# Patient Record
Sex: MALE | Race: WHITE | URBAN - METROPOLITAN AREA
[De-identification: names, ages, dates, MRNs, and addresses within clinical notes are randomized per-mention and may not be internally consistent; named-entity substitution may affect disease eponyms.]

---

## 2017-01-01 ENCOUNTER — ONCOLOGY VISIT (OUTPATIENT)
Dept: ONCOLOGY | Facility: CLINIC | Age: 27
End: 2017-01-01
Attending: PHYSICIAN ASSISTANT
Payer: COMMERCIAL

## 2017-01-01 ENCOUNTER — ALLIED HEALTH/NURSE VISIT (OUTPATIENT)
Dept: RADIATION ONCOLOGY | Facility: CLINIC | Age: 27
End: 2017-01-01
Attending: RADIOLOGY
Payer: COMMERCIAL

## 2017-01-01 ENCOUNTER — INFUSION THERAPY VISIT (OUTPATIENT)
Dept: ONCOLOGY | Facility: CLINIC | Age: 27
End: 2017-01-01
Attending: INTERNAL MEDICINE
Payer: COMMERCIAL

## 2017-01-01 ENCOUNTER — OFFICE VISIT (OUTPATIENT)
Dept: PALLIATIVE CARE | Facility: CLINIC | Age: 27
End: 2017-01-01
Attending: INTERNAL MEDICINE
Payer: COMMERCIAL

## 2017-01-01 ENCOUNTER — CARE COORDINATION (OUTPATIENT)
Dept: ONCOLOGY | Facility: CLINIC | Age: 27
End: 2017-01-01

## 2017-01-01 ENCOUNTER — TELEPHONE (OUTPATIENT)
Dept: INTERVENTIONAL RADIOLOGY/VASCULAR | Facility: CLINIC | Age: 27
End: 2017-01-01

## 2017-01-01 ENCOUNTER — APPOINTMENT (OUTPATIENT)
Dept: LAB | Facility: CLINIC | Age: 27
End: 2017-01-01
Attending: INTERNAL MEDICINE
Payer: COMMERCIAL

## 2017-01-01 ENCOUNTER — MYC MEDICAL ADVICE (OUTPATIENT)
Dept: ONCOLOGY | Facility: CLINIC | Age: 27
End: 2017-01-01

## 2017-01-01 ENCOUNTER — TELEPHONE (OUTPATIENT)
Dept: ONCOLOGY | Facility: CLINIC | Age: 27
End: 2017-01-01

## 2017-01-01 ENCOUNTER — OFFICE VISIT (OUTPATIENT)
Dept: ORTHOPEDICS | Facility: CLINIC | Age: 27
End: 2017-01-01

## 2017-01-01 ENCOUNTER — HOSPITAL ENCOUNTER (OUTPATIENT)
Dept: NUTRITION | Facility: CLINIC | Age: 27
Discharge: HOME | End: 2017-04-25
Attending: INTERNAL MEDICINE | Admitting: INTERNAL MEDICINE
Payer: COMMERCIAL

## 2017-01-01 ENCOUNTER — ALLIED HEALTH/NURSE VISIT (OUTPATIENT)
Dept: ONCOLOGY | Facility: CLINIC | Age: 27
End: 2017-01-01
Attending: DIETITIAN, REGISTERED
Payer: COMMERCIAL

## 2017-01-01 ENCOUNTER — MYC REFILL (OUTPATIENT)
Dept: ONCOLOGY | Facility: CLINIC | Age: 27
End: 2017-01-01

## 2017-01-01 ENCOUNTER — OFFICE VISIT (OUTPATIENT)
Dept: ONCOLOGY | Facility: CLINIC | Age: 27
End: 2017-01-01
Attending: GENETIC COUNSELOR, MS
Payer: COMMERCIAL

## 2017-01-01 ENCOUNTER — ONCOLOGY VISIT (OUTPATIENT)
Dept: RADIATION ONCOLOGY | Facility: CLINIC | Age: 27
End: 2017-01-01

## 2017-01-01 ENCOUNTER — APPOINTMENT (OUTPATIENT)
Dept: MEDSURG UNIT | Facility: CLINIC | Age: 27
End: 2017-01-01
Attending: RADIOLOGY
Payer: COMMERCIAL

## 2017-01-01 ENCOUNTER — THERAPY VISIT (OUTPATIENT)
Dept: SPEECH THERAPY | Facility: CLINIC | Age: 27
End: 2017-01-01

## 2017-01-01 ENCOUNTER — APPOINTMENT (OUTPATIENT)
Dept: INTERVENTIONAL RADIOLOGY/VASCULAR | Facility: CLINIC | Age: 27
End: 2017-01-01
Attending: PHYSICIAN ASSISTANT
Payer: COMMERCIAL

## 2017-01-01 ENCOUNTER — TELEPHONE (OUTPATIENT)
Dept: ENDOCRINOLOGY | Facility: CLINIC | Age: 27
End: 2017-01-01

## 2017-01-01 ENCOUNTER — HOSPITAL ENCOUNTER (OUTPATIENT)
Facility: CLINIC | Age: 27
End: 2017-01-01
Admitting: INTERNAL MEDICINE
Payer: COMMERCIAL

## 2017-01-01 ENCOUNTER — PRE VISIT (OUTPATIENT)
Dept: ORTHOPEDICS | Facility: CLINIC | Age: 27
End: 2017-01-01

## 2017-01-01 ENCOUNTER — HOSPITAL ENCOUNTER (OUTPATIENT)
Facility: CLINIC | Age: 27
Setting detail: OBSERVATION
Discharge: HOME HEALTH | End: 2017-03-21
Attending: RADIOLOGY | Admitting: INTERNAL MEDICINE
Payer: COMMERCIAL

## 2017-01-01 VITALS
TEMPERATURE: 97.5 F | WEIGHT: 129.9 LBS | HEART RATE: 88 BPM | RESPIRATION RATE: 16 BRPM | OXYGEN SATURATION: 98 % | DIASTOLIC BLOOD PRESSURE: 81 MMHG | BODY MASS INDEX: 16.68 KG/M2 | SYSTOLIC BLOOD PRESSURE: 122 MMHG

## 2017-01-01 VITALS
RESPIRATION RATE: 16 BRPM | HEART RATE: 109 BPM | DIASTOLIC BLOOD PRESSURE: 77 MMHG | TEMPERATURE: 98.5 F | OXYGEN SATURATION: 98 % | WEIGHT: 127.8 LBS | HEIGHT: 74 IN | BODY MASS INDEX: 16.4 KG/M2 | SYSTOLIC BLOOD PRESSURE: 117 MMHG

## 2017-01-01 VITALS
HEART RATE: 106 BPM | TEMPERATURE: 98.3 F | BODY MASS INDEX: 19.3 KG/M2 | DIASTOLIC BLOOD PRESSURE: 67 MMHG | HEIGHT: 74 IN | DIASTOLIC BLOOD PRESSURE: 79 MMHG | SYSTOLIC BLOOD PRESSURE: 127 MMHG | RESPIRATION RATE: 18 BRPM | WEIGHT: 126.9 LBS | SYSTOLIC BLOOD PRESSURE: 118 MMHG | OXYGEN SATURATION: 100 % | BODY MASS INDEX: 16.29 KG/M2 | WEIGHT: 150.4 LBS | OXYGEN SATURATION: 93 % | HEART RATE: 107 BPM | RESPIRATION RATE: 18 BRPM | TEMPERATURE: 98.7 F

## 2017-01-01 VITALS
RESPIRATION RATE: 18 BRPM | SYSTOLIC BLOOD PRESSURE: 120 MMHG | HEART RATE: 75 BPM | OXYGEN SATURATION: 100 % | BODY MASS INDEX: 15.89 KG/M2 | DIASTOLIC BLOOD PRESSURE: 80 MMHG | WEIGHT: 123.8 LBS | TEMPERATURE: 98.1 F | HEIGHT: 74 IN

## 2017-01-01 VITALS
RESPIRATION RATE: 16 BRPM | DIASTOLIC BLOOD PRESSURE: 81 MMHG | OXYGEN SATURATION: 100 % | WEIGHT: 134.7 LBS | TEMPERATURE: 98.3 F | HEART RATE: 93 BPM | BODY MASS INDEX: 17.29 KG/M2 | SYSTOLIC BLOOD PRESSURE: 130 MMHG

## 2017-01-01 VITALS
RESPIRATION RATE: 14 BRPM | SYSTOLIC BLOOD PRESSURE: 133 MMHG | TEMPERATURE: 98.9 F | WEIGHT: 132.3 LBS | DIASTOLIC BLOOD PRESSURE: 93 MMHG | BODY MASS INDEX: 16.98 KG/M2 | HEART RATE: 115 BPM | OXYGEN SATURATION: 100 %

## 2017-01-01 VITALS
OXYGEN SATURATION: 100 % | BODY MASS INDEX: 18.22 KG/M2 | HEART RATE: 82 BPM | DIASTOLIC BLOOD PRESSURE: 71 MMHG | SYSTOLIC BLOOD PRESSURE: 128 MMHG | WEIGHT: 142 LBS

## 2017-01-01 VITALS
SYSTOLIC BLOOD PRESSURE: 127 MMHG | BODY MASS INDEX: 18.19 KG/M2 | TEMPERATURE: 97.8 F | WEIGHT: 141.7 LBS | RESPIRATION RATE: 18 BRPM | HEART RATE: 94 BPM | OXYGEN SATURATION: 100 % | DIASTOLIC BLOOD PRESSURE: 79 MMHG

## 2017-01-01 VITALS
DIASTOLIC BLOOD PRESSURE: 99 MMHG | HEART RATE: 109 BPM | OXYGEN SATURATION: 100 % | SYSTOLIC BLOOD PRESSURE: 130 MMHG | BODY MASS INDEX: 16.81 KG/M2 | WEIGHT: 131 LBS

## 2017-01-01 VITALS
TEMPERATURE: 98 F | HEART RATE: 104 BPM | BODY MASS INDEX: 16.54 KG/M2 | DIASTOLIC BLOOD PRESSURE: 84 MMHG | RESPIRATION RATE: 16 BRPM | SYSTOLIC BLOOD PRESSURE: 118 MMHG | OXYGEN SATURATION: 100 % | WEIGHT: 128.8 LBS

## 2017-01-01 VITALS
SYSTOLIC BLOOD PRESSURE: 111 MMHG | WEIGHT: 125.44 LBS | OXYGEN SATURATION: 100 % | HEART RATE: 116 BPM | TEMPERATURE: 98.1 F | DIASTOLIC BLOOD PRESSURE: 76 MMHG | BODY MASS INDEX: 16.1 KG/M2 | RESPIRATION RATE: 16 BRPM

## 2017-01-01 VITALS
HEIGHT: 74 IN | TEMPERATURE: 98.2 F | WEIGHT: 142 LBS | HEART RATE: 85 BPM | SYSTOLIC BLOOD PRESSURE: 126 MMHG | RESPIRATION RATE: 18 BRPM | OXYGEN SATURATION: 97 % | DIASTOLIC BLOOD PRESSURE: 81 MMHG | BODY MASS INDEX: 18.22 KG/M2

## 2017-01-01 VITALS
SYSTOLIC BLOOD PRESSURE: 115 MMHG | HEART RATE: 120 BPM | DIASTOLIC BLOOD PRESSURE: 67 MMHG | HEIGHT: 74 IN | WEIGHT: 131.7 LBS | TEMPERATURE: 98.7 F | OXYGEN SATURATION: 100 % | BODY MASS INDEX: 16.9 KG/M2 | RESPIRATION RATE: 20 BRPM

## 2017-01-01 VITALS
BODY MASS INDEX: 18.85 KG/M2 | TEMPERATURE: 99.5 F | SYSTOLIC BLOOD PRESSURE: 115 MMHG | DIASTOLIC BLOOD PRESSURE: 72 MMHG | HEART RATE: 97 BPM | WEIGHT: 146.9 LBS | HEIGHT: 74 IN | OXYGEN SATURATION: 100 % | RESPIRATION RATE: 14 BRPM

## 2017-01-01 VITALS
DIASTOLIC BLOOD PRESSURE: 82 MMHG | RESPIRATION RATE: 18 BRPM | BODY MASS INDEX: 15.97 KG/M2 | TEMPERATURE: 98.9 F | WEIGHT: 124.4 LBS | OXYGEN SATURATION: 100 % | SYSTOLIC BLOOD PRESSURE: 113 MMHG | HEIGHT: 74 IN | HEART RATE: 119 BPM

## 2017-01-01 VITALS
TEMPERATURE: 98.2 F | OXYGEN SATURATION: 99 % | DIASTOLIC BLOOD PRESSURE: 73 MMHG | HEART RATE: 113 BPM | BODY MASS INDEX: 16.19 KG/M2 | WEIGHT: 126.11 LBS | RESPIRATION RATE: 16 BRPM | SYSTOLIC BLOOD PRESSURE: 109 MMHG

## 2017-01-01 VITALS
RESPIRATION RATE: 16 BRPM | DIASTOLIC BLOOD PRESSURE: 68 MMHG | TEMPERATURE: 97.4 F | OXYGEN SATURATION: 97 % | SYSTOLIC BLOOD PRESSURE: 112 MMHG | HEART RATE: 101 BPM

## 2017-01-01 VITALS
TEMPERATURE: 98.1 F | OXYGEN SATURATION: 100 % | WEIGHT: 136.9 LBS | RESPIRATION RATE: 18 BRPM | SYSTOLIC BLOOD PRESSURE: 118 MMHG | DIASTOLIC BLOOD PRESSURE: 75 MMHG | HEART RATE: 103 BPM | BODY MASS INDEX: 17.57 KG/M2

## 2017-01-01 VITALS — BODY MASS INDEX: 17.53 KG/M2 | WEIGHT: 136.6 LBS

## 2017-01-01 VITALS — HEIGHT: 74 IN | BODY MASS INDEX: 18.26 KG/M2 | WEIGHT: 142.3 LBS

## 2017-01-01 DIAGNOSIS — D70.2 DRUG-INDUCED NEUTROPENIA (H): ICD-10-CM

## 2017-01-01 DIAGNOSIS — R63.4 WEIGHT LOSS: ICD-10-CM

## 2017-01-01 DIAGNOSIS — C76.0 HEAD AND NECK CANCER (H): Primary | ICD-10-CM

## 2017-01-01 DIAGNOSIS — Z80.8 FAMILY HISTORY OF BRAIN CANCER: ICD-10-CM

## 2017-01-01 DIAGNOSIS — C76.0 HEAD AND NECK CANCER (H): ICD-10-CM

## 2017-01-01 DIAGNOSIS — C79.51 BONE METASTASIS: ICD-10-CM

## 2017-01-01 DIAGNOSIS — R09.89 RESPIRATORY CRACKLES AT LEFT LUNG BASE: ICD-10-CM

## 2017-01-01 DIAGNOSIS — C02.9 MALIGNANT NEOPLASM OF TONGUE (H): Primary | ICD-10-CM

## 2017-01-01 DIAGNOSIS — C02.9 MALIGNANT NEOPLASM OF TONGUE (H): ICD-10-CM

## 2017-01-01 DIAGNOSIS — C06.9 ORAL CANCER (H): ICD-10-CM

## 2017-01-01 DIAGNOSIS — Z80.41 FAMILY HISTORY OF MALIGNANT NEOPLASM OF OVARY: ICD-10-CM

## 2017-01-01 DIAGNOSIS — C79.51 BONE METASTASIS: Primary | ICD-10-CM

## 2017-01-01 DIAGNOSIS — Z80.51 FAMILY HISTORY OF KIDNEY CANCER: ICD-10-CM

## 2017-01-01 DIAGNOSIS — R13.12 OROPHARYNGEAL DYSPHAGIA: Primary | ICD-10-CM

## 2017-01-01 DIAGNOSIS — R53.0 NEOPLASTIC MALIGNANT RELATED FATIGUE: ICD-10-CM

## 2017-01-01 DIAGNOSIS — D70.1 CHEMOTHERAPY-INDUCED NEUTROPENIA (H): ICD-10-CM

## 2017-01-01 DIAGNOSIS — R21 RASH: ICD-10-CM

## 2017-01-01 DIAGNOSIS — C02.9 PRIMARY TONGUE SQUAMOUS CELL CARCINOMA (H): Primary | ICD-10-CM

## 2017-01-01 DIAGNOSIS — K59.03 DRUG-INDUCED CONSTIPATION: ICD-10-CM

## 2017-01-01 DIAGNOSIS — C14.0 THROAT CANCER (H): Primary | ICD-10-CM

## 2017-01-01 DIAGNOSIS — R11.0 NAUSEA: ICD-10-CM

## 2017-01-01 DIAGNOSIS — K11.7 INCREASED OROPHARYNGEAL SECRETIONS: ICD-10-CM

## 2017-01-01 DIAGNOSIS — E03.8 OTHER SPECIFIED HYPOTHYROIDISM: ICD-10-CM

## 2017-01-01 DIAGNOSIS — C02.9 TONGUE CANCER (H): ICD-10-CM

## 2017-01-01 DIAGNOSIS — Z51.5 ENCOUNTER FOR PALLIATIVE CARE: Primary | ICD-10-CM

## 2017-01-01 DIAGNOSIS — D70.2 DRUG-INDUCED NEUTROPENIA (H): Primary | ICD-10-CM

## 2017-01-01 DIAGNOSIS — T45.1X5A CHEMOTHERAPY-INDUCED NEUTROPENIA (H): ICD-10-CM

## 2017-01-01 DIAGNOSIS — K12.31 MUCOSITIS DUE TO CHEMOTHERAPY: ICD-10-CM

## 2017-01-01 LAB
ABO + RH BLD: NORMAL
ABO + RH BLD: NORMAL
ACANTHOCYTES BLD QL SMEAR: SLIGHT
ALBUMIN SERPL-MCNC: 2.7 G/DL (ref 3.4–5)
ALBUMIN SERPL-MCNC: 2.8 G/DL (ref 3.4–5)
ALBUMIN SERPL-MCNC: 2.9 G/DL (ref 3.4–5)
ALBUMIN SERPL-MCNC: 3 G/DL (ref 3.4–5)
ALBUMIN SERPL-MCNC: 3.1 G/DL (ref 3.4–5)
ALBUMIN SERPL-MCNC: 3.1 G/DL (ref 3.4–5)
ALBUMIN SERPL-MCNC: 3.2 G/DL (ref 3.4–5)
ALBUMIN SERPL-MCNC: 3.3 G/DL (ref 3.4–5)
ALBUMIN SERPL-MCNC: 3.4 G/DL (ref 3.4–5)
ALBUMIN SERPL-MCNC: 3.4 G/DL (ref 3.4–5)
ALP SERPL-CCNC: 133 U/L (ref 40–150)
ALP SERPL-CCNC: 177 U/L (ref 40–150)
ALP SERPL-CCNC: 64 U/L (ref 40–150)
ALP SERPL-CCNC: 66 U/L (ref 40–150)
ALP SERPL-CCNC: 68 U/L (ref 40–150)
ALP SERPL-CCNC: 69 U/L (ref 40–150)
ALP SERPL-CCNC: 69 U/L (ref 40–150)
ALP SERPL-CCNC: 71 U/L (ref 40–150)
ALP SERPL-CCNC: 75 U/L (ref 40–150)
ALP SERPL-CCNC: 76 U/L (ref 40–150)
ALP SERPL-CCNC: 84 U/L (ref 40–150)
ALP SERPL-CCNC: 95 U/L (ref 40–150)
ALT SERPL W P-5'-P-CCNC: 18 U/L (ref 0–70)
ALT SERPL W P-5'-P-CCNC: 23 U/L (ref 0–70)
ALT SERPL W P-5'-P-CCNC: 26 U/L (ref 0–70)
ALT SERPL W P-5'-P-CCNC: 27 U/L (ref 0–70)
ALT SERPL W P-5'-P-CCNC: 27 U/L (ref 0–70)
ALT SERPL W P-5'-P-CCNC: 28 U/L (ref 0–70)
ALT SERPL W P-5'-P-CCNC: 34 U/L (ref 0–70)
ALT SERPL W P-5'-P-CCNC: 34 U/L (ref 0–70)
ALT SERPL W P-5'-P-CCNC: 37 U/L (ref 0–70)
ALT SERPL W P-5'-P-CCNC: 43 U/L (ref 0–70)
ALT SERPL W P-5'-P-CCNC: 51 U/L (ref 0–70)
ALT SERPL W P-5'-P-CCNC: 52 U/L (ref 0–70)
ALT SERPL W P-5'-P-CCNC: 52 U/L (ref 0–70)
ALT SERPL W P-5'-P-CCNC: 64 U/L (ref 0–70)
ALT SERPL W P-5'-P-CCNC: 65 U/L (ref 0–70)
ALT SERPL W P-5'-P-CCNC: 71 U/L (ref 0–70)
ANION GAP SERPL CALCULATED.3IONS-SCNC: 5 MMOL/L (ref 3–14)
ANION GAP SERPL CALCULATED.3IONS-SCNC: 6 MMOL/L (ref 3–14)
ANION GAP SERPL CALCULATED.3IONS-SCNC: 7 MMOL/L (ref 3–14)
ANION GAP SERPL CALCULATED.3IONS-SCNC: 8 MMOL/L (ref 3–14)
ANION GAP SERPL CALCULATED.3IONS-SCNC: 9 MMOL/L (ref 3–14)
ANISOCYTOSIS BLD QL SMEAR: ABNORMAL
ANISOCYTOSIS BLD QL SMEAR: ABNORMAL
AST SERPL W P-5'-P-CCNC: 15 U/L (ref 0–45)
AST SERPL W P-5'-P-CCNC: 15 U/L (ref 0–45)
AST SERPL W P-5'-P-CCNC: 18 U/L (ref 0–45)
AST SERPL W P-5'-P-CCNC: 19 U/L (ref 0–45)
AST SERPL W P-5'-P-CCNC: 19 U/L (ref 0–45)
AST SERPL W P-5'-P-CCNC: 20 U/L (ref 0–45)
AST SERPL W P-5'-P-CCNC: 22 U/L (ref 0–45)
AST SERPL W P-5'-P-CCNC: 23 U/L (ref 0–45)
AST SERPL W P-5'-P-CCNC: 24 U/L (ref 0–45)
AST SERPL W P-5'-P-CCNC: 25 U/L (ref 0–45)
AST SERPL W P-5'-P-CCNC: 30 U/L (ref 0–45)
AST SERPL W P-5'-P-CCNC: 30 U/L (ref 0–45)
AST SERPL W P-5'-P-CCNC: 32 U/L (ref 0–45)
AST SERPL W P-5'-P-CCNC: 38 U/L (ref 0–45)
AST SERPL W P-5'-P-CCNC: 39 U/L (ref 0–45)
AST SERPL W P-5'-P-CCNC: 41 U/L (ref 0–45)
BASOPHILS # BLD AUTO: 0 10E9/L (ref 0–0.2)
BASOPHILS # BLD AUTO: 0.1 10E9/L (ref 0–0.2)
BASOPHILS # BLD AUTO: 0.1 10E9/L (ref 0–0.2)
BASOPHILS NFR BLD AUTO: 0 %
BASOPHILS NFR BLD AUTO: 0.3 %
BASOPHILS NFR BLD AUTO: 0.4 %
BASOPHILS NFR BLD AUTO: 0.4 %
BASOPHILS NFR BLD AUTO: 0.5 %
BASOPHILS NFR BLD AUTO: 0.7 %
BASOPHILS NFR BLD AUTO: 0.8 %
BASOPHILS NFR BLD AUTO: 0.9 %
BILIRUB SERPL-MCNC: 0.2 MG/DL (ref 0.2–1.3)
BILIRUB SERPL-MCNC: 0.3 MG/DL (ref 0.2–1.3)
BILIRUB SERPL-MCNC: 0.4 MG/DL (ref 0.2–1.3)
BILIRUB SERPL-MCNC: 0.5 MG/DL (ref 0.2–1.3)
BILIRUB SERPL-MCNC: 0.5 MG/DL (ref 0.2–1.3)
BILIRUB SERPL-MCNC: 0.7 MG/DL (ref 0.2–1.3)
BILIRUB SERPL-MCNC: 0.7 MG/DL (ref 0.2–1.3)
BLD GP AB SCN SERPL QL: NORMAL
BLD PROD TYP BPU: NORMAL
BLD PROD TYP BPU: NORMAL
BLD UNIT ID BPU: 0
BLOOD BANK CMNT PATIENT-IMP: NORMAL
BLOOD PRODUCT CODE: NORMAL
BPU ID: NORMAL
BUN SERPL-MCNC: 10 MG/DL (ref 7–30)
BUN SERPL-MCNC: 11 MG/DL (ref 7–30)
BUN SERPL-MCNC: 11 MG/DL (ref 7–30)
BUN SERPL-MCNC: 12 MG/DL (ref 7–30)
BUN SERPL-MCNC: 14 MG/DL (ref 7–30)
BUN SERPL-MCNC: 16 MG/DL (ref 7–30)
BUN SERPL-MCNC: 17 MG/DL (ref 7–30)
BUN SERPL-MCNC: 17 MG/DL (ref 7–30)
BUN SERPL-MCNC: 20 MG/DL (ref 7–30)
BUN SERPL-MCNC: 8 MG/DL (ref 7–30)
CALCIUM SERPL-MCNC: 10.6 MG/DL (ref 8.5–10.1)
CALCIUM SERPL-MCNC: 10.7 MG/DL (ref 8.5–10.1)
CALCIUM SERPL-MCNC: 7.9 MG/DL (ref 8.5–10.1)
CALCIUM SERPL-MCNC: 8.5 MG/DL (ref 8.5–10.1)
CALCIUM SERPL-MCNC: 8.6 MG/DL (ref 8.5–10.1)
CALCIUM SERPL-MCNC: 8.8 MG/DL (ref 8.5–10.1)
CALCIUM SERPL-MCNC: 9 MG/DL (ref 8.5–10.1)
CALCIUM SERPL-MCNC: 9.1 MG/DL (ref 8.5–10.1)
CALCIUM SERPL-MCNC: 9.2 MG/DL (ref 8.5–10.1)
CALCIUM SERPL-MCNC: 9.3 MG/DL (ref 8.5–10.1)
CHLORIDE SERPL-SCNC: 100 MMOL/L (ref 94–109)
CHLORIDE SERPL-SCNC: 101 MMOL/L (ref 94–109)
CHLORIDE SERPL-SCNC: 102 MMOL/L (ref 94–109)
CHLORIDE SERPL-SCNC: 102 MMOL/L (ref 94–109)
CHLORIDE SERPL-SCNC: 103 MMOL/L (ref 94–109)
CHLORIDE SERPL-SCNC: 103 MMOL/L (ref 94–109)
CHLORIDE SERPL-SCNC: 104 MMOL/L (ref 94–109)
CHLORIDE SERPL-SCNC: 105 MMOL/L (ref 94–109)
CHLORIDE SERPL-SCNC: 99 MMOL/L (ref 94–109)
CO2 SERPL-SCNC: 25 MMOL/L (ref 20–32)
CO2 SERPL-SCNC: 26 MMOL/L (ref 20–32)
CO2 SERPL-SCNC: 27 MMOL/L (ref 20–32)
CO2 SERPL-SCNC: 28 MMOL/L (ref 20–32)
CO2 SERPL-SCNC: 29 MMOL/L (ref 20–32)
CO2 SERPL-SCNC: 32 MMOL/L (ref 20–32)
COPATH REPORT: NORMAL
CREAT SERPL-MCNC: 0.53 MG/DL (ref 0.66–1.25)
CREAT SERPL-MCNC: 0.55 MG/DL (ref 0.66–1.25)
CREAT SERPL-MCNC: 0.55 MG/DL (ref 0.66–1.25)
CREAT SERPL-MCNC: 0.56 MG/DL (ref 0.66–1.25)
CREAT SERPL-MCNC: 0.56 MG/DL (ref 0.66–1.25)
CREAT SERPL-MCNC: 0.58 MG/DL (ref 0.66–1.25)
CREAT SERPL-MCNC: 0.59 MG/DL (ref 0.66–1.25)
CREAT SERPL-MCNC: 0.63 MG/DL (ref 0.66–1.25)
CREAT SERPL-MCNC: 0.65 MG/DL (ref 0.66–1.25)
CREAT SERPL-MCNC: 0.66 MG/DL (ref 0.66–1.25)
CREAT SERPL-MCNC: 0.69 MG/DL (ref 0.66–1.25)
CREAT SERPL-MCNC: 0.71 MG/DL (ref 0.66–1.25)
CREAT SERPL-MCNC: 0.72 MG/DL (ref 0.66–1.25)
CREAT SERPL-MCNC: 0.73 MG/DL (ref 0.66–1.25)
DACRYOCYTES BLD QL SMEAR: SLIGHT
DIFFERENTIAL METHOD BLD: ABNORMAL
EOSINOPHIL # BLD AUTO: 0 10E9/L (ref 0–0.7)
EOSINOPHIL # BLD AUTO: 0.1 10E9/L (ref 0–0.7)
EOSINOPHIL # BLD AUTO: 0.3 10E9/L (ref 0–0.7)
EOSINOPHIL # BLD AUTO: 0.4 10E9/L (ref 0–0.7)
EOSINOPHIL NFR BLD AUTO: 0 %
EOSINOPHIL NFR BLD AUTO: 0.7 %
EOSINOPHIL NFR BLD AUTO: 0.8 %
EOSINOPHIL NFR BLD AUTO: 0.8 %
EOSINOPHIL NFR BLD AUTO: 1 %
EOSINOPHIL NFR BLD AUTO: 1.5 %
EOSINOPHIL NFR BLD AUTO: 1.8 %
EOSINOPHIL NFR BLD AUTO: 2 %
EOSINOPHIL NFR BLD AUTO: 2.9 %
EOSINOPHIL NFR BLD AUTO: 3.2 %
EOSINOPHIL NFR BLD AUTO: 3.5 %
EOSINOPHIL NFR BLD AUTO: 3.7 %
EOSINOPHIL NFR BLD AUTO: 4.4 %
ERYTHROCYTE [DISTWIDTH] IN BLOOD BY AUTOMATED COUNT: 14.1 % (ref 10–15)
ERYTHROCYTE [DISTWIDTH] IN BLOOD BY AUTOMATED COUNT: 14.2 % (ref 10–15)
ERYTHROCYTE [DISTWIDTH] IN BLOOD BY AUTOMATED COUNT: 16 % (ref 10–15)
ERYTHROCYTE [DISTWIDTH] IN BLOOD BY AUTOMATED COUNT: 18.4 % (ref 10–15)
ERYTHROCYTE [DISTWIDTH] IN BLOOD BY AUTOMATED COUNT: 18.6 % (ref 10–15)
ERYTHROCYTE [DISTWIDTH] IN BLOOD BY AUTOMATED COUNT: 20.4 % (ref 10–15)
ERYTHROCYTE [DISTWIDTH] IN BLOOD BY AUTOMATED COUNT: 21.4 % (ref 10–15)
ERYTHROCYTE [DISTWIDTH] IN BLOOD BY AUTOMATED COUNT: 21.9 % (ref 10–15)
ERYTHROCYTE [DISTWIDTH] IN BLOOD BY AUTOMATED COUNT: 22.5 % (ref 10–15)
ERYTHROCYTE [DISTWIDTH] IN BLOOD BY AUTOMATED COUNT: 22.7 % (ref 10–15)
ERYTHROCYTE [DISTWIDTH] IN BLOOD BY AUTOMATED COUNT: 23.8 % (ref 10–15)
ERYTHROCYTE [DISTWIDTH] IN BLOOD BY AUTOMATED COUNT: 24 % (ref 10–15)
ERYTHROCYTE [DISTWIDTH] IN BLOOD BY AUTOMATED COUNT: 24.1 % (ref 10–15)
ERYTHROCYTE [DISTWIDTH] IN BLOOD BY AUTOMATED COUNT: 24.5 % (ref 10–15)
ERYTHROCYTE [DISTWIDTH] IN BLOOD BY AUTOMATED COUNT: 25 % (ref 10–15)
GFR SERPL CREATININE-BSD FRML MDRD: ABNORMAL ML/MIN/1.7M2
GFR SERPL CREATININE-BSD FRML MDRD: NORMAL ML/MIN/1.7M2
GLUCOSE SERPL-MCNC: 104 MG/DL (ref 70–99)
GLUCOSE SERPL-MCNC: 106 MG/DL (ref 70–99)
GLUCOSE SERPL-MCNC: 108 MG/DL (ref 70–99)
GLUCOSE SERPL-MCNC: 109 MG/DL (ref 70–99)
GLUCOSE SERPL-MCNC: 113 MG/DL (ref 70–99)
GLUCOSE SERPL-MCNC: 114 MG/DL (ref 70–99)
GLUCOSE SERPL-MCNC: 114 MG/DL (ref 70–99)
GLUCOSE SERPL-MCNC: 117 MG/DL (ref 70–99)
GLUCOSE SERPL-MCNC: 118 MG/DL (ref 70–99)
GLUCOSE SERPL-MCNC: 118 MG/DL (ref 70–99)
GLUCOSE SERPL-MCNC: 132 MG/DL (ref 70–99)
GLUCOSE SERPL-MCNC: 141 MG/DL (ref 70–99)
GLUCOSE SERPL-MCNC: 148 MG/DL (ref 70–99)
GLUCOSE SERPL-MCNC: 73 MG/DL (ref 70–99)
GLUCOSE SERPL-MCNC: 89 MG/DL (ref 70–99)
GLUCOSE SERPL-MCNC: 98 MG/DL (ref 70–99)
HCT VFR BLD AUTO: 23.7 % (ref 40–53)
HCT VFR BLD AUTO: 25.6 % (ref 40–53)
HCT VFR BLD AUTO: 26 % (ref 40–53)
HCT VFR BLD AUTO: 26.1 % (ref 40–53)
HCT VFR BLD AUTO: 26.1 % (ref 40–53)
HCT VFR BLD AUTO: 29.4 % (ref 40–53)
HCT VFR BLD AUTO: 29.4 % (ref 40–53)
HCT VFR BLD AUTO: 29.7 % (ref 40–53)
HCT VFR BLD AUTO: 30.3 % (ref 40–53)
HCT VFR BLD AUTO: 30.4 % (ref 40–53)
HCT VFR BLD AUTO: 30.4 % (ref 40–53)
HCT VFR BLD AUTO: 30.5 % (ref 40–53)
HCT VFR BLD AUTO: 31 % (ref 40–53)
HCT VFR BLD AUTO: 33 % (ref 40–53)
HCT VFR BLD AUTO: 33.3 % (ref 40–53)
HGB BLD-MCNC: 10 G/DL (ref 13.3–17.7)
HGB BLD-MCNC: 10.2 G/DL (ref 13.3–17.7)
HGB BLD-MCNC: 7.6 G/DL (ref 13.3–17.7)
HGB BLD-MCNC: 8.2 G/DL (ref 13.3–17.7)
HGB BLD-MCNC: 8.3 G/DL (ref 13.3–17.7)
HGB BLD-MCNC: 8.4 G/DL (ref 13.3–17.7)
HGB BLD-MCNC: 8.5 G/DL (ref 13.3–17.7)
HGB BLD-MCNC: 9.1 G/DL (ref 13.3–17.7)
HGB BLD-MCNC: 9.3 G/DL (ref 13.3–17.7)
HGB BLD-MCNC: 9.5 G/DL (ref 13.3–17.7)
HGB BLD-MCNC: 9.5 G/DL (ref 13.3–17.7)
HGB BLD-MCNC: 9.6 G/DL (ref 13.3–17.7)
HGB BLD-MCNC: 9.7 G/DL (ref 13.3–17.7)
HGB BLD-MCNC: 9.7 G/DL (ref 13.3–17.7)
HGB BLD-MCNC: 9.8 G/DL (ref 13.3–17.7)
IMM GRANULOCYTES # BLD: 0 10E9/L (ref 0–0.4)
IMM GRANULOCYTES # BLD: 0.1 10E9/L (ref 0–0.4)
IMM GRANULOCYTES # BLD: 0.1 10E9/L (ref 0–0.4)
IMM GRANULOCYTES NFR BLD: 0.3 %
IMM GRANULOCYTES NFR BLD: 0.4 %
IMM GRANULOCYTES NFR BLD: 0.5 %
IMM GRANULOCYTES NFR BLD: 0.5 %
IMM GRANULOCYTES NFR BLD: 0.6 %
IMM GRANULOCYTES NFR BLD: 0.7 %
IMM GRANULOCYTES NFR BLD: 0.7 %
IMM GRANULOCYTES NFR BLD: 1 %
IMM GRANULOCYTES NFR BLD: 1.3 %
IMM GRANULOCYTES NFR BLD: 1.5 %
IMM GRANULOCYTES NFR BLD: 2 %
INR PPP: 1.12 (ref 0.86–1.14)
INTERPRETATION ECG - MUSE: NORMAL
INTERPRETATION ECG - MUSE: NORMAL
LAB SCANNED RESULT: NORMAL
LAB SCANNED RESULT: NORMAL
LYMPHOCYTES # BLD AUTO: 0.2 10E9/L (ref 0.8–5.3)
LYMPHOCYTES # BLD AUTO: 0.3 10E9/L (ref 0.8–5.3)
LYMPHOCYTES # BLD AUTO: 0.4 10E9/L (ref 0.8–5.3)
LYMPHOCYTES # BLD AUTO: 0.5 10E9/L (ref 0.8–5.3)
LYMPHOCYTES # BLD AUTO: 0.5 10E9/L (ref 0.8–5.3)
LYMPHOCYTES # BLD AUTO: 0.6 10E9/L (ref 0.8–5.3)
LYMPHOCYTES # BLD AUTO: 0.9 10E9/L (ref 0.8–5.3)
LYMPHOCYTES NFR BLD AUTO: 10.3 %
LYMPHOCYTES NFR BLD AUTO: 10.6 %
LYMPHOCYTES NFR BLD AUTO: 12.4 %
LYMPHOCYTES NFR BLD AUTO: 12.4 %
LYMPHOCYTES NFR BLD AUTO: 12.6 %
LYMPHOCYTES NFR BLD AUTO: 14.3 %
LYMPHOCYTES NFR BLD AUTO: 15.7 %
LYMPHOCYTES NFR BLD AUTO: 15.7 %
LYMPHOCYTES NFR BLD AUTO: 16.1 %
LYMPHOCYTES NFR BLD AUTO: 16.8 %
LYMPHOCYTES NFR BLD AUTO: 17.8 %
LYMPHOCYTES NFR BLD AUTO: 19.6 %
LYMPHOCYTES NFR BLD AUTO: 28.6 %
LYMPHOCYTES NFR BLD AUTO: 7.4 %
LYMPHOCYTES NFR BLD AUTO: 8.3 %
MAGNESIUM SERPL-MCNC: 2 MG/DL (ref 1.6–2.3)
MAGNESIUM SERPL-MCNC: 2.1 MG/DL (ref 1.6–2.3)
MAGNESIUM SERPL-MCNC: 2.2 MG/DL (ref 1.6–2.3)
MAGNESIUM SERPL-MCNC: 2.3 MG/DL (ref 1.6–2.3)
MAGNESIUM SERPL-MCNC: 2.3 MG/DL (ref 1.6–2.3)
MAGNESIUM SERPL-MCNC: 2.4 MG/DL (ref 1.6–2.3)
MAGNESIUM SERPL-MCNC: 2.5 MG/DL (ref 1.6–2.3)
MAGNESIUM SERPL-MCNC: 2.7 MG/DL (ref 1.6–2.3)
MCH RBC QN AUTO: 24.2 PG (ref 26.5–33)
MCH RBC QN AUTO: 24.5 PG (ref 26.5–33)
MCH RBC QN AUTO: 24.7 PG (ref 26.5–33)
MCH RBC QN AUTO: 24.8 PG (ref 26.5–33)
MCH RBC QN AUTO: 24.9 PG (ref 26.5–33)
MCH RBC QN AUTO: 25.8 PG (ref 26.5–33)
MCH RBC QN AUTO: 26.1 PG (ref 26.5–33)
MCH RBC QN AUTO: 26.5 PG (ref 26.5–33)
MCH RBC QN AUTO: 26.6 PG (ref 26.5–33)
MCH RBC QN AUTO: 27.2 PG (ref 26.5–33)
MCH RBC QN AUTO: 28.6 PG (ref 26.5–33)
MCH RBC QN AUTO: 28.9 PG (ref 26.5–33)
MCH RBC QN AUTO: 29.2 PG (ref 26.5–33)
MCH RBC QN AUTO: 30.4 PG (ref 26.5–33)
MCH RBC QN AUTO: 30.9 PG (ref 26.5–33)
MCHC RBC AUTO-ENTMCNC: 30.3 G/DL (ref 31.5–36.5)
MCHC RBC AUTO-ENTMCNC: 30.6 G/DL (ref 31.5–36.5)
MCHC RBC AUTO-ENTMCNC: 31 G/DL (ref 31.5–36.5)
MCHC RBC AUTO-ENTMCNC: 31.1 G/DL (ref 31.5–36.5)
MCHC RBC AUTO-ENTMCNC: 31.3 G/DL (ref 31.5–36.5)
MCHC RBC AUTO-ENTMCNC: 31.5 G/DL (ref 31.5–36.5)
MCHC RBC AUTO-ENTMCNC: 31.6 G/DL (ref 31.5–36.5)
MCHC RBC AUTO-ENTMCNC: 31.7 G/DL (ref 31.5–36.5)
MCHC RBC AUTO-ENTMCNC: 31.8 G/DL (ref 31.5–36.5)
MCHC RBC AUTO-ENTMCNC: 31.9 G/DL (ref 31.5–36.5)
MCHC RBC AUTO-ENTMCNC: 31.9 G/DL (ref 31.5–36.5)
MCHC RBC AUTO-ENTMCNC: 32.1 G/DL (ref 31.5–36.5)
MCHC RBC AUTO-ENTMCNC: 32.3 G/DL (ref 31.5–36.5)
MCHC RBC AUTO-ENTMCNC: 32.6 G/DL (ref 31.5–36.5)
MCHC RBC AUTO-ENTMCNC: 32.8 G/DL (ref 31.5–36.5)
MCV RBC AUTO: 78 FL (ref 78–100)
MCV RBC AUTO: 78 FL (ref 78–100)
MCV RBC AUTO: 79 FL (ref 78–100)
MCV RBC AUTO: 80 FL (ref 78–100)
MCV RBC AUTO: 82 FL (ref 78–100)
MCV RBC AUTO: 83 FL (ref 78–100)
MCV RBC AUTO: 84 FL (ref 78–100)
MCV RBC AUTO: 85 FL (ref 78–100)
MCV RBC AUTO: 88 FL (ref 78–100)
MCV RBC AUTO: 88 FL (ref 78–100)
MCV RBC AUTO: 93 FL (ref 78–100)
MCV RBC AUTO: 95 FL (ref 78–100)
MCV RBC AUTO: 97 FL (ref 78–100)
MICROCYTES BLD QL SMEAR: PRESENT
MISCELLANEOUS TEST: NORMAL
MISCELLANEOUS TEST: NORMAL
MONOCYTES # BLD AUTO: 0.1 10E9/L (ref 0–1.3)
MONOCYTES # BLD AUTO: 0.2 10E9/L (ref 0–1.3)
MONOCYTES # BLD AUTO: 0.2 10E9/L (ref 0–1.3)
MONOCYTES # BLD AUTO: 0.3 10E9/L (ref 0–1.3)
MONOCYTES # BLD AUTO: 0.4 10E9/L (ref 0–1.3)
MONOCYTES # BLD AUTO: 0.5 10E9/L (ref 0–1.3)
MONOCYTES # BLD AUTO: 0.5 10E9/L (ref 0–1.3)
MONOCYTES # BLD AUTO: 0.6 10E9/L (ref 0–1.3)
MONOCYTES # BLD AUTO: 0.7 10E9/L (ref 0–1.3)
MONOCYTES # BLD AUTO: 1.7 10E9/L (ref 0–1.3)
MONOCYTES NFR BLD AUTO: 10.2 %
MONOCYTES NFR BLD AUTO: 10.3 %
MONOCYTES NFR BLD AUTO: 13.1 %
MONOCYTES NFR BLD AUTO: 14.2 %
MONOCYTES NFR BLD AUTO: 14.7 %
MONOCYTES NFR BLD AUTO: 18.2 %
MONOCYTES NFR BLD AUTO: 19.4 %
MONOCYTES NFR BLD AUTO: 20.1 %
MONOCYTES NFR BLD AUTO: 23 %
MONOCYTES NFR BLD AUTO: 24.8 %
MONOCYTES NFR BLD AUTO: 25 %
MONOCYTES NFR BLD AUTO: 26.8 %
MONOCYTES NFR BLD AUTO: 28.7 %
MONOCYTES NFR BLD AUTO: 5.3 %
MONOCYTES NFR BLD AUTO: 6.2 %
NEUTROPHILS # BLD AUTO: 0.5 10E9/L (ref 1.6–8.3)
NEUTROPHILS # BLD AUTO: 0.5 10E9/L (ref 1.6–8.3)
NEUTROPHILS # BLD AUTO: 1 10E9/L (ref 1.6–8.3)
NEUTROPHILS # BLD AUTO: 1.2 10E9/L (ref 1.6–8.3)
NEUTROPHILS # BLD AUTO: 1.3 10E9/L (ref 1.6–8.3)
NEUTROPHILS # BLD AUTO: 1.4 10E9/L (ref 1.6–8.3)
NEUTROPHILS # BLD AUTO: 1.4 10E9/L (ref 1.6–8.3)
NEUTROPHILS # BLD AUTO: 1.5 10E9/L (ref 1.6–8.3)
NEUTROPHILS # BLD AUTO: 1.6 10E9/L (ref 1.6–8.3)
NEUTROPHILS # BLD AUTO: 1.6 10E9/L (ref 1.6–8.3)
NEUTROPHILS # BLD AUTO: 1.8 10E9/L (ref 1.6–8.3)
NEUTROPHILS # BLD AUTO: 2 10E9/L (ref 1.6–8.3)
NEUTROPHILS # BLD AUTO: 2.7 10E9/L (ref 1.6–8.3)
NEUTROPHILS # BLD AUTO: 6.1 10E9/L (ref 1.6–8.3)
NEUTROPHILS # BLD AUTO: 8.7 10E9/L (ref 1.6–8.3)
NEUTROPHILS NFR BLD AUTO: 44.6 %
NEUTROPHILS NFR BLD AUTO: 49.5 %
NEUTROPHILS NFR BLD AUTO: 57.7 %
NEUTROPHILS NFR BLD AUTO: 60.2 %
NEUTROPHILS NFR BLD AUTO: 60.5 %
NEUTROPHILS NFR BLD AUTO: 63.1 %
NEUTROPHILS NFR BLD AUTO: 63.2 %
NEUTROPHILS NFR BLD AUTO: 63.8 %
NEUTROPHILS NFR BLD AUTO: 64 %
NEUTROPHILS NFR BLD AUTO: 66 %
NEUTROPHILS NFR BLD AUTO: 70 %
NEUTROPHILS NFR BLD AUTO: 73.2 %
NEUTROPHILS NFR BLD AUTO: 76.9 %
NEUTROPHILS NFR BLD AUTO: 79.9 %
NEUTROPHILS NFR BLD AUTO: 81.3 %
NRBC # BLD AUTO: 0 10*3/UL
NRBC BLD AUTO-RTO: 0 /100
NUM BPU REQUESTED: 1
OVALOCYTES BLD QL SMEAR: SLIGHT
PHOSPHATE SERPL-MCNC: 3.1 MG/DL (ref 2.5–4.5)
PLATELET # BLD AUTO: 105 10E9/L (ref 150–450)
PLATELET # BLD AUTO: 113 10E9/L (ref 150–450)
PLATELET # BLD AUTO: 129 10E9/L (ref 150–450)
PLATELET # BLD AUTO: 151 10E9/L (ref 150–450)
PLATELET # BLD AUTO: 156 10E9/L (ref 150–450)
PLATELET # BLD AUTO: 225 10E9/L (ref 150–450)
PLATELET # BLD AUTO: 232 10E9/L (ref 150–450)
PLATELET # BLD AUTO: 250 10E9/L (ref 150–450)
PLATELET # BLD AUTO: 278 10E9/L (ref 150–450)
PLATELET # BLD AUTO: 290 10E9/L (ref 150–450)
PLATELET # BLD AUTO: 299 10E9/L (ref 150–450)
PLATELET # BLD AUTO: 45 10E9/L (ref 150–450)
PLATELET # BLD AUTO: 511 10E9/L (ref 150–450)
PLATELET # BLD AUTO: 521 10E9/L (ref 150–450)
PLATELET # BLD AUTO: 71 10E9/L (ref 150–450)
PLATELET # BLD AUTO: 99 10E9/L (ref 150–450)
POIKILOCYTOSIS BLD QL SMEAR: ABNORMAL
POIKILOCYTOSIS BLD QL SMEAR: SLIGHT
POTASSIUM SERPL-SCNC: 3.6 MMOL/L (ref 3.4–5.3)
POTASSIUM SERPL-SCNC: 3.7 MMOL/L (ref 3.4–5.3)
POTASSIUM SERPL-SCNC: 3.8 MMOL/L (ref 3.4–5.3)
POTASSIUM SERPL-SCNC: 3.8 MMOL/L (ref 3.4–5.3)
POTASSIUM SERPL-SCNC: 3.9 MMOL/L (ref 3.4–5.3)
POTASSIUM SERPL-SCNC: 4 MMOL/L (ref 3.4–5.3)
POTASSIUM SERPL-SCNC: 4.1 MMOL/L (ref 3.4–5.3)
POTASSIUM SERPL-SCNC: 4.1 MMOL/L (ref 3.4–5.3)
POTASSIUM SERPL-SCNC: 4.2 MMOL/L (ref 3.4–5.3)
POTASSIUM SERPL-SCNC: 4.4 MMOL/L (ref 3.4–5.3)
POTASSIUM SERPL-SCNC: 4.8 MMOL/L (ref 3.4–5.3)
POTASSIUM SERPL-SCNC: 5.1 MMOL/L (ref 3.4–5.3)
PROT SERPL-MCNC: 7 G/DL (ref 6.8–8.8)
PROT SERPL-MCNC: 7.3 G/DL (ref 6.8–8.8)
PROT SERPL-MCNC: 7.4 G/DL (ref 6.8–8.8)
PROT SERPL-MCNC: 7.6 G/DL (ref 6.8–8.8)
PROT SERPL-MCNC: 7.7 G/DL (ref 6.8–8.8)
PROT SERPL-MCNC: 7.7 G/DL (ref 6.8–8.8)
PROT SERPL-MCNC: 7.8 G/DL (ref 6.8–8.8)
PROT SERPL-MCNC: 7.8 G/DL (ref 6.8–8.8)
PROT SERPL-MCNC: 8 G/DL (ref 6.8–8.8)
PROT SERPL-MCNC: 8.5 G/DL (ref 6.8–8.8)
RBC # BLD AUTO: 2.5 10E12/L (ref 4.4–5.9)
RBC # BLD AUTO: 2.69 10E12/L (ref 4.4–5.9)
RBC # BLD AUTO: 2.81 10E12/L (ref 4.4–5.9)
RBC # BLD AUTO: 2.91 10E12/L (ref 4.4–5.9)
RBC # BLD AUTO: 2.97 10E12/L (ref 4.4–5.9)
RBC # BLD AUTO: 3.57 10E12/L (ref 4.4–5.9)
RBC # BLD AUTO: 3.58 10E12/L (ref 4.4–5.9)
RBC # BLD AUTO: 3.6 10E12/L (ref 4.4–5.9)
RBC # BLD AUTO: 3.68 10E12/L (ref 4.4–5.9)
RBC # BLD AUTO: 3.68 10E12/L (ref 4.4–5.9)
RBC # BLD AUTO: 3.76 10E12/L (ref 4.4–5.9)
RBC # BLD AUTO: 3.84 10E12/L (ref 4.4–5.9)
RBC # BLD AUTO: 3.89 10E12/L (ref 4.4–5.9)
RBC # BLD AUTO: 4.04 10E12/L (ref 4.4–5.9)
RBC # BLD AUTO: 4.17 10E12/L (ref 4.4–5.9)
SODIUM SERPL-SCNC: 132 MMOL/L (ref 133–144)
SODIUM SERPL-SCNC: 135 MMOL/L (ref 133–144)
SODIUM SERPL-SCNC: 136 MMOL/L (ref 133–144)
SODIUM SERPL-SCNC: 137 MMOL/L (ref 133–144)
SODIUM SERPL-SCNC: 138 MMOL/L (ref 133–144)
SODIUM SERPL-SCNC: 139 MMOL/L (ref 133–144)
SODIUM SERPL-SCNC: 139 MMOL/L (ref 133–144)
SPECIMEN EXP DATE BLD: NORMAL
T4 FREE SERPL-MCNC: 0.53 NG/DL (ref 0.76–1.46)
T4 FREE SERPL-MCNC: 0.6 NG/DL (ref 0.76–1.46)
TRANSFUSION STATUS PATIENT QL: NORMAL
TRANSFUSION STATUS PATIENT QL: NORMAL
TSH SERPL DL<=0.005 MIU/L-ACNC: 131.83 MU/L (ref 0.4–4)
TSH SERPL DL<=0.005 MIU/L-ACNC: 93.09 MU/L (ref 0.4–4)
WBC # BLD AUTO: 1 10E9/L (ref 4–11)
WBC # BLD AUTO: 1.1 10E9/L (ref 4–11)
WBC # BLD AUTO: 1.4 10E9/L (ref 4–11)
WBC # BLD AUTO: 11.9 10E9/L (ref 4–11)
WBC # BLD AUTO: 2 10E9/L (ref 4–11)
WBC # BLD AUTO: 2.2 10E9/L (ref 4–11)
WBC # BLD AUTO: 2.4 10E9/L (ref 4–11)
WBC # BLD AUTO: 2.4 10E9/L (ref 4–11)
WBC # BLD AUTO: 2.5 10E9/L (ref 4–11)
WBC # BLD AUTO: 3 10E9/L (ref 4–11)
WBC # BLD AUTO: 3 10E9/L (ref 4–11)
WBC # BLD AUTO: 3.4 10E9/L (ref 4–11)
WBC # BLD AUTO: 7.5 10E9/L (ref 4–11)

## 2017-01-01 PROCEDURE — 80053 COMPREHEN METABOLIC PANEL: CPT | Performed by: INTERNAL MEDICINE

## 2017-01-01 PROCEDURE — 96413 CHEMO IV INFUSION 1 HR: CPT

## 2017-01-01 PROCEDURE — 96416 CHEMO PROLONG INFUSE W/PUMP: CPT

## 2017-01-01 PROCEDURE — 25000128 H RX IP 250 OP 636: Mod: ZF | Performed by: INTERNAL MEDICINE

## 2017-01-01 PROCEDURE — 82962 GLUCOSE BLOOD TEST: CPT

## 2017-01-01 PROCEDURE — 25500064 ZZH RX 255 OP 636: Performed by: RADIOLOGY

## 2017-01-01 PROCEDURE — 85049 AUTOMATED PLATELET COUNT: CPT | Performed by: RADIOLOGY

## 2017-01-01 PROCEDURE — 25000128 H RX IP 250 OP 636: Mod: ZF | Performed by: PHYSICIAN ASSISTANT

## 2017-01-01 PROCEDURE — 85025 COMPLETE CBC W/AUTO DIFF WBC: CPT | Performed by: INTERNAL MEDICINE

## 2017-01-01 PROCEDURE — 96361 HYDRATE IV INFUSION ADD-ON: CPT

## 2017-01-01 PROCEDURE — 86901 BLOOD TYPING SEROLOGIC RH(D): CPT | Performed by: INTERNAL MEDICINE

## 2017-01-01 PROCEDURE — 99214 OFFICE O/P EST MOD 30 MIN: CPT | Mod: ZP | Performed by: INTERNAL MEDICINE

## 2017-01-01 PROCEDURE — C1887 CATHETER, GUIDING: HCPCS

## 2017-01-01 PROCEDURE — P9037 PLATE PHERES LEUKOREDU IRRAD: HCPCS | Performed by: RADIOLOGY

## 2017-01-01 PROCEDURE — 96365 THER/PROPH/DIAG IV INF INIT: CPT

## 2017-01-01 PROCEDURE — 99214 OFFICE O/P EST MOD 30 MIN: CPT | Mod: ZP | Performed by: PHYSICIAN ASSISTANT

## 2017-01-01 PROCEDURE — 96375 TX/PRO/DX INJ NEW DRUG ADDON: CPT

## 2017-01-01 PROCEDURE — 96417 CHEMO IV INFUS EACH ADDL SEQ: CPT

## 2017-01-01 PROCEDURE — 84439 ASSAY OF FREE THYROXINE: CPT | Performed by: INTERNAL MEDICINE

## 2017-01-01 PROCEDURE — 83735 ASSAY OF MAGNESIUM: CPT | Performed by: INTERNAL MEDICINE

## 2017-01-01 PROCEDURE — 25000125 ZZHC RX 250: Mod: ZF | Performed by: INTERNAL MEDICINE

## 2017-01-01 PROCEDURE — 36415 COLL VENOUS BLD VENIPUNCTURE: CPT

## 2017-01-01 PROCEDURE — 77336 RADIATION PHYSICS CONSULT: CPT | Performed by: RADIOLOGY

## 2017-01-01 PROCEDURE — 99212 OFFICE O/P EST SF 10 MIN: CPT

## 2017-01-01 PROCEDURE — 84999 UNLISTED CHEMISTRY PROCEDURE: CPT | Performed by: COLON & RECTAL SURGERY

## 2017-01-01 PROCEDURE — 99212 OFFICE O/P EST SF 10 MIN: CPT | Mod: ZF

## 2017-01-01 PROCEDURE — 88230 TISSUE CULTURE LYMPHOCYTE: CPT | Performed by: COLON & RECTAL SURGERY

## 2017-01-01 PROCEDURE — 83735 ASSAY OF MAGNESIUM: CPT | Performed by: PHYSICIAN ASSISTANT

## 2017-01-01 PROCEDURE — 86900 BLOOD TYPING SEROLOGIC ABO: CPT | Performed by: INTERNAL MEDICINE

## 2017-01-01 PROCEDURE — 25000132 ZZH RX MED GY IP 250 OP 250 PS 637: Performed by: PHYSICIAN ASSISTANT

## 2017-01-01 PROCEDURE — 49440 PLACE GASTROSTOMY TUBE PERC: CPT

## 2017-01-01 PROCEDURE — 25000128 H RX IP 250 OP 636: Performed by: RADIOLOGY

## 2017-01-01 PROCEDURE — 96415 CHEMO IV INFUSION ADDL HR: CPT

## 2017-01-01 PROCEDURE — 77280 THER RAD SIMULAJ FIELD SMPL: CPT | Performed by: RADIOLOGY

## 2017-01-01 PROCEDURE — 96367 TX/PROPH/DG ADDL SEQ IV INF: CPT

## 2017-01-01 PROCEDURE — G0378 HOSPITAL OBSERVATION PER HR: HCPCS

## 2017-01-01 PROCEDURE — 93010 ELECTROCARDIOGRAM REPORT: CPT | Performed by: INTERNAL MEDICINE

## 2017-01-01 PROCEDURE — 25000125 ZZHC RX 250: Mod: ZF | Performed by: PHYSICIAN ASSISTANT

## 2017-01-01 PROCEDURE — 25000128 H RX IP 250 OP 636: Performed by: PHYSICIAN ASSISTANT

## 2017-01-01 PROCEDURE — 77290 THER RAD SIMULAJ FIELD CPLX: CPT | Performed by: RADIOLOGY

## 2017-01-01 PROCEDURE — 84443 ASSAY THYROID STIM HORMONE: CPT | Performed by: INTERNAL MEDICINE

## 2017-01-01 PROCEDURE — 99207 ZZC APP CREDIT; MD BILLING SHARED VISIT: CPT | Performed by: PHYSICIAN ASSISTANT

## 2017-01-01 PROCEDURE — 99212 OFFICE O/P EST SF 10 MIN: CPT | Mod: 25

## 2017-01-01 PROCEDURE — 77387 GUIDANCE FOR RADJ TX DLVR: CPT | Performed by: RADIOLOGY

## 2017-01-01 PROCEDURE — 77412 RADIATION TX DELIVERY LVL 3: CPT | Performed by: RADIOLOGY

## 2017-01-01 PROCEDURE — 88249 CHROMOSOME ANALYSIS 100: CPT | Performed by: COLON & RECTAL SURGERY

## 2017-01-01 PROCEDURE — 99153 MOD SED SAME PHYS/QHP EA: CPT

## 2017-01-01 PROCEDURE — 86850 RBC ANTIBODY SCREEN: CPT | Performed by: INTERNAL MEDICINE

## 2017-01-01 PROCEDURE — 99215 OFFICE O/P EST HI 40 MIN: CPT | Mod: ZP | Performed by: INTERNAL MEDICINE

## 2017-01-01 PROCEDURE — 99215 OFFICE O/P EST HI 40 MIN: CPT | Mod: ZP | Performed by: PHYSICIAN ASSISTANT

## 2017-01-01 PROCEDURE — 36593 DECLOT VASCULAR DEVICE: CPT

## 2017-01-01 PROCEDURE — 77334 RADIATION TREATMENT AID(S): CPT | Performed by: RADIOLOGY

## 2017-01-01 PROCEDURE — 99212 OFFICE O/P EST SF 10 MIN: CPT | Mod: 25,ZF

## 2017-01-01 PROCEDURE — 84439 ASSAY OF FREE THYROXINE: CPT | Performed by: PHYSICIAN ASSISTANT

## 2017-01-01 PROCEDURE — 84443 ASSAY THYROID STIM HORMONE: CPT | Performed by: PHYSICIAN ASSISTANT

## 2017-01-01 PROCEDURE — 40000172 ZZH STATISTIC PROCEDURE PREP ONLY

## 2017-01-01 PROCEDURE — 99215 OFFICE O/P EST HI 40 MIN: CPT | Mod: 25 | Performed by: RADIOLOGY

## 2017-01-01 PROCEDURE — 88185 FLOWCYTOMETRY/TC ADD-ON: CPT | Performed by: COLON & RECTAL SURGERY

## 2017-01-01 PROCEDURE — 97802 MEDICAL NUTRITION INDIV IN: CPT | Mod: ZF | Performed by: DIETITIAN, REGISTERED

## 2017-01-01 PROCEDURE — 85025 COMPLETE CBC W/AUTO DIFF WBC: CPT | Performed by: PHYSICIAN ASSISTANT

## 2017-01-01 PROCEDURE — 25000128 H RX IP 250 OP 636: Mod: JW,ZF | Performed by: INTERNAL MEDICINE

## 2017-01-01 PROCEDURE — 25000125 ZZHC RX 250: Performed by: RADIOLOGY

## 2017-01-01 PROCEDURE — 80053 COMPREHEN METABOLIC PANEL: CPT | Performed by: PHYSICIAN ASSISTANT

## 2017-01-01 PROCEDURE — 27210905 ZZH KIT CR7

## 2017-01-01 PROCEDURE — 36430 TRANSFUSION BLD/BLD COMPNT: CPT

## 2017-01-01 PROCEDURE — 85610 PROTHROMBIN TIME: CPT | Performed by: RADIOLOGY

## 2017-01-01 PROCEDURE — 40000072 ZZH STATISTIC GENETIC COUNSELING, < 16 MIN: Mod: ZF | Performed by: GENETIC COUNSELOR, MS

## 2017-01-01 PROCEDURE — 84100 ASSAY OF PHOSPHORUS: CPT | Performed by: PHYSICIAN ASSISTANT

## 2017-01-01 PROCEDURE — 88184 FLOWCYTOMETRY/ TC 1 MARKER: CPT | Performed by: COLON & RECTAL SURGERY

## 2017-01-01 PROCEDURE — 27210995 ZZH RX 272: Performed by: RADIOLOGY

## 2017-01-01 PROCEDURE — 96040 ZZH GENETIC COUNSELING, EACH 30 MINUTES: CPT | Mod: ZF | Performed by: GENETIC COUNSELOR, MS

## 2017-01-01 PROCEDURE — 99152 MOD SED SAME PHYS/QHP 5/>YRS: CPT

## 2017-01-01 PROCEDURE — 99219 ZZC INITIAL OBSERVATION CARE,LEVL II: CPT | Mod: AI | Performed by: INTERNAL MEDICINE

## 2017-01-01 PROCEDURE — 27210814 ZZ H TUBE GASTRO CR12

## 2017-01-01 PROCEDURE — 36592 COLLECT BLOOD FROM PICC: CPT | Performed by: PHYSICIAN ASSISTANT

## 2017-01-01 PROCEDURE — 93005 ELECTROCARDIOGRAM TRACING: CPT

## 2017-01-01 PROCEDURE — 40000114 ZZH STATISTIC NO CHARGE CLINIC VISIT: Mod: ZF

## 2017-01-01 RX ORDER — HEPARIN SODIUM (PORCINE) LOCK FLUSH IV SOLN 100 UNIT/ML 100 UNIT/ML
5 SOLUTION INTRAVENOUS ONCE
Status: COMPLETED | OUTPATIENT
Start: 2017-01-01 | End: 2017-01-01

## 2017-01-01 RX ORDER — LORAZEPAM 0.5 MG/1
0.5 TABLET ORAL EVERY 4 HOURS PRN
Qty: 30 TABLET | Refills: 5 | Status: SHIPPED | OUTPATIENT
Start: 2017-01-01 | End: 2017-01-01

## 2017-01-01 RX ORDER — SODIUM CHLORIDE 9 MG/ML
1000 INJECTION, SOLUTION INTRAVENOUS CONTINUOUS PRN
Status: CANCELLED
Start: 2017-01-01

## 2017-01-01 RX ORDER — OXYCODONE HCL 20 MG/1
20 TABLET, FILM COATED, EXTENDED RELEASE ORAL EVERY 12 HOURS
Qty: 60 TABLET | Refills: 0 | Status: SHIPPED | OUTPATIENT
Start: 2017-01-01 | End: 2017-01-01

## 2017-01-01 RX ORDER — METHYLPREDNISOLONE SODIUM SUCCINATE 125 MG/2ML
125 INJECTION, POWDER, LYOPHILIZED, FOR SOLUTION INTRAMUSCULAR; INTRAVENOUS
Status: CANCELLED
Start: 2017-01-01

## 2017-01-01 RX ORDER — ZOLEDRONIC ACID 0.04 MG/ML
4 INJECTION, SOLUTION INTRAVENOUS ONCE
Status: COMPLETED | OUTPATIENT
Start: 2017-01-01 | End: 2017-01-01

## 2017-01-01 RX ORDER — ZOLEDRONIC ACID 0.04 MG/ML
4 INJECTION, SOLUTION INTRAVENOUS ONCE
Status: CANCELLED | OUTPATIENT
Start: 2017-01-01 | End: 2017-01-01

## 2017-01-01 RX ORDER — LORAZEPAM 2 MG/ML
0.5 INJECTION INTRAMUSCULAR EVERY 4 HOURS PRN
Status: CANCELLED
Start: 2017-01-01

## 2017-01-01 RX ORDER — FLUMAZENIL 0.1 MG/ML
0.2 INJECTION, SOLUTION INTRAVENOUS
Status: DISCONTINUED | OUTPATIENT
Start: 2017-01-01 | End: 2017-01-01 | Stop reason: HOSPADM

## 2017-01-01 RX ORDER — HEPARIN SODIUM (PORCINE) LOCK FLUSH IV SOLN 100 UNIT/ML 100 UNIT/ML
500 SOLUTION INTRAVENOUS ONCE
Status: CANCELLED
Start: 2017-01-01 | End: 2017-01-01

## 2017-01-01 RX ORDER — DIPHENHYDRAMINE HYDROCHLORIDE 50 MG/ML
50 INJECTION INTRAMUSCULAR; INTRAVENOUS
Status: CANCELLED
Start: 2017-01-01

## 2017-01-01 RX ORDER — LEVOTHYROXINE SODIUM 25 UG/1
25 TABLET ORAL DAILY
Status: DISCONTINUED | OUTPATIENT
Start: 2017-01-01 | End: 2017-01-01 | Stop reason: HOSPADM

## 2017-01-01 RX ORDER — ALBUTEROL SULFATE 90 UG/1
1-2 AEROSOL, METERED RESPIRATORY (INHALATION)
Status: CANCELLED
Start: 2017-01-01

## 2017-01-01 RX ORDER — LEVOTHYROXINE SODIUM 100 UG/1
100 TABLET ORAL DAILY
Qty: 30 TABLET | Refills: 1 | Status: SHIPPED | OUTPATIENT
Start: 2017-01-01 | End: 2017-01-01

## 2017-01-01 RX ORDER — OXYCODONE HCL 10 MG/1
10 TABLET, FILM COATED, EXTENDED RELEASE ORAL EVERY 12 HOURS
Qty: 60 TABLET | Refills: 0 | Status: SHIPPED | OUTPATIENT
Start: 2017-01-01 | End: 2017-01-01

## 2017-01-01 RX ORDER — HEPARIN SODIUM (PORCINE) LOCK FLUSH IV SOLN 100 UNIT/ML 100 UNIT/ML
5 SOLUTION INTRAVENOUS
Status: DISCONTINUED | OUTPATIENT
Start: 2017-01-01 | End: 2017-01-01 | Stop reason: HOSPADM

## 2017-01-01 RX ORDER — PROCHLORPERAZINE MALEATE 10 MG
10 TABLET ORAL EVERY 6 HOURS PRN
Qty: 30 TABLET | Refills: 5 | Status: SHIPPED | OUTPATIENT
Start: 2017-01-01 | End: 2017-05-30

## 2017-01-01 RX ORDER — ZOLEDRONIC ACID 0.04 MG/ML
4 INJECTION, SOLUTION INTRAVENOUS ONCE
Status: DISCONTINUED | OUTPATIENT
Start: 2017-01-01 | End: 2017-01-01 | Stop reason: CLARIF

## 2017-01-01 RX ORDER — EPINEPHRINE 0.3 MG/.3ML
0.3 INJECTION SUBCUTANEOUS EVERY 5 MIN PRN
Status: CANCELLED | OUTPATIENT
Start: 2017-01-01

## 2017-01-01 RX ORDER — ALBUTEROL SULFATE 0.83 MG/ML
2.5 SOLUTION RESPIRATORY (INHALATION)
Status: CANCELLED | OUTPATIENT
Start: 2017-01-01

## 2017-01-01 RX ORDER — OXYCODONE HCL 5 MG/5 ML
10 SOLUTION, ORAL ORAL EVERY 4 HOURS PRN
Qty: 1000 ML | Refills: 0 | Status: SHIPPED | OUTPATIENT
Start: 2017-01-01 | End: 2017-01-01

## 2017-01-01 RX ORDER — HEPARIN SODIUM (PORCINE) LOCK FLUSH IV SOLN 100 UNIT/ML 100 UNIT/ML
500 SOLUTION INTRAVENOUS ONCE
Status: COMPLETED | OUTPATIENT
Start: 2017-01-01 | End: 2017-01-01

## 2017-01-01 RX ORDER — LEVOFLOXACIN 500 MG/1
500 TABLET, FILM COATED ORAL DAILY
Qty: 7 TABLET | Refills: 0 | Status: SHIPPED | OUTPATIENT
Start: 2017-01-01 | End: 2017-01-01

## 2017-01-01 RX ORDER — PALONOSETRON 0.05 MG/ML
0.25 INJECTION, SOLUTION INTRAVENOUS ONCE
Status: COMPLETED | OUTPATIENT
Start: 2017-01-01 | End: 2017-01-01

## 2017-01-01 RX ORDER — NALOXONE HYDROCHLORIDE 0.4 MG/ML
.1-.4 INJECTION, SOLUTION INTRAMUSCULAR; INTRAVENOUS; SUBCUTANEOUS
Status: DISCONTINUED | OUTPATIENT
Start: 2017-01-01 | End: 2017-01-01 | Stop reason: HOSPADM

## 2017-01-01 RX ORDER — HEPARIN SODIUM (PORCINE) LOCK FLUSH IV SOLN 100 UNIT/ML 100 UNIT/ML
5 SOLUTION INTRAVENOUS DAILY PRN
Status: DISCONTINUED | OUTPATIENT
Start: 2017-01-01 | End: 2017-01-01 | Stop reason: HOSPADM

## 2017-01-01 RX ORDER — MEPERIDINE HYDROCHLORIDE 25 MG/ML
25 INJECTION INTRAMUSCULAR; INTRAVENOUS; SUBCUTANEOUS EVERY 30 MIN PRN
Status: CANCELLED | OUTPATIENT
Start: 2017-01-01

## 2017-01-01 RX ORDER — SODIUM CHLORIDE 9 MG/ML
INJECTION, SOLUTION INTRAVENOUS CONTINUOUS
Status: DISCONTINUED | OUTPATIENT
Start: 2017-01-01 | End: 2017-01-01 | Stop reason: HOSPADM

## 2017-01-01 RX ORDER — CITALOPRAM HYDROBROMIDE 20 MG/10ML
20 SOLUTION, ORAL ORAL DAILY
Status: DISCONTINUED | OUTPATIENT
Start: 2017-01-01 | End: 2017-01-01 | Stop reason: HOSPADM

## 2017-01-01 RX ORDER — ONDANSETRON 2 MG/ML
4 INJECTION INTRAMUSCULAR; INTRAVENOUS EVERY 6 HOURS PRN
Status: DISCONTINUED | OUTPATIENT
Start: 2017-01-01 | End: 2017-01-01 | Stop reason: HOSPADM

## 2017-01-01 RX ORDER — SODIUM CHLORIDE 9 MG/ML
INJECTION, SOLUTION INTRAVENOUS CONTINUOUS
Status: CANCELLED | OUTPATIENT
Start: 2017-01-01

## 2017-01-01 RX ORDER — PALONOSETRON 0.05 MG/ML
0.25 INJECTION, SOLUTION INTRAVENOUS ONCE
Status: CANCELLED
Start: 2017-01-01 | End: 2017-01-01

## 2017-01-01 RX ORDER — LIDOCAINE 40 MG/G
CREAM TOPICAL
Status: CANCELLED | OUTPATIENT
Start: 2017-01-01

## 2017-01-01 RX ORDER — HEPARIN SODIUM (PORCINE) LOCK FLUSH IV SOLN 100 UNIT/ML 100 UNIT/ML
5 SOLUTION INTRAVENOUS EVERY 8 HOURS
Status: DISCONTINUED | OUTPATIENT
Start: 2017-01-01 | End: 2017-01-01 | Stop reason: HOSPADM

## 2017-01-01 RX ORDER — CLINDAMYCIN PHOSPHATE 10 UG/ML
LOTION TOPICAL 2 TIMES DAILY
Qty: 60 ML | Refills: 3 | Status: SHIPPED | OUTPATIENT
Start: 2017-01-01

## 2017-01-01 RX ORDER — LEVOTHYROXINE SODIUM 50 UG/1
50 TABLET ORAL DAILY
Qty: 30 TABLET | Refills: 1 | Status: SHIPPED | OUTPATIENT
Start: 2017-01-01 | End: 2017-01-01

## 2017-01-01 RX ORDER — OXYCODONE HCL 5 MG/5 ML
10 SOLUTION, ORAL ORAL EVERY 4 HOURS PRN
Status: DISCONTINUED | OUTPATIENT
Start: 2017-01-01 | End: 2017-01-01 | Stop reason: HOSPADM

## 2017-01-01 RX ORDER — METHYLPHENIDATE HYDROCHLORIDE 5 MG/1
5 TABLET ORAL 2 TIMES DAILY
Qty: 60 TABLET | Refills: 0 | Status: SHIPPED | OUTPATIENT
Start: 2017-01-01

## 2017-01-01 RX ORDER — OXYCODONE HCL 5 MG/5 ML
10 SOLUTION, ORAL ORAL EVERY 4 HOURS PRN
Qty: 1000 ML | Refills: 0 | Status: SHIPPED | OUTPATIENT
Start: 2017-01-01

## 2017-01-01 RX ORDER — ONDANSETRON 4 MG/1
4 TABLET, ORALLY DISINTEGRATING ORAL EVERY 6 HOURS PRN
Status: DISCONTINUED | OUTPATIENT
Start: 2017-01-01 | End: 2017-01-01 | Stop reason: HOSPADM

## 2017-01-01 RX ORDER — LEVOTHYROXINE SODIUM 100 UG/1
100 TABLET ORAL DAILY
Qty: 30 TABLET | Refills: 3 | Status: SHIPPED | OUTPATIENT
Start: 2017-01-01

## 2017-01-01 RX ORDER — LIDOCAINE 40 MG/G
CREAM TOPICAL
Status: DISCONTINUED | OUTPATIENT
Start: 2017-01-01 | End: 2017-01-01 | Stop reason: HOSPADM

## 2017-01-01 RX ORDER — IODIXANOL 320 MG/ML
50 INJECTION, SOLUTION INTRAVASCULAR ONCE
Status: COMPLETED | OUTPATIENT
Start: 2017-01-01 | End: 2017-01-01

## 2017-01-01 RX ORDER — OXYCODONE HCL 20 MG/1
20 TABLET, FILM COATED, EXTENDED RELEASE ORAL EVERY 12 HOURS
Qty: 60 TABLET | Refills: 0 | Status: SHIPPED | OUTPATIENT
Start: 2017-01-01

## 2017-01-01 RX ORDER — OXYCODONE HCL 5 MG/5 ML
10 SOLUTION, ORAL ORAL EVERY 4 HOURS PRN
Qty: 500 ML | Refills: 0 | Status: SHIPPED | OUTPATIENT
Start: 2017-01-01 | End: 2017-01-01

## 2017-01-01 RX ORDER — METHYLPHENIDATE HYDROCHLORIDE 5 MG/1
5 TABLET ORAL 2 TIMES DAILY
Qty: 60 TABLET | Refills: 0 | Status: SHIPPED | OUTPATIENT
Start: 2017-01-01 | End: 2017-01-01

## 2017-01-01 RX ORDER — HEPARIN SODIUM (PORCINE) LOCK FLUSH IV SOLN 100 UNIT/ML 100 UNIT/ML
500 SOLUTION INTRAVENOUS ONCE
Status: DISCONTINUED | OUTPATIENT
Start: 2017-01-01 | End: 2017-01-01 | Stop reason: HOSPADM

## 2017-01-01 RX ORDER — OXYCODONE HCL 20 MG/1
20 TABLET, FILM COATED, EXTENDED RELEASE ORAL EVERY 12 HOURS
Status: DISCONTINUED | OUTPATIENT
Start: 2017-01-01 | End: 2017-01-01 | Stop reason: HOSPADM

## 2017-01-01 RX ORDER — HEPARIN SODIUM (PORCINE) LOCK FLUSH IV SOLN 100 UNIT/ML 100 UNIT/ML
500 SOLUTION INTRAVENOUS EVERY 8 HOURS
Status: DISCONTINUED | OUTPATIENT
Start: 2017-01-01 | End: 2017-01-01 | Stop reason: HOSPADM

## 2017-01-01 RX ORDER — LORAZEPAM 0.5 MG/1
0.5 TABLET ORAL EVERY 4 HOURS PRN
Qty: 30 TABLET | Refills: 5 | Status: SHIPPED | OUTPATIENT
Start: 2017-01-01

## 2017-01-01 RX ORDER — LEVOTHYROXINE SODIUM 25 UG/1
50 TABLET ORAL DAILY
Qty: 90 TABLET | Refills: 3 | COMMUNITY
Start: 2017-01-01 | End: 2017-01-01 | Stop reason: DRUGHIGH

## 2017-01-01 RX ORDER — EPINEPHRINE 0.3 MG/.3ML
INJECTION SUBCUTANEOUS
Status: DISCONTINUED
Start: 2017-01-01 | End: 2017-01-01 | Stop reason: WASHOUT

## 2017-01-01 RX ORDER — OLANZAPINE 5 MG/1
5 TABLET ORAL 2 TIMES DAILY PRN
Qty: 60 TABLET | Refills: 3 | Status: SHIPPED | OUTPATIENT
Start: 2017-01-01

## 2017-01-01 RX ORDER — CEFAZOLIN SODIUM 2 G/100ML
2 INJECTION, SOLUTION INTRAVENOUS
Status: COMPLETED | OUTPATIENT
Start: 2017-01-01 | End: 2017-01-01

## 2017-01-01 RX ORDER — FENTANYL CITRATE 50 UG/ML
25-50 INJECTION, SOLUTION INTRAMUSCULAR; INTRAVENOUS EVERY 5 MIN PRN
Status: DISCONTINUED | OUTPATIENT
Start: 2017-01-01 | End: 2017-01-01 | Stop reason: HOSPADM

## 2017-01-01 RX ADMIN — DIPHENHYDRAMINE HYDROCHLORIDE 50 MG: 50 INJECTION, SOLUTION INTRAMUSCULAR; INTRAVENOUS at 13:17

## 2017-01-01 RX ADMIN — CETUXIMAB 480 MG: 2 SOLUTION INTRAVENOUS at 08:50

## 2017-01-01 RX ADMIN — LEVOTHYROXINE SODIUM 25 MCG: 25 TABLET ORAL at 07:58

## 2017-01-01 RX ADMIN — SODIUM CHLORIDE, PRESERVATIVE FREE 5 ML: 5 INJECTION INTRAVENOUS at 08:22

## 2017-01-01 RX ADMIN — CETUXIMAB 433 MG: 2 SOLUTION INTRAVENOUS at 15:42

## 2017-01-01 RX ADMIN — SODIUM CHLORIDE 250 ML: 9 INJECTION, SOLUTION INTRAVENOUS at 12:57

## 2017-01-01 RX ADMIN — SODIUM CHLORIDE 250 ML: 9 INJECTION, SOLUTION INTRAVENOUS at 15:27

## 2017-01-01 RX ADMIN — CETUXIMAB 480 MG: 2 SOLUTION INTRAVENOUS at 16:20

## 2017-01-01 RX ADMIN — PALONOSETRON HYDROCHLORIDE 0.25 MG: 0.25 INJECTION INTRAVENOUS at 14:25

## 2017-01-01 RX ADMIN — SODIUM CHLORIDE, PRESERVATIVE FREE 5 ML: 5 INJECTION INTRAVENOUS at 16:36

## 2017-01-01 RX ADMIN — SODIUM CHLORIDE, PRESERVATIVE FREE 5 ML: 5 INJECTION INTRAVENOUS at 14:48

## 2017-01-01 RX ADMIN — SODIUM CHLORIDE, PRESERVATIVE FREE 5 ML: 5 INJECTION INTRAVENOUS at 11:51

## 2017-01-01 RX ADMIN — SODIUM CHLORIDE 150 MG: 9 INJECTION, SOLUTION INTRAVENOUS at 14:54

## 2017-01-01 RX ADMIN — SODIUM CHLORIDE 250 ML: 9 INJECTION, SOLUTION INTRAVENOUS at 15:42

## 2017-01-01 RX ADMIN — SODIUM CHLORIDE, PRESERVATIVE FREE 500 UNITS: 5 INJECTION INTRAVENOUS at 16:35

## 2017-01-01 RX ADMIN — SODIUM CHLORIDE, PRESERVATIVE FREE 5 ML: 5 INJECTION INTRAVENOUS at 14:01

## 2017-01-01 RX ADMIN — SODIUM CHLORIDE 1000 ML: 9 INJECTION, SOLUTION INTRAVENOUS at 14:06

## 2017-01-01 RX ADMIN — SODIUM CHLORIDE, PRESERVATIVE FREE 500 UNITS: 5 INJECTION INTRAVENOUS at 11:18

## 2017-01-01 RX ADMIN — SODIUM CHLORIDE 250 ML: 9 INJECTION, SOLUTION INTRAVENOUS at 14:36

## 2017-01-01 RX ADMIN — SODIUM CHLORIDE, PRESERVATIVE FREE 5 ML: 5 INJECTION INTRAVENOUS at 13:45

## 2017-01-01 RX ADMIN — SODIUM CHLORIDE, PRESERVATIVE FREE 500 UNITS: 5 INJECTION INTRAVENOUS at 16:07

## 2017-01-01 RX ADMIN — CETUXIMAB 480 MG: 2 SOLUTION INTRAVENOUS at 15:39

## 2017-01-01 RX ADMIN — SODIUM CHLORIDE 250 ML: 9 INJECTION, SOLUTION INTRAVENOUS at 14:24

## 2017-01-01 RX ADMIN — IODIXANOL 20 ML: 320 INJECTION, SOLUTION INTRAVASCULAR at 17:19

## 2017-01-01 RX ADMIN — ZOLEDRONIC ACID 4 MG: 0.04 INJECTION, SOLUTION INTRAVENOUS at 16:16

## 2017-01-01 RX ADMIN — FENTANYL CITRATE 150 MCG: 50 INJECTION, SOLUTION INTRAMUSCULAR; INTRAVENOUS at 16:52

## 2017-01-01 RX ADMIN — SODIUM CHLORIDE, PRESERVATIVE FREE 500 UNITS: 5 INJECTION INTRAVENOUS at 17:19

## 2017-01-01 RX ADMIN — SODIUM CHLORIDE 250 ML: 9 INJECTION, SOLUTION INTRAVENOUS at 10:40

## 2017-01-01 RX ADMIN — SODIUM CHLORIDE, PRESERVATIVE FREE 500 UNITS: 5 INJECTION INTRAVENOUS at 10:08

## 2017-01-01 RX ADMIN — SODIUM CHLORIDE 1000 ML: 9 INJECTION, SOLUTION INTRAVENOUS at 09:23

## 2017-01-01 RX ADMIN — SODIUM CHLORIDE, PRESERVATIVE FREE 5 ML: 5 INJECTION INTRAVENOUS at 13:33

## 2017-01-01 RX ADMIN — CARBOPLATIN 750 MG: 10 INJECTION, SOLUTION INTRAVENOUS at 16:40

## 2017-01-01 RX ADMIN — CETUXIMAB 768 MG: 2 SOLUTION INTRAVENOUS at 13:42

## 2017-01-01 RX ADMIN — OXYCODONE HYDROCHLORIDE 10 MG: 5 SOLUTION ORAL at 02:57

## 2017-01-01 RX ADMIN — GLUCAGON HYDROCHLORIDE 1 MG: 1 INJECTION, POWDER, FOR SOLUTION INTRAMUSCULAR; INTRAVENOUS; SUBCUTANEOUS at 16:38

## 2017-01-01 RX ADMIN — SODIUM CHLORIDE, PRESERVATIVE FREE 5 ML: 5 INJECTION INTRAVENOUS at 09:27

## 2017-01-01 RX ADMIN — SODIUM CHLORIDE 150 MG: 9 INJECTION, SOLUTION INTRAVENOUS at 14:55

## 2017-01-01 RX ADMIN — SODIUM CHLORIDE, PRESERVATIVE FREE 5 ML: 5 INJECTION INTRAVENOUS at 07:33

## 2017-01-01 RX ADMIN — SODIUM CHLORIDE 250 ML: 9 INJECTION, SOLUTION INTRAVENOUS at 12:26

## 2017-01-01 RX ADMIN — ALTEPLASE 2 MG: 2.2 INJECTION, POWDER, LYOPHILIZED, FOR SOLUTION INTRAVENOUS at 09:47

## 2017-01-01 RX ADMIN — SODIUM CHLORIDE 250 ML: 9 INJECTION, SOLUTION INTRAVENOUS at 08:38

## 2017-01-01 RX ADMIN — SODIUM CHLORIDE 250 ML: 9 INJECTION, SOLUTION INTRAVENOUS at 15:01

## 2017-01-01 RX ADMIN — ZOLEDRONIC ACID 4 MG: 0.04 INJECTION, SOLUTION INTRAVENOUS at 15:44

## 2017-01-01 RX ADMIN — SODIUM CHLORIDE, PRESERVATIVE FREE 500 UNITS: 5 INJECTION INTRAVENOUS at 16:51

## 2017-01-01 RX ADMIN — DEXAMETHASONE SODIUM PHOSPHATE: 10 INJECTION, SOLUTION INTRAMUSCULAR; INTRAVENOUS at 12:58

## 2017-01-01 RX ADMIN — DEXAMETHASONE SODIUM PHOSPHATE: 10 INJECTION, SOLUTION INTRAMUSCULAR; INTRAVENOUS at 14:36

## 2017-01-01 RX ADMIN — CARBOPLATIN 750 MG: 10 INJECTION, SOLUTION INTRAVENOUS at 17:14

## 2017-01-01 RX ADMIN — SODIUM CHLORIDE, PRESERVATIVE FREE 5 ML: 5 INJECTION INTRAVENOUS at 13:22

## 2017-01-01 RX ADMIN — CARBOPLATIN 750 MG: 10 INJECTION, SOLUTION INTRAVENOUS at 10:31

## 2017-01-01 RX ADMIN — CEFAZOLIN SODIUM 2 G: 2 INJECTION, SOLUTION INTRAVENOUS at 13:35

## 2017-01-01 RX ADMIN — SODIUM CHLORIDE, PRESERVATIVE FREE 5 ML: 5 INJECTION INTRAVENOUS at 17:52

## 2017-01-01 RX ADMIN — SODIUM CHLORIDE 250 ML: 9 INJECTION, SOLUTION INTRAVENOUS at 15:39

## 2017-01-01 RX ADMIN — LIDOCAINE HYDROCHLORIDE 11 ML: 10 INJECTION, SOLUTION EPIDURAL; INFILTRATION; INTRACAUDAL; PERINEURAL at 16:25

## 2017-01-01 RX ADMIN — SODIUM CHLORIDE: 900 INJECTION, SOLUTION INTRAVENOUS at 13:34

## 2017-01-01 RX ADMIN — SODIUM CHLORIDE, PRESERVATIVE FREE 5 ML: 5 INJECTION INTRAVENOUS at 12:58

## 2017-01-01 RX ADMIN — CETUXIMAB 480 MG: 2 SOLUTION INTRAVENOUS at 15:28

## 2017-01-01 RX ADMIN — DEXAMETHASONE SODIUM PHOSPHATE: 10 INJECTION, SOLUTION INTRAMUSCULAR; INTRAVENOUS at 09:36

## 2017-01-01 RX ADMIN — DEXAMETHASONE SODIUM PHOSPHATE: 10 INJECTION, SOLUTION INTRAMUSCULAR; INTRAVENOUS at 08:38

## 2017-01-01 RX ADMIN — OXYCODONE HYDROCHLORIDE 20 MG: 20 TABLET, FILM COATED, EXTENDED RELEASE ORAL at 21:13

## 2017-01-01 RX ADMIN — CETUXIMAB 480 MG: 2 SOLUTION INTRAVENOUS at 11:22

## 2017-01-01 RX ADMIN — SODIUM CHLORIDE, PRESERVATIVE FREE 5 ML: 5 INJECTION INTRAVENOUS at 13:16

## 2017-01-01 RX ADMIN — SODIUM CHLORIDE 250 ML: 9 INJECTION, SOLUTION INTRAVENOUS at 16:19

## 2017-01-01 RX ADMIN — CETUXIMAB 480 MG: 2 SOLUTION INTRAVENOUS at 15:01

## 2017-01-01 RX ADMIN — MIDAZOLAM 3 MG: 1 INJECTION INTRAMUSCULAR; INTRAVENOUS at 16:52

## 2017-01-01 RX ADMIN — CITALOPRAM HYDROBROMIDE 20 MG: 10 SOLUTION ORAL at 07:58

## 2017-01-01 RX ADMIN — CARBOPLATIN 750 MG: 10 INJECTION, SOLUTION INTRAVENOUS at 12:52

## 2017-01-01 RX ADMIN — SODIUM CHLORIDE, PRESERVATIVE FREE 500 UNITS: 5 INJECTION INTRAVENOUS at 14:32

## 2017-01-01 RX ADMIN — DEXAMETHASONE SODIUM PHOSPHATE: 10 INJECTION, SOLUTION INTRAMUSCULAR; INTRAVENOUS at 10:41

## 2017-01-01 RX ADMIN — ZOLEDRONIC ACID 4 MG: 0.04 INJECTION, SOLUTION INTRAVENOUS at 15:18

## 2017-01-01 RX ADMIN — CETUXIMAB 480 MG: 2 SOLUTION INTRAVENOUS at 14:36

## 2017-01-01 RX ADMIN — SODIUM CHLORIDE 150 MG: 9 INJECTION, SOLUTION INTRAVENOUS at 09:54

## 2017-01-01 RX ADMIN — CETUXIMAB 480 MG: 2 SOLUTION INTRAVENOUS at 12:28

## 2017-01-01 ASSESSMENT — ENCOUNTER SYMPTOMS
SLEEP DISTURBANCES DUE TO BREATHING: 0
INCREASED ENERGY: 1
DYSURIA: 0
SINUS CONGESTION: 0
INCREASED ENERGY: 1
SPUTUM PRODUCTION: 0
SORE THROAT: 1
ALTERED TEMPERATURE REGULATION: 0
NECK MASS: 1
POSTURAL DYSPNEA: 0
NUMBNESS: 0
LOSS OF CONSCIOUSNESS: 0
HALLUCINATIONS: 0
VOMITING: 1
MYALGIAS: 0
SINUS CONGESTION: 1
HOARSE VOICE: 0
SEIZURES: 0
DYSPNEA ON EXERTION: 1
WEIGHT GAIN: 0
JOINT SWELLING: 0
COUGH DISTURBING SLEEP: 0
ABDOMINAL PAIN: 1
SPEECH CHANGE: 0
TREMORS: 0
POOR WOUND HEALING: 0
SHORTNESS OF BREATH: 1
CONSTIPATION: 0
ALTERED TEMPERATURE REGULATION: 0
SINUS CONGESTION: 0
POSTURAL DYSPNEA: 0
POSTURAL DYSPNEA: 0
WHEEZING: 1
HALLUCINATIONS: 0
WEIGHT LOSS: 1
BOWEL INCONTINENCE: 0
DIFFICULTY URINATING: 0
SNORES LOUDLY: 0
PARALYSIS: 0
SINUS CONGESTION: 0
RESPIRATORY PAIN: 0
PARALYSIS: 0
MYALGIAS: 1
DECREASED APPETITE: 0
SNORES LOUDLY: 0
NIGHT SWEATS: 1
DIZZINESS: 1
SINUS CONGESTION: 0
LIGHT-HEADEDNESS: 0
INCREASED ENERGY: 1
DIARRHEA: 0
NAIL CHANGES: 0
TASTE DISTURBANCE: 0
DIARRHEA: 0
SHORTNESS OF BREATH: 0
RECTAL BLEEDING: 0
MEMORY LOSS: 0
NECK MASS: 1
RESPIRATORY PAIN: 0
SEIZURES: 0
RECTAL PAIN: 0
CLAUDICATION: 0
DIARRHEA: 0
LEG PAIN: 0
ORTHOPNEA: 0
MUSCLE CRAMPS: 1
HEADACHES: 0
NIGHT SWEATS: 0
LEG SWELLING: 0
NECK PAIN: 1
POSTURAL DYSPNEA: 0
STIFFNESS: 0
HYPERTENSION: 0
MUSCLE WEAKNESS: 1
DIFFICULTY URINATING: 0
POLYPHAGIA: 0
FATIGUE: 1
COUGH: 1
POOR WOUND HEALING: 0
COUGH DISTURBING SLEEP: 0
BACK PAIN: 1
TASTE DISTURBANCE: 1
HALLUCINATIONS: 0
SHORTNESS OF BREATH: 0
BLOOD IN STOOL: 0
DYSPNEA ON EXERTION: 0
FEVER: 0
TINGLING: 0
SMELL DISTURBANCE: 0
HEMOPTYSIS: 0
FATIGUE: 1
DECREASED APPETITE: 0
POLYPHAGIA: 0
RECTAL PAIN: 0
STIFFNESS: 0
SEIZURES: 0
TINGLING: 0
COUGH DISTURBING SLEEP: 0
FATIGUE: 0
HEMATURIA: 0
TROUBLE SWALLOWING: 1
BLOATING: 0
SLEEP DISTURBANCES DUE TO BREATHING: 0
SHORTNESS OF BREATH: 0
DIZZINESS: 0
POLYPHAGIA: 0
LEG PAIN: 0
SORE THROAT: 0
DIARRHEA: 0
DYSPNEA ON EXERTION: 0
BLOATING: 0
MUSCLE CRAMPS: 0
SHORTNESS OF BREATH: 0
NECK MASS: 0
DISTURBANCES IN COORDINATION: 0
TROUBLE SWALLOWING: 1
SNORES LOUDLY: 0
MUSCLE CRAMPS: 0
ABDOMINAL PAIN: 0
ALTERED TEMPERATURE REGULATION: 0
SPUTUM PRODUCTION: 0
POOR WOUND HEALING: 0
ORTHOPNEA: 0
SMELL DISTURBANCE: 0
CHILLS: 0
MUSCLE CRAMPS: 0
MUSCLE CRAMPS: 0
COUGH DISTURBING SLEEP: 0
SORE THROAT: 1
COUGH: 1
MYALGIAS: 1
BACK PAIN: 1
FEVER: 0
MUSCLE WEAKNESS: 0
TREMORS: 0
NUMBNESS: 0
RESPIRATORY PAIN: 0
RECTAL BLEEDING: 0
RESPIRATORY PAIN: 0
WEIGHT GAIN: 0
SHORTNESS OF BREATH: 0
SINUS PAIN: 0
SORE THROAT: 0
ABDOMINAL PAIN: 1
SKIN CHANGES: 0
SPUTUM PRODUCTION: 1
DYSURIA: 0
INCREASED ENERGY: 1
MUSCLE WEAKNESS: 0
NECK PAIN: 1
SMELL DISTURBANCE: 0
NAIL CHANGES: 0
DECREASED APPETITE: 1
COUGH DISTURBING SLEEP: 0
FLANK PAIN: 0
DIFFICULTY URINATING: 0
MEMORY LOSS: 0
NECK MASS: 1
SNORES LOUDLY: 0
HYPERTENSION: 0
SPUTUM PRODUCTION: 1
STIFFNESS: 0
SINUS CONGESTION: 0
SKIN CHANGES: 0
ARTHRALGIAS: 0
HEADACHES: 0
RESPIRATORY PAIN: 0
HEARTBURN: 0
SPEECH CHANGE: 1
WHEEZING: 1
WEAKNESS: 0
COUGH: 0
COUGH DISTURBING SLEEP: 0
JOINT SWELLING: 0
HEMATURIA: 0
CHILLS: 0
WEAKNESS: 1
POLYPHAGIA: 0
DYSURIA: 0
POSTURAL DYSPNEA: 0
TROUBLE SWALLOWING: 1
TROUBLE SWALLOWING: 1
SNORES LOUDLY: 0
MUSCLE WEAKNESS: 0
JOINT SWELLING: 0
RESPIRATORY PAIN: 0
SNORES LOUDLY: 0
NECK PAIN: 1
SPUTUM PRODUCTION: 0
ARTHRALGIAS: 0
WEIGHT GAIN: 0
SPUTUM PRODUCTION: 0
NIGHT SWEATS: 0
NUMBNESS: 0
DECREASED APPETITE: 0
RECTAL BLEEDING: 0
NAUSEA: 1
BLOATING: 0
NECK MASS: 1
NIGHT SWEATS: 1
SINUS PAIN: 0
HEADACHES: 0
TASTE DISTURBANCE: 0
LIGHT-HEADEDNESS: 0
STIFFNESS: 0
SYNCOPE: 0
HALLUCINATIONS: 0
DECREASED APPETITE: 0
TINGLING: 1
NECK PAIN: 1
POLYDIPSIA: 0
MUSCLE WEAKNESS: 1
HEMATURIA: 0
SNORES LOUDLY: 0
HOARSE VOICE: 1
ARTHRALGIAS: 0
FEVER: 0
MYALGIAS: 0
SHORTNESS OF BREATH: 0
BOWEL INCONTINENCE: 0
BLOOD IN STOOL: 0
NECK MASS: 0
CHILLS: 0
HEADACHES: 0
DISTURBANCES IN COORDINATION: 0
HALLUCINATIONS: 0
TASTE DISTURBANCE: 0
LOSS OF CONSCIOUSNESS: 0
SINUS PAIN: 0
COUGH: 1
WEIGHT LOSS: 1
BLOOD IN STOOL: 0
ARTHRALGIAS: 0
RESPIRATORY PAIN: 0
PALPITATIONS: 0
SORE THROAT: 1
SINUS PAIN: 0
WEIGHT LOSS: 1
DIFFICULTY URINATING: 0
HEARTBURN: 0
FLANK PAIN: 0
HALLUCINATIONS: 0
CHILLS: 0
MYALGIAS: 0
STIFFNESS: 0
LOSS OF CONSCIOUSNESS: 0
COUGH: 1
SNORES LOUDLY: 0
BLOOD IN STOOL: 0
TROUBLE SWALLOWING: 1
SHORTNESS OF BREATH: 0
FEVER: 0
TACHYCARDIA: 0
BOWEL INCONTINENCE: 0
ARTHRALGIAS: 0
POLYDIPSIA: 0
SMELL DISTURBANCE: 0
LOSS OF CONSCIOUSNESS: 0
SKIN CHANGES: 0
DYSPNEA ON EXERTION: 0
HYPOTENSION: 0
JAUNDICE: 0
MUSCLE WEAKNESS: 0
NIGHT SWEATS: 1
HEMATURIA: 0
PARALYSIS: 0
CHILLS: 0
HEMATURIA: 0
HEMOPTYSIS: 0
DISTURBANCES IN COORDINATION: 0
TACHYCARDIA: 0
WEAKNESS: 1
POLYDIPSIA: 0
FLANK PAIN: 0
SPUTUM PRODUCTION: 0
DYSPNEA ON EXERTION: 0
BACK PAIN: 1
FEVER: 0
WEIGHT LOSS: 1
VOMITING: 1
DYSPNEA ON EXERTION: 1
TINGLING: 1
JOINT SWELLING: 0
WHEEZING: 1
HEARTBURN: 1
EXERCISE INTOLERANCE: 0
DIZZINESS: 0
WHEEZING: 1
CONSTIPATION: 0
SKIN CHANGES: 0
DECREASED APPETITE: 0
MYALGIAS: 0
RECTAL PAIN: 0
WEAKNESS: 1
WEIGHT LOSS: 1
DISTURBANCES IN COORDINATION: 0
ARTHRALGIAS: 0
EXERCISE INTOLERANCE: 0
POLYDIPSIA: 0
DYSPNEA ON EXERTION: 1
HOARSE VOICE: 1
INCREASED ENERGY: 1
FATIGUE: 0
ALTERED TEMPERATURE REGULATION: 0
BOWEL INCONTINENCE: 0
FATIGUE: 0
COUGH: 1
ALTERED TEMPERATURE REGULATION: 0
POOR WOUND HEALING: 0
WEIGHT LOSS: 1
HEMOPTYSIS: 0
MEMORY LOSS: 0
WEIGHT LOSS: 1
SPUTUM PRODUCTION: 1
HEADACHES: 0
PARALYSIS: 0
DIFFICULTY URINATING: 0
FEVER: 0
DYSURIA: 0
FATIGUE: 1
JAUNDICE: 0
VOMITING: 1
BACK PAIN: 1
SPEECH CHANGE: 0
SYNCOPE: 0
NECK PAIN: 1
HEMOPTYSIS: 0
MUSCLE CRAMPS: 0
SORE THROAT: 0
SPEECH CHANGE: 1
WEIGHT GAIN: 0
TASTE DISTURBANCE: 0
JOINT SWELLING: 0
SKIN CHANGES: 0
NIGHT SWEATS: 0
FATIGUE: 0
LOSS OF CONSCIOUSNESS: 0
POLYPHAGIA: 0
HOARSE VOICE: 0
SEIZURES: 0
BACK PAIN: 1
DECREASED APPETITE: 0
POLYDIPSIA: 0
CLAUDICATION: 0
POSTURAL DYSPNEA: 0
FEVER: 0
SPEECH CHANGE: 1
TROUBLE SWALLOWING: 1
TREMORS: 0
WHEEZING: 0
JAUNDICE: 0
BACK PAIN: 1
POLYPHAGIA: 0
HYPOTENSION: 0
DISTURBANCES IN COORDINATION: 0
WHEEZING: 0
VOMITING: 1
NECK PAIN: 1
MEMORY LOSS: 1
JOINT SWELLING: 0
HEARTBURN: 1
NIGHT SWEATS: 0
ALTERED TEMPERATURE REGULATION: 0
ABDOMINAL PAIN: 0
HEMOPTYSIS: 0
MEMORY LOSS: 0
BLOATING: 0
NAIL CHANGES: 0
WHEEZING: 1
STIFFNESS: 0
CONSTIPATION: 0
NAUSEA: 1
SINUS PAIN: 0
RECTAL PAIN: 0
JAUNDICE: 0
HEMOPTYSIS: 0
DYSPNEA ON EXERTION: 1
MUSCLE CRAMPS: 0
POLYDIPSIA: 0
HOARSE VOICE: 0
SINUS CONGESTION: 0
NUMBNESS: 1
DIZZINESS: 0
SINUS PAIN: 0
HALLUCINATIONS: 0
COUGH: 1
SEIZURES: 0
NAUSEA: 1
ARTHRALGIAS: 0
NUMBNESS: 1
SORE THROAT: 1
COUGH DISTURBING SLEEP: 0
WEIGHT GAIN: 0
POLYDIPSIA: 0
STIFFNESS: 0
NAIL CHANGES: 0
FLANK PAIN: 0
FLANK PAIN: 0
CONSTIPATION: 0
DYSURIA: 0
DIZZINESS: 1
WEIGHT GAIN: 0
PARALYSIS: 0
PALPITATIONS: 0
WEAKNESS: 0
POSTURAL DYSPNEA: 0
JOINT SWELLING: 0
RECTAL BLEEDING: 0
CHILLS: 0
TROUBLE SWALLOWING: 1
MYALGIAS: 0
POOR WOUND HEALING: 0
INCREASED ENERGY: 1
CHILLS: 0
BACK PAIN: 0
SMELL DISTURBANCE: 0
POSTURAL DYSPNEA: 0
RESPIRATORY PAIN: 1
SINUS PAIN: 0
INCREASED ENERGY: 1
NAIL CHANGES: 0
COUGH: 1
WHEEZING: 1
TINGLING: 1
ALTERED TEMPERATURE REGULATION: 0
NAUSEA: 1
HEMOPTYSIS: 0
LEG SWELLING: 0
TREMORS: 0
COUGH DISTURBING SLEEP: 0
HEMOPTYSIS: 0
NECK MASS: 0
TREMORS: 0
POLYPHAGIA: 0
MUSCLE WEAKNESS: 1
NECK PAIN: 1

## 2017-01-01 ASSESSMENT — PAIN SCALES - GENERAL
PAINLEVEL: NO PAIN (0)
PAINLEVEL: MODERATE PAIN (5)
PAINLEVEL: NO PAIN (0)
PAINLEVEL: SEVERE PAIN (6)
PAINLEVEL: MODERATE PAIN (5)
PAINLEVEL: NO PAIN (0)

## 2017-01-01 ASSESSMENT — PAIN DESCRIPTION - DESCRIPTORS: DESCRIPTORS: ACHING;BURNING

## 2017-01-03 NOTE — TELEPHONE ENCOUNTER
Called pt and informed him Dr. Hernandez prescribed Oxycontin 10mg BID for him to take in addition to using oxycodone prn for breakthrough pain.  Pt requesting to  medication at [facility] pharmacy.  Rx dropped off at pharmacy for .

## 2017-01-03 NOTE — TELEPHONE ENCOUNTER
----- Message from Keira Hernandez MD sent at 1/3/2017  7:01 AM CST -----  Please add oxycontin 10 mg BID. I can sign the prescription, please arrange for a  by the patient.     Thanks  ----- Message -----     From: Yadira Ferraro RN     Sent: 1/2/2017   1:04 PM       To: Keira Hernandez MD    Hi Dr. Hernandez and welcome back...you were missed!!!      So, Darek's sister called me and said that Darek is having trouble controlling his pain.  The oxy only lasts about 3 hours before it wears off.  He would like to discuss alternatives with you.  If your schedule does not permit, I am happy to call him.  Is there something different that would last longer of in addition to current pain control regimen?    Yadira Acuña

## 2017-01-16 NOTE — NURSING NOTE
Chief Complaint   Patient presents with     Blood Draw     lab collected in right antecubital by RN, not other appts on this floor.

## 2017-01-16 NOTE — TELEPHONE ENCOUNTER
I was called by radiology about the patient's CT scan which shows progression and a thoracic vertebral fracture. I have tried to call the patient but phone was not attended. I have called his sister Heena and talked to her. She will try to reach him, if he has any worsening back pain or any numbness or weakness of legs then he will call us. Otherwise, I plan to see him tomorrow. I will likely switch treatment to chemotherapy for now given persistent rapid progression while on chemotherapy.     Keira Hernandez MD

## 2017-01-17 NOTE — Clinical Note
1/17/2017       RE: Darek Navarro  [id number]  [place] [address]     Dear Colleague,    Thank you for referring your patient, Darek Navarro, to the [facility]. Please see a copy of my visit note below.    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 Pathologic staging: [id number]      TREATMENT:    1- status post an April 28, 2016 transmandibular subtotal oral glossectomy, right base of tongue resection with partial pharyngectomy, bilateral neck dissections, and free flap reconstruction. Concurrent ChemoRt with high dose Cisplatin 6/6/16. day 22 cisplatin on 6/27/16. He completed day 43 on 7/19/16. Radiation completed, 6600cGy on 7/21/16.   2- Palliative intent treatment with Pembrolizumab 12/6/2016  3- Due to rapid progression of disease the treatment was switched to Carboplatin, 5-FU and Cetuximab on 1/18/17     SUBJECTIVE:  The patient was seen as a followup today. ***    REVIEW OF SYSTEMS:  A complete review of systems was performed and found to be negative other than pertinent positives mentioned in history of present illness.     Past medical, social histories reviewed.    Meds- Reviewed.     PHYSICAL EXAMINATION:   VITAL SIGNS: ***  GENERAL: Sitting comfortably.   HEENT: Pupils are equal. Oropharynx is clear.   NECK: No cervical or supraclavicular lymphadenopathy.   LUNGS: Clear bilaterally.   HEART: S1, S2, regular.   ABDOMEN: Soft, nontender, nondistended, no hepatosplenomegaly.   EXTREMITIES: Warm, well perfused.   NEUROLOGIC: Alert, awake.   SKIN: No rash.   LYMPHATICS: No edema.      DATA:  Recent Labs   Lab Test  01/16/17   1454  12/27/16   1359   05/05/16   0750  05/04/16   0739   NA  135  135   < >  140  138   POTASSIUM  4.4  4.0   < >  4.9  4.1    CHLORIDE  100  101   < >  105  105   CO2  28  26   < >  28  29   ANIONGAP  6  8   < >  6  4   BUN  17  13   < >  16  14   CR  0.72  0.73   < >  0.64*  0.64*   GLC  104*  111*   < >  106*  102*   COLE  10.7*  9.2   < >  9.6  9.3   MAG  2.3  2.4*   < >  2.3  2.2   PHOS   --    --    --   5.3*  4.0    < > = values in this interval not displayed.     Recent Labs   Lab Test  01/16/17   1454  12/27/16   1359  12/21/16   1619   WBC  11.9*  10.9  15.3*   HGB  10.2*  10.4*  10.8*   PLT  511*  576*  557*   MCV  80  82  81   NEUTROPHIL  73.2  78.7  83.1     Recent Labs   Lab Test  01/16/17   1454  12/27/16   1359  12/21/16   1619   BILITOTAL  0.3  0.4  0.4   ALKPHOS  177*  422*  168*   ALT  43  69  51   AST  23  47*  37   ALBUMIN  3.0*  2.6*  2.8*         Results for orders placed or performed in visit on 01/16/17   CT Soft Tissue Neck w Contrast    Narrative    CT SOFT TISSUE NECK W CONTRAST 1/16/2017 3:30 PM    History:  Resection oral cavity squama cell carcinoma April 2017. At  chemoirradiation June 2016 2 July 2016. Now on chemotherapy for  metastatic disease.      Comparison:  CT neck 12/6/2016 and PET/CT dated 10/20/2016     Technique: Following intravenous administration of nonionic iodinated  contrast medium, thin section helical CT images were obtained from the  skull base down to the level of the aortic arch.  Axial, coronal and  sagittal reformations were performed with 2-3 mm slice thickness  reconstruction. Images were reviewed in soft tissue, lung and bone  windows.    Contrast: Isovue 370 95cc    Findings:   Postsurgical changes from bilateral neck dissection and mastectomy.  There is been resection of the right jugular vein. Vascular structures  of the neck are otherwise unremarkable.    The necrotic level 2 mass on the right which is hot on prior PET/CT  measures 2.1 x 3.6 cm, previously measured 1.8 x 2.9 cm.    There is a lytic lesion in the anterior aspect of T1 with compression  fracture and  approximately 10% loss of height anteriorly.    Previously PET avid mass in the base of the tongue measures 2.3 cm x  2.9 cm, previously measured 2.5 x 2.6 cm.    There is a left level 3 node at about the level of the cricoid closely  related to the anterior edge of the left sternocleidomastoid which is  increased in size from 0.9 to 1.5 cm.    Centrally necrotic mass in the left lung apex again noted increased in  size. Left-sided central venous line extends both inferior margin of  the study.      Impression    Impression:    1. Multiple findings consistent with progression of disease.  2. There is a lytic lesion in T1 causing a pathologic compression  fracture with approximately 10% loss of height anteriorly. No  retropulsion. Called to Keira Hernandez at 4:55 pm 1/16/17.  3. Previously PET avid mass base of the tongue has increased in size.  4. Necrotic mass at level 2 on the right has increased in size. There  is a necrotic level 3 node on the left which is also increased in  size.  5. Centrally necrotic chronic lung mass seen in the left lung apex has  increased in size.    I have personally reviewed the examination and initial interpretation  and I agree with the findings.    FLORENCIO JEAN MD       ECOG PS: 1     ASSESSMENT:  A 26-year-old gentleman  [id number] squamous cell carcinoma of the right oral tongue.Status post April 28, 2016 transmandibular subtotal oral glossectomy, right base of tongue resection with partial pharyngectomy, bilateral neck dissections, and free flap. Initiated treatment by 6/6/16.   He completed his concurrent chemoRT 7/21/16.  Follow up PET CT scan 10/28/16 was reviewed at multidisciplinary head and neck tumor board. It was consistent with local and distant recurrence. L- His case was subsequently reviewed at Thoracic oncology conference and we will proceed with biopsy by IR.glendy biopsy results were reviewed with the patient and family members which confirmed metastatic disease.   He was  seen at [facility] and no first line trials were available. He was switched to Pembrolizumab with a plan to do short term scans to assess the pace of disease progression.   - CT scan 1/16/17 results and images were reviewed. It unfortunately has shown rapid disease progression with thoracic vertebral fracture.   - I think we should switch to chemotherapy at this point for better disease control. May consider restarting immunotherapy in future.   - Discussed about combination chemotherapy with carboplatin/5-FU and Cetuximab, discussed risks and benefits and potential side effects. He agrees to proceed.     PLAN:  1- Discontinue Pembrolizumab.  2- Chemo education- carboplatin, 5-FU and cetuximab.  3- Start chemotherapy tomorrow.   4- Spine surgery consult for vertebral fracture.   5- RTC MD 3 weeks   ***  KEIRA HERNANDEZ MD    1/17/2017    cc: Andrea Pagan MD     Again, thank you for allowing me to participate in the care of your patient.      Sincerely,    Keira Hernandez MD

## 2017-01-17 NOTE — PROGRESS NOTES
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 Pathologic staging: [id number]      TREATMENT:    1- status post an April 28, 2016 transmandibular subtotal oral glossectomy, right base of tongue resection with partial pharyngectomy, bilateral neck dissections, and free flap reconstruction. Concurrent ChemoRt with high dose Cisplatin 6/6/16. day 22 cisplatin on 6/27/16. He completed day 43 on 7/19/16. Radiation completed, 6600cGy on 7/21/16.   2- Palliative intent treatment with Pembrolizumab 12/6/2016  3- Due to rapid progression of disease the treatment was switched to Carboplatin, 5-FU and Cetuximab on 1/18/17     SUBJECTIVE:  The patient was seen as a followup today. He states that he has upper back pain which is controlled by current pain meds. No weakness or numbness in the legs. He is loosing weight.     REVIEW OF SYSTEMS:  A complete review of systems was performed and found to be negative other than pertinent positives mentioned in history of present illness.     Past medical, social histories reviewed.    Meds- Reviewed.     PHYSICAL EXAMINATION:   VITAL SIGNS: ./79 mmHg  Pulse 107  Temp(Src) 98.3  F (36.8  C) (Oral)  Resp 18  Ht 1.88 m (6' 2.02\")  Wt 68.221 kg (150 lb 6.4 oz)  BMI 19.30 kg/m2  SpO2 93%  GENERAL: Sitting comfortably.   HEENT: Pupils are equal. Oropharynx is clear.   NECK: No cervical or supraclavicular lymphadenopathy.   LUNGS: Clear bilaterally.   HEART: S1, S2, regular.   ABDOMEN: Soft, nontender, nondistended, no hepatosplenomegaly.   EXTREMITIES: Warm, well perfused.   NEUROLOGIC: Alert, awake.   SKIN: No rash.   LYMPHATICS: No edema.      DATA:  Recent Labs   Lab Test  01/16/17   1454  12/27/16   1359   05/05/16   0750  05/04/16   0739   NA  135  135   < >  140  " 138   POTASSIUM  4.4  4.0   < >  4.9  4.1   CHLORIDE  100  101   < >  105  105   CO2  28  26   < >  28  29   ANIONGAP  6  8   < >  6  4   BUN  17  13   < >  16  14   CR  0.72  0.73   < >  0.64*  0.64*   GLC  104*  111*   < >  106*  102*   COLE  10.7*  9.2   < >  9.6  9.3   MAG  2.3  2.4*   < >  2.3  2.2   PHOS   --    --    --   5.3*  4.0    < > = values in this interval not displayed.     Recent Labs   Lab Test  01/16/17   1454  12/27/16   1359  12/21/16   1619   WBC  11.9*  10.9  15.3*   HGB  10.2*  10.4*  10.8*   PLT  511*  576*  557*   MCV  80  82  81   NEUTROPHIL  73.2  78.7  83.1     Recent Labs   Lab Test  01/16/17   1454  12/27/16   1359  12/21/16   1619   BILITOTAL  0.3  0.4  0.4   ALKPHOS  177*  422*  168*   ALT  43  69  51   AST  23  47*  37   ALBUMIN  3.0*  2.6*  2.8*         Results for orders placed or performed in visit on 01/16/17   CT Soft Tissue Neck w Contrast    Narrative    CT SOFT TISSUE NECK W CONTRAST 1/16/2017 3:30 PM    History:  Resection oral cavity squama cell carcinoma April 2017. At  chemoirradiation June 2016 2 July 2016. Now on chemotherapy for  metastatic disease.      Comparison:  CT neck 12/6/2016 and PET/CT dated 10/20/2016     Technique: Following intravenous administration of nonionic iodinated  contrast medium, thin section helical CT images were obtained from the  skull base down to the level of the aortic arch.  Axial, coronal and  sagittal reformations were performed with 2-3 mm slice thickness  reconstruction. Images were reviewed in soft tissue, lung and bone  windows.    Contrast: Isovue 370 95cc    Findings:   Postsurgical changes from bilateral neck dissection and mastectomy.  There is been resection of the right jugular vein. Vascular structures  of the neck are otherwise unremarkable.    The necrotic level 2 mass on the right which is hot on prior PET/CT  measures 2.1 x 3.6 cm, previously measured 1.8 x 2.9 cm.    There is a lytic lesion in the anterior aspect of T1  with compression  fracture and approximately 10% loss of height anteriorly.    Previously PET avid mass in the base of the tongue measures 2.3 cm x  2.9 cm, previously measured 2.5 x 2.6 cm.    There is a left level 3 node at about the level of the cricoid closely  related to the anterior edge of the left sternocleidomastoid which is  increased in size from 0.9 to 1.5 cm.    Centrally necrotic mass in the left lung apex again noted increased in  size. Left-sided central venous line extends both inferior margin of  the study.      Impression    Impression:    1. Multiple findings consistent with progression of disease.  2. There is a lytic lesion in T1 causing a pathologic compression  fracture with approximately 10% loss of height anteriorly. No  retropulsion. Called to Keira Hernandez at 4:55 pm 1/16/17.  3. Previously PET avid mass base of the tongue has increased in size.  4. Necrotic mass at level 2 on the right has increased in size. There  is a necrotic level 3 node on the left which is also increased in  size.  5. Centrally necrotic chronic lung mass seen in the left lung apex has  increased in size.    I have personally reviewed the examination and initial interpretation  and I agree with the findings.    FLORENCIO JEAN MD       ECOG PS: 1     ASSESSMENT:  A 26-year-old gentleman  [id number] squamous cell carcinoma of the right oral tongue.Status post April 28, 2016 transmandibular subtotal oral glossectomy, right base of tongue resection with partial pharyngectomy, bilateral neck dissections, and free flap. Initiated treatment by 6/6/16.   He completed his concurrent chemoRT 7/21/16.  Follow up PET CT scan 10/28/16 was reviewed at multidisciplinary head and neck tumor board. It was consistent with local and distant recurrence. L- His case was subsequently reviewed at Thoracic oncology conference and we will proceed with biopsy by IR.glendy biopsy results were reviewed with the patient and family members which  confirmed metastatic disease.   He was seen at [facility] and no first line trials were available. He was switched to Pembrolizumab with a plan to do short term scans to assess the pace of disease progression.   - CT scan 1/16/17 results and images were reviewed. It unfortunately has shown rapid disease progression with thoracic vertebral fracture.   - I think we should switch to chemotherapy at this point for better disease control. May consider restarting immunotherapy in future.   - Discussed about combination chemotherapy with carboplatin/5-FU and Cetuximab, discussed risks and benefits and potential side effects. He agrees to proceed.   - He may need palliative radiation to bone mets.     PLAN:  1- Discontinue Pembrolizumab.  2- Chemo education- carboplatin, 5-FU and cetuximab.  3- Start chemotherapy tomorrow.   4- Spine surgery consult for vertebral fracture.   5- RTC MD 3 weeks   6- Schedule an appointment with the nutritionist for weight loss    CLAUS CAT MD    1/17/2017    cc: Andrea Pagan MD

## 2017-01-17 NOTE — Clinical Note
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 Pathologic staging: [id number]      TREATMENT:    1- status post an April 28, 2016 transmandibular subtotal oral glossectomy, right base of tongue resection with partial pharyngectomy, bilateral neck dissections, and free flap reconstruction. Concurrent ChemoRt with high dose Cisplatin 6/6/16. day 22 cisplatin on 6/27/16. He completed day 43 on 7/19/16. Radiation completed, 6600cGy on 7/21/16.   2- Palliative intent treatment with Pembrolizumab 12/6/2016  3- Due to rapid progression of disease the treatment was switched to Carboplatin, 5-FU and Cetuximab on 1/18/17     SUBJECTIVE:  The patient was seen as a followup today. He states that he has upper back pain which is controlled by current pain meds. No weakness or numbness in the legs. He is loosing weight.     REVIEW OF SYSTEMS:  A complete review of systems was performed and found to be negative other than pertinent positives mentioned in history of present illness.     Past medical, social histories reviewed.    Meds- Reviewed.     PHYSICAL EXAMINATION:   VITAL SIGNS: ./79 mmHg  Pulse 107  Temp(Src) 98.3  F (36.8  C) (Oral)  Resp 18  Ht 1.88 m (6' 2.02\")  Wt 68.221 kg (150 lb 6.4 oz)  BMI 19.30 kg/m2  SpO2 93%  GENERAL: Sitting comfortably.   HEENT: Pupils are equal. Oropharynx is clear.   NECK: No cervical or supraclavicular lymphadenopathy.   LUNGS: Clear bilaterally.   HEART: S1, S2, regular.   ABDOMEN: Soft, nontender, nondistended, no hepatosplenomegaly.   EXTREMITIES: Warm, well perfused.   NEUROLOGIC: Alert, awake.   SKIN: No rash.   LYMPHATICS: No edema.      DATA:  Recent Labs   Lab Test  01/16/17   " 1454  12/27/16   1359   05/05/16   0750  05/04/16   0739   NA  135  135   < >  140  138   POTASSIUM  4.4  4.0   < >  4.9  4.1   CHLORIDE  100  101   < >  105  105   CO2  28  26   < >  28  29   ANIONGAP  6  8   < >  6  4   BUN  17  13   < >  16  14   CR  0.72  0.73   < >  0.64*  0.64*   GLC  104*  111*   < >  106*  102*   COLE  10.7*  9.2   < >  9.6  9.3   MAG  2.3  2.4*   < >  2.3  2.2   PHOS   --    --    --   5.3*  4.0    < > = values in this interval not displayed.     Recent Labs   Lab Test  01/16/17   1454  12/27/16   1359  12/21/16   1619   WBC  11.9*  10.9  15.3*   HGB  10.2*  10.4*  10.8*   PLT  511*  576*  557*   MCV  80  82  81   NEUTROPHIL  73.2  78.7  83.1     Recent Labs   Lab Test  01/16/17   1454  12/27/16   1359  12/21/16   1619   BILITOTAL  0.3  0.4  0.4   ALKPHOS  177*  422*  168*   ALT  43  69  51   AST  23  47*  37   ALBUMIN  3.0*  2.6*  2.8*         Results for orders placed or performed in visit on 01/16/17   CT Soft Tissue Neck w Contrast    Narrative    CT SOFT TISSUE NECK W CONTRAST 1/16/2017 3:30 PM    History:  Resection oral cavity squama cell carcinoma April 2017. At  chemoirradiation June 2016 2 July 2016. Now on chemotherapy for  metastatic disease.      Comparison:  CT neck 12/6/2016 and PET/CT dated 10/20/2016     Technique: Following intravenous administration of nonionic iodinated  contrast medium, thin section helical CT images were obtained from the  skull base down to the level of the aortic arch.  Axial, coronal and  sagittal reformations were performed with 2-3 mm slice thickness  reconstruction. Images were reviewed in soft tissue, lung and bone  windows.    Contrast: Isovue 370 95cc    Findings:   Postsurgical changes from bilateral neck dissection and mastectomy.  There is been resection of the right jugular vein. Vascular structures  of the neck are otherwise unremarkable.    The necrotic level 2 mass on the right which is hot on prior PET/CT  measures 2.1 x 3.6 cm,  previously measured 1.8 x 2.9 cm.    There is a lytic lesion in the anterior aspect of T1 with compression  fracture and approximately 10% loss of height anteriorly.    Previously PET avid mass in the base of the tongue measures 2.3 cm x  2.9 cm, previously measured 2.5 x 2.6 cm.    There is a left level 3 node at about the level of the cricoid closely  related to the anterior edge of the left sternocleidomastoid which is  increased in size from 0.9 to 1.5 cm.    Centrally necrotic mass in the left lung apex again noted increased in  size. Left-sided central venous line extends both inferior margin of  the study.      Impression    Impression:    1. Multiple findings consistent with progression of disease.  2. There is a lytic lesion in T1 causing a pathologic compression  fracture with approximately 10% loss of height anteriorly. No  retropulsion. Called to Keira Hernandez at 4:55 pm 1/16/17.  3. Previously PET avid mass base of the tongue has increased in size.  4. Necrotic mass at level 2 on the right has increased in size. There  is a necrotic level 3 node on the left which is also increased in  size.  5. Centrally necrotic chronic lung mass seen in the left lung apex has  increased in size.    I have personally reviewed the examination and initial interpretation  and I agree with the findings.    FLORENCIO JEAN MD       ECOG PS: 1     ASSESSMENT:  A 26-year-old gentleman  [id number] squamous cell carcinoma of the right oral tongue.Status post April 28, 2016 transmandibular subtotal oral glossectomy, right base of tongue resection with partial pharyngectomy, bilateral neck dissections, and free flap. Initiated treatment by 6/6/16.   He completed his concurrent chemoRT 7/21/16.  Follow up PET CT scan 10/28/16 was reviewed at multidisciplinary head and neck tumor board. It was consistent with local and distant recurrence. L- His case was subsequently reviewed at Thoracic oncology conference and we will proceed with biopsy  by Mikal biopsy results were reviewed with the patient and family members which confirmed metastatic disease.   He was seen at [facility] and no first line trials were available. He was switched to Pembrolizumab with a plan to do short term scans to assess the pace of disease progression.   - CT scan 1/16/17 results and images were reviewed. It unfortunately has shown rapid disease progression with thoracic vertebral fracture.   - I think we should switch to chemotherapy at this point for better disease control. May consider restarting immunotherapy in future.   - Discussed about combination chemotherapy with carboplatin/5-FU and Cetuximab, discussed risks and benefits and potential side effects. He agrees to proceed.   - He may need palliative radiation to bone mets.     PLAN:  1- Discontinue Pembrolizumab.  2- Chemo education- carboplatin, 5-FU and cetuximab.  3- Start chemotherapy tomorrow.   4- Spine surgery consult for vertebral fracture.   5- RTC MD 3 weeks   6- Schedule an appointment with the nutritionist for weight loss    CLAUS HERNANDEZ MD    1/17/2017    cc: Andrea Hernandez MD

## 2017-01-17 NOTE — NURSING NOTE
"Darek Navarro is a 26 year old male who presents for:  Chief Complaint   Patient presents with     Oncology Clinic Visit     Throat Ca, 3 wk F/U        Initial Vitals:  /79 mmHg  Pulse 107  Temp(Src) 98.3  F (36.8  C) (Oral)  Resp 18  Ht 1.88 m (6' 2.02\")  Wt 68.221 kg (150 lb 6.4 oz)  BMI 19.30 kg/m2  SpO2 93% Estimated body mass index is 19.3 kg/(m^2) as calculated from the following:    Height as of this encounter: 1.88 m (6' 2.02\").    Weight as of this encounter: 68.221 kg (150 lb 6.4 oz).. Body surface area is 1.89 meters squared. BP completed using cuff size: regular  Severe Pain (6) No LMP for male patient. Allergies and medications reviewed.     Medications: Medication refills not needed today.  Pharmacy name entered into [facility]:    [facility] [address] [place], [place] - [address]  [facility] [id number] [place] [facility] [address]    Comments:     7 minutes for nursing intake (face to face time)   Gertrude Mcgill LPN          "

## 2017-01-18 NOTE — PROGRESS NOTES
Infusion Nursing Note:  Darek Navarro presents today for C1 D1 Erbitux, Carboplatin, Zometa and Fluorouracil pump.    Patient seen by provider today: No   present during visit today: Not Applicable.    Note: Pt is receiving new chemotherapy regimen. RN printed information on Erbitux, Carboplatin and Flurouracil. Care coordinator RN did the chemotherapy teaching.    Intravenous Access:  Implanted Port.    Treatment Conditions:  HGB     10.2   1/16/2017  WBC     11.9   1/16/2017   ANEU      8.7   1/16/2017  PLT      511   1/16/2017     NA      135   1/16/2017                POTASSIUM      4.4   1/16/2017        MAG      2.3   1/16/2017         CR     0.72   1/16/2017                COLE     10.7   1/16/2017             BILITOTAL      0.3   1/16/2017        ALBUMIN      3.0   1/16/2017                 ALT       43   1/16/2017        AST       23   1/16/2017  Results reviewed, labs MET treatment parameters, ok to proceed with treatment.      Post Infusion Assessment:  Flurouracil  continuous infusion pump connected at 1727.  Positive blood return from port at time of pump hook up.  Pump will infuse over 96 hours at 2.1cc/hour.  Fluorouracil continuous pump will be disconnected on Sunday 1/22  by [facility].  Yadira care Coordinator RN is in the process of arranging pump disconnect through [facility]. She will contact pt with exact time.     Patient tolerated infusion without incident.  Blood return noted pre and post infusion.  Site patent and intact, free from redness, edema or discomfort.  No evidence of extravasations.    Discharge Plan:   Prescription refills given for Ativan and Compazine.  Discharge instructions reviewed with: Patient.  Patient and/or family verbalized understanding of discharge instructions and all questions answered.  Copy of AVS reviewed with patient and/or family.  Patient will return 1/24/17 for next appointment.    Lee Ann Tejada RN

## 2017-01-18 NOTE — PROGRESS NOTES
"CLINICAL NUTRITION SERVICES - ASSESSMENT NOTE    REASON FOR ASSESSMENT  Darek Navarro is a 26 year old male seen by the dietitian for Medical Nutrition Therapy related to throat cancer referred by Dr. Hernandez.  Pt accompanied by his sister, Carmina.      NUTRITION HISTORY  Factors affecting nutrition intake include:feeding difficulties, slow eating pace and swallowing difficulties    Corey has been following a full liquid diet d/t barriers mentioned above.  He reports that he can eat solid foods and does try them frequently, however, he has meal time fatigue as it takes him almost 2 minutes to chew/swallow 1 bite.    He has been drinking 4 regular Ensure or Boost/day for ~1100kcal.    He drinks 2 cups 2% milk for ~250kcal  He drinks 1 meal replacement for ~300kcal.     Total caloric intake = 1650/day (65-80 % of his calorie needs).     He reports that he will take bites of cottage cheese, yogurt and mashed potatoes.   He drinks sports drinks and water.       ANTHROPOMETRICS  Height: 6'2\"  Weight: 150 lbs  BMI: 19.2  Weight Status:  Normal BMI  IBW: 190 lbs  % IBW: 78%  Weight History: noted ~27 lb wt loss x past 4 months since removal of feeding tube.  He reports that he did not like his feeding tube and refuses to have another one placed.   Wt Readings from Last 10 Encounters:   01/17/17 68.221 kg (150 lb 6.4 oz)   12/27/16 70.58 kg (155 lb 9.6 oz)   12/14/16 74.39 kg (164 lb)   12/09/16 74.39 kg (164 lb)   12/06/16 74.3 kg (163 lb 12.8 oz)   11/17/16 76.204 kg (168 lb)   11/09/16 76.658 kg (169 lb)   11/04/16 77.565 kg (171 lb)   11/03/16 77.928 kg (171 lb 12.8 oz)   09/14/16 80.287 kg (177 lb)     LABS  Labs reviewed    MEDICATIONS/VITAMINS/MINERALS  Medications reviewed    PROCEDURES WITH NUTRITIONAL IMPLICATIONS  Chemotherapy - initiation of Carboplatin, 5FU and Cetuximab today 1/18/17  Glossectomy - 6/6/16    ASSESSED NUTRITION NEEDS:  Estimated Energy Needs: 1718-5456 kcals (30-35 Kcal/Kg)  Justification: " increased needs with chemo/repletion  Estimated Protein Needs:  grams protein (1.2-1.5 g pro/Kg)  Justification: Increased needs with chemo/repletion  Estimated Fluid Needs: 7784-9339  mL  Justification: maintenance with chemotherapy    MALNUTRITION:  % Weight Loss:  > 7.5% in 3 months (severe malnutrition)  % Intake:  <75% for >/= 3 months (non-severe malnutrition)  Subcutaneous Fat Loss:  Thoracic region moderate depletion  Muscle Loss:  Anterior thigh region moderate depletion  Fluid Retention:  None noted    Malnutrition Diagnosis: Non-Severe malnutrition  In Context of:  Chronic illness or disease    NUTRITION DIAGNOSIS:  Inadequate oral intake related to dysphagia, chewing difficulties as evidenced by 15% wt loss x past 4 months.     INTERVENTIONS  Recommendations / Nutrition Prescription  1. 2400kcal, 80-100g protein/day via small frequent meals  2. 4-6 ONS - Switch to higher calorie dense ONS - Boost Plus/Ensure Plus, Ensure Enlive or Boost Very High Calorie to obtain >300 additional calories if drinking at least 4/day.     Implementation/Nutrition education provided:  -Discussed ways to maximize kcal and protein intake. Discussed calorie and protein needs for maintenance of weight and nutrition status.  Advised pt to aim for 2400kcal and 80-100g protein via 5-6 small frequent meals.   -Discussed that as chemotherapy progresses, eating may become more difficult and discussed ways to cope with this.      Provided pt with corresponding education materials -  High Calorie, High Protein Recipe booklet, Tips to Increase the Calories in Your Diet, Tips to Increase the Protein in Your Diet and Protein Sources.    Suggested patient switch ONS to Ensure Enlive or Boost VHC.  Suggested ways to incorporate these supplements to avoid flavor fatigue. Provided pt with [employer] recipe bookelet.  Encouraged pt to have at least 4-6/day in addition to meal replacement he is currently drinking or home made  shakes/smoothies.     Pt verbalize understanding of materials provided during consult.   Patient Understanding: Excellent  Expected Compliance: Excellent      Goals  1.  Aim for 5-6 small frequent meals   2.  Aim for 2400kcal and 80-100g protein/day  3. Weight repletion towards 170 lbs    Follow-Up Plans: Pt has RD contact information for questions.    Pt encouraged to follow up with RD in 2 for weight check.     MONITORING AND EVALUATION:  -Food intake, adequacy   -Liquid meal replacement or supplement (?switch to higher calorie ONS)  -Weight trends    Time spent with patient: 30 minutes.  Renea Borges RD, LD

## 2017-01-24 NOTE — Clinical Note
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 Pathologic staging: [id number]      TREATMENT:  1- status post an April 28, 2016 transmandibular subtotal oral glossectomy, right base of tongue resection with partial pharyngectomy, bilateral neck dissections, and free flap reconstruction. Concurrent ChemoRt with high dose Cisplatin 6/6/16. day 22 cisplatin on 6/27/16. He completed day 43 on 7/19/16. Radiation completed, 6600cGy on 7/21/16.    2- Palliative intent treatment with Pembrolizumab 12/6/2016  3- Due to rapid progression of disease the treatment was switched to Carboplatin, 5-FU and Cetuximab on 1/18/17    INTERVAL HISTORY:   Darek presents for follow-up after starting new chemotherapy regimen last week.    He reports it went well for him with the exception of mouth sores, which are improving, but still there today. He had some leftover viscous lidocaine and felt this has been very helpful. He has been drinking 6 Boost daily, just switched to the higher calorie Plus version. He had no fevers, chills, cough, SOB, chest pain, nausea, vomiting, bowel changes, bleeding, swelling, or rashes. Ritalin is helping his fatigue and he reports no pain today. He offers no further concerns today.        Current Outpatient Prescriptions   Medication Sig Dispense Refill     LORazepam (ATIVAN) 0.5 MG tablet Take 1 tablet (0.5 mg) by mouth every 4 hours as needed (Anxiety, Nausea/Vomiting or Sleep) 30 tablet 5     prochlorperazine (COMPAZINE) 10 MG tablet Take 1 tablet (10 mg) by mouth every 6 hours as needed (Nausea/Vomiting) 30 tablet 5     oxyCODONE (ROXICODONE) 5 MG/5ML solution 10 mLs (10 mg) by Oral or PEG tube route every 4 hours as  "needed for moderate to severe pain 1000 mL 0     oxyCODONE (OXYCONTIN) 10 MG 12 hr tablet Take 1 tablet (10 mg) by mouth every 12 hours maximum 2 tablet(s) per day 60 tablet 0     pantoprazole (PROTONIX) 20 MG EC tablet Take 1 tablet (20 mg) by mouth daily (Patient taking differently: Take 20 mg by mouth daily as needed ) 30 tablet 3     methylphenidate (RITALIN) 5 MG tablet Take 1 tablet (5 mg) by mouth 2 times daily 60 tablet 0     levothyroxine (SYNTHROID/LEVOTHROID) 25 MCG tablet Take 1 tablet (25 mcg) by mouth daily 90 tablet 3     ibuprofen (ADVIL/MOTRIN) 200 MG tablet Take 200 mg by mouth daily as needed        ondansetron (ZOFRAN) 4 MG tablet Take 4 mg by mouth daily as needed        LORazepam (ATIVAN) 2 MG/ML (HIGH CONC) solution Take 0.25-0.5 mLs (0.5-1 mg) by mouth every 4 hours as needed for anxiety, sleep, nausea or vomiting 30 mL 3     citalopram (CELEXA) 10 MG/5ML solution Take 10 mLs (20 mg) by mouth daily 300 mL 3     lidocaine-prilocaine (EMLA) cream Apply to PORT site 45-60 minutes prior to procedure.  Cover with non-absorbent dressing. 30 g 5     multivitamins with minerals (CERTAVITE) LIQD 15 mLs by Per Feeding Tube route daily 1 Bottle 1     mineral oil-hydrophilic petrolatum (AQUAPHOR) ointment Apply topically every 8 hours (Patient taking differently: Apply topically every 8 hours as needed ) 50 g 0        No Known Allergies    PHYSICAL EXAMINATION  /72 mmHg  Pulse 97  Temp(Src) 99.5  F (37.5  C) (Oral)  Resp 14  Ht 1.88 m (6' 2.02\")  Wt 66.633 kg (146 lb 14.4 oz)  BMI 18.85 kg/m2  SpO2 100%   Wt Readings from Last 10 Encounters:   01/24/17 66.633 kg (146 lb 14.4 oz)   01/17/17 68.221 kg (150 lb 6.4 oz)   12/27/16 70.58 kg (155 lb 9.6 oz)   12/14/16 74.39 kg (164 lb)   12/09/16 74.39 kg (164 lb)   12/06/16 74.3 kg (163 lb 12.8 oz)   11/17/16 76.204 kg (168 lb)   11/09/16 76.658 kg (169 lb)   11/04/16 77.565 kg (171 lb)   11/03/16 77.928 kg (171 lb 12.8 oz)     Constitutional: " Alert, oriented male in no visible distress.  Eyes: PERRL. Anicteric sclerae.  ENT/Mouth: OM moist and pink, moderate mucositis on palate and buccal mucosa. Moderate trismus. Neck with surgical resection changes and limited ROM.  CV: RRR, no murmurs appreciated.  Resp: CTAB throughout  Abdomen: Soft, non-tender, non-distended. Bowel sounds present. No masses appreciated. No organomegaly noted.  Extremities: No lower extremity edema appreciated.  Skin: Warm, dry. No bruising or petechiae noted.  Lymph: No cervical or supraclavicular lymphadenopathy appreciated.   Neuro: CN II-XII grossly intact.    LABS:  Results for DAREK NAVARRO (MRN [id number]) as of 1/24/2017 10:46   Ref. Range 1/16/2017 14:54 1/24/2017 10:15   WBC Latest Ref Range: 4.0-11.0 10e9/L 11.9 (H) 7.5   Hemoglobin Latest Ref Range: 13.3-17.7 g/dL 10.2 (L) 9.1 (L)   Hematocrit Latest Ref Range: 40.0-53.0 % 33.3 (L) 29.4 (L)   Platelet Count Latest Ref Range: 150-450 10e9/L 511 (H) 521 (H)       IMPRESSION/PLAN:  Darek Navarro is a 27-year-old male with [id number] squamous cell carcinoma of the right oral tongue initially treated with resection and concurrent chemoradiation, completed in July 2016, with recurrent disease identified in October 2016. He had rapid progression identified on repeat imaging 1/16/2017 while on Keytruda.    1. Metastatic SCC: Started Carbo/5-FU/cetuximab on 1/18/2017. Here today for day 8 cetuximab. He is tolerating this regimen well thus far with the exception of mucositis. He will return next week for day 15 cetuximab, and will see Dr. Hernandez with cycle 2 day 1.    2. Bone mets: Vertebral fracture identifed on restaging. Sine surgery consult  -on monthly Zometa, last given 1/18    3. Cancer-related back pain: Reports stable. Continue Oxycodone 10 mg every 4 hours PRN    4. FEN: Progressive weight loss over the last month. He has met with nutritionist on 1/17 and recommended to strive for 2400 kCal daily and switch to 4-6  high calorie supplements, like Boost/Ensure Plus or Ensure Enlive. He is doing 6 supplements daily and just started doing the higher calorie versions. Continue.  - Hypercalcemia resolved today with Zometa 1/18 (calcium corrects to 8.8)    5. Fatigue: Continue the Ritalin 5 mg BID PRN.    6. Mucositis: Improving, but still present today. Continue the viscous lidocaine and salt/soda swishes.    Wilma Hill PA-C  Hematology, Oncology, and Transplant  [facility]  [address] [place] [id number] [phone]

## 2017-01-24 NOTE — PATIENT INSTRUCTIONS
Contact Numbers    [facility] Main Line: [phone]    Triage Nurse Line: [phone]      Please call the [facility] Nurse Triage line if you experience a temperature greater than or equal to 100.5, shaking chills, have uncontrolled nausea, vomiting and/or diarrhea, dizziness, shortness of breath, bleeding not relieved with pressure, or if you have any other questions or concerns.     If it is after hours, weekends, or holidays, please call the main hospital  at  [phone] and ask to speak to the adult Oncology doctor on call.     If you are having any concerning symptoms or wish to speak to a provider before your next infusion visit, please call your care coordinator or triage them so we can adequately serve you.      If you need to refill your narcotic prescription or other medication, please call triage before your infusion appointment.        January 2017 Sunday Monday Tuesday Wednesday Thursday Friday Saturday   1     2     3     4     5     6     7       8     9     10     11     12     13     14       15     16     [facility]ON[facility] LAB DRAW    1:45 PM   (15 min.)    MASONIC LAB DRAW   [facility] Lab Draw     CT CHEST/ABDOMEN/PELVIS W    2:20 PM   (20 min.)   CT2   [facility] CT     CT SOFT TISSUE NECK W    2:40 PM   (20 min.)   [address] 17     [facility] RETURN    1:15 PM   (30 min.)   Keira Hernandez MD   [facility] 18     [facility] ONC INFUSION 240   12:00 PM   (240 min.)   [facility] ONCOLOGY INFUSION   [facility] NUTRITION VISIT    3:00 PM   (30 min.)   Renea Avila, BIJAL   [facility] 19     20     21       22     23  Happy Birthday!     24     P [facility]ONIC LAB DRAW    9:45 AM   (15 min.)    [facility] LAB DRAW   [facility] Lab Draw     [facility] RETURN   10:10 AM   (50 min.)   Wilma Hlil PA-C   [facility] ONC INFUSION 120   11:30 AM    (120 min.)   [facility] ONCOLOGY INFUSION   [facility] 25     26     27     28       29     30     31     [facility] LAB DRAW    2:00 PM   (15 min.)   [facility] LAB DRAW   [facility] Lab Draw     [facility] RETURN    2:30 PM   (50 min.)   Wilma Hill PA-C   [facility]     [facility] ONC INFUSION 120    3:30 PM   (120 min.)   [facility] ONCOLOGY INFUSION   [facility]                                February 2017 Sunday Monday Tuesday Wednesday Thursday Friday Saturday                  1     2     3     4       5     6     7     [facility] LAB DRAW    9:00 AM   (15 min.)    [facility] LAB DRAW   [facility] Lab Draw     [facility] RETURN    9:30 AM   (30 min.)   Keira Hernandez MD   [facility]     [facility] ONC INFUSION 240   10:00 AM   (240 min.)    ONCOLOGY INFUSION   [facility] 8     9     10     11       12     13     14     [facility] RETURN WITH ROOM    3:30 PM   (60 min.)   Yanique Wiggins GC   [facility] 15     16     17     18       19     20     21     22     23     24     25       26     27     28                                     Recent Results (from the past 24 hour(s))   CBC with platelets differential    Collection Time: 01/24/17 10:15 AM   Result Value Ref Range    WBC 7.5 4.0 - 11.0 10e9/L    RBC Count 3.76 (L) 4.4 - 5.9 10e12/L    Hemoglobin 9.1 (L) 13.3 - 17.7 g/dL    Hematocrit 29.4 (L) 40.0 - 53.0 %    MCV 78 78 - 100 fl    MCH 24.2 (L) 26.5 - 33.0 pg    MCHC 31.0 (L) 31.5 - 36.5 g/dL    RDW 14.2 10.0 - 15.0 %    Platelet Count 521 (H) 150 - 450 10e9/L    Diff Method Automated Method     % Neutrophils 81.3 %    % Lymphocytes 8.3 %    % Monocytes 5.3 %    % Eosinophils 3.7 %    % Basophils 0.7 %    % Immature Granulocytes 0.7 %    Nucleated RBCs 0 0 /100    Absolute Neutrophil 6.1 1.6 - 8.3 10e9/L    Absolute Lymphocytes 0.6 (L) 0.8 - 5.3 10e9/L    Absolute Monocytes 0.4 0.0 - 1.3 10e9/L    Absolute  Eosinophils 0.3 0.0 - 0.7 10e9/L    Absolute Basophils 0.1 0.0 - 0.2 10e9/L    Abs Immature Granulocytes 0.1 0 - 0.4 10e9/L    Absolute Nucleated RBC 0.0    Comprehensive metabolic panel    Collection Time: 01/24/17 10:15 AM   Result Value Ref Range    Sodium 132 (L) 133 - 144 mmol/L    Potassium 4.1 3.4 - 5.3 mmol/L    Chloride 99 94 - 109 mmol/L    Carbon Dioxide 26 20 - 32 mmol/L    Anion Gap 7 3 - 14 mmol/L    Glucose 98 70 - 99 mg/dL    Urea Nitrogen 10 7 - 30 mg/dL    Creatinine 0.65 (L) 0.66 - 1.25 mg/dL    GFR Estimate >90  Non  GFR Calc   >60 mL/min/1.7m2    GFR Estimate If Black >90   GFR Calc   >60 mL/min/1.7m2    Calcium 7.9 (L) 8.5 - 10.1 mg/dL    Bilirubin Total 0.7 0.2 - 1.3 mg/dL    Albumin 2.8 (L) 3.4 - 5.0 g/dL    Protein Total 7.4 6.8 - 8.8 g/dL    Alkaline Phosphatase 133 40 - 150 U/L    ALT 52 0 - 70 U/L    AST 24 0 - 45 U/L   Magnesium    Collection Time: 01/24/17 10:15 AM   Result Value Ref Range    Magnesium 2.5 (H) 1.6 - 2.3 mg/dL       Contact Numbers    [facility] Main Line: [phone]    Triage Nurse Line: [phone]      Please call the [facility] Nurse Triage line if you experience a temperature greater than or equal to 100.5, shaking chills, have uncontrolled nausea, vomiting and/or diarrhea, dizziness, shortness of breath, bleeding not relieved with pressure, or if you have any other questions or concerns.     If it is after hours, weekends, or holidays, please call the main hospital  at  [phone] and ask to speak to the adult Oncology doctor on call.     If you are having any concerning symptoms or wish to speak to a provider before your next infusion visit, please call your care coordinator or triage them so we can adequately serve you.      If you need to refill your narcotic prescription or other medication, please call triage before your infusion appointment.        January 2017 Sunday Monday Tuesday Wednesday  Thursday Friday Saturday   1     2     3     4     5     6     7       8     9     10     11     12     13     14       15     16     [facility] LAB DRAW    1:45 PM   (15 min.)   [facility] DRAW   [facility] Lab Draw     CT CHEST/ABDOMEN/PELVIS W    2:20 PM   (20 min.)   CT2   [facility] CT     CT SOFT TISSUE NECK W    2:40 PM   (20 min.)   CT2   [facility] CT 17     [facility] RETURN    1:15 PM   (30 min.)   Keira Hernandez MD   [facility] 18     [facility] ONC INFUSION 240   12:00 PM   (240 min.)   [facility] ONCOLOGY INFUSION   [facility]     [facility] NUTRITION VISIT    3:00 PM   (30 min.)   Renea Avila RD   [facility] 19     20     21       22     23  Happy Birthday!     24     [facility] LAB DRAW    9:45 AM   (15 min.)   [facility] LAB DRAW   [facility] Lab Draw     [facility] RETURN   10:10 AM   (50 min.)   Wilma Hill PA-C   [facility]     [facility] ONC INFUSION 120   11:30 AM   (120 min.)   [facility] ONCOLOGY INFUSION   [facility] 25     26     27     28       29     30     31     [facility] LAB DRAW    2:00 PM   (15 min.)   [facility] LAB DRAW   [facility] Lab Draw     [facility] RETURN    2:30 PM   (50 min.)   Wilma Hill PA-C   [facility]     [facility] ONC INFUSION 120    3:30 PM   (120 min.)   [facility] ONCOLOGY INFUSION   [facility]                                February 2017 Sunday Monday Tuesday Wednesday Thursday Friday Saturday                  1     2     3     4       5     6     7     [facility] LAB DRAW    9:00 AM   (15 min.)   [facility]     [facility] RETURN    9:30 AM   (30 min.)   Keira Hernandez MD   [facility]     [facility] ONC INFUSION 240   10:00 AM   (240 min.)   [facility] ONCOLOGY INFUSION   [facility] 8     9     10     11       12     13     14     [facility] RETURN  WITH ROOM    3:30 PM   (60 min.)   Yanique Wiggins, [facility] 15     16     17     18       19     20     21     22     23     24     25       26     27     28                                     Recent Results (from the past 24 hour(s))   CBC with platelets differential    Collection Time: 01/24/17 10:15 AM   Result Value Ref Range    WBC 7.5 4.0 - 11.0 10e9/L    RBC Count 3.76 (L) 4.4 - 5.9 10e12/L    Hemoglobin 9.1 (L) 13.3 - 17.7 g/dL    Hematocrit 29.4 (L) 40.0 - 53.0 %    MCV 78 78 - 100 fl    MCH 24.2 (L) 26.5 - 33.0 pg    MCHC 31.0 (L) 31.5 - 36.5 g/dL    RDW 14.2 10.0 - 15.0 %    Platelet Count 521 (H) 150 - 450 10e9/L    Diff Method Automated Method     % Neutrophils 81.3 %    % Lymphocytes 8.3 %    % Monocytes 5.3 %    % Eosinophils 3.7 %    % Basophils 0.7 %    % Immature Granulocytes 0.7 %    Nucleated RBCs 0 0 /100    Absolute Neutrophil 6.1 1.6 - 8.3 10e9/L    Absolute Lymphocytes 0.6 (L) 0.8 - 5.3 10e9/L    Absolute Monocytes 0.4 0.0 - 1.3 10e9/L    Absolute Eosinophils 0.3 0.0 - 0.7 10e9/L    Absolute Basophils 0.1 0.0 - 0.2 10e9/L    Abs Immature Granulocytes 0.1 0 - 0.4 10e9/L    Absolute Nucleated RBC 0.0    Comprehensive metabolic panel    Collection Time: 01/24/17 10:15 AM   Result Value Ref Range    Sodium 132 (L) 133 - 144 mmol/L    Potassium 4.1 3.4 - 5.3 mmol/L    Chloride 99 94 - 109 mmol/L    Carbon Dioxide 26 20 - 32 mmol/L    Anion Gap 7 3 - 14 mmol/L    Glucose 98 70 - 99 mg/dL    Urea Nitrogen 10 7 - 30 mg/dL    Creatinine 0.65 (L) 0.66 - 1.25 mg/dL    GFR Estimate >90  Non  GFR Calc   >60 mL/min/1.7m2    GFR Estimate If Black >90   GFR Calc   >60 mL/min/1.7m2    Calcium 7.9 (L) 8.5 - 10.1 mg/dL    Bilirubin Total 0.7 0.2 - 1.3 mg/dL    Albumin 2.8 (L) 3.4 - 5.0 g/dL    Protein Total 7.4 6.8 - 8.8 g/dL    Alkaline Phosphatase 133 40 - 150 U/L    ALT 52 0 - 70 U/L    AST 24 0 - 45 U/L   Magnesium    Collection Time: 01/24/17  10:15 AM   Result Value Ref Range    Magnesium 2.5 (H) 1.6 - 2.3 mg/dL

## 2017-01-24 NOTE — NURSING NOTE
Chief Complaint   Patient presents with     Port Draw     Labs Drawn      Port accessed. Labs drawn. Flushed with heparin and NS.    Lauren Schoen, RN

## 2017-01-24 NOTE — PROGRESS NOTES
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 Pathologic staging: [id number]      TREATMENT:  1- status post an April 28, 2016 transmandibular subtotal oral glossectomy, right base of tongue resection with partial pharyngectomy, bilateral neck dissections, and free flap reconstruction. Concurrent ChemoRt with high dose Cisplatin 6/6/16. day 22 cisplatin on 6/27/16. He completed day 43 on 7/19/16. Radiation completed, 6600cGy on 7/21/16.    2- Palliative intent treatment with Pembrolizumab 12/6/2016  3- Due to rapid progression of disease the treatment was switched to Carboplatin, 5-FU and Cetuximab on 1/18/17    INTERVAL HISTORY:   Darek presents for follow-up after starting new chemotherapy regimen last week.    He reports it went well for him with the exception of mouth sores, which are improving, but still there today. He had some leftover viscous lidocaine and felt this has been very helpful. He has been drinking 6 Boost daily, just switched to the higher calorie Plus version. He had no fevers, chills, cough, SOB, chest pain, nausea, vomiting, bowel changes, bleeding, swelling, or rashes. Ritalin is helping his fatigue and he reports no pain today. He offers no further concerns today.        Current Outpatient Prescriptions   Medication Sig Dispense Refill     LORazepam (ATIVAN) 0.5 MG tablet Take 1 tablet (0.5 mg) by mouth every 4 hours as needed (Anxiety, Nausea/Vomiting or Sleep) 30 tablet 5     prochlorperazine (COMPAZINE) 10 MG tablet Take 1 tablet (10 mg) by mouth every 6 hours as needed (Nausea/Vomiting) 30 tablet 5     oxyCODONE (ROXICODONE) 5 MG/5ML solution 10 mLs (10 mg) by Oral or PEG tube route every 4 hours as needed for moderate to severe pain 1000 mL 0     oxyCODONE (OXYCONTIN) 10 MG 12 hr  "tablet Take 1 tablet (10 mg) by mouth every 12 hours maximum 2 tablet(s) per day 60 tablet 0     pantoprazole (PROTONIX) 20 MG EC tablet Take 1 tablet (20 mg) by mouth daily (Patient taking differently: Take 20 mg by mouth daily as needed ) 30 tablet 3     methylphenidate (RITALIN) 5 MG tablet Take 1 tablet (5 mg) by mouth 2 times daily 60 tablet 0     levothyroxine (SYNTHROID/LEVOTHROID) 25 MCG tablet Take 1 tablet (25 mcg) by mouth daily 90 tablet 3     ibuprofen (ADVIL/MOTRIN) 200 MG tablet Take 200 mg by mouth daily as needed        ondansetron (ZOFRAN) 4 MG tablet Take 4 mg by mouth daily as needed        LORazepam (ATIVAN) 2 MG/ML (HIGH CONC) solution Take 0.25-0.5 mLs (0.5-1 mg) by mouth every 4 hours as needed for anxiety, sleep, nausea or vomiting 30 mL 3     citalopram (CELEXA) 10 MG/5ML solution Take 10 mLs (20 mg) by mouth daily 300 mL 3     lidocaine-prilocaine (EMLA) cream Apply to PORT site 45-60 minutes prior to procedure.  Cover with non-absorbent dressing. 30 g 5     multivitamins with minerals (CERTAVITE) LIQD 15 mLs by Per Feeding Tube route daily 1 Bottle 1     mineral oil-hydrophilic petrolatum (AQUAPHOR) ointment Apply topically every 8 hours (Patient taking differently: Apply topically every 8 hours as needed ) 50 g 0        No Known Allergies    PHYSICAL EXAMINATION  /72 mmHg  Pulse 97  Temp(Src) 99.5  F (37.5  C) (Oral)  Resp 14  Ht 1.88 m (6' 2.02\")  Wt 66.633 kg (146 lb 14.4 oz)  BMI 18.85 kg/m2  SpO2 100%   Wt Readings from Last 10 Encounters:   01/24/17 66.633 kg (146 lb 14.4 oz)   01/17/17 68.221 kg (150 lb 6.4 oz)   12/27/16 70.58 kg (155 lb 9.6 oz)   12/14/16 74.39 kg (164 lb)   12/09/16 74.39 kg (164 lb)   12/06/16 74.3 kg (163 lb 12.8 oz)   11/17/16 76.204 kg (168 lb)   11/09/16 76.658 kg (169 lb)   11/04/16 77.565 kg (171 lb)   11/03/16 77.928 kg (171 lb 12.8 oz)     Constitutional: Alert, oriented male in no visible distress.  Eyes: PERRL. Anicteric " sclerae.  ENT/Mouth: OM moist and pink, moderate mucositis on palate and buccal mucosa. Moderate trismus. Neck with surgical resection changes and limited ROM.  CV: RRR, no murmurs appreciated.  Resp: CTAB throughout  Abdomen: Soft, non-tender, non-distended. Bowel sounds present. No masses appreciated. No organomegaly noted.  Extremities: No lower extremity edema appreciated.  Skin: Warm, dry. No bruising or petechiae noted.  Lymph: No cervical or supraclavicular lymphadenopathy appreciated.   Neuro: CN II-XII grossly intact.    LABS:  Results for DAREK NAVARRO (MRN [id number]) as of 1/24/2017 10:46   Ref. Range 1/16/2017 14:54 1/24/2017 10:15   WBC Latest Ref Range: 4.0-11.0 10e9/L 11.9 (H) 7.5   Hemoglobin Latest Ref Range: 13.3-17.7 g/dL 10.2 (L) 9.1 (L)   Hematocrit Latest Ref Range: 40.0-53.0 % 33.3 (L) 29.4 (L)   Platelet Count Latest Ref Range: 150-450 10e9/L 511 (H) 521 (H)       IMPRESSION/PLAN:  Darek Navarro is a 27-year-old male with [id number] squamous cell carcinoma of the right oral tongue initially treated with resection and concurrent chemoradiation, completed in July 2016, with recurrent disease identified in October 2016. He had rapid progression identified on repeat imaging 1/16/2017 while on Keytruda.    1. Metastatic SCC: Started Carbo/5-FU/cetuximab on 1/18/2017. Here today for day 8 cetuximab. He is tolerating this regimen well thus far with the exception of mucositis. He will return next week for day 15 cetuximab, and will see Dr. Hernandez with cycle 2 day 1.    2. Bone mets: Vertebral fracture identifed on restaging. Sine surgery consult  -on monthly Zometa, last given 1/18    3. Cancer-related back pain: Reports stable. Continue Oxycodone 10 mg every 4 hours PRN    4. FEN: Progressive weight loss over the last month. He has met with nutritionist on 1/17 and recommended to strive for 2400 kCal daily and switch to 4-6 high calorie supplements, like Boost/Ensure Plus or Ensure Enlive. He  is doing 6 supplements daily and just started doing the higher calorie versions. Continue.  - Hypercalcemia resolved today with Zometa 1/18 (calcium corrects to 8.8)    5. Fatigue: Continue the Ritalin 5 mg BID PRN.    6. Mucositis: Improving, but still present today. Continue the viscous lidocaine and salt/soda swishes.    Wilma Hill PA-C  Hematology, Oncology, and Transplant  [facility]  [address] [place] [id number] [phone]

## 2017-01-24 NOTE — NURSING NOTE
"Darek Navarro is a 27 year old male who presents for:  Chief Complaint   Patient presents with     Port Draw     Labs Drawn      Oncology Clinic Visit     Throat CA        Initial Vitals:  /72 mmHg  Pulse 97  Temp(Src) 99.5  F (37.5  C) (Oral)  Resp 14  Ht 1.88 m (6' 2.02\")  Wt 66.633 kg (146 lb 14.4 oz)  BMI 18.85 kg/m2  SpO2 100% Estimated body mass index is 18.85 kg/(m^2) as calculated from the following:    Height as of this encounter: 1.88 m (6' 2.02\").    Weight as of this encounter: 66.633 kg (146 lb 14.4 oz).. Body surface area is 1.86 meters squared. BP completed using cuff size: NA (Not Taken)  Data Unavailable No LMP for male patient. Allergies and medications reviewed.     Medications: MEDICATION REFILLS NEEDED TODAY.  Pharmacy name entered into OMGPOP:    [facility] [id number] [place], [place] [facility] [address] [facility]  [facility] [address] [place], [place] - [address]    Comments: Pt would like a refill on the Methylphenidate HCL    7 minutes for nursing intake (face to face time)   Sara Gay MA          "

## 2017-01-24 NOTE — Clinical Note
1/24/2017       RE: Darek Navarro  [address]  [place] [address]     Dear Colleague,    Thank you for referring your patient, Darek Navarro, to the [facility]. Please see a copy of my visit note below.    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 Pathologic staging: [id number]      TREATMENT:  1- status post an April 28, 2016 transmandibular subtotal oral glossectomy, right base of tongue resection with partial pharyngectomy, bilateral neck dissections, and free flap reconstruction. Concurrent ChemoRt with high dose Cisplatin 6/6/16. day 22 cisplatin on 6/27/16. He completed day 43 on 7/19/16. Radiation completed, 6600cGy on 7/21/16.    2- Palliative intent treatment with Pembrolizumab 12/6/2016  3- Due to rapid progression of disease the treatment was switched to Carboplatin, 5-FU and Cetuximab on 1/18/17    INTERVAL HISTORY:   ***       Otherwise, ROS negative for: fever, chills, sweats, weight loss, weight gain, appetite change, rashes, change in vision or hearing, shortness of breath, cough, chest pain, abdominal pain, nausea, vomiting, diarrhea, weakness, easy bleeding or bruising, lymphadenopathy, or bone pain.      Current Outpatient Prescriptions   Medication Sig Dispense Refill     LORazepam (ATIVAN) 0.5 MG tablet Take 1 tablet (0.5 mg) by mouth every 4 hours as needed (Anxiety, Nausea/Vomiting or Sleep) 30 tablet 5     prochlorperazine (COMPAZINE) 10 MG tablet Take 1 tablet (10 mg) by mouth every 6 hours as needed (Nausea/Vomiting) 30 tablet 5     oxyCODONE (ROXICODONE) 5 MG/5ML solution 10 mLs (10 mg) by Oral or PEG tube route every 4 hours as needed for moderate to severe pain 1000 mL 0     oxyCODONE (OXYCONTIN) 10 MG 12 hr tablet Take 1 tablet (10 mg) by mouth  "every 12 hours maximum 2 tablet(s) per day 60 tablet 0     pantoprazole (PROTONIX) 20 MG EC tablet Take 1 tablet (20 mg) by mouth daily (Patient taking differently: Take 20 mg by mouth daily as needed ) 30 tablet 3     methylphenidate (RITALIN) 5 MG tablet Take 1 tablet (5 mg) by mouth 2 times daily 60 tablet 0     levothyroxine (SYNTHROID/LEVOTHROID) 25 MCG tablet Take 1 tablet (25 mcg) by mouth daily 90 tablet 3     ibuprofen (ADVIL/MOTRIN) 200 MG tablet Take 200 mg by mouth daily as needed        ondansetron (ZOFRAN) 4 MG tablet Take 4 mg by mouth daily as needed        LORazepam (ATIVAN) 2 MG/ML (HIGH CONC) solution Take 0.25-0.5 mLs (0.5-1 mg) by mouth every 4 hours as needed for anxiety, sleep, nausea or vomiting 30 mL 3     citalopram (CELEXA) 10 MG/5ML solution Take 10 mLs (20 mg) by mouth daily 300 mL 3     lidocaine-prilocaine (EMLA) cream Apply to PORT site 45-60 minutes prior to procedure.  Cover with non-absorbent dressing. 30 g 5     multivitamins with minerals (CERTAVITE) LIQD 15 mLs by Per Feeding Tube route daily 1 Bottle 1     mineral oil-hydrophilic petrolatum (AQUAPHOR) ointment Apply topically every 8 hours (Patient taking differently: Apply topically every 8 hours as needed ) 50 g 0        No Known Allergies    PHYSICAL EXAMINATION  /72 mmHg  Pulse 97  Temp(Src) 99.5  F (37.5  C) (Oral)  Resp 14  Ht 1.88 m (6' 2.02\")  Wt 66.633 kg (146 lb 14.4 oz)  BMI 18.85 kg/m2  SpO2 100%   Wt Readings from Last 10 Encounters:   01/24/17 66.633 kg (146 lb 14.4 oz)   01/17/17 68.221 kg (150 lb 6.4 oz)   12/27/16 70.58 kg (155 lb 9.6 oz)   12/14/16 74.39 kg (164 lb)   12/09/16 74.39 kg (164 lb)   12/06/16 74.3 kg (163 lb 12.8 oz)   11/17/16 76.204 kg (168 lb)   11/09/16 76.658 kg (169 lb)   11/04/16 77.565 kg (171 lb)   11/03/16 77.928 kg (171 lb 12.8 oz)     Constitutional: Alert, oriented male in no visible distress.  Eyes: PERRL. Anicteric sclerae.  ENT/Mouth: OM moist and pink without lesions " or thrush.  CV: RRR, no murmurs appreciated.  Resp: CTAB throughout  Abdomen: Soft, non-tender, non-distended. Bowel sounds present. No masses appreciated. No organomegaly noted.  Extremities: No lower extremity edema appreciated.  Skin: Warm, dry. No bruising or petechiae noted.  Lymph: No cervical or supraclavicular lymphadenopathy appreciated.   Neuro: CN II-XII grossly intact.    LABS:  No results found for this or any previous visit (from the past 24 hour(s)).      IMPRESSION/PLAN:  Darek Navarro is a 27-year-old male with [id number] squamous cell carcinoma of the right oral tongue initially treated with resection and concurrent chemoradiation, completed in July 2016, with recurrent disease identified in October 2016. He had rapid progression identified on repeat imaging 1/16/2017 while on Keytruda.    1. Metastatic SCC: Started Carbo/5-FU/cetuximab on 1/18/2017. Here today for day 8 cetuximab.  2. Bone mets: Vertebral fracture identifed on restaging. Sine surgery consult  -on monthly Zometa, last given 1/18  3. Cancer-related pain: Oxycodone 10 mg every 4 hours  4. FEN: Progressive weight loss over the last month. He has met with nutritionist on 1/17 and recommended to strive for 2400 kCal daily and switch to 4-6 high calorie supplements, like Boost/Ensure Plus or Ensure Enlive.    Wilma Hill PA-C  Hematology, Oncology, and Transplant  [facility]  [address] [place] [id number] [phone]      Again, thank you for allowing me to participate in the care of your patient.      Sincerely,    Wilma Hill PA-C

## 2017-01-24 NOTE — PROGRESS NOTES
Infusion Nursing Note:  Darek Navarro presents today for C1D8 Erbitux.    Patient seen by provider today: Yes: Wilma TOURE    Note: Patient tolerated today's infusion without difficulity.    Intravenous Access:  Implanted Port.      Treatment Conditions:  HGB      9.1   1/24/2017  WBC      7.5   1/24/2017   ANEU      6.1   1/24/2017  PLT      521   1/24/2017     NA      132   1/24/2017                POTASSIUM      4.1   1/24/2017        MAG      2.5   1/24/2017         CR     0.65   1/24/2017                COLE      7.9   1/24/2017             BILITOTAL      0.7   1/24/2017        ALBUMIN      2.8   1/24/2017                 ALT       52   1/24/2017        AST       24   1/24/2017  Results reviewed, labs MET treatment parameters, ok to proceed with treatment.      Post Infusion Assessment:  Patient tolerated infusion without incident.  Patient observed for 30 minutes post Erbitux per protocol.  Site patent and intact, free from redness, edema or discomfort.  No evidence of extravasations.  Access discontinued per protocol.    Discharge Plan:   Prescription refills given for Ritalin, lidocaine mouth rinse.  Discharge instructions reviewed with: Patient.  Patient and/or family verbalized understanding of discharge instructions and all questions answered.  AVS to patient via aWhereT.  Patient will return 1/31/17 for next appointment.   Patient discharged in stable condition accompanied by: friend.  Departure Mode: Ambulatory.    Kate Hebert RN

## 2017-01-24 NOTE — PROGRESS NOTES
SPIRITUAL HEALTH SERVICES  SPIRITUAL ASSESSMENT Progress Note  [facility]      ILLNESS CIRCUMSTANCES:   Reviewed documentation. I chatted briefly with Corey about his illness, his treatment, and I provided a quick orientation to the Chaplaincy Internship program.     PLAN: Either I or another member of the chaplaincy team will follow-up with Corey as he receives ongoing care at the [facility].    Driss Veliz   Intern  Pager [phone]

## 2017-01-31 NOTE — PROGRESS NOTES
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 Pathologic staging: [id number]      TREATMENT:  1- status post an April 28, 2016 transmandibular subtotal oral glossectomy, right base of tongue resection with partial pharyngectomy, bilateral neck dissections, and free flap reconstruction. Concurrent ChemoRt with high dose Cisplatin 6/6/16. day 22 cisplatin on 6/27/16. He completed day 43 on 7/19/16. Radiation completed, 6600cGy on 7/21/16.    2- Palliative intent treatment with Pembrolizumab 12/6/2016  3- Due to rapid progression of disease the treatment was switched to Carboplatin, 5-FU and Cetuximab on 1/18/17    INTERVAL HISTORY:   Darek presents for follow-up after starting new chemotherapy regimen last week, accompanied by sister.    He has no new concerns today. His back pain is stable on current regimen and he has no new pains. He does admit he has been lax about the protein shakes, but knows he can do better. The mouth sores have resolved. He has some mild acneiform rash on his face. He had no fevers, chills, cough, SOB, chest pain, nausea, vomiting, bowel changes, bleeding, swelling.He offers no further concerns today.        Current Outpatient Prescriptions   Medication Sig Dispense Refill     oxyCODONE (ROXICODONE) 5 MG/5ML solution 10 mLs (10 mg) by Oral or PEG tube route every 4 hours as needed for moderate to severe pain 1000 mL 0     oxyCODONE (OXYCONTIN) 10 MG 12 hr tablet Take 1 tablet (10 mg) by mouth every 12 hours maximum 2 tablet(s) per day 60 tablet 0     LORazepam (ATIVAN) 0.5 MG tablet Take 1 tablet (0.5 mg) by mouth every 4 hours as needed (Anxiety, Nausea/Vomiting or Sleep) 30 tablet 5     methylphenidate (RITALIN) 5 MG tablet Take 1 tablet (5 mg) by mouth 2 times daily 60 tablet 0      "pantoprazole (PROTONIX) 20 MG EC tablet Take 1 tablet (20 mg) by mouth daily (Patient taking differently: Take 20 mg by mouth daily as needed ) 30 tablet 3     levothyroxine (SYNTHROID/LEVOTHROID) 25 MCG tablet Take 1 tablet (25 mcg) by mouth daily 90 tablet 3     ibuprofen (ADVIL/MOTRIN) 200 MG tablet Take 200 mg by mouth daily as needed        citalopram (CELEXA) 10 MG/5ML solution Take 10 mLs (20 mg) by mouth daily 300 mL 3     multivitamins with minerals (CERTAVITE) LIQD 15 mLs by Per Feeding Tube route daily 1 Bottle 1     lidocaine (XYLOCAINE) 2 % solution 15 mL swish and spit q3h PRN, max 8 doses in 24 hours 100 mL 3     prochlorperazine (COMPAZINE) 10 MG tablet Take 1 tablet (10 mg) by mouth every 6 hours as needed (Nausea/Vomiting) 30 tablet 5     ondansetron (ZOFRAN) 4 MG tablet Take 4 mg by mouth daily as needed        LORazepam (ATIVAN) 2 MG/ML (HIGH CONC) solution Take 0.25-0.5 mLs (0.5-1 mg) by mouth every 4 hours as needed for anxiety, sleep, nausea or vomiting 30 mL 3     lidocaine-prilocaine (EMLA) cream Apply to PORT site 45-60 minutes prior to procedure.  Cover with non-absorbent dressing. 30 g 5     mineral oil-hydrophilic petrolatum (AQUAPHOR) ointment Apply topically every 8 hours (Patient taking differently: Apply topically every 8 hours as needed ) 50 g 0        No Known Allergies    PHYSICAL EXAMINATION  /81 mmHg  Pulse 85  Temp(Src) 98.2  F (36.8  C) (Oral)  Resp 18  Ht 1.88 m (6' 2.02\")  Wt 64.411 kg (142 lb)  BMI 18.22 kg/m2  SpO2 97%   Wt Readings from Last 10 Encounters:   01/31/17 64.411 kg (142 lb)   01/24/17 66.633 kg (146 lb 14.4 oz)   01/17/17 68.221 kg (150 lb 6.4 oz)   12/27/16 70.58 kg (155 lb 9.6 oz)   12/14/16 74.39 kg (164 lb)   12/09/16 74.39 kg (164 lb)   12/06/16 74.3 kg (163 lb 12.8 oz)   11/17/16 76.204 kg (168 lb)   11/09/16 76.658 kg (169 lb)   11/04/16 77.565 kg (171 lb)     Constitutional: Alert, oriented male in no visible distress.  Eyes: PERRL. " Anicteric sclerae.  ENT/Mouth: OM moist and pink. Neck with surgical resection changes and limited ROM.  CV: RRR, no murmurs appreciated.  Resp: CTAB throughout  Abdomen: Soft, non-tender, non-distended. Bowel sounds present. No masses appreciated. No organomegaly noted.  Extremities: No lower extremity edema appreciated.  Skin: Warm, dry. No bruising or petechiae noted. Mild acneiform rash on nose  Lymph: No cervical or supraclavicular lymphadenopathy appreciated.   Neuro: CN II-XII grossly intact.    LABS:  Results for DAREK NAVARRO (MRN [id number]) as of 1/24/2017 10:46   Ref. Range 1/16/2017 14:54 1/24/2017 10:15   WBC Latest Ref Range: 4.0-11.0 10e9/L 11.9 (H) 7.5   Hemoglobin Latest Ref Range: 13.3-17.7 g/dL 10.2 (L) 9.1 (L)   Hematocrit Latest Ref Range: 40.0-53.0 % 33.3 (L) 29.4 (L)   Platelet Count Latest Ref Range: 150-450 10e9/L 511 (H) 521 (H)       IMPRESSION/PLAN:  Darek Navarro is a 27-year-old male with [id number] squamous cell carcinoma of the right oral tongue initially treated with resection and concurrent chemoradiation, completed in July 2016, with recurrent disease identified in October 2016. He had rapid progression identified on repeat imaging 1/16/2017 while on Keytruda.    1. Metastatic SCC: Started Carbo/5-FU/cetuximab on 1/18/2017. Here today for day 8 cetuximab. He is tolerating this regimen well thus far with the exception of mucositis after day 1 and mild rash. We will continue with day 15 today, and will see Dr. Hernandez with cycle 2 day 1.    2. Bone mets: Vertebral fracture identifed on restaging. Spine surgery consult - asked again for scheduling of this  -on monthly Zometa, last given 1/18    3. Cancer-related back pain: Reports stable. Continue OxyContin 10 mg BID with Oxycodone 10 mg every 4 hours PRN    4. FEN: Progressive weight loss over the last month. He has met with nutritionist on 1/17 and recommended to strive for 2400 kCal daily and switch to 4-6 high calorie  supplements, like Boost/Ensure Plus or Ensure Enlive. He has been lax about this. We had a elvis discussion about getting the calories in and he will try to do better. Goal to have stable weight at next visit.    5. Fatigue: Continue the Ritalin 5 mg BID PRN.    6. Mucositis: Resolved today. When symptomatic, has viscous lidocaine and salt/soda swishes.    7. Mild acneiform rash and xeroderma: likely 2/2 Erbitux. Continue emollients. Discussed that we can use an Rx cream if he would like or if rash worsens, he would like to continue the emollients alone for now.    Wilma Hill PA-C  Hematology, Oncology, and Transplant  [facility]  [address] [place] [address] [id number] [phone]      I spent >15 minutes with the patient, with over 50% of the time spent counseling or coordinating their care as described above.

## 2017-01-31 NOTE — NURSING NOTE
Chief Complaint   Patient presents with     Port Draw     port accessed by RN, labs collected and sent, line flushed with NS and heparin, pt tolerated well-multiple flushes done to promote blood return, vitals taken and pt checked in for next appt.     Opal Avendano

## 2017-01-31 NOTE — Clinical Note
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 Pathologic staging: [id number]      TREATMENT:  1- status post an April 28, 2016 transmandibular subtotal oral glossectomy, right base of tongue resection with partial pharyngectomy, bilateral neck dissections, and free flap reconstruction. Concurrent ChemoRt with high dose Cisplatin 6/6/16. day 22 cisplatin on 6/27/16. He completed day 43 on 7/19/16. Radiation completed, 6600cGy on 7/21/16.    2- Palliative intent treatment with Pembrolizumab 12/6/2016  3- Due to rapid progression of disease the treatment was switched to Carboplatin, 5-FU and Cetuximab on 1/18/17    INTERVAL HISTORY:   Darek presents for follow-up after starting new chemotherapy regimen last week, accompanied by sister.    He has no new concerns today. His back pain is stable on current regimen and he has no new pains. He does admit he has been lax about the protein shakes, but knows he can do better. The mouth sores have resolved. He has some mild acneiform rash on his face. He had no fevers, chills, cough, SOB, chest pain, nausea, vomiting, bowel changes, bleeding, swelling.He offers no further concerns today.        Current Outpatient Prescriptions   Medication Sig Dispense Refill     oxyCODONE (ROXICODONE) 5 MG/5ML solution 10 mLs (10 mg) by Oral or PEG tube route every 4 hours as needed for moderate to severe pain 1000 mL 0     oxyCODONE (OXYCONTIN) 10 MG 12 hr tablet Take 1 tablet (10 mg) by mouth every 12 hours maximum 2 tablet(s) per day 60 tablet 0     LORazepam (ATIVAN) 0.5 MG tablet Take 1 tablet (0.5 mg) by mouth every 4 hours as needed (Anxiety, Nausea/Vomiting or Sleep) 30 tablet 5     methylphenidate  "(RITALIN) 5 MG tablet Take 1 tablet (5 mg) by mouth 2 times daily 60 tablet 0     pantoprazole (PROTONIX) 20 MG EC tablet Take 1 tablet (20 mg) by mouth daily (Patient taking differently: Take 20 mg by mouth daily as needed ) 30 tablet 3     levothyroxine (SYNTHROID/LEVOTHROID) 25 MCG tablet Take 1 tablet (25 mcg) by mouth daily 90 tablet 3     ibuprofen (ADVIL/MOTRIN) 200 MG tablet Take 200 mg by mouth daily as needed        citalopram (CELEXA) 10 MG/5ML solution Take 10 mLs (20 mg) by mouth daily 300 mL 3     multivitamins with minerals (CERTAVITE) LIQD 15 mLs by Per Feeding Tube route daily 1 Bottle 1     lidocaine (XYLOCAINE) 2 % solution 15 mL swish and spit q3h PRN, max 8 doses in 24 hours 100 mL 3     prochlorperazine (COMPAZINE) 10 MG tablet Take 1 tablet (10 mg) by mouth every 6 hours as needed (Nausea/Vomiting) 30 tablet 5     ondansetron (ZOFRAN) 4 MG tablet Take 4 mg by mouth daily as needed        LORazepam (ATIVAN) 2 MG/ML (HIGH CONC) solution Take 0.25-0.5 mLs (0.5-1 mg) by mouth every 4 hours as needed for anxiety, sleep, nausea or vomiting 30 mL 3     lidocaine-prilocaine (EMLA) cream Apply to PORT site 45-60 minutes prior to procedure.  Cover with non-absorbent dressing. 30 g 5     mineral oil-hydrophilic petrolatum (AQUAPHOR) ointment Apply topically every 8 hours (Patient taking differently: Apply topically every 8 hours as needed ) 50 g 0        No Known Allergies    PHYSICAL EXAMINATION  /81 mmHg  Pulse 85  Temp(Src) 98.2  F (36.8  C) (Oral)  Resp 18  Ht 1.88 m (6' 2.02\")  Wt 64.411 kg (142 lb)  BMI 18.22 kg/m2  SpO2 97%   Wt Readings from Last 10 Encounters:   01/31/17 64.411 kg (142 lb)   01/24/17 66.633 kg (146 lb 14.4 oz)   01/17/17 68.221 kg (150 lb 6.4 oz)   12/27/16 70.58 kg (155 lb 9.6 oz)   12/14/16 74.39 kg (164 lb)   12/09/16 74.39 kg (164 lb)   12/06/16 74.3 kg (163 lb 12.8 oz)   11/17/16 76.204 kg (168 lb)   11/09/16 76.658 kg (169 lb)   11/04/16 77.565 kg (171 lb) "     Constitutional: Alert, oriented male in no visible distress.  Eyes: PERRL. Anicteric sclerae.  ENT/Mouth: OM moist and pink. Neck with surgical resection changes and limited ROM.  CV: RRR, no murmurs appreciated.  Resp: CTAB throughout  Abdomen: Soft, non-tender, non-distended. Bowel sounds present. No masses appreciated. No organomegaly noted.  Extremities: No lower extremity edema appreciated.  Skin: Warm, dry. No bruising or petechiae noted. Mild acneiform rash on nose  Lymph: No cervical or supraclavicular lymphadenopathy appreciated.   Neuro: CN II-XII grossly intact.    LABS:  Results for DAREK NAVARRO (MRN [id number]) as of 1/24/2017 10:46   Ref. Range 1/16/2017 14:54 1/24/2017 10:15   WBC Latest Ref Range: 4.0-11.0 10e9/L 11.9 (H) 7.5   Hemoglobin Latest Ref Range: 13.3-17.7 g/dL 10.2 (L) 9.1 (L)   Hematocrit Latest Ref Range: 40.0-53.0 % 33.3 (L) 29.4 (L)   Platelet Count Latest Ref Range: 150-450 10e9/L 511 (H) 521 (H)       IMPRESSION/PLAN:  Darek Navarro is a 27-year-old male with [id number] squamous cell carcinoma of the right oral tongue initially treated with resection and concurrent chemoradiation, completed in July 2016, with recurrent disease identified in October 2016. He had rapid progression identified on repeat imaging 1/16/2017 while on Keytruda.    1. Metastatic SCC: Started Carbo/5-FU/cetuximab on 1/18/2017. Here today for day 8 cetuximab. He is tolerating this regimen well thus far with the exception of mucositis after day 1 and mild rash. We will continue with day 15 today, and will see Dr. Hernandez with cycle 2 day 1.    2. Bone mets: Vertebral fracture identifed on restaging. Spine surgery consult - asked again for scheduling of this  -on monthly Zometa, last given 1/18    3. Cancer-related back pain: Reports stable. Continue OxyContin 10 mg BID with Oxycodone 10 mg every 4 hours PRN    4. FEN: Progressive weight loss over the last month. He has met with nutritionist on 1/17 and  recommended to strive for 2400 kCal daily and switch to 4-6 high calorie supplements, like Boost/Ensure Plus or Ensure Enlive. He has been lax about this. We had a elvis discussion about getting the calories in and he will try to do better. Goal to have stable weight at next visit.    5. Fatigue: Continue the Ritalin 5 mg BID PRN.    6. Mucositis: Resolved today. When symptomatic, has viscous lidocaine and salt/soda swishes.    7. Mild acneiform rash and xeroderma: likely 2/2 Erbitux. Continue emollients. Discussed that we can use an Rx cream if he would like or if rash worsens, he would like to continue the emollients alone for now.    Wilma Hill PA-C  Hematology, Oncology, and Transplant  [facility]  [address] [place] [id number] [phone]      I spent >15 minutes with the patient, with over 50% of the time spent counseling or coordinating their care as described above.

## 2017-01-31 NOTE — PROGRESS NOTES
Infusion Nursing Note:  Darek Navarro presents today for Cycle 1 Day 15 Erbitux.    Patient seen by provider today: Yes: CLARY Pavon    Intravenous Access:  Implanted Port.    Treatment Conditions:  HGB      9.5   1/31/2017  WBC      2.5   1/31/2017   ANEU      1.6   1/31/2017  PLT      290   1/31/2017     NA      138   1/31/2017                POTASSIUM      4.2   1/31/2017        MAG      2.2   1/31/2017         CR     0.73   1/31/2017                COLE      8.8   1/31/2017             BILITOTAL      0.4   1/31/2017        ALBUMIN      3.1   1/31/2017                 ALT       27   1/31/2017        AST       20   1/31/2017  Results reviewed, labs MET treatment parameters, ok to proceed with treatment.    Post Infusion Assessment:  Patient tolerated infusion without incident.  Patient observed for 30 minutes post Erbitux per protocol.  Blood return noted pre and post infusion.  Site patent and intact, free from redness, edema or discomfort.  No evidence of extravasations.  Access discontinued per protocol.    Discharge Plan:   Prescription refills given for Oxycodone, Oxycontin and Ativan.  Copy of AVS reviewed with patient and/or family.  Patient will return 2/7 for next appointment.  Patient discharged in stable condition accompanied by: siblings.  Departure Mode: Ambulatory.    Ariadna Bucio RN

## 2017-01-31 NOTE — NURSING NOTE
"Darek Navarro is a 27 year old male who presents for:  Chief Complaint   Patient presents with     Port Draw     port accessed by RN, labs collected and sent, line flushed with NS and heparin, pt tolerated well-multiple flushes done to promote blood return, vitals taken and pt checked in for next appt.     Oncology Clinic Visit     return patient for follow up visit related to throat ca         Initial Vitals:  /81 mmHg  Pulse 85  Temp(Src) 98.2  F (36.8  C) (Oral)  Resp 18  Ht 1.88 m (6' 2.02\")  Wt 64.411 kg (142 lb)  BMI 18.22 kg/m2  SpO2 97% Estimated body mass index is 18.22 kg/(m^2) as calculated from the following:    Height as of this encounter: 1.88 m (6' 2.02\").    Weight as of this encounter: 64.411 kg (142 lb).. Body surface area is 1.83 meters squared. BP completed using cuff size: NA (Not Taken)  No Pain (0) No LMP for male patient. Allergies and medications reviewed.     Medications: MEDICATION REFILLS NEEDED TODAY.  Pharmacy name entered into HOSTEX:    [facility] [id number] [place], [place] - [address]  [facility] [id number] [place] [facility] [address]    Comments: patient mentioned severe back pain. Patient took oxycontin to help relieve pain.     5 minutes for nursing intake (face to face time)   Hesham Arreola CMA          "

## 2017-01-31 NOTE — Clinical Note
1/31/2017       RE: Darek Navarro  [address]  [place] [address]     Dear Colleague,    Thank you for referring your patient, Darek Navarro, to the [facility]. Please see a copy of my visit note below.    No notes on file    Again, thank you for allowing me to participate in the care of your patient.      Sincerely,    Wilma Hill PA-C

## 2017-02-01 NOTE — TELEPHONE ENCOUNTER
1.  Date/reason for appt: 2/1/17 - New Pathologic Fracture T1 and T10 Vertebral Bodies  2.  Referring provider: Dr. Hernandez  3.  Call to patient (Yes / No - short description): No, internal referral  4.  Previous care at / records requested from: [facility] -- Records and imaging in Epic/pacs

## 2017-02-01 NOTE — PROGRESS NOTES
This 27-year-old man has a history  Of squamous cell carcinoma of the tongue. He has bone metastasis to the spine. He reports that he has received some  Medical treatment for the bone lesions. Presumably this is Zometa or something similar. He is also receiving chemotherapy. He has had resection of the primary tumor. He has minimal spine symptoms with no major pain.He has not noticed any lower extremity weakness or numbness. He presents today because of new lesions that were recently noticed on his scans including a fracture of T10. I have reviewed his patient surveys in the EMR.     I reviewed this patient's imaging including PET scans and CT scans.  He has  Lesions at T1 and  T10 and T11. There is no canal compromise but there is disruption of the anterior cortex at T1 and T10.     This patient is alert and oriented and in no acute distress. His eyes are reactive and nonicteric. His speech is understandable butaltered secondary to his surgery presumably. He has well-healed scars along his jaw. He is able to ambulate without a limp. His respirations are regular and unlabored.    This patient has some significant spine metastases without significant kyphosis. There is some loss of height of the anterior portion of T10. I have recommended that he undergo radiation treatment to the  Known lesions.  I have recommended that he see Dr. Collado or spine surgeon for a consultation but I doubt that he is a candidate for a vertebroplasty given his lack of symptoms. He should continue on a bisphosphonate or similar medication. He will return to see me as needed.

## 2017-02-01 NOTE — NURSING NOTE
"Reason For Visit:   Chief Complaint   Patient presents with     Consult     Pt. states that he is here today for Vertebral Fracture. He mention that he is not having pain and no recent injury or fall.        Pain Assessment  Patient Currently in Pain: No               HEIGHT: 6' 2.02\", WEIGHT: 142 lbs 4.8 oz, BMI: Body mass index is 18.26 kg/(m^2).      Current Outpatient Prescriptions   Medication Sig Dispense Refill     oxyCODONE (ROXICODONE) 5 MG/5ML solution 10 mLs (10 mg) by Oral or PEG tube route every 4 hours as needed for moderate to severe pain 1000 mL 0     oxyCODONE (OXYCONTIN) 10 MG 12 hr tablet Take 1 tablet (10 mg) by mouth every 12 hours maximum 2 tablet(s) per day 60 tablet 0     LORazepam (ATIVAN) 0.5 MG tablet Take 1 tablet (0.5 mg) by mouth every 4 hours as needed (Anxiety, Nausea/Vomiting or Sleep) 30 tablet 5     lidocaine (XYLOCAINE) 2 % solution 15 mL swish and spit q3h PRN, max 8 doses in 24 hours 100 mL 3     methylphenidate (RITALIN) 5 MG tablet Take 1 tablet (5 mg) by mouth 2 times daily 60 tablet 0     prochlorperazine (COMPAZINE) 10 MG tablet Take 1 tablet (10 mg) by mouth every 6 hours as needed (Nausea/Vomiting) 30 tablet 5     pantoprazole (PROTONIX) 20 MG EC tablet Take 1 tablet (20 mg) by mouth daily (Patient taking differently: Take 20 mg by mouth daily as needed ) 30 tablet 3     levothyroxine (SYNTHROID/LEVOTHROID) 25 MCG tablet Take 1 tablet (25 mcg) by mouth daily 90 tablet 3     ibuprofen (ADVIL/MOTRIN) 200 MG tablet Take 200 mg by mouth daily as needed        ondansetron (ZOFRAN) 4 MG tablet Take 4 mg by mouth daily as needed        LORazepam (ATIVAN) 2 MG/ML (HIGH CONC) solution Take 0.25-0.5 mLs (0.5-1 mg) by mouth every 4 hours as needed for anxiety, sleep, nausea or vomiting 30 mL 3     citalopram (CELEXA) 10 MG/5ML solution Take 10 mLs (20 mg) by mouth daily 300 mL 3     lidocaine-prilocaine (EMLA) cream Apply to PORT site 45-60 minutes prior to procedure.  Cover with " non-absorbent dressing. 30 g 5     multivitamins with minerals (CERTAVITE) LIQD 15 mLs by Per Feeding Tube route daily 1 Bottle 1     mineral oil-hydrophilic petrolatum (AQUAPHOR) ointment Apply topically every 8 hours (Patient taking differently: Apply topically every 8 hours as needed ) 50 g 0        No Known Allergies

## 2017-02-01 NOTE — Clinical Note
2/1/2017       RE: Darek Navarro  [address]  [place] [address]     Dear Colleague,    Thank you for referring your patient, Darek Navarro, to the [facility] at [facility]. Please see a copy of my visit note below.    This 27-year-old man has a history  Of squamous cell carcinoma of the tongue. He has bone metastasis to the spine. He reports that he has received some  Medical treatment for the bone lesions. Presumably this is Zometa or something similar. He is also receiving chemotherapy. He has had resection of the primary tumor. He has minimal spine symptoms with no major pain.He has not noticed any lower extremity weakness or numbness. He presents today because of new lesions that were recently noticed on his scans including a fracture of T10. I have reviewed his patient surveys in the EMR.     I reviewed this patient's imaging including PET scans and CT scans.  He has  Lesions at T1 and  T10 and T11. There is no canal compromise but there is disruption of the anterior cortex at T1 and T10.     This patient is alert and oriented and in no acute distress. His eyes are reactive and nonicteric. His speech is understandable butaltered secondary to his surgery presumably. He has well-healed scars along his jaw. He is able to ambulate without a limp. His respirations are regular and unlabored.    This patient has some significant spine metastases without significant kyphosis. There is some loss of height of the anterior portion of T10. I have recommended that he undergo radiation treatment to the  Known lesions.  I have recommended that he see Dr. Collado or spine surgeon for a consultation but I doubt that he is a candidate for a vertebroplasty given his lack of symptoms. He should continue on a bisphosphonate or similar medication. He will return to see me as needed.    Again, thank you for allowing me to participate in the care of your patient.       Sincerely,    Dayday Parker MD

## 2017-02-07 NOTE — MR AVS SNAPSHOT
After Visit Summary   2/7/2017    Darek Navarro    MRN: [id number]           Patient Information     Date Of Birth          1990        Visit Information        Provider Department      2/7/2017 10:00 AM  24 ATC;  ONCOLOGY INFUSION [facility]        Today's Diagnoses     Head and neck cancer (H)    -  1     Drug-induced neutropenia (H)         Bone metastasis (H)         Malignant neoplasm of tongue (H)           Care Instructions    Contact Numbers    [facility] Main Line: [phone]  [facility] Triage:  [phone]    Call triage with chills and/or temperature greater than or equal to 100.5, uncontrolled nausea/vomiting, diarrhea, constipation, dizziness, shortness of breath, chest pain, bleeding, unexplained bruising, or any new/concerning symptoms, questions/concerns.     If you are having any concerning symptoms or wish to speak to a provider before your next infusion visit, please call your care coordinator or triage to notify them so we can adequately serve you.       After Hours: [phone]    If after hours, weekends, or holidays, call main hospital  and ask for Oncology doctor on call.           February 2017 Sunday Monday Tuesday Wednesday Thursday Friday Saturday                  1     P NEW TUMOR    8:15 AM   (15 min.)   Dayday Parker MD   [facility] Orthopa[facility] 2     3     4       5     6     7     [facility] LAB DRAW    9:00 AM   (15 min.)    [facility] LAB DRAW   [facility] Lab Draw     [facility] RETURN    9:30 AM   (30 min.)   Keira Hernandez MD   [facility] ONC INFUSION 240   10:00 AM   (240 min.)    ONCOLOGY INFUSION   [facility] 8     [facility] CONSULT    2:30 PM   (90 min.)   Jodee Pagan MD   Radiation Oncology Clinic     [facility] TCT/SIM SUITE    4:00 PM   (60 min.)   Jodee Pagan MD   Radiation Oncology Clinic 9     10     11       12     13     14     [facility]    8:00 AM   (15 min.)    [facility]   [facility]     [facility] RETURN    8:40 AM   (50 min.)   Opal Cannon PA-C   [facility]     [facility] ONC INFUSION 120   10:00 AM   (120 min.)   [facility] ONCOLOGY INFUSION   [facility]     [facility] RETURN WITH ROOM    3:30 PM   (60 min.)   Yanique Wiggins GC   [facility] 15     16     17     18       19     20     21     [facility] LAB DRAW   11:30 AM   (15 min.)    [facility] LAB DRAW   [facility] Lab Draw     CT CHEST/ABDOMEN/PELVIS W   12:00 PM   (20 min.)   UCCT2   [facility] CT     [facility] RETURN    1:40 PM   (50 min.)   Opal Cannon PA-C   [facility]     [facility] ONC INFUSION 120    2:30 PM   (120 min.)    ONCOLOGY INFUSION   [facility] 22     23     24     25       26     27     28     P [facility] LAB DRAW    7:00 AM   (15 min.)    [facility] LAB DRAW   [facility] Lab Draw     [facility] RETURN    7:30 AM   (30 min.)   Keira Hernandez MD   [facility]     [facility] ONC INFUSION 240    8:00 AM   (240 min.)    ONCOLOGY INFUSION   [facility]                                March 2017 Sunday Monday Tuesday Wednesday Thursday Friday Saturday                  1     2     3     4       5     6     7     8     9     10     11       12     13     14     15     16     17     18       19     20     21     22     23     24     25       26     27     28     29     30     31                      Recent Results (from the past 24 hour(s))   CBC with platelets differential    Collection Time: 02/07/17  9:37 AM   Result Value Ref Range    WBC 3.0 (L) 4.0 - 11.0 10e9/L    RBC Count 4.04 (L) 4.4 - 5.9 10e12/L    Hemoglobin 10.0 (L) 13.3 - 17.7 g/dL    Hematocrit 33.0 (L) 40.0 - 53.0 %    MCV 82 78 - 100 fl    MCH 24.8 (L) 26.5 - 33.0 pg    MCHC 30.3 (L) 31.5 - 36.5 g/dL    RDW 18.6 (H) 10.0 - 15.0 %    Platelet Count 225 150 -  450 10e9/L    Diff Method Automated Method     % Neutrophils 66.0 %    % Lymphocytes 16.8 %    % Monocytes 14.2 %    % Eosinophils 2.0 %    % Basophils 0.7 %    % Immature Granulocytes 0.3 %    Nucleated RBCs 0 0 /100    Absolute Neutrophil 2.0 1.6 - 8.3 10e9/L    Absolute Lymphocytes 0.5 (L) 0.8 - 5.3 10e9/L    Absolute Monocytes 0.4 0.0 - 1.3 10e9/L    Absolute Eosinophils 0.1 0.0 - 0.7 10e9/L    Absolute Basophils 0.0 0.0 - 0.2 10e9/L    Abs Immature Granulocytes 0.0 0 - 0.4 10e9/L    Absolute Nucleated RBC 0.0    Comprehensive metabolic panel    Collection Time: 02/07/17  9:37 AM   Result Value Ref Range    Sodium 137 133 - 144 mmol/L    Potassium 4.8 3.4 - 5.3 mmol/L    Chloride 102 94 - 109 mmol/L    Carbon Dioxide 28 20 - 32 mmol/L    Anion Gap 7 3 - 14 mmol/L    Glucose 141 (H) 70 - 99 mg/dL    Urea Nitrogen 10 7 - 30 mg/dL    Creatinine 0.71 0.66 - 1.25 mg/dL    GFR Estimate >90  Non  GFR Calc   >60 mL/min/1.7m2    GFR Estimate If Black >90   GFR Calc   >60 mL/min/1.7m2    Calcium 9.3 8.5 - 10.1 mg/dL    Bilirubin Total 0.4 0.2 - 1.3 mg/dL    Albumin 3.4 3.4 - 5.0 g/dL    Protein Total 8.0 6.8 - 8.8 g/dL    Alkaline Phosphatase 84 40 - 150 U/L    ALT 28 0 - 70 U/L    AST 19 0 - 45 U/L   Magnesium    Collection Time: 02/07/17  9:37 AM   Result Value Ref Range    Magnesium 2.1 1.6 - 2.3 mg/dL                 Follow-ups after your visit        Your next 10 appointments already scheduled     Feb 08, 2017  2:30 PM   (Arrive by 2:15 PM)   CONSULT with Jodee Pagan MD   Radiation Oncology Clinic ([facility])    [facility]  1st Floor  [address] [address] [phone]            Feb 08, 2017  4:00 PM   TCT/SIM Suite Visit with Jodee Pagan MD   Radiation Oncology Clinic ([facility])    [facility]  1st Floor  [address] [address] [phone]             Feb 14, 2017  8:00 AM   [facility] Lab Draw with  MASONIC LAB DRAW   [facility] Lab Draw ([facility])    [address] [place] [address]   [phone]            Feb 14, 2017  8:40 AM   (Arrive by 8:25 AM)   Return Visit with Opal Cannon PA-C   [facility] ([facility])    [address] [place] [address]   [phone]            Feb 14, 2017 10:00 AM   Infusion 120 with  ONCOLOGY INFUSION, UC 28 ATC   [facility] ([facility])    [address] [place] [address]   [phone]            Feb 14, 2017  3:30 PM   (Arrive by 3:15 PM)   RETURN WITH ROOM with Yanique Wiggins GC,  2 114 CONSULT RM   [facility] ([facility])    [address] [place] [address]   [phone]            Feb 21, 2017 11:30 AM   [facility] Lab Draw with  [facility] LAB DRAW   [facility]    [address] [place] [address]   [phone]            Feb 21, 2017 12:00 PM   (Arrive by 11:45 AM)   CT CHEST/ABDOMEN/PELVIS W CONTRAST with UCCT2   [facility] [place] [facility] ([facility])    [address] [place] [address]  1st [facility] [address]   [phone]           Please bring any scans or X-rays taken at other hospitals, if similar tests were done. Also bring a list of your medicines, including vitamins, minerals and over-the-counter drugs. It is safest to leave personal items at home.  Be sure to tell your doctor:   If you have any allergies.   If there s any chance you are pregnant.   If you are breastfeeding.   If you have any special needs.  You may have contrast for this exam. To prepare:   Do not eat or drink for 2 hours before your exam. If you need to take  medicine, you may take it with small sips of water. (We may ask you to take liquid medicine as well.)   The day before your exam, drink extra fluids at least six 8-ounce glasses (unless your doctor tells you to restrict your fluids).  Patients over 70 or patients with diabetes or kidney problems:   If you haven t had a blood test (creatinine test) within the last 30 days, go to your clinic or Diagnostic Imaging Department for this test.  If you have diabetes:   If your kidney function is normal, continue taking your metformin (Avandamet, Glucophage, Glucovance, Metaglip) on the day of your exam.   If your kidney function is abnormal, wait 48 hours before restarting this medicine.  You will have oral contrast for this exam:   You will drink the contrast at home. Get this from your clinic or Diagnostic Imaging Department. Please follow the directions given.  Please wear loose clothing, such as a sweat suit or jogging clothes. Avoid snaps, zippers and other metal. We may ask you to undress and put on a hospital gown.  If you have any questions, please call the Imaging Department where you will have your exam.              Future tests that were ordered for you today     Open Future Orders        Priority Expected Expires Ordered    CT Chest/Abdomen/Pelvis w Contrast Routine  2/7/2018 2/7/2017            Who to contact     If you have questions or need follow up information about today's clinic visit or your schedule please contact [facility] directly at [phone].  Normal or non-critical lab and imaging results will be communicated to you by MyChart, letter or phone within 4 business days after the clinic has received the results. If you do not hear from us within 7 days, please contact the clinic through MyChart or phone. If you have a critical or abnormal lab result, we will notify you by phone as soon as possible.  Submit refill requests through [employer] or call your pharmacy and they will  forward the refill request to us. Please allow 3 business days for your refill to be completed.          Additional Information About Your Visit        [employer] Information     [employer] gives you secure access to your electronic health record. If you see a primary care provider, you can also send messages to your care team and make appointments. If you have questions, please call your primary care clinic.  If you do not have a primary care provider, please call [phone] and they will assist you.        Care EveryWhere ID     This is your Care EveryWhere ID. This could be used by other organizations to access your [facility] medical records  [id number]         Blood Pressure from Last 3 Encounters:   02/07/17 127/79   01/31/17 126/81   01/24/17 115/72    Weight from Last 3 Encounters:   02/07/17 64.275 kg (141 lb 11.2 oz)   02/01/17 64.547 kg (142 lb 4.8 oz)   01/31/17 64.411 kg (142 lb)              We Performed the Following     CBC with platelets differential     Comprehensive metabolic panel     Magnesium     Nursing observation     Treatment Conditions     Treatment Conditions          Today's Medication Changes          These changes are accurate as of: 2/7/17  1:34 PM.  If you have any questions, ask your nurse or doctor.               Start taking these medicines.        Dose/Directions    FIRST-FERNANDEZ MOUTHWASH Susp   Used for:  Head and neck cancer (H)   Started by:  Keira Hernandez MD        Dose:  5-10 mL   Swish and swallow 5-10 mLs in mouth every 6 hours as needed   Quantity:  237 mL   Refills:  11         These medicines have changed or have updated prescriptions.        Dose/Directions    mineral oil-hydrophilic petrolatum   This may have changed:    - when to take this  - reasons to take this   Used for:  Malignant neoplasm of tongue (H)        Apply topically every 8 hours   Quantity:  50 g   Refills:  0       pantoprazole 20 MG EC tablet   Commonly known as:  PROTONIX   This may have changed:    -  when to take this  - reasons to take this   Used for:  Gastroesophageal reflux disease without esophagitis        Dose:  20 mg   Take 1 tablet (20 mg) by mouth daily   Quantity:  30 tablet   Refills:  3            Where to get your medicines      These medications were sent to [facility] [place] [place] - [address]  [address], [facility] [address]    Hours:  TRANSPLANT PHONE NUMBER [phone] Phone:  [phone]    - FIRST-FERNANDEZ MOUTHWASH Susp             Primary Care Provider Office Phone # Fax #    [facility] [phone] [address]  [facility] [address]        Thank you!     Thank you for choosing [facility] CANC[facility]  for your care. Our goal is always to provide you with excellent care. Hearing back from our patients is one way we can continue to improve our services. Please take a few minutes to complete the written survey that you may receive in the mail after your visit with us. Thank you!             Your Updated Medication List - Protect others around you: Learn how to safely use, store and throw away your medicines at www.disposemymeds.org.          This list is accurate as of: 2/7/17  1:34 PM.  Always use your most recent med list.                   Brand Name Dispense Instructions for use    citalopram 10 MG/5ML solution    celeXA    300 mL    Take 10 mLs (20 mg) by mouth daily       FIRST-FERNANDEZ MOUTHWASH Susp     237 mL    Swish and swallow 5-10 mLs in mouth every 6 hours as needed       ibuprofen 200 MG tablet    ADVIL/MOTRIN     Take 200 mg by mouth daily as needed       levothyroxine 25 MCG tablet    SYNTHROID/LEVOTHROID    90 tablet    Take 1 tablet (25 mcg) by mouth daily       lidocaine 2 % solution    XYLOCAINE    100 mL    15 mL swish and spit q3h PRN, max 8 doses in 24 hours       lidocaine-prilocaine cream    EMLA    30 g    Apply to PORT site 45-60  minutes prior to procedure.  Cover with non-absorbent dressing.       * LORazepam 2 MG/ML (HIGH CONC) solution    ATIVAN    30 mL    Take 0.25-0.5 mLs (0.5-1 mg) by mouth every 4 hours as needed for anxiety, sleep, nausea or vomiting       * LORazepam 0.5 MG tablet    ATIVAN    30 tablet    Take 1 tablet (0.5 mg) by mouth every 4 hours as needed (Anxiety, Nausea/Vomiting or Sleep)       methylphenidate 5 MG tablet    RITALIN    60 tablet    Take 1 tablet (5 mg) by mouth 2 times daily       mineral oil-hydrophilic petrolatum     50 g    Apply topically every 8 hours       multivitamins with minerals Liqd liquid     1 Bottle    15 mLs by Per Feeding Tube route daily       ondansetron 4 MG tablet    ZOFRAN     Take 4 mg by mouth daily as needed       * oxyCODONE 5 MG/5ML solution    ROXICODONE    1000 mL    10 mLs (10 mg) by Oral or PEG tube route every 4 hours as needed for moderate to severe pain       * oxyCODONE 10 MG 12 hr tablet    OxyCONTIN    60 tablet    Take 1 tablet (10 mg) by mouth every 12 hours maximum 2 tablet(s) per day       pantoprazole 20 MG EC tablet    PROTONIX    30 tablet    Take 1 tablet (20 mg) by mouth daily       prochlorperazine 10 MG tablet    COMPAZINE    30 tablet    Take 1 tablet (10 mg) by mouth every 6 hours as needed (Nausea/Vomiting)       * Notice:  This list has 4 medication(s) that are the same as other medications prescribed for you. Read the directions carefully, and ask your doctor or other care provider to review them with you.

## 2017-02-07 NOTE — NURSING NOTE
Chief Complaint   Patient presents with     Blood Draw     Pt with hx of head and neck ca labs collected via venipuncture. Port accessed, no consistant blood flow. Alteplace administered. Will need to reassess port flow prior to infusion.

## 2017-02-07 NOTE — Clinical Note
2/7/2017       RE: Darek Navarro  [address]  [place] [address]     Dear Colleague,    Thank you for referring your patient, Darek Navarro, to the [facility]. Please see a copy of my visit note below.    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 Pathologic staging: [id number]      TREATMENT:    1- status post an April 28, 2016 transmandibular subtotal oral glossectomy, right base of tongue resection with partial pharyngectomy, bilateral neck dissections, and free flap reconstruction. Concurrent ChemoRt with high dose Cisplatin 6/6/16. day 22 cisplatin on 6/27/16. He completed day 43 on 7/19/16. Radiation completed, 6600cGy on 7/21/16.   2- Palliative intent treatment with Pembrolizumab 12/6/2016  3- Due to rapid progression of disease the treatment was switched to Carboplatin, 5-FU and Cetuximab on 1/18/17     SUBJECTIVE:  The patient was seen as a followup today. ***    REVIEW OF SYSTEMS:  A complete review of systems was performed and found to be negative other than pertinent positives mentioned in history of present illness.     Past medical, social histories reviewed.    Meds- Reviewed.     PHYSICAL EXAMINATION:   VITAL SIGNS: ./79 mmHg  Pulse 94  Temp(Src) 97.8  F (36.6  C) (Oral)  Resp 18  Wt 64.275 kg (141 lb 11.2 oz)  SpO2 100%  GENERAL: Sitting comfortably.   HEENT: Pupils are equal. Oropharynx is clear.   NECK: No cervical or supraclavicular lymphadenopathy.   LUNGS: Clear bilaterally.   HEART: S1, S2, regular.   ABDOMEN: Soft, nontender, nondistended, no hepatosplenomegaly.   EXTREMITIES: Warm, well perfused.   NEUROLOGIC: Alert, awake.   SKIN: No rash.   LYMPHATICS: No edema.      DATA:  Recent Labs   Lab Test  01/31/17   1447  01/24/17    1015   05/05/16   0750  05/04/16   0739   NA  138  132*   < >  140  138   POTASSIUM  4.2  4.1   < >  4.9  4.1   CHLORIDE  103  99   < >  105  105   CO2  26  26   < >  28  29   ANIONGAP  8  7   < >  6  4   BUN  11  10   < >  16  14   CR  0.73  0.65*   < >  0.64*  0.64*   GLC  148*  98   < >  106*  102*   COLE  8.8  7.9*   < >  9.6  9.3   MAG  2.2  2.5*   < >  2.3  2.2   PHOS   --    --    --   5.3*  4.0    < > = values in this interval not displayed.     Recent Labs   Lab Test  01/31/17   1447  01/24/17   1015  01/16/17   1454   WBC  2.5*  7.5  11.9*   HGB  9.5*  9.1*  10.2*   PLT  290  521*  511*   MCV  79  78  80   NEUTROPHIL  63.2  81.3  73.2     Recent Labs   Lab Test  01/31/17   1447  01/24/17   1015  01/16/17   1454   BILITOTAL  0.4  0.7  0.3   ALKPHOS  95  133  177*   ALT  27  52  43   AST  20  24  23   ALBUMIN  3.1*  2.8*  3.0*     ECOG PS: 1     ASSESSMENT:  A 26-year-old gentleman  [id number] squamous cell carcinoma of the right oral tongue.Status post April 28, 2016 transmandibular subtotal oral glossectomy, right base of tongue resection with partial pharyngectomy, bilateral neck dissections, and free flap. Initiated treatment by 6/6/16.   He completed his concurrent chemoRT 7/21/16.  Follow up PET CT scan 10/28/16 was reviewed at multidisciplinary head and neck tumor board. It was consistent with local and distant recurrence. L- His case was subsequently reviewed at Thoracic oncology conference and we will proceed with biopsy by IR.glendy biopsy results were reviewed with the patient and family members which confirmed metastatic disease.   He was seen at [facility] and no first line trials were available. He was switched to Pembrolizumab with a plan to do short term scans to assess the pace of disease progression.   CT scan 1/16/17 showed rapid disease progression with thoracic vertebral fracture. Multiple lytic/destructive spine lesions were reviewed by spine surgery and were not felt to be unstable.   He is  on chemotherapy with carboplatin/5-FU and Cetuximab.  He is on Zometa  - He may need palliative radiation to bone mets.     PLAN:  1- Continue carboplatin, 5-FU and cetuximab. Cycle 2 starts today.  2-     KEIRA HERNANDEZ MD    2/7/2017  ***  cc: Andrea Pagan MD     Again, thank you for allowing me to participate in the care of your patient.      Sincerely,    Keira Hernandez MD

## 2017-02-07 NOTE — LETTER
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 Pathologic staging: [id number]      TREATMENT:    1- status post an April 28, 2016 transmandibular subtotal oral glossectomy, right base of tongue resection with partial pharyngectomy, bilateral neck dissections, and free flap reconstruction. Concurrent ChemoRt with high dose Cisplatin 6/6/16. day 22 cisplatin on 6/27/16. He completed day 43 on 7/19/16. Radiation completed, 6600cGy on 7/21/16.   2- Palliative intent treatment with Pembrolizumab 12/6/2016  3- Due to rapid progression of disease the treatment was switched to Carboplatin, 5-FU and Cetuximab on 1/18/17     SUBJECTIVE:  The patient was seen as a followup today. He is tolerating treatment well except for skin rash. Appetite is better.     REVIEW OF SYSTEMS:  A complete review of systems was performed and found to be negative other than pertinent positives mentioned in history of present illness.     Past medical, social histories reviewed.    Meds- Reviewed.     PHYSICAL EXAMINATION:   VITAL SIGNS: ./79 mmHg  Pulse 94  Temp(Src) 97.8  F (36.6  C) (Oral)  Resp 18  Wt 64.275 kg (141 lb 11.2 oz)  SpO2 100%  GENERAL: Sitting comfortably.   HEENT: Pupils are equal. Oropharynx is clear.   NECK: No cervical or supraclavicular lymphadenopathy.   ABDOMEN: Soft, nontender, nondistended, no hepatosplenomegaly.   EXTREMITIES: Warm, well perfused.   NEUROLOGIC: Alert, awake.   SKIN: Rash on skin and torso due to cetuximab.    LYMPHATICS: No edema.      DATA:  Recent Labs   Lab Test  01/31/17   1447  01/24/17   1015   05/05/16   0750  05/04/16   0739   NA  138  132*   < >  140  138   POTASSIUM  4.2  4.1   < >  4.9  4.1    CHLORIDE  103  99   < >  105  105   CO2  26  26   < >  28  29   ANIONGAP  8  7   < >  6  4   BUN  11  10   < >  16  14   CR  0.73  0.65*   < >  0.64*  0.64*   GLC  148*  98   < >  106*  102*   COLE  8.8  7.9*   < >  9.6  9.3   MAG  2.2  2.5*   < >  2.3  2.2   PHOS   --    --    --   5.3*  4.0    < > = values in this interval not displayed.     Recent Labs   Lab Test  01/31/17   1447  01/24/17   1015  01/16/17   1454   WBC  2.5*  7.5  11.9*   HGB  9.5*  9.1*  10.2*   PLT  290  521*  511*   MCV  79  78  80   NEUTROPHIL  63.2  81.3  73.2     Recent Labs   Lab Test  01/31/17   1447  01/24/17   1015  01/16/17   1454   BILITOTAL  0.4  0.7  0.3   ALKPHOS  95  133  177*   ALT  27  52  43   AST  20  24  23   ALBUMIN  3.1*  2.8*  3.0*     ECOG PS: 1     ASSESSMENT:  A 26-year-old gentleman  [id number] squamous cell carcinoma of the right oral tongue.Status post April 28, 2016 transmandibular subtotal oral glossectomy, right base of tongue resection with partial pharyngectomy, bilateral neck dissections, and free flap. Initiated treatment by 6/6/16.   He completed his concurrent chemoRT 7/21/16.  Follow up PET CT scan 10/28/16 was reviewed at multidisciplinary head and neck tumor board. It was consistent with local and distant recurrence. L- His case was subsequently reviewed at Thoracic oncology conference and we will proceed with biopsy by IR.glendy biopsy results were reviewed with the patient and family members which confirmed metastatic disease.   He was seen at [facility] and no first line trials were available. He was switched to Pembrolizumab with a plan to do short term scans to assess the pace of disease progression.   CT scan 1/16/17 showed rapid disease progression with thoracic vertebral fracture. Multiple lytic/destructive spine lesions were reviewed by spine surgery and were not felt to be unstable.   He is on chemotherapy with carboplatin/5-FU and Cetuximab.  He is on Zometa  He needs palliative radiation to bone  mets.   - He is using emollient for rash related to cetuximab.   - I will repeat a scan in next few weeks.     PLAN:  1- Continue carboplatin, 5-FU and cetuximab. Cycle 2 starts today.  2- Seeing genetics next week  3- CT scan chest abdomen and pelvis with contrast in 2 weeks  4- RTC MD 3 weeks  5- Refer to Rad Onc for palliative radiation    CLAUS CAT MD    2/7/2017    cc: Andrea Pagan MD

## 2017-02-07 NOTE — PROGRESS NOTES
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 Pathologic staging: [id number]      TREATMENT:    1- status post an April 28, 2016 transmandibular subtotal oral glossectomy, right base of tongue resection with partial pharyngectomy, bilateral neck dissections, and free flap reconstruction. Concurrent ChemoRt with high dose Cisplatin 6/6/16. day 22 cisplatin on 6/27/16. He completed day 43 on 7/19/16. Radiation completed, 6600cGy on 7/21/16.   2- Palliative intent treatment with Pembrolizumab 12/6/2016  3- Due to rapid progression of disease the treatment was switched to Carboplatin, 5-FU and Cetuximab on 1/18/17     SUBJECTIVE:  The patient was seen as a followup today. He is tolerating treatment well except for skin rash. Appetite is better.     REVIEW OF SYSTEMS:  A complete review of systems was performed and found to be negative other than pertinent positives mentioned in history of present illness.     Past medical, social histories reviewed.    Meds- Reviewed.     PHYSICAL EXAMINATION:   VITAL SIGNS: ./79 mmHg  Pulse 94  Temp(Src) 97.8  F (36.6  C) (Oral)  Resp 18  Wt 64.275 kg (141 lb 11.2 oz)  SpO2 100%  GENERAL: Sitting comfortably.   HEENT: Pupils are equal. Oropharynx is clear.   NECK: No cervical or supraclavicular lymphadenopathy.   ABDOMEN: Soft, nontender, nondistended, no hepatosplenomegaly.   EXTREMITIES: Warm, well perfused.   NEUROLOGIC: Alert, awake.   SKIN: Rash on skin and torso due to cetuximab.    LYMPHATICS: No edema.      DATA:  Recent Labs   Lab Test  01/31/17   1447  01/24/17   1015   05/05/16   0750  05/04/16   0739   NA  138  132*   < >  140  138   POTASSIUM  4.2  4.1   < >  4.9  4.1   CHLORIDE  103  99   < >  105  105   CO2  26  26   < >  28  29   ANIONGAP  8  7    < >  6  4   BUN  11  10   < >  16  14   CR  0.73  0.65*   < >  0.64*  0.64*   GLC  148*  98   < >  106*  102*   COLE  8.8  7.9*   < >  9.6  9.3   MAG  2.2  2.5*   < >  2.3  2.2   PHOS   --    --    --   5.3*  4.0    < > = values in this interval not displayed.     Recent Labs   Lab Test  01/31/17   1447  01/24/17   1015  01/16/17   1454   WBC  2.5*  7.5  11.9*   HGB  9.5*  9.1*  10.2*   PLT  290  521*  511*   MCV  79  78  80   NEUTROPHIL  63.2  81.3  73.2     Recent Labs   Lab Test  01/31/17   1447  01/24/17   1015  01/16/17   1454   BILITOTAL  0.4  0.7  0.3   ALKPHOS  95  133  177*   ALT  27  52  43   AST  20  24  23   ALBUMIN  3.1*  2.8*  3.0*     ECOG PS: 1     ASSESSMENT:  A 26-year-old gentleman  [id number] squamous cell carcinoma of the right oral tongue.Status post April 28, 2016 transmandibular subtotal oral glossectomy, right base of tongue resection with partial pharyngectomy, bilateral neck dissections, and free flap. Initiated treatment by 6/6/16.   He completed his concurrent chemoRT 7/21/16.  Follow up PET CT scan 10/28/16 was reviewed at multidisciplinary head and neck tumor board. It was consistent with local and distant recurrence. L- His case was subsequently reviewed at Thoracic oncology conference and we will proceed with biopsy by IR.glendy biopsy results were reviewed with the patient and family members which confirmed metastatic disease.   He was seen at [facility] and no first line trials were available. He was switched to Pembrolizumab with a plan to do short term scans to assess the pace of disease progression.   CT scan 1/16/17 showed rapid disease progression with thoracic vertebral fracture. Multiple lytic/destructive spine lesions were reviewed by spine surgery and were not felt to be unstable.   He is on chemotherapy with carboplatin/5-FU and Cetuximab.  He is on Zometa  He needs palliative radiation to bone mets.   - He is using emollient for rash related to cetuximab.   - I will repeat a  scan in next few weeks.     PLAN:  1- Continue carboplatin, 5-FU and cetuximab. Cycle 2 starts today.  2- Seeing genetics next week  3- CT scan chest abdomen and pelvis with contrast in 2 weeks  4- RTC MD 3 weeks  5- Refer to Rad Onc for palliative radiation    CLAUS CAT MD    2/7/2017    cc: Andrea Pagan MD

## 2017-02-07 NOTE — MR AVS SNAPSHOT
After Visit Summary   2/7/2017    Darek Navarro    MRN: [id number]           Patient Information     Date Of Birth          1990        Visit Information        Provider Department      2/7/2017 9:30 AM Keira Hernandez MD [facility]        Today's Diagnoses     Head and neck cancer (H)    -  1       Follow-ups after your visit        Your next 10 appointments already scheduled     Feb 15, 2017  1:30 PM CST   EXTERNAL RADIATION TREATMENT with [facility] RAD ONC VARIAN   Radiation Oncology Clinic ([facility])    [facility]  1st Floor  [address] [address]   [phone]            Feb 15, 2017  1:45 PM CST   ON TREATMENT VISIT with Jodee Pagan MD   Radiation Oncology Clinic ([facility])    [facility]  1st Floor  [address] [address]   [phone]            Feb 16, 2017  1:30 PM CST   EXTERNAL RADIATION TREATMENT with [facility] RAD ONC VARIAN   Radiation Oncology Clinic ([facility])    [facility]  1st Floor  [address] [address]   [phone]            Feb 16, 2017  2:30 PM CST   Treatment with QUINTIN Fortune   [facility] ([facility] [place])    [address]  4th Floor  [facility] [address]   [phone]            Feb 17, 2017  1:30 PM CST   EXTERNAL RADIATION TREATMENT with [facility] RAD ONC VARIAN   Radiation Oncology Clinic ([facility])    [facility]  1st Floor  [address] [address]   [phone]            Feb 21, 2017 11:30 AM CST   [facility] Draw with  [facility] DRAW   [facility] Draw ([facility])    [address]  2nd Floor  [facility] [address]   [phone]            Feb 21, 2017 12:00 PM CST   (Arrive by 11:45 AM)   CT CHEST/ABDOMEN/PELVIS W CONTRAST  with UCCT2   [facility] CT ([facility])    [address]  1st [facility] [address] [phone]           Please bring any scans or X-rays taken at other hospitals, if similar tests were done. Also bring a list of your medicines, including vitamins, minerals and over-the-counter drugs. It is safest to leave personal items at home.  Be sure to tell your doctor:   If you have any allergies.   If there s any chance you are pregnant.   If you are breastfeeding.   If you have any special needs.  You may have contrast for this exam. To prepare:   Do not eat or drink for 2 hours before your exam. If you need to take medicine, you may take it with small sips of water. (We may ask you to take liquid medicine as well.)   The day before your exam, drink extra fluids at least six 8-ounce glasses (unless your doctor tells you to restrict your fluids).  Patients over 70 or patients with diabetes or kidney problems:   If you haven t had a blood test (creatinine test) within the last 30 days, go to your clinic or Diagnostic Imaging Department for this test.  If you have diabetes:   If your kidney function is normal, continue taking your metformin (Avandamet, Glucophage, Glucovance, Metaglip) on the day of your exam.   If your kidney function is abnormal, wait 48 hours before restarting this medicine.  You will have oral contrast for this exam:   You will drink the contrast at home. Get this from your clinic or Diagnostic Imaging Department. Please follow the directions given.  Please wear loose clothing, such as a sweat suit or jogging clothes. Avoid snaps, zippers and other metal. We may ask you to undress and put on a hospital gown.  If you have any questions, please call the Imaging Department where you will have your exam.            Feb 21, 2017  1:40 PM CST   (Arrive by 1:25 PM)   Return Visit with Opal Cannon PA-C   [facility] ([facility]  [facility] [place])    [address]  [address]nd [facility] [address] [phone]            Feb 21, 2017  2:30 PM CST   Infusion 120 with UC ONCOLOGY INFUSION, UC 20 ATC   [facility] ([facility] [place])    [address] [place] [address]  [address]nd [facility] [address] [phone]              Who to contact     If you have questions or need follow up information about today's clinic visit or your schedule please contact [facility] directly at [phone].  Normal or non-critical lab and imaging results will be communicated to you by [employer], letter or phone within 4 business days after the clinic has received the results. If you do not hear from us within 7 days, please contact the clinic through [employer] or phone. If you have a critical or abnormal lab result, we will notify you by phone as soon as possible.  Submit refill requests through [employer] or call your pharmacy and they will forward the refill request to us. Please allow 3 business days for your refill to be completed.          Additional Information About Your Visit        MyChart Information     [employer] gives you secure access to your electronic health record. If you see a primary care provider, you can also send messages to your care team and make appointments. If you have questions, please call your primary care clinic.  If you do not have a primary care provider, please call [phone] and they will assist you.        Care EveryWhere ID     This is your Care EveryWhere ID. This could be used by other organizations to access your [place] medical records  [id number]        Your Vitals Were     Pulse Temperature Respirations Pulse Oximetry BMI (Body Mass Index)       94 97.8  F (36.6  C) (Oral) 18 100% 18.18 kg/m2        Blood Pressure from Last 3 Encounters:   No data found for BP    Weight from Last 3 Encounters:   No data found for Wt                 Today's  Medication Changes          These changes are accurate as of: 2/7/17 11:59 PM.  If you have any questions, ask your nurse or doctor.               Start taking these medicines.        Dose/Directions    FIRST-FERNANDEZ MOUTHWASH Susp   Used for:  Head and neck cancer (H)   Started by:  Keira Hernandez MD        Dose:  5-10 mL   Swish and swallow 5-10 mLs in mouth every 6 hours as needed   Quantity:  237 mL   Refills:  11         These medicines have changed or have updated prescriptions.        Dose/Directions    mineral oil-hydrophilic petrolatum   This may have changed:    - when to take this  - reasons to take this   Used for:  Malignant neoplasm of tongue (H)        Apply topically every 8 hours   Quantity:  50 g   Refills:  0       pantoprazole 20 MG EC tablet   Commonly known as:  PROTONIX   This may have changed:    - when to take this  - reasons to take this   Used for:  Gastroesophageal reflux disease without esophagitis        Dose:  20 mg   Take 1 tablet (20 mg) by mouth daily   Quantity:  30 tablet   Refills:  3            Where to get your medicines      These medications were sent to [address] [address]    Hours:  TRANSPLANT PHONE NUMBER [phone] Phone:  [phone]     FIRST-FERNANDEZ MOUTHWASH Susp                Primary Care Provider Office Phone # Fax #    [facility] [phone] [address]  [facility] [address]        Thank you!     Thank you for choosing [facility]  for your care. Our goal is always to provide you with excellent care. Hearing back from our patients is one way we can continue to improve our services. Please take a few minutes to complete the written survey that you may receive in the mail after your visit with us. Thank you!             Your Updated Medication List - Protect others around  you: Learn how to safely use, store and throw away your medicines at www.disposemymeds.org.          This list is accurate as of: 2/7/17 11:59 PM.  Always use your most recent med list.                   Brand Name Dispense Instructions for use    citalopram 10 MG/5ML solution    celeXA    300 mL    Take 10 mLs (20 mg) by mouth daily       FIRST-FERNANDEZ MOUTHWASH Susp     237 mL    Swish and swallow 5-10 mLs in mouth every 6 hours as needed       ibuprofen 200 MG tablet    ADVIL/MOTRIN     Take 200 mg by mouth daily as needed       levothyroxine 25 MCG tablet    SYNTHROID/LEVOTHROID    90 tablet    Take 1 tablet (25 mcg) by mouth daily       lidocaine-prilocaine cream    EMLA    30 g    Apply to PORT site 45-60 minutes prior to procedure.  Cover with non-absorbent dressing.       * LORazepam 2 MG/ML (HIGH CONC) solution    ATIVAN    30 mL    Take 0.25-0.5 mLs (0.5-1 mg) by mouth every 4 hours as needed for anxiety, sleep, nausea or vomiting       * LORazepam 0.5 MG tablet    ATIVAN    30 tablet    Take 1 tablet (0.5 mg) by mouth every 4 hours as needed (Anxiety, Nausea/Vomiting or Sleep)       methylphenidate 5 MG tablet    RITALIN    60 tablet    Take 1 tablet (5 mg) by mouth 2 times daily       mineral oil-hydrophilic petrolatum     50 g    Apply topically every 8 hours       multivitamins with minerals Liqd liquid     1 Bottle    15 mLs by Per Feeding Tube route daily       ondansetron 4 MG tablet    ZOFRAN     Take 4 mg by mouth daily as needed       pantoprazole 20 MG EC tablet    PROTONIX    30 tablet    Take 1 tablet (20 mg) by mouth daily       prochlorperazine 10 MG tablet    COMPAZINE    30 tablet    Take 1 tablet (10 mg) by mouth every 6 hours as needed (Nausea/Vomiting)       * Notice:  This list has 2 medication(s) that are the same as other medications prescribed for you. Read the directions carefully, and ask your doctor or other care provider to review them with you.

## 2017-02-07 NOTE — NURSING NOTE
"Darek Navarro is a 27 year old male who presents for:  Chief Complaint   Patient presents with     Blood Draw     Pt with hx of head and neck ca labs collected via venipuncture. Port accessed, no consistant blood flow. Alteplace administered. Will need to reassess port flow prior to infusion.      Oncology Clinic Visit     3 week follow up throat cancer        Initial Vitals:  /79 mmHg  Pulse 94  Temp(Src) 97.8  F (36.6  C) (Oral)  Resp 18  Wt 64.275 kg (141 lb 11.2 oz)  SpO2 100% Estimated body mass index is 18.19 kg/(m^2) as calculated from the following:    Height as of 2/1/17: 1.88 m (6' 2.02\").    Weight as of this encounter: 64.275 kg (141 lb 11.2 oz).. There is no height on file to calculate BSA. BP completed using cuff size: NA (Not Taken)  Moderate Pain (5) No LMP for male patient. Allergies and medications reviewed.     Medications: MEDICATION REFILLS NEEDED TODAY.  Pharmacy name entered into [employer]:    [id number] [place], [place] - [address] [address] [place] [facility] [address]    Comments: needs refill of lidocaine mouthwash. meds correct per patient.    7 minutes for nursing intake (face to face time)   Alessia Pereira CMA          "

## 2017-02-07 NOTE — PATIENT INSTRUCTIONS
Contact Numbers    [facility] Main Line: [phone]  [facility] Triage:  [phone]    Call triage with chills and/or temperature greater than or equal to 100.5, uncontrolled nausea/vomiting, diarrhea, constipation, dizziness, shortness of breath, chest pain, bleeding, unexplained bruising, or any new/concerning symptoms, questions/concerns.     If you are having any concerning symptoms or wish to speak to a provider before your next infusion visit, please call your care coordinator or triage to notify them so we can adequately serve you.       After Hours: [phone]    If after hours, weekends, or holidays, call main hospital  and ask for Oncology doctor on call.           February 2017 Sunday Monday Tuesday Wednesday Thursday Friday Saturday                  1     P NEW TUMOR    8:15 AM   (15 min.)   Dayday Parker MD   [facility] 2     3     4       5     6     7     [facility] DRAW    9:00 AM   (15 min.)    [facility] DRAW   [facility] Lab Draw     [facility] RETURN    9:30 AM   (30 min.)   Keira Hernandez MD   [facility] ONC INFUSION 240   10:00 AM   (240 min.)    ONCOLOGY INFUSION   [facility] 8     [facility] CONSULT    2:30 PM   (90 min.)   Jodee Pagan MD   Radiation Oncology Clinic     [facility] TCT/SIM SUITE    4:00 PM   (60 min.)   Jodee Pagan MD   Radiation Oncology Clinic 9     10     11       12     13     14     [facility] DRAW    8:00 AM   (15 min.)    [facility] DRAW   [facility] Lab Draw     P RETURN    8:40 AM   (50 min.)   Opal Cannon PA-C   [facility] ONC INFUSION 120   10:00 AM   (120 min.)   [facility] ONCOLOGY INFUSION   [facility] RETURN WITH ROOM    3:30 PM   (60 min.)   Yanique Wiggins GC   [facility] 15     16     17     18       19     20     21     [facility] DRAW   11:30 AM   (15 min.)     [facility] LAB DRAW   [facility] Lab Draw     CT CHEST/ABDOMEN/PELVIS W   12:00 PM   (20 min.)   UCCT2   [facility] CT     [facility] RETURN    1:40 PM   (50 min.)   Opal Cannon PA-C   [facility] ONC INFUSION 120    2:30 PM   (120 min.)   [facility] ONCOLOGY INFUSION   [facility] 22     23     24     25       26     27     28     [facility]    7:00 AM   (15 min.)   [facility] DRAW   [facility] Lab Draw     P RETURN    7:30 AM   (30 min.)   Keira Hernandez MD   [facility] ONC INFUSION 240    8:00 AM   (240 min.)    ONCOLOGY INFUSION   [facility]                                March 2017 Sunday Monday Tuesday Wednesday Thursday Friday Saturday                  1     2     3     4       5     6     7     8     9     10     11       12     13     14     15     16     17     18       19     20     21     22     23     24     25       26     27     28     29     30     31                      Recent Results (from the past 24 hour(s))   CBC with platelets differential    Collection Time: 02/07/17  9:37 AM   Result Value Ref Range    WBC 3.0 (L) 4.0 - 11.0 10e9/L    RBC Count 4.04 (L) 4.4 - 5.9 10e12/L    Hemoglobin 10.0 (L) 13.3 - 17.7 g/dL    Hematocrit 33.0 (L) 40.0 - 53.0 %    MCV 82 78 - 100 fl    MCH 24.8 (L) 26.5 - 33.0 pg    MCHC 30.3 (L) 31.5 - 36.5 g/dL    RDW 18.6 (H) 10.0 - 15.0 %    Platelet Count 225 150 - 450 10e9/L    Diff Method Automated Method     % Neutrophils 66.0 %    % Lymphocytes 16.8 %    % Monocytes 14.2 %    % Eosinophils 2.0 %    % Basophils 0.7 %    % Immature Granulocytes 0.3 %    Nucleated RBCs 0 0 /100    Absolute Neutrophil 2.0 1.6 - 8.3 10e9/L    Absolute Lymphocytes 0.5 (L) 0.8 - 5.3 10e9/L    Absolute Monocytes 0.4 0.0 - 1.3 10e9/L    Absolute Eosinophils 0.1 0.0 - 0.7 10e9/L    Absolute Basophils 0.0 0.0 - 0.2 10e9/L    Abs Immature Granulocytes 0.0 0 - 0.4  10e9/L    Absolute Nucleated RBC 0.0    Comprehensive metabolic panel    Collection Time: 02/07/17  9:37 AM   Result Value Ref Range    Sodium 137 133 - 144 mmol/L    Potassium 4.8 3.4 - 5.3 mmol/L    Chloride 102 94 - 109 mmol/L    Carbon Dioxide 28 20 - 32 mmol/L    Anion Gap 7 3 - 14 mmol/L    Glucose 141 (H) 70 - 99 mg/dL    Urea Nitrogen 10 7 - 30 mg/dL    Creatinine 0.71 0.66 - 1.25 mg/dL    GFR Estimate >90  Non  GFR Calc   >60 mL/min/1.7m2    GFR Estimate If Black >90   GFR Calc   >60 mL/min/1.7m2    Calcium 9.3 8.5 - 10.1 mg/dL    Bilirubin Total 0.4 0.2 - 1.3 mg/dL    Albumin 3.4 3.4 - 5.0 g/dL    Protein Total 8.0 6.8 - 8.8 g/dL    Alkaline Phosphatase 84 40 - 150 U/L    ALT 28 0 - 70 U/L    AST 19 0 - 45 U/L   Magnesium    Collection Time: 02/07/17  9:37 AM   Result Value Ref Range    Magnesium 2.1 1.6 - 2.3 mg/dL

## 2017-02-08 NOTE — LETTER
2017       RE: Darek Navarro  [address]  [place] [address]     Dear Colleague,    Thank you for referring your patient, Darek Navarro, to the RADIATION ONCOLOGY CLINIC. Please see a copy of my visit note below.    FOLLOW-UP VISIT    Patient Name: Darek Navarro      : 1990     Age: 27 year old        ______________________________________________________________________________     Chief Complaint   Patient presents with     Cancer     Treatment Planning       Pain  Current history of pain associated with this visit:   Intensity: 5/10  Current: aching  Location: Back  Treatment: Oxycontin and Oxycodone    Labs  Other Labs: No    Imaging  CT: 17    Other Appointments:     MD Name: Opal Cannon NP Appointment Date: 17   MD Name: Appointment Date:   MD Name: Appointment Date:   Other Appointment Notes:     Residual Radiation side effect: Dysphagia     Additional Instructions: Here today to discuss other treatment options for back pain                      DISEASE TREATED:  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      RADIATION THERAPY DELIVERED:  Concurrent chemoradiation therapy with high-dose cisplatin to a dose of 66 Gy in 33 fractions.        INTERVAL SINCE COMPLETION OF RADIATION THERAPY:  Approximately 6 months (completed 2016).      HISTORY OF PRESENT ILLNESS:  Mr. 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  Unfortunately, his 3 month PET-CT was positive for local and distant recurrence with diffuse metastatic disease in bilateral lungs and local recurrence in the base of tongue and cervical lymph nodes.   The left lung mass was biopsied and consistent with metastatic squamous cell carcinoma ([id number]).  He was then started on palliative intent pembrolizumab on 12/06/2016, but his disease continued to progress, and he was switched to carboplatin, 5-FU and cetuximab on 01/18/2017, which he is receiving weekly.      He was referred back for followup regarding pain in his midback.  His CT scan from 12/06/2016, which prompted the switch in chemotherapy regimens, did show new lytic lesions in the body of T1, T10-T11 as well as the left iliac.  This was repeated on 01/16/2017, which showed new pathological fractures in T1 and T10.  He was seen by Dr. Parker on 02/01/2017, who recommended radiation rather than surgical intervention or a vertebroplasty.      He was therefore seen today to discuss palliative radiation therapy.  He elaborates that his pain was going on for 2-3 months and with no analgesics was 8-9/10, decreased to 4-5/10 with OxyContin and oxycodone plus ibuprofen.  The pain is in the low posterior neck and midback.  The neck pain is somewhat worse than the lower back pain, although the lower back pain does occasionally radiate in a belt-like distribution around his ribs.  He denies any neurologic symptoms, such as upper or lower extremity weakness, numbness or change in sensation.  He has normal ambulation and no urinary or bowel retention or incontinence.      PHYSICAL EXAMINATION:   VITAL SIGNS:  Blood pressure 128/71, pulse 82, weight 142 pounds, oxygen saturation 100% on room air.  (Weight was previously 168 pounds 3 months ago.)   GENERAL:  He is alert, in no acute distress.   HEENT:  He has postoperative and post radiation changes to his face and neck with some persistent speech changes related to his tongue reconstruction, but he is very intelligible.   NEUROLOGIC:  Negative for any cranial nerve deficits.  He has 5/5 strength and intact light touch sensation in his extremities as well as 1+ deep  tendon reflexes in bilateral patellar and biceps tendons.  Normal ambulation.   PSYCHIATRIC:  Appropriate mood and affect.      IMPRESSION/RECOMMENDATION:  Mr. Navarro is a 27-year-old man with local and distant squamous cell carcinoma recurrence rapidly progressing after a short interval from definitive treatment attempts.  Unfortunately, he has developed painful metastases at T1 and T10 with slightly more pain at T1.  He was seen by Orthopedic Surgery, and surgical intervention was not recommended.  We therefore recommended palliative radiation therapy to both of these thoracic spine metastases.  We will plan on a shortened course to 20 Gy in 5 fractions each.  The upper spine lesion is within an area that previously received a partial dose of radiation with the cord receiving an EQD2 of approximately 37 Gy with expected cord regeneration and healing of 25% equating to an accumulative equivalent dose of 28 Gy.  With an additional 20 Gy at 4 Gy per fraction, this would provide an additional EQD2 of 23 Gy for a total of approximately 50 Gy to the cord in this area, which is at the upper limit of acceptable cord dose.  Given his rapid progression and poor prognosis, we would preferably like to complete his treatment in a shorter period of time and feel that the cost-benefit ratio justifies proceeding with a dose of 20 Gy in 5 fractions.  The specifics of the procedure as well as the risks and benefits were discussed, and he signed consent prior to proceeding with simulation.  He would prefer to start on Monday so that his treatment is completed in 1 week.      The patient was seen and discussed with staff, Dr. Pagan.      Giovanni Ervin MD  Resident  Department of Radiation Oncology   [facility]  [phone]     Mr. Navarro was seen and examined by me. Note above by Dr. Ervin was reviewed and edited by me and reflects our mutual findings and plan of care.  Jodee Pagan,  MD  Department of Radiation Oncology  [facility]    cc:   Keira Hernandez MD   [facility]    [address] [place] [address]      Dayday Parker MD   [facility]   [facility] [place] [address]

## 2017-02-08 NOTE — PROGRESS NOTES
FOLLOW-UP VISIT    Patient Name: Darek Navarro      : 1990     Age: 27 year old        ______________________________________________________________________________     Chief Complaint   Patient presents with     Cancer     Treatment Planning       Pain  Current history of pain associated with this visit:   Intensity: 5/10  Current: aching  Location: Back  Treatment: Oxycontin and Oxycodone    Labs  Other Labs: No    Imaging  CT: 17    Other Appointments:     MD Name: Opal Cannon NP Appointment Date: 17   MD Name: Appointment Date:   MD Name: Appointment Date:   Other Appointment Notes:     Residual Radiation side effect: Dysphagia     Additional Instructions: Here today to discuss other treatment options for back pain

## 2017-02-08 NOTE — PROGRESS NOTES
A radiation therapy treatment planning simulation was performed.  Please see the [employer] record for documentation.    Jodee Pagan MD  Radiation Oncology

## 2017-02-08 NOTE — MR AVS SNAPSHOT
After Visit Summary   2/8/2017    Darek Navarro    MRN: [id number]           Patient Information     Date Of Birth          1990        Visit Information        Provider Department      2/8/2017 9:30 AM Jodee Pagan MD Radiation Oncology Clinic        Today's Diagnoses     Malignant neoplasm of tongue (H)    -  1    Bone metastasis (H)           Follow-ups after your visit        Your next 10 appointments already scheduled     Feb 15, 2017  1:45 PM CST   ON TREATMENT VISIT with Jodee Pagan MD   Radiation Oncology Clinic ([facility])    [facility]  1st Floor  [address] [facility] [address]   [phone]            Feb 16, 2017  1:30 PM CST   EXTERNAL RADIATION TREATMENT with [facility] RAD ONC VARIAN   Radiation Oncology Clinic ([facility])    [facility]  1st Floor  [address] [address]   [phone]            Feb 16, 2017  2:30 PM CST   Treatment with QUINTIN Fortune   [facility] ([facility])    [address] [facility]  4th Floor  [facility] [address] [phone]            Feb 17, 2017  1:30 PM CST   EXTERNAL RADIATION TREATMENT with [facility] RAD ONC VARIAN   Radiation Oncology Clinic ([facility])    [facility]  1st Floor  [address] [facility] [address] [address]   [phone]            Feb 21, 2017 11:30 AM CST   [facility] Draw with  [facility] DRAW   [facility] ([facility])    [address] [facility] [address]  2nd Floor  [facility] [address] [phone]            Feb 21, 2017 12:00 PM CST   (Arrive by 11:45 AM)   CT CHEST/ABDOMEN/PELVIS W CONTRAST with UCCT2   [facility] ([facility])    [address] [facility] [address]  1st Floor  [facility] [address] [phone]           Please bring any scans or X-rays taken at other  [facility] [place], if similar tests were done. Also bring a list of your medicines, including vitamins, minerals and over-the-counter drugs. It is safest to leave personal items at home.  Be sure to tell your doctor:   If you have any allergies.   If there s any chance you are pregnant.   If you are breastfeeding.   If you have any special needs.  You may have contrast for this exam. To prepare:   Do not eat or drink for 2 hours before your exam. If you need to take medicine, you may take it with small sips of water. (We may ask you to take liquid medicine as well.)   The day before your exam, drink extra fluids at least six 8-ounce glasses (unless your doctor tells you to restrict your fluids).  Patients over 70 or patients with diabetes or kidney problems:   If you haven t had a blood test (creatinine test) within the last 30 days, go to your clinic or Diagnostic Imaging Department for this test.  If you have diabetes:   If your kidney function is normal, continue taking your metformin (Avandamet, Glucophage, Glucovance, Metaglip) on the day of your exam.   If your kidney function is abnormal, wait 48 hours before restarting this medicine.  You will have oral contrast for this exam:   You will drink the contrast at home. Get this from your clinic or Diagnostic Imaging Department. Please follow the directions given.  Please wear loose clothing, such as a sweat suit or jogging clothes. Avoid snaps, zippers and other metal. We may ask you to undress and put on a hospital gown.  If you have any questions, please call the Imaging Department where you will have your exam.            Feb 21, 2017  1:40 PM CST   (Arrive by 1:25 PM)   Return Visit with Opal Cannon PA-C   [facility] ([facility])    [address] [facility]  2nd Floor  [facility] [address] [phone]            Feb 21, 2017  2:30 PM CST   Infusion 120 with  ONCOLOGY INFUSION,  [address] [facility]  Cancer Clinic ([facility])    [address]  2nd [facility] [address] [phone]            Feb 24, 2017  1:15 PM CST   (Arrive by 1:00 PM)   RETURN WITH ROOM with Yanique Wiggins GC   [facility] ([facility])    [address] [id number] [phone]              Who to contact     Please call your clinic at [phone] to:    Ask questions about your health    Make or cancel appointments    Discuss your medicines    Learn about your test results    Speak to your doctor   If you have compliments or concerns about an experience at your clinic, or if you wish to file a complaint, please contact [facility] Patient Relations at [phone] or email us at [email or web address]         Additional Information About Your Visit        [employer] Information     [employer] gives you secure access to your electronic health record. If you see a primary care provider, you can also send messages to your care team and make appointments. If you have questions, please call your primary care clinic.  If you do not have a primary care provider, please call [phone] and they will assist you.      [employer] is an electronic gateway that provides easy, online access to your medical records. With [employer], you can request a clinic appointment, read your test results, renew a prescription or communicate with your care team.     To access your existing account, please contact your [facility] Clinic or call [phone] for assistance.        Care EveryWhere ID     This is your Care EveryWhere ID. This could be used by other organizations to access your [place] medical records  [id number]         Blood Pressure from Last 3 Encounters:   02/14/17 130/81   02/08/17 128/71   02/07/17 127/79    Weight from Last 3 Encounters:   02/14/17 61.1 kg (134 lb 11.2 oz)    02/08/17 64.4 kg (142 lb)   02/07/17 64.3 kg (141 lb 11.2 oz)              Today, you had the following     No orders found for display         Today's Medication Changes          These changes are accurate as of: 2/8/17 11:59 PM.  If you have any questions, ask your nurse or doctor.               These medicines have changed or have updated prescriptions.        Dose/Directions    mineral oil-hydrophilic petrolatum   This may have changed:    - when to take this  - reasons to take this   Used for:  Malignant neoplasm of tongue (H)        Apply topically every 8 hours   Quantity:  50 g   Refills:  0       pantoprazole 20 MG EC tablet   Commonly known as:  PROTONIX   This may have changed:    - when to take this  - reasons to take this   Used for:  Gastroesophageal reflux disease without esophagitis        Dose:  20 mg   Take 1 tablet (20 mg) by mouth daily   Quantity:  30 tablet   Refills:  3                Primary Care Provider Office Phone # Fax #    [facility] [phone] [address] [facility] [facility] [address]  [place] [address]        Thank you!     Thank you for choosing [facility]COL[facility]Y [facility]INIC  for your care. Our goal is always to provide you with excellent care. Hearing back from our patients is one way we can continue to improve our services. Please take a few minutes to complete the written survey that you may receive in the mail after your visit with us. Thank you!             Your Updated Medication List - Protect others around you: Learn how to safely use, store and throw away your medicines at www.disposemymeds.org.          This list is accurate as of: 2/8/17 11:59 PM.  Always use your most recent med list.                   Brand Name Dispense Instructions for use    citalopram 10 MG/5ML solution    celeXA    300 mL    Take 10 mLs (20 mg) by mouth daily       FIRST-FERNANDEZ MOUTHWASH Susp     237 mL    Swish and swallow 5-10 mLs in mouth every 6 hours as  needed       ibuprofen 200 MG tablet    ADVIL/MOTRIN     Take 200 mg by mouth daily as needed       levothyroxine 25 MCG tablet    SYNTHROID/LEVOTHROID    90 tablet    Take 1 tablet (25 mcg) by mouth daily       lidocaine-prilocaine cream    EMLA    30 g    Apply to PORT site 45-60 minutes prior to procedure.  Cover with non-absorbent dressing.       * LORazepam 2 MG/ML (HIGH CONC) solution    ATIVAN    30 mL    Take 0.25-0.5 mLs (0.5-1 mg) by mouth every 4 hours as needed for anxiety, sleep, nausea or vomiting       * LORazepam 0.5 MG tablet    ATIVAN    30 tablet    Take 1 tablet (0.5 mg) by mouth every 4 hours as needed (Anxiety, Nausea/Vomiting or Sleep)       methylphenidate 5 MG tablet    RITALIN    60 tablet    Take 1 tablet (5 mg) by mouth 2 times daily       mineral oil-hydrophilic petrolatum     50 g    Apply topically every 8 hours       multivitamins with minerals Liqd liquid     1 Bottle    15 mLs by Per Feeding Tube route daily       ondansetron 4 MG tablet    ZOFRAN     Take 4 mg by mouth daily as needed       pantoprazole 20 MG EC tablet    PROTONIX    30 tablet    Take 1 tablet (20 mg) by mouth daily       prochlorperazine 10 MG tablet    COMPAZINE    30 tablet    Take 1 tablet (10 mg) by mouth every 6 hours as needed (Nausea/Vomiting)       * Notice:  This list has 2 medication(s) that are the same as other medications prescribed for you. Read the directions carefully, and ask your doctor or other care provider to review them with you.

## 2017-02-14 NOTE — NURSING NOTE
"Darek Navarro is a 27 year old male who presents for:  Chief Complaint   Patient presents with     Port Draw     Labs drawn from Left Chest Port-a-Cath and line flushed with Saline and Heparin.      Oncology Clinic Visit     Return patient visit-         Initial Vitals:  /81  Pulse 93  Temp 98.3  F (36.8  C) (Oral)  Resp 16  Wt 61.1 kg (134 lb 11.2 oz)  SpO2 100%  BMI 17.29 kg/m2 Estimated body mass index is 17.29 kg/(m^2) as calculated from the following:    Height as of 2/1/17: 1.88 m (6' 2.02\").    Weight as of this encounter: 61.1 kg (134 lb 11.2 oz).. Body surface area is 1.79 meters squared. BP completed using cuff size: NA (Not Taken)  No Pain (0) No LMP for male patient. Allergies and medications reviewed.     Medications: MEDICATION REFILLS NEEDED TODAY.  Pharmacy name entered into [email or web address]:    [facility] [id number] [place] [facility] [address] [address] [place] [facility] [address]    Comments: vitals done in lab    5 minutes for nursing intake (face to face time)   Arcadio Gonsalves CMA        "

## 2017-02-14 NOTE — NURSING NOTE
Chief Complaint   Patient presents with     Port Draw     Labs drawn from Left Chest Port-a-Cath and line flushed with Saline and Heparin.      Kerri Rivera RN

## 2017-02-14 NOTE — PROGRESS NOTES
Infusion Nursing Note:  Darek Ramon presents for C2D8 Erbitux  Met with CLARY Cardenas before infusion.    Note: N/A.    Treatment Conditions:  Lab Results   Component Value Date    HGB 9.7 02/14/2017     Lab Results   Component Value Date    WBC 3.4 02/14/2017      Lab Results   Component Value Date    ANEU 2.7 02/14/2017     Lab Results   Component Value Date     02/14/2017      Lab Results   Component Value Date     02/14/2017                   Lab Results   Component Value Date    POTASSIUM 3.6 02/14/2017           Lab Results   Component Value Date    MAG 2.0 02/14/2017            Lab Results   Component Value Date    CR 0.55 02/14/2017                   Lab Results   Component Value Date    COLE 8.8 02/14/2017                Lab Results   Component Value Date    BILITOTAL 0.7 02/14/2017           Lab Results   Component Value Date    ALBUMIN 3.3 02/14/2017                    Lab Results   Component Value Date    ALT 27 02/14/2017           Lab Results   Component Value Date    AST 18 02/14/2017     Results reviewed, labs MET treatment parameters, ok to proceed with treatment.    Intravenous Access:  Implanted Port.    Post Infusion Assessment:  Patient tolerated infusion without incident.  Patient observed for 30 minutes post Erbitux per protocol.  Blood return noted pre and post infusion.  No evidence of extravasations.  Access discontinued per protocol.    Discharge Plan:   Prescription refills given for ativan, oxycodone, oxycontin, and emla cream.  Discharge instructions reviewed with: Patient.  Patient and/or family verbalized understanding of discharge instructions and all questions answered.  AVS to patient via [employer].  Patient will return 2/21 for next appointment.   Patient discharged in stable condition accompanied by: self, brother and sister.    Eileen Langford RN    zometa stopped at 11:50

## 2017-02-14 NOTE — MR AVS SNAPSHOT
After Visit Summary   2/14/2017    Darek Navarro    MRN: [id number]           Patient Information     Date Of Birth          1990        Visit Information        Provider Department      2/14/2017 8:40 AM Opal Cannon PA-C [facility]        Today's Diagnoses     Malignant neoplasm of tongue (H)    -  1    Oral cancer (H)        Head and neck cancer (H)        Bone metastasis (H)        Rash           Follow-ups after your visit        Your next 10 appointments already scheduled     Feb 14, 2017 10:00 AM CST   Infusion 120 with  ONCOLOGY INFUSION, UC 28 ATC   [facility] ([facility])    [address]  2nd Floor  [place] [address] [phone]            Feb 14, 2017  1:30 PM CST   EXTERNAL RADIATION TREATMENT with [facility] RAD ONC VARIAN   Radiation Oncology Clinic ([facility])    [facility]  1st Floor  [address] [address] [phone]            Feb 14, 2017  3:30 PM CST   (Arrive by 3:15 PM)   RETURN WITH ROOM with Yanique Wiggins GC,  2 114 CONSULT [facility] ([facility])    [address]  2nd Floor  [place] [id number] [phone]            Feb 14, 2017  4:30 PM CST   [facility] Draw with [facility] DRAW   [facility] ([facility])    [address]  2nd Floor  [place] [address] [phone]            Feb 15, 2017  1:30 PM CST   EXTERNAL RADIATION TREATMENT with [facility] RAD ONC VARIAN   Radiation Oncology Clinic ([facility])    [facility]  1st Floor  [address] [address]   [phone]            Feb 15, 2017  1:45 PM CST   ON TREATMENT VISIT with Jodee Pagan MD   Radiation Oncology Clinic ([facility])    [facility]  1st  Floor  [address] [address]   [phone]            Feb 16, 2017  1:30 PM CST   EXTERNAL RADIATION TREATMENT with [facility] RAD ONC VARIAN   Radiation Oncology Clinic ([facility])    [facility]  1st Floor  [address] [address]   [phone]            Feb 17, 2017  1:30 PM CST   EXTERNAL RADIATION TREATMENT with [facility] RAD ONC VARIAN   Radiation Oncology Clinic ([facility])    [facility]  1st Floor  [address] [facility] [address]   [phone]              Who to contact     If you have questions or need follow up information about today's clinic visit or your schedule please contact [facility] directly at [phone].  Normal or non-critical lab and imaging results will be communicated to you by MyChart, letter or phone within 4 business days after the clinic has received the results. If you do not hear from us within 7 days, please contact the clinic through MyChart or phone. If you have a critical or abnormal lab result, we will notify you by phone as soon as possible.  Submit refill requests through [employer] or call your pharmacy and they will forward the refill request to us. Please allow 3 business days for your refill to be completed.          Additional Information About Your Visit        [employer] Information     [employer] gives you secure access to your electronic health record. If you see a primary care provider, you can also send messages to your care team and make appointments. If you have questions, please call your primary care clinic.  If you do not have a primary care provider, please call [phone] and they will assist you.        Care EveryWhere ID     This is your Care EveryWhere ID. This could be used by other organizations to access your [place] medical records  [id number]        Your Vitals Were     Pulse Temperature Respirations Pulse Oximetry  BMI (Body Mass Index)       93 98.3  F (36.8  C) (Oral) 16 100% 17.29 kg/m2        Blood Pressure from Last 3 Encounters:   02/14/17 130/81   02/08/17 128/71   02/07/17 127/79    Weight from Last 3 Encounters:   02/14/17 61.1 kg (134 lb 11.2 oz)   02/08/17 64.4 kg (142 lb)   02/07/17 64.3 kg (141 lb 11.2 oz)              Today, you had the following     No orders found for display         Today's Medication Changes          These changes are accurate as of: 2/14/17  9:46 AM.  If you have any questions, ask your nurse or doctor.               Start taking these medicines.        Dose/Directions    clindamycin 1 % lotion   Commonly known as:  CLEOCIN T   Used for:  Rash   Started by:  Opal Cannon PA-C        Apply topically 2 times daily   Quantity:  60 mL   Refills:  3         These medicines have changed or have updated prescriptions.        Dose/Directions    mineral oil-hydrophilic petrolatum   This may have changed:    - when to take this  - reasons to take this   Used for:  Malignant neoplasm of tongue (H)        Apply topically every 8 hours   Quantity:  50 g   Refills:  0       * oxyCODONE 5 MG/5ML solution   Commonly known as:  ROXICODONE   This may have changed:  Another medication with the same name was changed. Make sure you understand how and when to take each.   Used for:  Malignant neoplasm of tongue (H), Head and neck cancer (H), Oral cancer (H)   Changed by:  Opal Cannon PA-C        Dose:  10 mg   10 mLs (10 mg) by Oral or PEG tube route every 4 hours as needed for moderate to severe pain   Quantity:  1000 mL   Refills:  0       * oxyCODONE 20 MG 12 hr tablet   Commonly known as:  OxyCONTIN   This may have changed:    - medication strength  - how much to take   Used for:  Bone metastasis (H)   Changed by:  Opal Cannon PA-C        Dose:  20 mg   Take 1 tablet (20 mg) by mouth every 12 hours maximum 2 tablet(s) per day   Quantity:  60 tablet   Refills:  0       pantoprazole 20  MG EC tablet   Commonly known as:  PROTONIX   This may have changed:    - when to take this  - reasons to take this   Used for:  Gastroesophageal reflux disease without esophagitis        Dose:  20 mg   Take 1 tablet (20 mg) by mouth daily   Quantity:  30 tablet   Refills:  3       * Notice:  This list has 2 medication(s) that are the same as other medications prescribed for you. Read the directions carefully, and ask your doctor or other care provider to review them with you.      Stop taking these medicines if you haven't already. Please contact your care team if you have questions.     [facility] MOUTHWASH Susp   Stopped by:  Opal Cannon PA-C                Where to get your medicines      These medications were sent to [facility] [address] [facility] [address]  [address] [place] [address] [address] [place] [id number]    Hours:  TRANSPLANT PHONE NUMBER [phone] Phone:  [phone]     clindamycin 1 % lotion    lidocaine 2 % solution         Some of these will need a paper prescription and others can be bought over the counter.  Ask your nurse if you have questions.     Bring a paper prescription for each of these medications     oxyCODONE 20 MG 12 hr tablet    oxyCODONE 5 MG/5ML solution                Primary Care Provider Office Phone # Fax #    Mary Jane Nicollet [facility] [phone] [address]  [facility] [address]        Thank you!     Thank you for choosing [facility]  for your care. Our goal is always to provide you with excellent care. Hearing back from our patients is one way we can continue to improve our services. Please take a few minutes to complete the written survey that you may receive in the mail after your visit with us. Thank you!             Your Updated Medication List - Protect others around you: Learn how to safely use, store and throw away your medicines at  www.disposemymeds.org.          This list is accurate as of: 2/14/17  9:46 AM.  Always use your most recent med list.                   Brand Name Dispense Instructions for use    citalopram 10 MG/5ML solution    celeXA    300 mL    Take 10 mLs (20 mg) by mouth daily       clindamycin 1 % lotion    CLEOCIN T    60 mL    Apply topically 2 times daily       ibuprofen 200 MG tablet    ADVIL/MOTRIN     Take 200 mg by mouth daily as needed       levothyroxine 25 MCG tablet    SYNTHROID/LEVOTHROID    90 tablet    Take 1 tablet (25 mcg) by mouth daily       lidocaine 2 % solution    XYLOCAINE    1000 mL    15 mL swish and spit q3h PRN, max 8 doses in 24 hours       lidocaine-prilocaine cream    EMLA    30 g    Apply to PORT site 45-60 minutes prior to procedure.  Cover with non-absorbent dressing.       * LORazepam 2 MG/ML (HIGH CONC) solution    ATIVAN    30 mL    Take 0.25-0.5 mLs (0.5-1 mg) by mouth every 4 hours as needed for anxiety, sleep, nausea or vomiting       * LORazepam 0.5 MG tablet    ATIVAN    30 tablet    Take 1 tablet (0.5 mg) by mouth every 4 hours as needed (Anxiety, Nausea/Vomiting or Sleep)       methylphenidate 5 MG tablet    RITALIN    60 tablet    Take 1 tablet (5 mg) by mouth 2 times daily       mineral oil-hydrophilic petrolatum     50 g    Apply topically every 8 hours       multivitamins with minerals Liqd liquid     1 Bottle    15 mLs by Per Feeding Tube route daily       ondansetron 4 MG tablet    ZOFRAN     Take 4 mg by mouth daily as needed       * oxyCODONE 5 MG/5ML solution    ROXICODONE    1000 mL    10 mLs (10 mg) by Oral or PEG tube route every 4 hours as needed for moderate to severe pain       * oxyCODONE 20 MG 12 hr tablet    OxyCONTIN    60 tablet    Take 1 tablet (20 mg) by mouth every 12 hours maximum 2 tablet(s) per day       pantoprazole 20 MG EC tablet    PROTONIX    30 tablet    Take 1 tablet (20 mg) by mouth daily       prochlorperazine 10 MG tablet    COMPAZINE    30 tablet     Take 1 tablet (10 mg) by mouth every 6 hours as needed (Nausea/Vomiting)       * Notice:  This list has 4 medication(s) that are the same as other medications prescribed for you. Read the directions carefully, and ask your doctor or other care provider to review them with you.

## 2017-02-14 NOTE — MR AVS SNAPSHOT
After Visit Summary   2/14/2017    Darek Navarro    MRN: [id number]           Patient Information     Date Of Birth          1990        Visit Information        Provider Department      2/14/2017 10:00 AM  28 ATC;  ONCOLOGY INFUSION [facility]        Today's Diagnoses     Head and neck cancer (H)    -  1    Drug-induced neutropenia (H)        Bone metastasis (H)        Malignant neoplasm of tongue (H)          Care Instructions    Increase OxyContin to 20 mg twice/day.  Start clindamycin twice/day to face.   Increase salt/soda swishes to 4 times per day.   Get back in to see Lana for swallowing.     Contact numbers:  Triage Main Line: [phone]  After hours: [phone]    Call with chills and/or temperature greater than or equal to 100.5 and questions or concerns.    If after hours, weekends, or holidays, call main hospital  at  [phone] and ask for Oncology doctor on call.           February 2017 Sunday Monday Tuesday Wednesday Thursday Friday Saturday                  1     [facility] NEW TUMOR    8:00 AM   (15 min.)   Dayday Parker MD   [facility] Orthopaedic [facility] 2     3     4       5     6     7     [facility]    9[facility]00 AM   (15 min.)   [facility] LAB DRAW   [facility] Lab Draw     P RETURN    9:15 AM   (30 min.)   Keira Hernandez MD   [facility] ONC INFUSION 240   10:00 AM   (240 min.)    ONCOLOGY INFUSION   [facility] Cancer [facility] 8     P CONSULT    9:30 AM   (90 min.)   Jodee Pagan MD   Radiation Oncology Clinic     [facility] TCT/SIM SUITE   10:00 AM   (60 min.)   Jodee Pagan MD   [facility] Oncology Clinic 9     [facility] TREATMENT PLAN VISIT    7:00 AM   (15 min.)   Jodee Pagan MD   [facility] Oncology Clinic 10     11       12     13     [facility] EXTERNAL RADIATION TREATMT   11:30 AM   (15 min.)   [facility] RAD ONC VARIAN   Radiation Oncology Clinic 14     [facility] MASONIC LAB  DRAW    8:00 AM   (15 min.)   [facility] DRAW   [facility] Lab Draw     [facility] RETURN    8:25 AM   (50 min.)   Opal Cannon PA-C   [facility]     [facility] ONC INFUSION 120   10:00 AM   (120 min.)   [facility] ONCOLOGY INFUSION   [facility]     [facility] EXTERNAL RADIATION TREATMT    1:30 PM   (15 min.)   [facility] RAD ONC [facility]   Radiation Oncology Clinic     [facility] RETURN WITH ROOM    3:15 PM   (60 min.)   Yanique Wiggins GC   [facility]     [facility] LAB DRAW    4:30 PM   (15 min.)   [facility] LAB DRAW   [facility] Lab Draw 15     [facility] EXTERNAL RADIATION TREATMT    1:30 PM   (15 min.)   [facility] RAD ONC [facility]   Radiation Oncology Clinic     [facility] ON TREATMENT VISIT    1:45 PM   (15 min.)   Jodee Pagan MD   [facility] 16     TREATMENT    8:30 AM   (30 min.)   Lana Calvillo SLP   [facility] Rehab     [facility] EXTERNAL RADIATION TREATMT    1:30 PM   (15 min.)   [facility] RAD ONC [facility]   Radiation Oncology Clinic 17     [facility] EXTERNAL RADIATION TREATMT    1:30 PM   (15 min.)   [facility] RAD ONC [facility]   Radiation Oncology Clinic 18       19     20     21     [facility] LAB DRAW   11:30 AM   (15 min.)   [facility] LAB DRAW   [facility] Lab Draw     CT CHEST/ABDOMEN/PELVIS W   11:45 AM   (20 min.)   UCCT2   [facility] Imaging Center CT     [facility] RETURN    1:25 PM   (50 min.)   Opal Cannon PA-C   [facility]     [facility] ONC INFUSION 120    2:30 PM   (120 min.)   [facility] ONCOLOGY INFUSION   [facility] 22     23     24     25       26     27     28     [facility] LAB DRAW    7:00 AM   (15 min.)   [facility] LAB DRAW   [facility] Lab Draw     [facility] RETURN    7:15 AM   (30 min.)   Keira Hernandez MD   [facility]     [facility] ONC INFUSION 240    8:00 AM   (240 min.)   [facility] ONCOLOGY INFUSION   [facility]                                March 2017 Sunday Monday Tuesday Wednesday Thursday Friday  Saturday                  1     2     3     4       5     6     7     8     9     10     11       12     13     14     15     16     17     18       19     20     21     22     23     24     25       26     27     28     29     30     31                       Lab Results:  Recent Results (from the past 12 hour(s))   CBC with platelets differential    Collection Time: 02/14/17  8:17 AM   Result Value Ref Range    WBC 3.4 (L) 4.0 - 11.0 10e9/L    RBC Count 3.89 (L) 4.4 - 5.9 10e12/L    Hemoglobin 9.7 (L) 13.3 - 17.7 g/dL    Hematocrit 30.4 (L) 40.0 - 53.0 %    MCV 78 78 - 100 fl    MCH 24.9 (L) 26.5 - 33.0 pg    MCHC 31.9 31.5 - 36.5 g/dL    RDW 18.4 (H) 10.0 - 15.0 %    Platelet Count 299 150 - 450 10e9/L    Diff Method Automated Method     % Neutrophils 79.9 %    % Lymphocytes 10.6 %    % Monocytes 6.2 %    % Eosinophils 1.8 %    % Basophils 0.9 %    % Immature Granulocytes 0.6 %    Nucleated RBCs 0 0 /100    Absolute Neutrophil 2.7 1.6 - 8.3 10e9/L    Absolute Lymphocytes 0.4 (L) 0.8 - 5.3 10e9/L    Absolute Monocytes 0.2 0.0 - 1.3 10e9/L    Absolute Eosinophils 0.1 0.0 - 0.7 10e9/L    Absolute Basophils 0.0 0.0 - 0.2 10e9/L    Abs Immature Granulocytes 0.0 0 - 0.4 10e9/L    Absolute Nucleated RBC 0.0    Comprehensive metabolic panel    Collection Time: 02/14/17  8:17 AM   Result Value Ref Range    Sodium 135 133 - 144 mmol/L    Potassium 3.6 3.4 - 5.3 mmol/L    Chloride 99 94 - 109 mmol/L    Carbon Dioxide 27 20 - 32 mmol/L    Anion Gap 8 3 - 14 mmol/L    Glucose 114 (H) 70 - 99 mg/dL    Urea Nitrogen 12 7 - 30 mg/dL    Creatinine 0.55 (L) 0.66 - 1.25 mg/dL    GFR Estimate >90  Non  GFR Calc   >60 mL/min/1.7m2    GFR Estimate If Black >90   GFR Calc   >60 mL/min/1.7m2    Calcium 8.8 8.5 - 10.1 mg/dL    Bilirubin Total 0.7 0.2 - 1.3 mg/dL    Albumin 3.3 (L) 3.4 - 5.0 g/dL    Protein Total 7.7 6.8 - 8.8 g/dL    Alkaline Phosphatase 76 40 - 150 U/L    ALT 27 0 - 70 U/L    AST 18 0 - 45  U/L   Magnesium    Collection Time: 02/14/17  8:17 AM   Result Value Ref Range    Magnesium 2.0 1.6 - 2.3 mg/dL             Follow-ups after your visit        Your next 10 appointments already scheduled     Feb 14, 2017  1:30 PM CST   EXTERNAL RADIATION TREATMENT with [facility] RAD ONC VARIAN   Radiation Oncology Clinic ([facility])    [facility]  1st Floor  [address] [address]   [phone]            Feb 14, 2017  3:30 PM CST   (Arrive by 3:15 PM)   RETURN WITH ROOM with Yanique Wiggins GC,  2 114 [facility]    [address]  2nd Floor  [place] [address]   [phone]            Feb 14, 2017  4:30 PM CST   [facility] Lab Draw with [facility] LAB DRAW   [facility]    [address]  2nd Floor  [place] [facility] [place] [address]   [phone]            Feb 15, 2017  1:30 PM CST   EXTERNAL RADIATION TREATMENT with [facility] RAD ONC VARIAN   Radiation Oncology Clinic ([facility])    [facility]  1st Floor  [address] [address]   [phone]            Feb 15, 2017  1:45 PM CST   ON TREATMENT VISIT with Jodee Pagan MD   Radiation Oncology Clinic ([facility])    [facility]  1st Floor  [address] [address]   [phone]            Feb 16, 2017  8:30 AM CST   Treatment with QUINTIN Fortune   [facility] ([facility])    [address]  4th Floor  [facility] [facility] [address]   [phone]            Feb 16, 2017  1:30 PM CST   EXTERNAL RADIATION TREATMENT with [facility] RAD ONC VARIAN   Radiation Oncology Clinic ([facility])    [facility]  1st Floor  [address] [address]   [phone]            Feb 17, 2017  1:30 PM CST   EXTERNAL  RADIATION TREATMENT with [facility] RAD ONC VARIAN   Radiation Oncology Clinic ([facility])    [facility]  1st Floor  [address]  [place] [id number] [phone]            Feb 21, 2017 11:30 AM [facility]   [facility] Lab Draw with  [facility] LAB DRAW   [facility] Lab Draw ([facility])    [address]  2nd Floor  [place] [id number] [phone]              Who to contact     If you have questions or need follow up information about today's clinic visit or your schedule please contact [facility] CANCER CLINIC directly at [phone].  Normal or non-critical lab and imaging results will be communicated to you by PureSignCohart, letter or phone within 4 business days after the clinic has received the results. If you do not hear from us within 7 days, please contact the clinic through Capptaint or phone. If you have a critical or abnormal lab result, we will notify you by phone as soon as possible.  Submit refill requests through voxapp or call your pharmacy and they will forward the refill request to us. Please allow 3 business days for your refill to be completed.          Additional Information About Your Visit        PureSignCoharIroFit Information     voxapp gives you secure access to your electronic health record. If you see a primary care provider, you can also send messages to your care team and make appointments. If you have questions, please call your primary care clinic.  If you do not have a primary care provider, please call [phone] and they will assist you.        Care EveryWhere ID     This is your Care EveryWhere ID. This could be used by other organizations to access your [place] medical records  [id number]         Blood Pressure from Last 3 Encounters:   02/14/17 130/81   02/08/17 128/71   02/07/17 127/79    Weight from Last 3 Encounters:   02/14/17 61.1 kg (134 lb 11.2 oz)   02/08/17 64.4 kg (142 lb)   02/07/17 64.3 kg (141  lb 11.2 oz)              We Performed the Following     CBC with platelets differential     Comprehensive metabolic panel     Magnesium          Today's Medication Changes          These changes are accurate as of: 2/14/17 12:30 PM.  If you have any questions, ask your nurse or doctor.               Start taking these medicines.        Dose/Directions    clindamycin 1 % lotion   Commonly known as:  CLEOCIN T   Used for:  Rash   Started by:  Opal Cannon PA-C        Apply topically 2 times daily   Quantity:  60 mL   Refills:  3         These medicines have changed or have updated prescriptions.        Dose/Directions    mineral oil-hydrophilic petrolatum   This may have changed:    - when to take this  - reasons to take this   Used for:  Malignant neoplasm of tongue (H)        Apply topically every 8 hours   Quantity:  50 g   Refills:  0       * oxyCODONE 5 MG/5ML solution   Commonly known as:  ROXICODONE   This may have changed:  Another medication with the same name was changed. Make sure you understand how and when to take each.   Used for:  Malignant neoplasm of tongue (H), Head and neck cancer (H), Oral cancer (H)   Changed by:  Opal Cannon PA-C        Dose:  10 mg   10 mLs (10 mg) by Oral or PEG tube route every 4 hours as needed for moderate to severe pain   Quantity:  1000 mL   Refills:  0       * oxyCODONE 20 MG 12 hr tablet   Commonly known as:  OxyCONTIN   This may have changed:    - medication strength  - how much to take   Used for:  Bone metastasis (H)   Changed by:  Opal Cannon PA-C        Dose:  20 mg   Take 1 tablet (20 mg) by mouth every 12 hours maximum 2 tablet(s) per day   Quantity:  60 tablet   Refills:  0       pantoprazole 20 MG EC tablet   Commonly known as:  PROTONIX   This may have changed:    - when to take this  - reasons to take this   Used for:  Gastroesophageal reflux disease without esophagitis        Dose:  20 mg   Take 1 tablet (20 mg) by mouth daily    Quantity:  30 tablet   Refills:  3       * Notice:  This list has 2 medication(s) that are the same as other medications prescribed for you. Read the directions carefully, and ask your doctor or other care provider to review them with you.      Stop taking these medicines if you haven't already. Please contact your care team if you have questions.     FIRST-FERNANDEZ MOUTHWASH Susp   Stopped by:  Opal Cannon PA-C                Where to get your medicines      These medications were sent to [place], [place] - [address]  [address], [facility] [address]    Hours:  TRANSPLANT PHONE NUMBER [phone] Phone:  [phone]     clindamycin 1 % lotion    lidocaine 2 % solution         Some of these will need a paper prescription and others can be bought over the counter.  Ask your nurse if you have questions.     Bring a paper prescription for each of these medications     oxyCODONE 20 MG 12 hr tablet    oxyCODONE 5 MG/5ML solution                Primary Care Provider Office Phone # Fax #    Mary Jane Nicollet [facility] [phone] [address]  [facility] [address]        Thank you!     Thank you for choosing [facility] CANCER CLINIC  for your care. Our goal is always to provide you with excellent care. Hearing back from our patients is one way we can continue to improve our services. Please take a few minutes to complete the written survey that you may receive in the mail after your visit with us. Thank you!             Your Updated Medication List - Protect others around you: Learn how to safely use, store and throw away your medicines at www.disposemymeds.org.          This list is accurate as of: 2/14/17 12:30 PM.  Always use your most recent med list.                   Brand Name Dispense Instructions for use    citalopram 10 MG/5ML solution    celeXA    300 mL    Take 10 mLs (20 mg) by  mouth daily       clindamycin 1 % lotion    CLEOCIN T    60 mL    Apply topically 2 times daily       ibuprofen 200 MG tablet    ADVIL/MOTRIN     Take 200 mg by mouth daily as needed       levothyroxine 25 MCG tablet    SYNTHROID/LEVOTHROID    90 tablet    Take 1 tablet (25 mcg) by mouth daily       lidocaine 2 % solution    XYLOCAINE    1000 mL    15 mL swish and spit q3h PRN, max 8 doses in 24 hours       lidocaine-prilocaine cream    EMLA    30 g    Apply to PORT site 45-60 minutes prior to procedure.  Cover with non-absorbent dressing.       * LORazepam 2 MG/ML (HIGH CONC) solution    ATIVAN    30 mL    Take 0.25-0.5 mLs (0.5-1 mg) by mouth every 4 hours as needed for anxiety, sleep, nausea or vomiting       * LORazepam 0.5 MG tablet    ATIVAN    30 tablet    Take 1 tablet (0.5 mg) by mouth every 4 hours as needed (Anxiety, Nausea/Vomiting or Sleep)       methylphenidate 5 MG tablet    RITALIN    60 tablet    Take 1 tablet (5 mg) by mouth 2 times daily       mineral oil-hydrophilic petrolatum     50 g    Apply topically every 8 hours       multivitamins with minerals Liqd liquid     1 Bottle    15 mLs by Per Feeding Tube route daily       ondansetron 4 MG tablet    ZOFRAN     Take 4 mg by mouth daily as needed       * oxyCODONE 5 MG/5ML solution    ROXICODONE    1000 mL    10 mLs (10 mg) by Oral or PEG tube route every 4 hours as needed for moderate to severe pain       * oxyCODONE 20 MG 12 hr tablet    OxyCONTIN    60 tablet    Take 1 tablet (20 mg) by mouth every 12 hours maximum 2 tablet(s) per day       pantoprazole 20 MG EC tablet    PROTONIX    30 tablet    Take 1 tablet (20 mg) by mouth daily       prochlorperazine 10 MG tablet    COMPAZINE    30 tablet    Take 1 tablet (10 mg) by mouth every 6 hours as needed (Nausea/Vomiting)       * Notice:  This list has 4 medication(s) that are the same as other medications prescribed for you. Read the directions carefully, and ask your doctor or other care provider  to review them with you.

## 2017-02-14 NOTE — LETTER
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 Pathologic staging: [id number]      TREATMENT:  1- status post an April 28, 2016 transmandibular subtotal oral glossectomy, right base of tongue resection with partial pharyngectomy, bilateral neck dissections, and free flap reconstruction. Concurrent ChemoRt with high dose Cisplatin 6/6/16. day 22 cisplatin on 6/27/16. He completed day 43 on 7/19/16. Radiation completed, 6600cGy on 7/21/16.    2- Palliative intent treatment with Pembrolizumab 12/6/2016  3- Due to rapid progression of disease the treatment was switched to Carboplatin, 5-FU and Cetuximab on 1/18/17    INTERVAL HISTORY:   Darek presents for follow-up prior to cycle 2, accompanied by sister and brother.    Patient started radiation to two spots in his spine yesterday, which is going okay. He reports taking 10 mg oxycodone every 4 hours, including overnight, in addition to the OxyContin at 10 mg bid for his back pain. He has significant nausea and vomiting the first week following the start of each cycle of chemotherapy. He feels that the Zofran and Compazine do not help, but he does get relief from lorazepam 0.5 mg every 6 hours. He is applying lotion to the rash on his face. He denies a rash anywhere else. He has mouth sores and is doing salt/soda swishes bid. He ran out of lidocaine, but finds this helpful for the mouth pain. He has not been eating regular foods since starting on the chemotherapy, primarily due to pain. He has moderate trismus and is occasionally doing exercises for that. He has not seen speech therapy for a while, but is open to meeting with them again. He gets in 4-5 Ensure or Boost/day, but did much  less this past week with the nausea. He feels his energy is okay. He denies any skin changes on his hands or feet. He denies other concerns.     Current Outpatient Prescriptions   Medication Sig Dispense Refill     oxyCODONE (ROXICODONE) 5 MG/5ML solution 10 mLs (10 mg) by Oral or PEG tube route every 4 hours as needed for moderate to severe pain 1000 mL 0     oxyCODONE (OXYCONTIN) 20 MG 12 hr tablet Take 1 tablet (20 mg) by mouth every 12 hours maximum 2 tablet(s) per day 60 tablet 0     clindamycin (CLEOCIN T) 1 % lotion Apply topically 2 times daily 60 mL 3     lidocaine (XYLOCAINE) 2 % solution 15 mL swish and spit q3h PRN, max 8 doses in 24 hours 1000 mL 3     LORazepam (ATIVAN) 0.5 MG tablet Take 1 tablet (0.5 mg) by mouth every 4 hours as needed (Anxiety, Nausea/Vomiting or Sleep) 30 tablet 5     methylphenidate (RITALIN) 5 MG tablet Take 1 tablet (5 mg) by mouth 2 times daily 60 tablet 0     prochlorperazine (COMPAZINE) 10 MG tablet Take 1 tablet (10 mg) by mouth every 6 hours as needed (Nausea/Vomiting) 30 tablet 5     pantoprazole (PROTONIX) 20 MG EC tablet Take 1 tablet (20 mg) by mouth daily (Patient taking differently: Take 20 mg by mouth daily as needed ) 30 tablet 3     levothyroxine (SYNTHROID/LEVOTHROID) 25 MCG tablet Take 1 tablet (25 mcg) by mouth daily 90 tablet 3     ibuprofen (ADVIL/MOTRIN) 200 MG tablet Take 200 mg by mouth daily as needed        ondansetron (ZOFRAN) 4 MG tablet Take 4 mg by mouth daily as needed        LORazepam (ATIVAN) 2 MG/ML (HIGH CONC) solution Take 0.25-0.5 mLs (0.5-1 mg) by mouth every 4 hours as needed for anxiety, sleep, nausea or vomiting 30 mL 3     citalopram (CELEXA) 10 MG/5ML solution Take 10 mLs (20 mg) by mouth daily 300 mL 3     lidocaine-prilocaine (EMLA) cream Apply to PORT site 45-60 minutes prior to procedure.  Cover with non-absorbent dressing. 30 g 5     multivitamins with minerals (CERTAVITE) LIQD 15 mLs by Per Feeding Tube route daily 1 Bottle 1      mineral oil-hydrophilic petrolatum (AQUAPHOR) ointment Apply topically every 8 hours (Patient taking differently: Apply topically every 8 hours as needed ) 50 g 0        No Known Allergies    PHYSICAL EXAMINATION  /81  Pulse 93  Temp 98.3  F (36.8  C) (Oral)  Resp 16  Wt 61.1 kg (134 lb 11.2 oz)  SpO2 100%  BMI 17.29 kg/m2   Wt Readings from Last 10 Encounters:   02/14/17 61.1 kg (134 lb 11.2 oz)   02/08/17 64.4 kg (142 lb)   02/07/17 64.3 kg (141 lb 11.2 oz)   02/01/17 64.5 kg (142 lb 4.8 oz)   01/31/17 64.4 kg (142 lb)   01/24/17 66.6 kg (146 lb 14.4 oz)   01/17/17 68.2 kg (150 lb 6.4 oz)   12/27/16 70.6 kg (155 lb 9.6 oz)   12/14/16 74.4 kg (164 lb)   12/09/16 74.4 kg (164 lb)     Constitutional: Alert, oriented male in no visible distress.  Eyes: PERRL. Anicteric sclerae. Mouth mucosa is moist with moderate mucositis. Moderate trismus noted.   ENT/Mouth: OM moist and pink. Neck with surgical resection changes and limited ROM.  CV: RRR, no murmurs appreciated.  Resp: CTAB throughout  Abdomen: Soft, non-tender, non-distended. Bowel sounds present. No masses appreciated. No organomegaly noted.  Extremities: No lower extremity edema appreciated.  Skin: Warm, dry. No bruising or petechiae noted. Mild acneiform rash on nose  Lymph: No cervical or supraclavicular lymphadenopathy appreciated.   Neuro: CN II-XII grossly intact.    LABS:   2/14/2017 08:17   Sodium 135   Potassium 3.6   Chloride 99   Carbon Dioxide 27   Urea Nitrogen 12   Creatinine 0.55 (L)   GFR Estimate >90...   GFR Estimate If Black >90...   Calcium 8.8   Anion Gap 8   Magnesium 2.0   Albumin 3.3 (L)   Protein Total 7.7   Bilirubin Total 0.7   Alkaline Phosphatase 76   ALT 27   AST 18   Glucose 114 (H)   WBC 3.4 (L)   Hemoglobin 9.7 (L)   Hematocrit 30.4 (L)   Platelet Count 299   RBC Count 3.89 (L)   MCV 78   MCH 24.9 (L)   MCHC 31.9   RDW 18.4 (H)   Diff Method Automated Method   % Neutrophils 79.9   % Lymphocytes 10.6   % Monocytes 6.2   %  Eosinophils 1.8   % Basophils 0.9   % Immature Granulocytes 0.6   Nucleated RBCs 0   Absolute Neutrophil 2.7   Absolute Lymphocytes 0.4 (L)   Absolute Monocytes 0.2   Absolute Eosinophils 0.1   Absolute Basophils 0.0   Abs Immature Granulocytes 0.0   Absolute Nucleated RBC 0.0     IMPRESSION/PLAN:  Darek Navarro is a 27-year-old male with [id number] squamous cell carcinoma of the right oral tongue initially treated with resection and concurrent chemoradiation, completed in July 2016, with recurrent disease identified in October 2016. He had rapid progression identified on repeat imaging 1/16/2017 while on Keytruda.    1. Metastatic SCC: Started Carbo/5-FU/cetuximab on 1/18/2017. Here today for cycle 2 day 8 cetuximab. He has had difficulty with nausea, vomiting, mucositis, and Erbitux induced rash on his face. He will continue with cycle 2 day 8 today. He will have a CT CAP next week and I will see him back prior to cycle 2 day 15. He will see Dr. Hernandez prior to consideration of cycle 3. He will call sooner for concerns.     2. Bone mets: Vertebral fracture identifed on restaging. Spine surgery and radiation oncology consulted. Recommended radiation, which he started on 2/13/17.  -on monthly Zometa, last given 1/18, will give dose today.    3. Cancer-related back pain:   -Will increase OxyContin to 20 mg bid and continue oxycodone prn, currently 10 mg every 4 hours.     4. FEN: Progressive weight loss over the last month.   -Recommend consistently getting in Boost/Ensure Plus. He met with a dietician on 1/18/17.   -Will get him in to see Lana Calvillo, SLP, again to reevaluate swallowing, as he has stopped eating regular foods and has moderate trismus.  -May need to consider starting Marinol if weight continues to decline.     5. Fatigue: Continue the Ritalin 5 mg BID PRN.    6. Mucositis: Recommend viscous lidocaine and salt/soda swishes, but increase to qid.    7. Mild acneiform rash and xeroderma: likely 2/2  Erbitux. Continue emollients. Start clindamycin lotion bid to face.     Opal Cannon PA-C  [facility]  [address], [place] [id number] [phone]

## 2017-02-14 NOTE — PATIENT INSTRUCTIONS
Increase OxyContin to 20 mg twice/day.  Start clindamycin twice/day to face.   Increase salt/soda swishes to 4 times per day.   Get back in to see Lana for swallowing.     Contact numbers:  Triage Main Line: [phone]  After hours: [phone]    Call with chills and/or temperature greater than or equal to 100.5 and questions or concerns.    If after hours, weekends, or holidays, call main hospital  at  [phone] and ask for Oncology doctor on call.           February 2017 Sunday Monday Tuesday Wednesday Thursday Friday Saturday                  1     [facility] NEW TUMOR    8:00 AM   (15 min.)   Dayday Parker MD   [facility] Orthopaedic [facility] 2     3     4       5     6     7     [facility] LAB DRAW    9:00 AM   (15 min.)    [facility] LAB DRAW   [facility] Lab Draw     P RETURN    9:15 AM   (30 min.)   Keira Hernandez MD   [facility] ONC INFUSION 240   10:00 AM   (240 min.)    ONCOLOGY INFUSION   [facility] 8     [facility] CONSULT    9:30 AM   (90 min.)   Jodee Pagan MD   Radiation Oncology Clinic     [facility] TCT/SIM SUITE   10:00 AM   (60 min.)   Jodee Pagan MD   Radiation Oncology Clinic 9     [facility] TREATMENT PLAN VISIT    7:00 AM   (15 min.)   Jodee Pagan MD   Radiation Oncology Clinic 10     11       12     13     [facility] EXTERNAL RADIATION TREATMT   11:30 AM   (15 min.)   [facility] RAD ONC [facility]   Radiation Oncology Clinic 14     [facility] LAB DRAW    8:00 AM   (15 min.)    [facility] LAB DRAW   [facility] Lab Draw     P RETURN    8:25 AM   (50 min.)   Opal Cannon PA-C   [facility] ONC INFUSION 120   10:00 AM   (120 min.)    ONCOLOGY INFUSION   [facility] EXTERNAL RADIATION TREATMT    1:30 PM   (15 min.)   [facility] RAD ONC [facility]   [facility]    3:15 PM   (60 min.)   Yanique Wiggins GC   [facility]  [facility] LAB DRAW    4:30 PM   (15 min.)    [facility] LAB DRAW   [facility] Lab Draw 15     [facility] EXTERNAL RADIATION TREATMT    1:30 PM   (15 min.)   [facility] RAD ONC [facility]   Radiation Oncology Clinic     [facility] ON TREATMENT VISIT    1:45 PM   (15 min.)   Jodee Pagan MD   Radiation Oncology Clinic 16     TREATMENT    8:30 AM   (30 min.)   Lana Calvillo SLP   [facility] Rehab     [facility] EXTERNAL RADIATION TREATMT    1:30 PM   (15 min.)   [facility] RAD ONC [facility]   Radiation Oncology Clinic 17     [facility] EXTERNAL RADIATION TREATMT    1:30 PM   (15 min.)   [facility] RAD ONC [facility]   Radiation Oncology Clinic 18       19     20     21     [facility] LAB DRAW   11:30 AM   (15 min.)    [facility] LAB DRAW   [facility] Lab Draw     CT CHEST/ABDOMEN/PELVIS W   11:45 AM   (20 min.)   UCCT2   [facility] CT     [facility] RETURN    1:25 PM   (50 min.)   Opal Cannon PA-C   [facility]    2:30 PM   (120 min.)   [facility] ONCOLOGY INFUSION   [facility] 22     23     24     25       26     27     28     [facility] LAB DRAW    7:00 AM   (15 min.)    [facility] LAB DRAW   [facility] Lab Draw     [facility] RETURN    7:15 AM   (30 min.)   Keira Hernandez MD   [facility]    8:00 AM   (240 min.)   [facility] ONCOLOGY INFUSION   [facility]                                March 2017 Sunday Monday Tuesday Wednesday Thursday Friday Saturday                  1     2     3     4       5     6     7     8     9     10     11       12     13     14     15     16     17     18       19     20     21     22     23     24     25       26     27     28     29     30     31                       Lab Results:  Recent Results (from the past 12 hour(s))   CBC with platelets differential    Collection Time: 02/14/17  8:17 AM   Result Value Ref Range    WBC 3.4 (L) 4.0 - 11.0 10e9/L    RBC Count 3.89 (L) 4.4 - 5.9  10e12/L    Hemoglobin 9.7 (L) 13.3 - 17.7 g/dL    Hematocrit 30.4 (L) 40.0 - 53.0 %    MCV 78 78 - 100 fl    MCH 24.9 (L) 26.5 - 33.0 pg    MCHC 31.9 31.5 - 36.5 g/dL    RDW 18.4 (H) 10.0 - 15.0 %    Platelet Count 299 150 - 450 10e9/L    Diff Method Automated Method     % Neutrophils 79.9 %    % Lymphocytes 10.6 %    % Monocytes 6.2 %    % Eosinophils 1.8 %    % Basophils 0.9 %    % Immature Granulocytes 0.6 %    Nucleated RBCs 0 0 /100    Absolute Neutrophil 2.7 1.6 - 8.3 10e9/L    Absolute Lymphocytes 0.4 (L) 0.8 - 5.3 10e9/L    Absolute Monocytes 0.2 0.0 - 1.3 10e9/L    Absolute Eosinophils 0.1 0.0 - 0.7 10e9/L    Absolute Basophils 0.0 0.0 - 0.2 10e9/L    Abs Immature Granulocytes 0.0 0 - 0.4 10e9/L    Absolute Nucleated RBC 0.0    Comprehensive metabolic panel    Collection Time: 02/14/17  8:17 AM   Result Value Ref Range    Sodium 135 133 - 144 mmol/L    Potassium 3.6 3.4 - 5.3 mmol/L    Chloride 99 94 - 109 mmol/L    Carbon Dioxide 27 20 - 32 mmol/L    Anion Gap 8 3 - 14 mmol/L    Glucose 114 (H) 70 - 99 mg/dL    Urea Nitrogen 12 7 - 30 mg/dL    Creatinine 0.55 (L) 0.66 - 1.25 mg/dL    GFR Estimate >90  Non  GFR Calc   >60 mL/min/1.7m2    GFR Estimate If Black >90   GFR Calc   >60 mL/min/1.7m2    Calcium 8.8 8.5 - 10.1 mg/dL    Bilirubin Total 0.7 0.2 - 1.3 mg/dL    Albumin 3.3 (L) 3.4 - 5.0 g/dL    Protein Total 7.7 6.8 - 8.8 g/dL    Alkaline Phosphatase 76 40 - 150 U/L    ALT 27 0 - 70 U/L    AST 18 0 - 45 U/L   Magnesium    Collection Time: 02/14/17  8:17 AM   Result Value Ref Range    Magnesium 2.0 1.6 - 2.3 mg/dL

## 2017-02-14 NOTE — PROGRESS NOTES
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 Pathologic staging: [id number]      TREATMENT:  1- status post an April 28, 2016 transmandibular subtotal oral glossectomy, right base of tongue resection with partial pharyngectomy, bilateral neck dissections, and free flap reconstruction. Concurrent ChemoRt with high dose Cisplatin 6/6/16. day 22 cisplatin on 6/27/16. He completed day 43 on 7/19/16. Radiation completed, 6600cGy on 7/21/16.    2- Palliative intent treatment with Pembrolizumab 12/6/2016  3- Due to rapid progression of disease the treatment was switched to Carboplatin, 5-FU and Cetuximab on 1/18/17    INTERVAL HISTORY:   Darek presents for follow-up prior to cycle 2, accompanied by sister and brother.    Patient started radiation to two spots in his spine yesterday, which is going okay. He reports taking 10 mg oxycodone every 4 hours, including overnight, in addition to the OxyContin at 10 mg bid for his back pain. He has significant nausea and vomiting the first week following the start of each cycle of chemotherapy. He feels that the Zofran and Compazine do not help, but he does get relief from lorazepam 0.5 mg every 6 hours. He is applying lotion to the rash on his face. He denies a rash anywhere else. He has mouth sores and is doing salt/soda swishes bid. He ran out of lidocaine, but finds this helpful for the mouth pain. He has not been eating regular foods since starting on the chemotherapy, primarily due to pain. He has moderate trismus and is occasionally doing exercises for that. He has not seen speech therapy for a while, but is open to meeting with them again. He gets in 4-5 Ensure or Boost/day, but did much less this past week with the nausea. He feels his energy is okay. He denies any  skin changes on his hands or feet. He denies other concerns.     Current Outpatient Prescriptions   Medication Sig Dispense Refill     oxyCODONE (ROXICODONE) 5 MG/5ML solution 10 mLs (10 mg) by Oral or PEG tube route every 4 hours as needed for moderate to severe pain 1000 mL 0     oxyCODONE (OXYCONTIN) 20 MG 12 hr tablet Take 1 tablet (20 mg) by mouth every 12 hours maximum 2 tablet(s) per day 60 tablet 0     clindamycin (CLEOCIN T) 1 % lotion Apply topically 2 times daily 60 mL 3     lidocaine (XYLOCAINE) 2 % solution 15 mL swish and spit q3h PRN, max 8 doses in 24 hours 1000 mL 3     LORazepam (ATIVAN) 0.5 MG tablet Take 1 tablet (0.5 mg) by mouth every 4 hours as needed (Anxiety, Nausea/Vomiting or Sleep) 30 tablet 5     methylphenidate (RITALIN) 5 MG tablet Take 1 tablet (5 mg) by mouth 2 times daily 60 tablet 0     prochlorperazine (COMPAZINE) 10 MG tablet Take 1 tablet (10 mg) by mouth every 6 hours as needed (Nausea/Vomiting) 30 tablet 5     pantoprazole (PROTONIX) 20 MG EC tablet Take 1 tablet (20 mg) by mouth daily (Patient taking differently: Take 20 mg by mouth daily as needed ) 30 tablet 3     levothyroxine (SYNTHROID/LEVOTHROID) 25 MCG tablet Take 1 tablet (25 mcg) by mouth daily 90 tablet 3     ibuprofen (ADVIL/MOTRIN) 200 MG tablet Take 200 mg by mouth daily as needed        ondansetron (ZOFRAN) 4 MG tablet Take 4 mg by mouth daily as needed        LORazepam (ATIVAN) 2 MG/ML (HIGH CONC) solution Take 0.25-0.5 mLs (0.5-1 mg) by mouth every 4 hours as needed for anxiety, sleep, nausea or vomiting 30 mL 3     citalopram (CELEXA) 10 MG/5ML solution Take 10 mLs (20 mg) by mouth daily 300 mL 3     lidocaine-prilocaine (EMLA) cream Apply to PORT site 45-60 minutes prior to procedure.  Cover with non-absorbent dressing. 30 g 5     multivitamins with minerals (CERTAVITE) LIQD 15 mLs by Per Feeding Tube route daily 1 Bottle 1     mineral oil-hydrophilic petrolatum (AQUAPHOR) ointment Apply topically every 8  hours (Patient taking differently: Apply topically every 8 hours as needed ) 50 g 0        No Known Allergies    PHYSICAL EXAMINATION  /81  Pulse 93  Temp 98.3  F (36.8  C) (Oral)  Resp 16  Wt 61.1 kg (134 lb 11.2 oz)  SpO2 100%  BMI 17.29 kg/m2   Wt Readings from Last 10 Encounters:   02/14/17 61.1 kg (134 lb 11.2 oz)   02/08/17 64.4 kg (142 lb)   02/07/17 64.3 kg (141 lb 11.2 oz)   02/01/17 64.5 kg (142 lb 4.8 oz)   01/31/17 64.4 kg (142 lb)   01/24/17 66.6 kg (146 lb 14.4 oz)   01/17/17 68.2 kg (150 lb 6.4 oz)   12/27/16 70.6 kg (155 lb 9.6 oz)   12/14/16 74.4 kg (164 lb)   12/09/16 74.4 kg (164 lb)     Constitutional: Alert, oriented male in no visible distress.  Eyes: PERRL. Anicteric sclerae. Mouth mucosa is moist with moderate mucositis. Moderate trismus noted.   ENT/Mouth: OM moist and pink. Neck with surgical resection changes and limited ROM.  CV: RRR, no murmurs appreciated.  Resp: CTAB throughout  Abdomen: Soft, non-tender, non-distended. Bowel sounds present. No masses appreciated. No organomegaly noted.  Extremities: No lower extremity edema appreciated.  Skin: Warm, dry. No bruising or petechiae noted. Mild acneiform rash on nose  Lymph: No cervical or supraclavicular lymphadenopathy appreciated.   Neuro: CN II-XII grossly intact.    LABS:   2/14/2017 08:17   Sodium 135   Potassium 3.6   Chloride 99   Carbon Dioxide 27   Urea Nitrogen 12   Creatinine 0.55 (L)   GFR Estimate >90...   GFR Estimate If Black >90...   Calcium 8.8   Anion Gap 8   Magnesium 2.0   Albumin 3.3 (L)   Protein Total 7.7   Bilirubin Total 0.7   Alkaline Phosphatase 76   ALT 27   AST 18   Glucose 114 (H)   WBC 3.4 (L)   Hemoglobin 9.7 (L)   Hematocrit 30.4 (L)   Platelet Count 299   RBC Count 3.89 (L)   MCV 78   MCH 24.9 (L)   MCHC 31.9   RDW 18.4 (H)   Diff Method Automated Method   % Neutrophils 79.9   % Lymphocytes 10.6   % Monocytes 6.2   % Eosinophils 1.8   % Basophils 0.9   % Immature Granulocytes 0.6   Nucleated RBCs 0    Absolute Neutrophil 2.7   Absolute Lymphocytes 0.4 (L)   Absolute Monocytes 0.2   Absolute Eosinophils 0.1   Absolute Basophils 0.0   Abs Immature Granulocytes 0.0   Absolute Nucleated RBC 0.0     IMPRESSION/PLAN:  Darek Navarro is a 27-year-old male with pP8wW2a squamous cell carcinoma of the right oral tongue initially treated with resection and concurrent chemoradiation, completed in July 2016, with recurrent disease identified in October 2016. He had rapid progression identified on repeat imaging 1/16/2017 while on Keytruda.    1. Metastatic SCC: Started Carbo/5-FU/cetuximab on 1/18/2017. Here today for cycle 2 day 8 cetuximab. He has had difficulty with nausea, vomiting, mucositis, and Erbitux induced rash on his face. He will continue with cycle 2 day 8 today. He will have a CT CAP next week and I will see him back prior to cycle 2 day 15. He will see Dr. Hernandez prior to consideration of cycle 3. He will call sooner for concerns.     2. Bone mets: Vertebral fracture identifed on restaging. Spine surgery and radiation oncology consulted. Recommended radiation, which he started on 2/13/17.  -on monthly Zometa, last given 1/18, will give dose today.    3. Cancer-related back pain:   -Will increase OxyContin to 20 mg bid and continue oxycodone prn, currently 10 mg every 4 hours.     4. FEN: Progressive weight loss over the last month.   -Recommend consistently getting in Boost/Ensure Plus. He met with a dietician on 1/18/17.   -Will get him in to see Lana Calvillo, SLP, again to reevaluate swallowing, as he has stopped eating regular foods and has moderate trismus.  -May need to consider starting Marinol if weight continues to decline.     5. Fatigue: Continue the Ritalin 5 mg BID PRN.    6. Mucositis: Recommend viscous lidocaine and salt/soda swishes, but increase to qid.    7. Mild acneiform rash and xeroderma: likely 2/2 Erbitux. Continue emollients. Start clindamycin lotion bid to face.     Opal Cannon  CATHERINE  [facility]  [address], [place] [address] [phone]

## 2017-02-15 NOTE — PROGRESS NOTES
[facility] PHYSICIANS  SPECIALIZING IN BREAKTHROUGHS  Radiation Oncology    On Treatment Visit Note      Darek Navarro      Date: 2/15/2017   MRN: [id number]   : 1990  Diagnosis: Tongue Cancer    Treatment Summary to Date  Treatment Site: T1 and T10 Current Dose: 1200/2000 cGy Fractions: 3/5      Subjective: Some improvement in mid back pain at T10, although he has not changed his pain medication regimen yet. No esophagitis or heartburn or nausea.     Nursing ROS:   Nutrition Alteration  Diet Type: Patient's Preference  Skin  Skin Reaction: 0 - No changes     ENT and Mouth Exam  Mucositis - Current: 0 - None      Pain Assessment  0-10 Pain Scale: 3    Objective:   Wt 62 kg (136 lb 9.6 oz)  BMI 17.53 kg/m2  Gen: Appears well, NAD    Assessment:    Tolerating radiation therapy well.  All questions and concerns addressed.    Plan:   1. Continue current therapy.    2. Follow-up as needed after radiation.       Mosaiq chart and setup information reviewed  Port images reviewed    Medication Review  Med list reviewed with patient?: Yes    Educational Topic Discussed  Education Instructions: Reviewed education    Pt was seen and discussed with staff, Dr. Pagan.    Giovanni Ervin MD  Resident   Department of Radiation Oncology   [facility]  Phone: [phone]    Mr. Navarro was seen and examined by me. Note above by Dr. Ervin was reviewed and edited by me and reflects our mutual findings and plan of care.  Jodee Pagan MD  Department of Radiation Oncology  [facility]

## 2017-02-15 NOTE — MR AVS SNAPSHOT
After Visit Summary   2/15/2017    Darek Navarro    MRN: [id number]           Patient Information     Date Of Birth          1990        Visit Information        Provider Department      2/15/2017 1:45 PM Jodee Pagan MD Radiation Oncology Clinic         Follow-ups after your visit        Your next 10 appointments already scheduled     Feb 16, 2017  1:30 PM CST   EXTERNAL RADIATION TREATMENT with P RAD ONC VARIAN   Radiation Oncology Clinic ([facility])    [facility]  1st Floor  [address] [address]   [phone]            Feb 16, 2017  2:30 PM CST   Treatment with QUINTIN Fortune   [facility] ([facility])    [address]  4th Floor  [facility] [address]   [phone]            Feb 17, 2017  1:30 PM CST   EXTERNAL RADIATION TREATMENT with UMP RAD ONC VARIAN   Radiation Oncology Clinic ([facility])    [facility]  1st Floor  [address] [address]   [phone]            Feb 21, 2017 11:30 AM CST   Masonic Lab Draw with  MASONIC LAB DRAW   [facility] Masonic Lab Draw ([facility])    [address]  2nd Floor  [facility] [address] [phone]            Feb 21, 2017 12:00 PM CST   (Arrive by 11:45 AM)   CT CHEST/ABDOMEN/PELVIS W CONTRAST with UCCT2   [facility] [place] [facility] ([facility])    [address]  1st Floor  [facility] [address] [phone]           Please bring any scans or X-rays taken at other hospitals, if similar tests were done. Also bring a list of your medicines, including vitamins, minerals and over-the-counter drugs. It is safest to leave personal items at home.  Be sure to tell your doctor:   If you have any allergies.   If there s any chance you are pregnant.   If you are breastfeeding.   If you have any special needs.   You may have contrast for this exam. To prepare:   Do not eat or drink for 2 hours before your exam. If you need to take medicine, you may take it with small sips of water. (We may ask you to take liquid medicine as well.)   The day before your exam, drink extra fluids at least six 8-ounce glasses (unless your doctor tells you to restrict your fluids).  Patients over 70 or patients with diabetes or kidney problems:   If you haven t had a blood test (creatinine test) within the last 30 days, go to your clinic or Diagnostic Imaging Department for this test.  If you have diabetes:   If your kidney function is normal, continue taking your metformin (Avandamet, Glucophage, Glucovance, Metaglip) on the day of your exam.   If your kidney function is abnormal, wait 48 hours before restarting this medicine.  You will have oral contrast for this exam:   You will drink the contrast at home. Get this from your clinic or Diagnostic Imaging Department. Please follow the directions given.  Please wear loose clothing, such as a sweat suit or jogging clothes. Avoid snaps, zippers and other metal. We may ask you to undress and put on a hospital gown.  If you have any questions, please call the Imaging Department where you will have your exam.            Feb 21, 2017  1:40 PM CST   (Arrive by 1:25 PM)   Return Visit with Opal Cannon PA-C   [facility] ([facility])    [address] [place] [address]   [phone]            Feb 21, 2017  2:30 PM CST   Infusion 120 with  ONCOLOGY INFUSION, UC 20 ATC   [facility] ([facility])    [address] [place] [address]   [phone]            Feb 24, 2017  1:15 PM CST   (Arrive by 1:00 PM)   RETURN WITH ROOM with Yanique Wiggins GC,  2 116 CONSULT RM   [facility] ([facility])    [address] [place] [address]  Se  2nd Floor  [facility] [address] [phone]              Who to contact     Please call your clinic at [phone] to:    Ask questions about your health    Make or cancel appointments    Discuss your medicines    Learn about your test results    Speak to your doctor   If you have compliments or concerns about an experience at your clinic, or if you wish to file a complaint, please contact [facility] Patient Relations at [phone] or email us at [email or web address]         Additional Information About Your Visit        Privatexthart Information     Cliq gives you secure access to your electronic health record. If you see a primary care provider, you can also send messages to your care team and make appointments. If you have questions, please call your primary care clinic.  If you do not have a primary care provider, please call [phone] and they will assist you.      Cliq is an electronic gateway that provides easy, online access to your medical records. With Cliq, you can request a clinic appointment, read your test results, renew a prescription or communicate with your care team.     To access your existing account, please contact your [facility] or call [phone] for assistance.        Care EveryWhere ID     This is your Care EveryWhere ID. This could be used by other organizations to access your [facility] medical records  [id number]         Blood Pressure from Last 3 Encounters:   02/14/17 130/81   02/08/17 128/71   02/07/17 127/79    Weight from Last 3 Encounters:   02/14/17 61.1 kg (134 lb 11.2 oz)   02/08/17 64.4 kg (142 lb)   02/07/17 64.3 kg (141 lb 11.2 oz)              Today, you had the following     No orders found for display         Today's Medication Changes          These changes are accurate as of: 2/15/17  1:51 PM.  If you have any questions, ask your nurse or doctor.               These medicines have  changed or have updated prescriptions.        Dose/Directions    mineral oil-hydrophilic petrolatum   This may have changed:    - when to take this  - reasons to take this   Used for:  Malignant neoplasm of tongue (H)        Apply topically every 8 hours   Quantity:  50 g   Refills:  0       pantoprazole 20 MG EC tablet   Commonly known as:  PROTONIX   This may have changed:    - when to take this  - reasons to take this   Used for:  Gastroesophageal reflux disease without esophagitis        Dose:  20 mg   Take 1 tablet (20 mg) by mouth daily   Quantity:  30 tablet   Refills:  3                Primary Care Provider Office Phone # Fax #    [facility] [phone] [address]  [place] [address]        Thank you!     Thank you for choosing [facility] ONCOLOGY CLINIC  for your care. Our goal is always to provide you with excellent care. Hearing back from our patients is one way we can continue to improve our services. Please take a few minutes to complete the written survey that you may receive in the mail after your visit with us. Thank you!             Your Updated Medication List - Protect others around you: Learn how to safely use, store and throw away your medicines at www.disposemymeds.org.          This list is accurate as of: 2/15/17  1:51 PM.  Always use your most recent med list.                   Brand Name Dispense Instructions for use    citalopram 10 MG/5ML solution    celeXA    300 mL    Take 10 mLs (20 mg) by mouth daily       clindamycin 1 % lotion    CLEOCIN T    60 mL    Apply topically 2 times daily       ibuprofen 200 MG tablet    ADVIL/MOTRIN     Take 200 mg by mouth daily as needed       levothyroxine 25 MCG tablet    SYNTHROID/LEVOTHROID    90 tablet    Take 1 tablet (25 mcg) by mouth daily       lidocaine 2 % solution    XYLOCAINE    1000 mL    15 mL swish and spit q3h PRN, max 8 doses in 24 hours       lidocaine-prilocaine cream     EMLA    30 g    Apply to PORT site 45-60 minutes prior to procedure.  Cover with non-absorbent dressing.       * LORazepam 2 MG/ML (HIGH CONC) solution    ATIVAN    30 mL    Take 0.25-0.5 mLs (0.5-1 mg) by mouth every 4 hours as needed for anxiety, sleep, nausea or vomiting       * LORazepam 0.5 MG tablet    ATIVAN    30 tablet    Take 1 tablet (0.5 mg) by mouth every 4 hours as needed (Anxiety, Nausea/Vomiting or Sleep)       methylphenidate 5 MG tablet    RITALIN    60 tablet    Take 1 tablet (5 mg) by mouth 2 times daily       mineral oil-hydrophilic petrolatum     50 g    Apply topically every 8 hours       multivitamins with minerals Liqd liquid     1 Bottle    15 mLs by Per Feeding Tube route daily       ondansetron 4 MG tablet    ZOFRAN     Take 4 mg by mouth daily as needed       * oxyCODONE 5 MG/5ML solution    ROXICODONE    1000 mL    10 mLs (10 mg) by Oral or PEG tube route every 4 hours as needed for moderate to severe pain       * oxyCODONE 20 MG 12 hr tablet    OxyCONTIN    60 tablet    Take 1 tablet (20 mg) by mouth every 12 hours maximum 2 tablet(s) per day       pantoprazole 20 MG EC tablet    PROTONIX    30 tablet    Take 1 tablet (20 mg) by mouth daily       prochlorperazine 10 MG tablet    COMPAZINE    30 tablet    Take 1 tablet (10 mg) by mouth every 6 hours as needed (Nausea/Vomiting)       * Notice:  This list has 4 medication(s) that are the same as other medications prescribed for you. Read the directions carefully, and ask your doctor or other care provider to review them with you.

## 2017-02-15 NOTE — LETTER
2/15/2017       RE: Darek Navarro  [address]  [place] [address]     Dear Colleague,    Thank you for referring your patient, Darek Navarro, to the RADIATION ONCOLOGY CLINIC. Please see a copy of my visit note below.    [facility]  SPECIALIZING IN BREAKTHROUGHS  Radiation Oncology    On Treatment Visit Note      Darek Navarro      Date: 2/15/2017   MRN: [id number]   : 1990  Diagnosis: Tongue Cancer    Treatment Summary to Date  Treatment Site: T1 and T10 Current Dose: 1200/2000 cGy Fractions: 3/5      Subjective: Some improvement in mid back pain at T10, although he has not changed his pain medication regimen yet. No esophagitis or heartburn or nausea.     Nursing ROS:   Nutrition Alteration  Diet Type: Patient's Preference  Skin  Skin Reaction: 0 - No changes     ENT and Mouth Exam  Mucositis - Current: 0 - None      Pain Assessment  0-10 Pain Scale: 3    Objective:   Wt 62 kg (136 lb 9.6 oz)  BMI 17.53 kg/m2  Gen: Appears well, NAD    Assessment:    Tolerating radiation therapy well.  All questions and concerns addressed.    Plan:   1. Continue current therapy.    2. Follow-up as needed after radiation.       Mosaiq chart and setup information reviewed  Port images reviewed    Medication Review  Med list reviewed with patient?: Yes    Educational Topic Discussed  Education Instructions: Reviewed education    Pt was seen and discussed with staff, Dr. Pagan.    Giovanni Ervin MD  Resident   Department of Radiation Oncology   [facility]  Phone: [phone]    Mr. Navarro was seen and examined by me. Note above by Dr. Ervin was reviewed and edited by me and reflects our mutual findings and plan of care.  Jodee Pagan MD  Department of Radiation Oncology  [facility]

## 2017-02-21 NOTE — MR AVS SNAPSHOT
After Visit Summary   2/21/2017    Darek Navarro    MRN: [id number]           Patient Information     Date Of Birth          1990        Visit Information        Provider Department      2/21/2017 1:40 PM Opal Cannon PA-C [facility]        Today's Diagnoses     Malignant neoplasm of tongue (H)    -  1    Neoplastic malignant related fatigue        Respiratory crackles at left lung base           Follow-ups after your visit        Your next 10 appointments already scheduled     Feb 24, 2017  1:15 PM CST   (Arrive by 1:00 PM)   RETURN WITH ROOM with Yanique Wiggins GC,  2 116 CONSULT RM   [facility] ([facility])    [address] [place] [address] [phone]            Feb 27, 2017 12:00 PM CST   NUTRITION VISIT with Renea Borges RD   [facility] ([facility])    [address] [place] [id number] [phone]            Feb 28, 2017  7:00 AM CST   Masonic Lab Draw with  MASONIC LAB DRAW   [facility] Lab Draw ([facility])    [address] [place] [id number] [phone]            Feb 28, 2017  7:30 AM CST   (Arrive by 7:15 AM)   Return Visit with Keira Hernandez MD   [facility] ([facility])    [address] [place] [address]52-[address] [phone]            Feb 28, 2017  8:00 AM CST   Infusion 240 with TROY ONCOLOGY INFUSION, UC 15 ATC   [facility] ([facility])    [address] [place] [address] [phone]              Who to contact     If you have questions or need follow up information about today's clinic visit or your schedule please contact [facility] directly at [phone].  Normal or  non-critical lab and imaging results will be communicated to you by [email or web address].shhart, letter or phone within 4 business days after the clinic has received the results. If you do not hear from us within 7 days, please contact the clinic through Better Weekdays or phone. If you have a critical or abnormal lab result, we will notify you by phone as soon as possible.  Submit refill requests through Better Weekdays or call your pharmacy and they will forward the refill request to us. Please allow 3 business days for your refill to be completed.          Additional Information About Your Visit        Better Weekdays Information     Better Weekdays gives you secure access to your electronic health record. If you see a primary care provider, you can also send messages to your care team and make appointments. If you have questions, please call your primary care clinic.  If you do not have a primary care provider, please call [phone] and they will assist you.        Care EveryWhere ID     This is your Care EveryWhere ID. This could be used by other organizations to access your [facility] [place] medical records  [id number]        Your Vitals Were     Pulse Temperature Respirations Pulse Oximetry BMI (Body Mass Index)       103 98.1  F (36.7  C) (Oral) 18 100% 17.57 kg/m2        Blood Pressure from Last 3 Encounters:   02/21/17 118/75   02/14/17 130/81   02/08/17 128/71    Weight from Last 3 Encounters:   02/21/17 62.1 kg (136 lb 14.4 oz)   02/15/17 62 kg (136 lb 9.6 oz)   02/14/17 61.1 kg (134 lb 11.2 oz)              Today, you had the following     No orders found for display         Today's Medication Changes          These changes are accurate as of: 2/21/17  3:08 PM.  If you have any questions, ask your nurse or doctor.               Start taking these medicines.        Dose/Directions    levofloxacin 500 MG tablet   Commonly known as:  LEVAQUIN   Used for:  Respiratory crackles at left lung base   Started by:  Opal Cannon PA-C        Dose:  500 mg   Take  1 tablet (500 mg) by mouth daily for 7 days   Quantity:  7 tablet   Refills:  0         These medicines have changed or have updated prescriptions.        Dose/Directions    mineral oil-hydrophilic petrolatum   This may have changed:    - when to take this  - reasons to take this   Used for:  Malignant neoplasm of tongue (H)        Apply topically every 8 hours   Quantity:  50 g   Refills:  0       pantoprazole 20 MG EC tablet   Commonly known as:  PROTONIX   This may have changed:    - when to take this  - reasons to take this   Used for:  Gastroesophageal reflux disease without esophagitis        Dose:  20 mg   Take 1 tablet (20 mg) by mouth daily   Quantity:  30 tablet   Refills:  3            Where to get your medicines      These medications were sent to [facility] [address] [facility] [address]  [address] [place] [address] [address] [address]    Hours:  TRANSPLANT PHONE NUMBER [phone] Phone:  [phone]     levofloxacin 500 MG tablet         Some of these will need a paper prescription and others can be bought over the counter.  Ask your nurse if you have questions.     Bring a paper prescription for each of these medications     methylphenidate 5 MG tablet                Primary Care Provider Office Phone # Fax #    [facility] [phone] [address] [facility] [address]  [facility] [address]        Thank you!     Thank you for choosing [facility]  for your care. Our goal is always to provide you with excellent care. Hearing back from our patients is one way we can continue to improve our services. Please take a few minutes to complete the written survey that you may receive in the mail after your visit with us. Thank you!             Your Updated Medication List - Protect others around you: Learn how to safely use, store and throw away your medicines at www.disposemymeds.org.          This list  is accurate as of: 2/21/17  3:08 PM.  Always use your most recent med list.                   Brand Name Dispense Instructions for use    citalopram 10 MG/5ML solution    celeXA    300 mL    Take 10 mLs (20 mg) by mouth daily       clindamycin 1 % lotion    CLEOCIN T    60 mL    Apply topically 2 times daily       ibuprofen 200 MG tablet    ADVIL/MOTRIN     Take 200 mg by mouth daily as needed       levofloxacin 500 MG tablet    LEVAQUIN    7 tablet    Take 1 tablet (500 mg) by mouth daily for 7 days       levothyroxine 25 MCG tablet    SYNTHROID/LEVOTHROID    90 tablet    Take 1 tablet (25 mcg) by mouth daily       lidocaine 2 % solution    XYLOCAINE    1000 mL    15 mL swish and spit q3h PRN, max 8 doses in 24 hours       lidocaine-prilocaine cream    EMLA    30 g    Apply to PORT site 45-60 minutes prior to procedure.  Cover with non-absorbent dressing.       * LORazepam 2 MG/ML (HIGH CONC) solution    ATIVAN    30 mL    Take 0.25-0.5 mLs (0.5-1 mg) by mouth every 4 hours as needed for anxiety, sleep, nausea or vomiting       * LORazepam 0.5 MG tablet    ATIVAN    30 tablet    Take 1 tablet (0.5 mg) by mouth every 4 hours as needed (Anxiety, Nausea/Vomiting or Sleep)       methylphenidate 5 MG tablet    RITALIN    60 tablet    Take 1 tablet (5 mg) by mouth 2 times daily       mineral oil-hydrophilic petrolatum     50 g    Apply topically every 8 hours       multivitamins with minerals Liqd liquid     1 Bottle    15 mLs by Per Feeding Tube route daily       ondansetron 4 MG tablet    ZOFRAN     Take 4 mg by mouth daily as needed       * oxyCODONE 5 MG/5ML solution    ROXICODONE    1000 mL    10 mLs (10 mg) by Oral or PEG tube route every 4 hours as needed for moderate to severe pain       * oxyCODONE 20 MG 12 hr tablet    OxyCONTIN    60 tablet    Take 1 tablet (20 mg) by mouth every 12 hours maximum 2 tablet(s) per day       pantoprazole 20 MG EC tablet    PROTONIX    30 tablet    Take 1 tablet (20 mg) by mouth  daily       prochlorperazine 10 MG tablet    COMPAZINE    30 tablet    Take 1 tablet (10 mg) by mouth every 6 hours as needed (Nausea/Vomiting)       * Notice:  This list has 4 medication(s) that are the same as other medications prescribed for you. Read the directions carefully, and ask your doctor or other care provider to review them with you.

## 2017-02-21 NOTE — NURSING NOTE
Chief Complaint   Patient presents with     Port Draw     powerport 20g .75in used by RN to access port, labs collected and sent, vitals taken and pt sent to appt downstairs understanding he will need to check in with concierges.     Opal Avendano

## 2017-02-21 NOTE — LETTER
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 Pathologic staging: [id number]      TREATMENT:  1- status post an April 28, 2016 transmandibular subtotal oral glossectomy, right base of tongue resection with partial pharyngectomy, bilateral neck dissections, and free flap reconstruction. Concurrent ChemoRt with high dose Cisplatin 6/6/16. day 22 cisplatin on 6/27/16. He completed day 43 on 7/19/16. Radiation completed, 6600cGy on 7/21/16.    2- Palliative intent treatment with Pembrolizumab 12/6/2016  3- Due to rapid progression of disease the treatment was switched to Carboplatin, 5-FU and Cetuximab on 1/18/17    INTERVAL HISTORY:   Darek presents for follow-up prior to cycle 2 day 15, accompanied by sister and brother and niece.    Patient completed radiation. He feels his back pain is getting better. He is now able to sleep through the night. He remains on OxyContin 20 mg bid and oxycodone 10 mg every 4 hours. He feels his energy is better on Ritalin, which he takes bid. He feels the face rash is getting better with clindamycin. He is using the salt/soda swishes and lidocaine and feels his mouth is also doing a bit better. He recently met with Lana from speech therapy and is working on his nutrition and exercises. He reports he has been eating and drinking better lately. He denies bernie fevers, cough, chest pain, or dyspnea. He denies other concerns.     Current Outpatient Prescriptions   Medication Sig Dispense Refill     methylphenidate (RITALIN) 5 MG tablet Take 1 tablet (5 mg) by mouth 2 times daily 60 tablet 0     levofloxacin (LEVAQUIN) 500 MG tablet Take 1 tablet (500 mg) by mouth daily for 7 days 7 tablet 0     oxyCODONE  (ROXICODONE) 5 MG/5ML solution 10 mLs (10 mg) by Oral or PEG tube route every 4 hours as needed for moderate to severe pain 1000 mL 0     oxyCODONE (OXYCONTIN) 20 MG 12 hr tablet Take 1 tablet (20 mg) by mouth every 12 hours maximum 2 tablet(s) per day 60 tablet 0     clindamycin (CLEOCIN T) 1 % lotion Apply topically 2 times daily 60 mL 3     lidocaine (XYLOCAINE) 2 % solution 15 mL swish and spit q3h PRN, max 8 doses in 24 hours 1000 mL 3     LORazepam (ATIVAN) 0.5 MG tablet Take 1 tablet (0.5 mg) by mouth every 4 hours as needed (Anxiety, Nausea/Vomiting or Sleep) 30 tablet 5     prochlorperazine (COMPAZINE) 10 MG tablet Take 1 tablet (10 mg) by mouth every 6 hours as needed (Nausea/Vomiting) 30 tablet 5     pantoprazole (PROTONIX) 20 MG EC tablet Take 1 tablet (20 mg) by mouth daily (Patient taking differently: Take 20 mg by mouth daily as needed ) 30 tablet 3     levothyroxine (SYNTHROID/LEVOTHROID) 25 MCG tablet Take 1 tablet (25 mcg) by mouth daily 90 tablet 3     ibuprofen (ADVIL/MOTRIN) 200 MG tablet Take 200 mg by mouth daily as needed        ondansetron (ZOFRAN) 4 MG tablet Take 4 mg by mouth daily as needed        LORazepam (ATIVAN) 2 MG/ML (HIGH CONC) solution Take 0.25-0.5 mLs (0.5-1 mg) by mouth every 4 hours as needed for anxiety, sleep, nausea or vomiting 30 mL 3     citalopram (CELEXA) 10 MG/5ML solution Take 10 mLs (20 mg) by mouth daily 300 mL 3     lidocaine-prilocaine (EMLA) cream Apply to PORT site 45-60 minutes prior to procedure.  Cover with non-absorbent dressing. 30 g 5     multivitamins with minerals (CERTAVITE) LIQD 15 mLs by Per Feeding Tube route daily 1 Bottle 1     mineral oil-hydrophilic petrolatum (AQUAPHOR) ointment Apply topically every 8 hours (Patient taking differently: Apply topically every 8 hours as needed ) 50 g 0      No Known Allergies    PHYSICAL EXAMINATION  /75  Pulse 103  Temp 98.1  F (36.7  C) (Oral)  Resp 18  Wt 62.1 kg (136 lb 14.4 oz)  SpO2 100%  BMI 17.57  kg/m2   Wt Readings from Last 10 Encounters:   02/21/17 62.1 kg (136 lb 14.4 oz)   02/15/17 62 kg (136 lb 9.6 oz)   02/14/17 61.1 kg (134 lb 11.2 oz)   02/08/17 64.4 kg (142 lb)   02/07/17 64.3 kg (141 lb 11.2 oz)   02/01/17 64.5 kg (142 lb 4.8 oz)   01/31/17 64.4 kg (142 lb)   01/24/17 66.6 kg (146 lb 14.4 oz)   01/17/17 68.2 kg (150 lb 6.4 oz)   12/27/16 70.6 kg (155 lb 9.6 oz)   Constitutional: Alert, oriented male in no visible distress.  Eyes: PERRL. Anicteric sclerae. Mouth mucosa is moist with mild mucositis. Moderate trismus noted.   ENT/Mouth: OM moist and pink. Neck with surgical resection changes and limited ROM.  CV: RRR, no murmurs appreciated.  Resp: Course LLL crackles, remainder clear to auscultation bilaterally.   Abdomen: Soft, non-tender, non-distended. Bowel sounds present. No masses appreciated. No organomegaly noted.  Extremities: No lower extremity edema appreciated.  Skin: Warm, dry. No bruising or petechiae noted. Mild acneiform rash on nose and face.  Lymph: No cervical or supraclavicular lymphadenopathy appreciated.   Neuro: CN II-XII grossly intact.    LABS:   2/21/2017 12:04   Sodium 138   Potassium 4.2   Chloride 103   Carbon Dioxide 27   Urea Nitrogen 11   Creatinine 0.58 (L)   GFR Estimate >90...   GFR Estimate If Black >90...   Calcium 8.8   Anion Gap 8   Magnesium 2.2   Albumin 3.1 (L)   Protein Total 7.3   Bilirubin Total 0.4   Alkaline Phosphatase 71   ALT 18   AST 15   Glucose 117 (H)   WBC 1.0 (L)   Hemoglobin 9.3 (L)   Hematocrit 29.7 (L)   Platelet Count 151   RBC Count 3.60 (L)   MCV 83   MCH 25.8 (L)   MCHC 31.3 (L)   RDW 20.4 (H)   Diff Method Automated Method   % Neutrophils 49.5   % Lymphocytes 17.8   % Monocytes 28.7   % Eosinophils 2.0   % Basophils 0.0   % Immature Granulocytes 2.0   Nucleated RBCs 0   Absolute Neutrophil 0.5 (L)   Absolute Lymphocytes 0.2 (L)   Absolute Monocytes 0.3   Absolute Eosinophils 0.0   Absolute Basophils 0.0   Abs Immature Granulocytes 0.0    Absolute Nucleated RBC 0.0     IMPRESSION/PLAN:  Darek Navarro is a 27 year old male with [id number] squamous cell carcinoma of the right oral tongue initially treated with resection and concurrent chemoradiation, completed in July 2016, with recurrent disease identified in October 2016. He had rapid progression identified on repeat imaging 1/16/2017 while on Keytruda.    1. Metastatic SCC: Started Carbo/5-FU/cetuximab on 1/18/2017. Here today for cycle 2 day 15 cetuximab. He has had difficulty with nausea, vomiting, mucositis, and Erbitux induced rash on his face, but is doing better today. He will continue with cycle 2 day 15 today. Preliminary review of his CT CAP shows improvement in his disease, which was reviewed with the patient. He will follow up with Dr. Hernandez next week. We will tentatively plan for 4-6 cycles of chemotherapy and then may consider switching back to immune therapy.    2. Bone mets: Vertebral fracture identifed on restaging. Spine surgery and radiation oncology consulted. Recommended radiation, which he started on 2/13/17 and has now completed.  -on monthly Zometa, last given 2/14.    3. Cancer-related back pain:   -Some improvement with radiation. Will continue OxyContin at 20 mg bid and continue oxycodone prn, currently 10 mg every 4 hours.     4. FEN: Progressive weight loss, now stabilized as he is eating better again.   -He met with a dietician on 1/18/17.   -Has also seen Lana Calvillo, QUINTIN to help with swallowing and trismus.  -Encouraged increased protein intake and albumin remains mildly low.    5. Fatigue: Much improved. Continue the Ritalin 5 mg BID.    6. Mucositis: Improved. Continue viscous lidocaine and salt/soda swishes.    7. Mild acneiform rash and xeroderma: likely 2/2 Erbitux. Mildly improved from last week. Continue emollients and clindamycin bid.     8. Neutropenia and tree-in-bud opacities. Chest imaging today concerning for possible pneumonia, though patient is  asymptomatic. He does have crackles in the LLL. He will start on Levaquin 500 mg daily x 7 days. For the neutropenia, we reviewed the importance of calling with a fever of 100.4F or higher and avoiding sick contacts. He will check his temperature at least once daily. He was given masks to wear when in public.    Opal Cannon PA-C  [facility]  [address] [place] [id number] [phone]

## 2017-02-21 NOTE — MR AVS SNAPSHOT
After Visit Summary   2/21/2017    Darek Navarro    MRN: [id number]           Patient Information     Date Of Birth          1990        Visit Information        Provider Department      2/21/2017 2:30 PM UC 20 ATC;  ONCOLOGY INFUSION [facility]        Today's Diagnoses     Head and neck cancer (H)    -  1    Drug-induced neutropenia (H)          Care Instructions    Contact Numbers    [facility] Main Line: [phone]  [facility] Triage:  [phone]    Call triage with chills and/or temperature greater than or equal to 100.5, uncontrolled nausea/vomiting, diarrhea, constipation, dizziness, shortness of breath, chest pain, bleeding, unexplained bruising, or any new/concerning symptoms, questions/concerns.     If you are having any concerning symptoms or wish to speak to a provider before your next infusion visit, please call your care coordinator or triage to notify them so we can adequately serve you.       After Hours: [phone]    If after hours, weekends, or holidays, call main hospital  and ask for Oncology doctor on call.         February 2017 Sunday Monday Tuesday Wednesday Thursday Friday Saturday                  1     [facility] NEW TUMOR    8:00 AM   (15 min.)   Dayday Parker MD   [facility] Orthopaedic [facility] 2     3     4       5     6     7     [facility] LAB DRAW    9:00 AM   (15 min.)    [facility] DRAW   [facility] Lab Draw     P RETURN    9:15 AM   (30 min.)   Keira Hernandez MD   [facility] ONC INFUSION 240   10:00 AM   (240 min.)   UC ONCOLOGY INFUSION   [facility] 8     P CONSULT    9:30 AM   (90 min.)   Jodee Pagan MD   Radiation Oncology Clinic     [facility] TCT/SIM SUITE   10:00 AM   (60 min.)   Jodee Pagan MD   [facility] 9     [facility] TREATMENT PLAN VISIT    7:00 AM   (15 min.)   Jodee Pagan MD   [facility] Clinic 10     11       12      13     [facility] EXTERNAL RADIATION TREATMT   11:30 AM   (15 min.)   [facility] RAD ONC [facility]   Radiation Oncology Clinic 14     [facility] LAB DRAW    8:00 AM   (15 min.)   [facility]     [facility] RETURN    8:25 AM   (50 min.)   Opal Cannon PA-C   [facility]     [facility] ONC INFUSION 120   10:00 AM   (120 min.)   [facility] ONCOLOGY INFUSION   [facility]     [facility] EXTERNAL RADIATION TREATMT    1:30 PM   (15 min.)   [facility] RAD ONC [facility]   [facility] 15     [facility] EXTERNAL RADIATION TREATMT    1:30 PM   (15 min.)   [facility] RAD ONC VARIAN   [facility]     [facility] ON TREATMENT VISIT    1:45 PM   (15 min.)   Jodee Pagan MD   [facility] 16     [facility] EXTERNAL RADIATION TREATMT    1:30 PM   (15 min.)   [facility] RAD ONC [facility]   Radiation Oncology Clinic     TREATMENT    2:30 PM   (30 min.)   Lana Calvillo SLP   [facility] 17     [facility] EXTERNAL RADIATION TREATMT    1:30 PM   (15 min.)   [facility] RAD ONC [facility]   Radiation Oncology Clinic 18       19     20     21     [facility] LAB DRAW   11:30 AM   (15 min.)    [facility] LAB DRAW   [facility] Lab Draw     CT CHEST/ABDOMEN/PELVIS W   11:45 AM   (20 min.)   UCCT2   [facility] CT     [facility] RETURN    1:25 PM   (50 min.)   Opal Cannon PA-C   [facility]     [facility] ONC INFUSION 120    2:30 PM   (120 min.)   [facility] ONCOLOGY INFUSION   [facility] 22     23     24     [facility] RETURN WITH ROOM    1:00 PM   (60 min.)   Yanique Wiggins GC   [facility] 25       26     27     [facility] NUTRITION VISIT   12:00 PM   (30 min.)   Renea Avila RD   [facility] 28     [facility] LAB DRAW    7:00 AM   (15 min.)   [facility] LAB DRAW   [facility] Lab Draw     [facility] RETURN    7:15 AM   (30 min.)   Keira Hernandez MD   [facility]     [facility] ONC INFUSION 240    8:00 AM   (240 min.)     ONCOLOGY [facility] Cancer [facility]                                March 2017 Sunday Monday Tuesday Wednesday Thursday Friday Saturday                  1     2     3     4       5     6     7     8     9     10     11       12     13     14     15     16     17     18       19     20     21     22     23     24     25       26     27     28     29     30     31                       Lab Results:  Recent Results (from the past 12 hour(s))   CBC with platelets differential    Collection Time: 02/21/17 12:04 PM   Result Value Ref Range    WBC 1.0 (L) 4.0 - 11.0 10e9/L    RBC Count 3.60 (L) 4.4 - 5.9 10e12/L    Hemoglobin 9.3 (L) 13.3 - 17.7 g/dL    Hematocrit 29.7 (L) 40.0 - 53.0 %    MCV 83 78 - 100 fl    MCH 25.8 (L) 26.5 - 33.0 pg    MCHC 31.3 (L) 31.5 - 36.5 g/dL    RDW 20.4 (H) 10.0 - 15.0 %    Platelet Count 151 150 - 450 10e9/L    Diff Method Automated Method     % Neutrophils 49.5 %    % Lymphocytes 17.8 %    % Monocytes 28.7 %    % Eosinophils 2.0 %    % Basophils 0.0 %    % Immature Granulocytes 2.0 %    Nucleated RBCs 0 0 /100    Absolute Neutrophil 0.5 (L) 1.6 - 8.3 10e9/L    Absolute Lymphocytes 0.2 (L) 0.8 - 5.3 10e9/L    Absolute Monocytes 0.3 0.0 - 1.3 10e9/L    Absolute Eosinophils 0.0 0.0 - 0.7 10e9/L    Absolute Basophils 0.0 0.0 - 0.2 10e9/L    Abs Immature Granulocytes 0.0 0 - 0.4 10e9/L    Absolute Nucleated RBC 0.0    Comprehensive metabolic panel    Collection Time: 02/21/17 12:04 PM   Result Value Ref Range    Sodium 138 133 - 144 mmol/L    Potassium 4.2 3.4 - 5.3 mmol/L    Chloride 103 94 - 109 mmol/L    Carbon Dioxide 27 20 - 32 mmol/L    Anion Gap 8 3 - 14 mmol/L    Glucose 117 (H) 70 - 99 mg/dL    Urea Nitrogen 11 7 - 30 mg/dL    Creatinine 0.58 (L) 0.66 - 1.25 mg/dL    GFR Estimate >90  Non  GFR Calc   >60 mL/min/1.7m2    GFR Estimate If Black >90   GFR Calc   >60 mL/min/1.7m2    Calcium 8.8 8.5 - 10.1 mg/dL    Bilirubin Total 0.4 0.2 - 1.3  mg/dL    Albumin 3.1 (L) 3.4 - 5.0 g/dL    Protein Total 7.3 6.8 - 8.8 g/dL    Alkaline Phosphatase 71 40 - 150 U/L    ALT 18 0 - 70 U/L    AST 15 0 - 45 U/L   Magnesium    Collection Time: 02/21/17 12:04 PM   Result Value Ref Range    Magnesium 2.2 1.6 - 2.3 mg/dL             Follow-ups after your visit        Your next 10 appointments already scheduled     Feb 24, 2017  1:15 PM CST   (Arrive by 1:00 PM)   RETURN WITH ROOM with Yanique Wiggins GC,  2 116 CONSULT RM   [facility]    [address] [place] [address] [id number] [phone]            Feb 27, 2017 12:00 PM CST   NUTRITION VISIT with Renea Borges RD   [facility])    [address] [place] [address] [id number] [phone]            Feb 28, 2017  7:00 AM CST   [facility] Lab Draw with  [facility] LAB DRAW   [facility] Lab Draw ([facility])    [address] [place] [address] [id number] [phone]            Feb 28, 2017  7:30 AM CST   (Arrive by 7:15 AM)   Return Visit with Keira Hernandez MD   [facility]    [address] [place] [address] [id number] [phone]            Feb 28, 2017  8:00 AM CST   Infusion 240 with  ONCOLOGY INFUSION, UC 15 ATC   [facility])    [address] [place] [address] [id number] [phone]              Who to contact     If you have questions or need follow up information about today's clinic visit or your schedule please contact [facility] directly at [phone].  Normal or non-critical lab and imaging results will be communicated to you by MyChart, letter or phone within 4 business days after the clinic has received the results. If you do not hear  from us within 7 days, please contact the clinic through [employer] or phone. If you have a critical or abnormal lab result, we will notify you by phone as soon as possible.  Submit refill requests through [employer] or call your pharmacy and they will forward the refill request to us. Please allow 3 business days for your refill to be completed.          Additional Information About Your Visit        [employer] Information     [employer] gives you secure access to your electronic health record. If you see a primary care provider, you can also send messages to your care team and make appointments. If you have questions, please call your primary care clinic.  If you do not have a primary care provider, please call [phone] and they will assist you.        Care EveryWhere ID     This is your Care EveryWhere ID. This could be used by other organizations to access your [facility] medical records  [id number]         Blood Pressure from Last 3 Encounters:   02/21/17 118/75   02/14/17 130/81   02/08/17 128/71    Weight from Last 3 Encounters:   02/21/17 62.1 kg (136 lb 14.4 oz)   02/15/17 62 kg (136 lb 9.6 oz)   02/14/17 61.1 kg (134 lb 11.2 oz)              We Performed the Following     CBC with platelets differential     Comprehensive metabolic panel     Magnesium          Today's Medication Changes          These changes are accurate as of: 2/21/17  4:44 PM.  If you have any questions, ask your nurse or doctor.               Start taking these medicines.        Dose/Directions    levofloxacin 500 MG tablet   Commonly known as:  LEVAQUIN   Used for:  Respiratory crackles at left lung base   Started by:  Opal Cannon PA-C        Dose:  500 mg   Take 1 tablet (500 mg) by mouth daily for 7 days   Quantity:  7 tablet   Refills:  0         These medicines have changed or have updated prescriptions.        Dose/Directions    mineral oil-hydrophilic petrolatum   This may have changed:    - when to take this  - reasons to take  this   Used for:  Malignant neoplasm of tongue (H)        Apply topically every 8 hours   Quantity:  50 g   Refills:  0       pantoprazole 20 MG EC tablet   Commonly known as:  PROTONIX   This may have changed:    - when to take this  - reasons to take this   Used for:  Gastroesophageal reflux disease without esophagitis        Dose:  20 mg   Take 1 tablet (20 mg) by mouth daily   Quantity:  30 tablet   Refills:  3            Where to get your medicines      These medications were sent to [place], [place] - [address] [facility] [address]  [address] [facility] [address], [facility] [address]    Hours:  TRANSPLANT PHONE NUMBER [phone] Phone:  [phone]     levofloxacin 500 MG tablet         Some of these will need a paper prescription and others can be bought over the counter.  Ask your nurse if you have questions.     Bring a paper prescription for each of these medications     methylphenidate 5 MG tablet                Primary Care Provider Office Phone # Fax #    Mary Jane Nicollet [facility] [phone] [address] [address]        Thank you!     Thank you for choosing [facility]  for your care. Our goal is always to provide you with excellent care. Hearing back from our patients is one way we can continue to improve our services. Please take a few minutes to complete the written survey that you may receive in the mail after your visit with us. Thank you!             Your Updated Medication List - Protect others around you: Learn how to safely use, store and throw away your medicines at www.disposemymeds.org.          This list is accurate as of: 2/21/17  4:44 PM.  Always use your most recent med list.                   Brand Name Dispense Instructions for use    citalopram 10 MG/5ML solution    celeXA    300 mL    Take 10 mLs (20 mg) by mouth daily       clindamycin 1 % lotion    CLEOCIN T    60 mL     Apply topically 2 times daily       ibuprofen 200 MG tablet    ADVIL/MOTRIN     Take 200 mg by mouth daily as needed       levofloxacin 500 MG tablet    LEVAQUIN    7 tablet    Take 1 tablet (500 mg) by mouth daily for 7 days       levothyroxine 25 MCG tablet    SYNTHROID/LEVOTHROID    90 tablet    Take 1 tablet (25 mcg) by mouth daily       lidocaine 2 % solution    XYLOCAINE    1000 mL    15 mL swish and spit q3h PRN, max 8 doses in 24 hours       lidocaine-prilocaine cream    EMLA    30 g    Apply to PORT site 45-60 minutes prior to procedure.  Cover with non-absorbent dressing.       * LORazepam 2 MG/ML (HIGH CONC) solution    ATIVAN    30 mL    Take 0.25-0.5 mLs (0.5-1 mg) by mouth every 4 hours as needed for anxiety, sleep, nausea or vomiting       * LORazepam 0.5 MG tablet    ATIVAN    30 tablet    Take 1 tablet (0.5 mg) by mouth every 4 hours as needed (Anxiety, Nausea/Vomiting or Sleep)       methylphenidate 5 MG tablet    RITALIN    60 tablet    Take 1 tablet (5 mg) by mouth 2 times daily       mineral oil-hydrophilic petrolatum     50 g    Apply topically every 8 hours       multivitamins with minerals Liqd liquid     1 Bottle    15 mLs by Per Feeding Tube route daily       ondansetron 4 MG tablet    ZOFRAN     Take 4 mg by mouth daily as needed       * oxyCODONE 5 MG/5ML solution    ROXICODONE    1000 mL    10 mLs (10 mg) by Oral or PEG tube route every 4 hours as needed for moderate to severe pain       * oxyCODONE 20 MG 12 hr tablet    OxyCONTIN    60 tablet    Take 1 tablet (20 mg) by mouth every 12 hours maximum 2 tablet(s) per day       pantoprazole 20 MG EC tablet    PROTONIX    30 tablet    Take 1 tablet (20 mg) by mouth daily       prochlorperazine 10 MG tablet    COMPAZINE    30 tablet    Take 1 tablet (10 mg) by mouth every 6 hours as needed (Nausea/Vomiting)       * Notice:  This list has 4 medication(s) that are the same as other medications prescribed for you. Read the directions  carefully, and ask your doctor or other care provider to review them with you.

## 2017-02-21 NOTE — PATIENT INSTRUCTIONS
Contact Numbers    [facility] [place]: [phone]  [facility] [place]:  [phone]    Call triage with chills and/or temperature greater than or equal to 100.5, uncontrolled nausea/vomiting, diarrhea, constipation, dizziness, shortness of breath, chest pain, bleeding, unexplained bruising, or any new/concerning symptoms, questions/concerns.     If you are having any concerning symptoms or wish to speak to a provider before your next infusion visit, please call your care coordinator or triage to notify them so we can adequately serve you.       After Hours: [phone]    If after hours, weekends, or holidays, call main hospital  and ask for Oncology doctor on call.         February 2017 Sunday Monday Tuesday Wednesday Thursday Friday Saturday                  1     [facility] NEW TUMOR    8:00 AM   (15 min.)   Dayday Parker MD   [facility] 2     3     4       5     6     7     [facility] LAB DRAW    9:00 AM   (15 min.)    [facility] LAB DRAW   [facility] Lab Draw     P RETURN    9:15 AM   (30 min.)   Keira Hernandez MD   [facility] ONC INFUSION 240   10:00 AM   (240 min.)    ONCOLOGY INFUSION   [facility] 8     P CONSULT    9:30 AM   (90 min.)   Jodee Pagan MD   [facility]     [facility] TCT/SIM SUITE   10:00 AM   (60 min.)   Jodee Pagan MD   [facility] 9     [facility] TREATMENT PLAN VISIT    7:00 AM   (15 min.)   Jodee Pagan MD   [facility] 10     11       12     13     [facility] EXTERNAL RADIATION TREATMT   11:30 AM   (15 min.)   [facility] RAD ONC VARIAN   [facility] 14     [facility] LAB DRAW    8:00 AM   (15 min.)    [facility] LAB DRAW   [facility] Lab Draw     P RETURN    8:25 AM   (50 min.)   Opal Cannon PA-C   [facility] ONC INFUSION 120   10:00 AM   (120 min.)    ONCOLOGY INFUSION   [facility]      [facility] EXTERNAL RADIATION TREATMT    1:30 PM   (15 min.)   [facility] RAD ONC [facility]   [facility] 15     [facility] EXTERNAL RADIATION TREATMT    1:30 PM   (15 min.)   [facility] RAD ONC [facility]   [facility] Oncology Clinic     [facility] ON TREATMENT VISIT    1:45 PM   (15 min.)   Jodee Pagan MD   [facility] 16     [facility] EXTERNAL RADIATION TREATMT    1:30 PM   (15 min.)   [facility] RAD ONC [facility]   Radiation Oncology Clinic     TREATMENT    2:30 PM   (30 min.)   Lana Calvillo SLP   [facility] 17     [facility] EXTERNAL RADIATION TREATMT    1:30 PM   (15 min.)   [facility] RAD ONC [facility]   [facility]cology Clinic 18       19     20     21     [facility] LAB DRAW   11:30 AM   (15 min.)    [facility] LAB DRAW   [facility] Lab Draw     CT CHEST/ABDOMEN/PELVIS W   11:45 AM   (20 min.)   UCCT2   [facility] CT     [facility] RETURN    1:25 PM   (50 min.)   Opal Cannon PA-C   [facility]     [facility] ONC INFUSION 120    2:30 PM   (120 min.)   [facility] ONCOLOGY INFUSION   [facility] 22     23     24     UMP RETURN WITH ROOM    1:00 PM   (60 min.)   Yanique Wiggins GC   [facility] 25       26     27     UMP NUTRITION VISIT   12:00 PM   (30 min.)   Renea Avila RD   [facility] 28     [facility] LAB DRAW    7:00 AM   (15 min.)    [facility] LAB DRAW   [facility] Lab Draw     UMP RETURN    7:15 AM   (30 min.)   Keira Hernandez MD   [facility]     [facility] ONC INFUSION 240    8:00 AM   (240 min.)   [facility] ONCOLOGY INFUSION   [facility]                                March 2017 Sunday Monday Tuesday Wednesday Thursday Friday Saturday                  1     2     3     4       5     6     7     8     9     10     11       12     13     14     15     16     17     18       19     20     21     22     23     24     25       26     27     28     29     30     31                       Lab  Results:  Recent Results (from the past 12 hour(s))   CBC with platelets differential    Collection Time: 02/21/17 12:04 PM   Result Value Ref Range    WBC 1.0 (L) 4.0 - 11.0 10e9/L    RBC Count 3.60 (L) 4.4 - 5.9 10e12/L    Hemoglobin 9.3 (L) 13.3 - 17.7 g/dL    Hematocrit 29.7 (L) 40.0 - 53.0 %    MCV 83 78 - 100 fl    MCH 25.8 (L) 26.5 - 33.0 pg    MCHC 31.3 (L) 31.5 - 36.5 g/dL    RDW 20.4 (H) 10.0 - 15.0 %    Platelet Count 151 150 - 450 10e9/L    Diff Method Automated Method     % Neutrophils 49.5 %    % Lymphocytes 17.8 %    % Monocytes 28.7 %    % Eosinophils 2.0 %    % Basophils 0.0 %    % Immature Granulocytes 2.0 %    Nucleated RBCs 0 0 /100    Absolute Neutrophil 0.5 (L) 1.6 - 8.3 10e9/L    Absolute Lymphocytes 0.2 (L) 0.8 - 5.3 10e9/L    Absolute Monocytes 0.3 0.0 - 1.3 10e9/L    Absolute Eosinophils 0.0 0.0 - 0.7 10e9/L    Absolute Basophils 0.0 0.0 - 0.2 10e9/L    Abs Immature Granulocytes 0.0 0 - 0.4 10e9/L    Absolute Nucleated RBC 0.0    Comprehensive metabolic panel    Collection Time: 02/21/17 12:04 PM   Result Value Ref Range    Sodium 138 133 - 144 mmol/L    Potassium 4.2 3.4 - 5.3 mmol/L    Chloride 103 94 - 109 mmol/L    Carbon Dioxide 27 20 - 32 mmol/L    Anion Gap 8 3 - 14 mmol/L    Glucose 117 (H) 70 - 99 mg/dL    Urea Nitrogen 11 7 - 30 mg/dL    Creatinine 0.58 (L) 0.66 - 1.25 mg/dL    GFR Estimate >90  Non  GFR Calc   >60 mL/min/1.7m2    GFR Estimate If Black >90   GFR Calc   >60 mL/min/1.7m2    Calcium 8.8 8.5 - 10.1 mg/dL    Bilirubin Total 0.4 0.2 - 1.3 mg/dL    Albumin 3.1 (L) 3.4 - 5.0 g/dL    Protein Total 7.3 6.8 - 8.8 g/dL    Alkaline Phosphatase 71 40 - 150 U/L    ALT 18 0 - 70 U/L    AST 15 0 - 45 U/L   Magnesium    Collection Time: 02/21/17 12:04 PM   Result Value Ref Range    Magnesium 2.2 1.6 - 2.3 mg/dL

## 2017-02-21 NOTE — PROGRESS NOTES
Infusion Nursing Note:  Darek Navarro presents today for Cycle 2, day 15 Erbitux.    Patient seen by provider today: Yes: CLARY Cardenas    Note: Patient presents to clinic today feeling well with no questions.  TORB 2/21/2017 14:20: Okay to administer Erbitux with today's neutropenic counts.    Intravenous Access:  Implanted Port.    Treatment Conditions:  Lab Results   Component Value Date    HGB 9.3 02/21/2017     Lab Results   Component Value Date    WBC 1.0 02/21/2017      Lab Results   Component Value Date    ANEU 0.5 02/21/2017     Lab Results   Component Value Date     02/21/2017      Lab Results   Component Value Date     02/21/2017                   Lab Results   Component Value Date    POTASSIUM 4.2 02/21/2017           Lab Results   Component Value Date    MAG 2.2 02/21/2017            Lab Results   Component Value Date    CR 0.58 02/21/2017                   Lab Results   Component Value Date    COLE 8.8 02/21/2017                Lab Results   Component Value Date    BILITOTAL 0.4 02/21/2017           Lab Results   Component Value Date    ALBUMIN 3.1 02/21/2017                    Lab Results   Component Value Date    ALT 18 02/21/2017           Lab Results   Component Value Date    AST 15 02/21/2017     Results reviewed, labs MET treatment parameters, ok to proceed with treatment.    Post Infusion Assessment:  Patient tolerated infusion without incident.  Patient observed for 30 minutes minutes post Erbitux per protocol.  Blood return noted pre and post infusion.  Site patent and intact, free from redness, edema or discomfort.  No evidence of extravasations.  Access discontinued per protocol.    Discharge Plan:   Patient declined prescription refills.  Discharge instructions reviewed with: Patient.  Patient and/or family verbalized understanding of discharge instructions and all questions answered.  Patient declined copy of AVS reviewed with patient and/or family.  Patient will return  2/28/2017 for next appointment.  Departure Mode: Ambulatory.    Clarice Huerta RN

## 2017-02-21 NOTE — PROGRESS NOTES
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 Pathologic staging: [id number]      TREATMENT:  1- status post an April 28, 2016 transmandibular subtotal oral glossectomy, right base of tongue resection with partial pharyngectomy, bilateral neck dissections, and free flap reconstruction. Concurrent ChemoRt with high dose Cisplatin 6/6/16. day 22 cisplatin on 6/27/16. He completed day 43 on 7/19/16. Radiation completed, 6600cGy on 7/21/16.    2- Palliative intent treatment with Pembrolizumab 12/6/2016  3- Due to rapid progression of disease the treatment was switched to Carboplatin, 5-FU and Cetuximab on 1/18/17    INTERVAL HISTORY:   Darek presents for follow-up prior to cycle 2 day 15, accompanied by sister and brother and niece.    Patient completed radiation. He feels his back pain is getting better. He is now able to sleep through the night. He remains on OxyContin 20 mg bid and oxycodone 10 mg every 4 hours. He feels his energy is better on Ritalin, which he takes bid. He feels the face rash is getting better with clindamycin. He is using the salt/soda swishes and lidocaine and feels his mouth is also doing a bit better. He recently met with Lana from speech therapy and is working on his nutrition and exercises. He reports he has been eating and drinking better lately. He denies bernie fevers, cough, chest pain, or dyspnea. He denies other concerns.     Current Outpatient Prescriptions   Medication Sig Dispense Refill     methylphenidate (RITALIN) 5 MG tablet Take 1 tablet (5 mg) by mouth 2 times daily 60 tablet 0     levofloxacin (LEVAQUIN) 500 MG tablet Take 1 tablet (500 mg) by mouth daily for 7 days 7 tablet 0     oxyCODONE (ROXICODONE) 5 MG/5ML solution 10 mLs (10 mg) by Oral or PEG tube route every 4  hours as needed for moderate to severe pain 1000 mL 0     oxyCODONE (OXYCONTIN) 20 MG 12 hr tablet Take 1 tablet (20 mg) by mouth every 12 hours maximum 2 tablet(s) per day 60 tablet 0     clindamycin (CLEOCIN T) 1 % lotion Apply topically 2 times daily 60 mL 3     lidocaine (XYLOCAINE) 2 % solution 15 mL swish and spit q3h PRN, max 8 doses in 24 hours 1000 mL 3     LORazepam (ATIVAN) 0.5 MG tablet Take 1 tablet (0.5 mg) by mouth every 4 hours as needed (Anxiety, Nausea/Vomiting or Sleep) 30 tablet 5     prochlorperazine (COMPAZINE) 10 MG tablet Take 1 tablet (10 mg) by mouth every 6 hours as needed (Nausea/Vomiting) 30 tablet 5     pantoprazole (PROTONIX) 20 MG EC tablet Take 1 tablet (20 mg) by mouth daily (Patient taking differently: Take 20 mg by mouth daily as needed ) 30 tablet 3     levothyroxine (SYNTHROID/LEVOTHROID) 25 MCG tablet Take 1 tablet (25 mcg) by mouth daily 90 tablet 3     ibuprofen (ADVIL/MOTRIN) 200 MG tablet Take 200 mg by mouth daily as needed        ondansetron (ZOFRAN) 4 MG tablet Take 4 mg by mouth daily as needed        LORazepam (ATIVAN) 2 MG/ML (HIGH CONC) solution Take 0.25-0.5 mLs (0.5-1 mg) by mouth every 4 hours as needed for anxiety, sleep, nausea or vomiting 30 mL 3     citalopram (CELEXA) 10 MG/5ML solution Take 10 mLs (20 mg) by mouth daily 300 mL 3     lidocaine-prilocaine (EMLA) cream Apply to PORT site 45-60 minutes prior to procedure.  Cover with non-absorbent dressing. 30 g 5     multivitamins with minerals (CERTAVITE) LIQD 15 mLs by Per Feeding Tube route daily 1 Bottle 1     mineral oil-hydrophilic petrolatum (AQUAPHOR) ointment Apply topically every 8 hours (Patient taking differently: Apply topically every 8 hours as needed ) 50 g 0      No Known Allergies    PHYSICAL EXAMINATION  /75  Pulse 103  Temp 98.1  F (36.7  C) (Oral)  Resp 18  Wt 62.1 kg (136 lb 14.4 oz)  SpO2 100%  BMI 17.57 kg/m2   Wt Readings from Last 10 Encounters:   02/21/17 62.1 kg (136 lb 14.4 oz)    02/15/17 62 kg (136 lb 9.6 oz)   02/14/17 61.1 kg (134 lb 11.2 oz)   02/08/17 64.4 kg (142 lb)   02/07/17 64.3 kg (141 lb 11.2 oz)   02/01/17 64.5 kg (142 lb 4.8 oz)   01/31/17 64.4 kg (142 lb)   01/24/17 66.6 kg (146 lb 14.4 oz)   01/17/17 68.2 kg (150 lb 6.4 oz)   12/27/16 70.6 kg (155 lb 9.6 oz)   Constitutional: Alert, oriented male in no visible distress.  Eyes: PERRL. Anicteric sclerae. Mouth mucosa is moist with mild mucositis. Moderate trismus noted.   ENT/Mouth: OM moist and pink. Neck with surgical resection changes and limited ROM.  CV: RRR, no murmurs appreciated.  Resp: Course LLL crackles, remainder clear to auscultation bilaterally.   Abdomen: Soft, non-tender, non-distended. Bowel sounds present. No masses appreciated. No organomegaly noted.  Extremities: No lower extremity edema appreciated.  Skin: Warm, dry. No bruising or petechiae noted. Mild acneiform rash on nose and face.  Lymph: No cervical or supraclavicular lymphadenopathy appreciated.   Neuro: CN II-XII grossly intact.    LABS:   2/21/2017 12:04   Sodium 138   Potassium 4.2   Chloride 103   Carbon Dioxide 27   Urea Nitrogen 11   Creatinine 0.58 (L)   GFR Estimate >90...   GFR Estimate If Black >90...   Calcium 8.8   Anion Gap 8   Magnesium 2.2   Albumin 3.1 (L)   Protein Total 7.3   Bilirubin Total 0.4   Alkaline Phosphatase 71   ALT 18   AST 15   Glucose 117 (H)   WBC 1.0 (L)   Hemoglobin 9.3 (L)   Hematocrit 29.7 (L)   Platelet Count 151   RBC Count 3.60 (L)   MCV 83   MCH 25.8 (L)   MCHC 31.3 (L)   RDW 20.4 (H)   Diff Method Automated Method   % Neutrophils 49.5   % Lymphocytes 17.8   % Monocytes 28.7   % Eosinophils 2.0   % Basophils 0.0   % Immature Granulocytes 2.0   Nucleated RBCs 0   Absolute Neutrophil 0.5 (L)   Absolute Lymphocytes 0.2 (L)   Absolute Monocytes 0.3   Absolute Eosinophils 0.0   Absolute Basophils 0.0   Abs Immature Granulocytes 0.0   Absolute Nucleated RBC 0.0     IMPRESSION/PLAN:  Darek Navarro is a 27 year  old male with [id number] squamous cell carcinoma of the right oral tongue initially treated with resection and concurrent chemoradiation, completed in July 2016, with recurrent disease identified in October 2016. He had rapid progression identified on repeat imaging 1/16/2017 while on Keytruda.    1. Metastatic SCC: Started Carbo/5-FU/cetuximab on 1/18/2017. Here today for cycle 2 day 15 cetuximab. He has had difficulty with nausea, vomiting, mucositis, and Erbitux induced rash on his face, but is doing better today. He will continue with cycle 2 day 15 today. Preliminary review of his CT CAP shows improvement in his disease, which was reviewed with the patient. He will follow up with Dr. Hernandez next week. We will tentatively plan for 4-6 cycles of chemotherapy and then may consider switching back to immune therapy.    2. Bone mets: Vertebral fracture identifed on restaging. Spine surgery and radiation oncology consulted. Recommended radiation, which he started on 2/13/17 and has now completed.  -on monthly Zometa, last given 2/14.    3. Cancer-related back pain:   -Some improvement with radiation. Will continue OxyContin at 20 mg bid and continue oxycodone prn, currently 10 mg every 4 hours.     4. FEN: Progressive weight loss, now stabilized as he is eating better again.   -He met with a dietician on 1/18/17.   -Has also seen Lana Calvillo, QUINTIN to help with swallowing and trismus.  -Encouraged increased protein intake and albumin remains mildly low.    5. Fatigue: Much improved. Continue the Ritalin 5 mg BID.    6. Mucositis: Improved. Continue viscous lidocaine and salt/soda swishes.    7. Mild acneiform rash and xeroderma: likely 2/2 Erbitux. Mildly improved from last week. Continue emollients and clindamycin bid.     8. Neutropenia and tree-in-bud opacities. Chest imaging today concerning for possible pneumonia, though patient is asymptomatic. He does have crackles in the LLL. He will start on Levaquin 500 mg daily  x 7 days. For the neutropenia, we reviewed the importance of calling with a fever of 100.4F or higher and avoiding sick contacts. He will check his temperature at least once daily. He was given masks to wear when in public.    Opal Cannon PA-C  [facility]  [address] [place] [id number] [phone]

## 2017-02-21 NOTE — NURSING NOTE
"Darek Navarro is a 27 year old male who presents for:  Chief Complaint   Patient presents with     Port Draw     powerport 20g .75in used by RN to access port, labs collected and sent, vitals taken and pt sent to appt downstairs understanding he will need to check in with concierges.     Oncology Clinic Visit     Pt is here for a Fu appt        Initial Vitals:  /75  Pulse 103  Temp 98.1  F (36.7  C) (Oral)  Resp 18  Wt 62.1 kg (136 lb 14.4 oz)  SpO2 100%  BMI 17.57 kg/m2 Estimated body mass index is 17.57 kg/(m^2) as calculated from the following:    Height as of 2/1/17: 1.88 m (6' 2.02\").    Weight as of this encounter: 62.1 kg (136 lb 14.4 oz).. Body surface area is 1.8 meters squared. BP completed using cuff size: NA (Not Taken)  No Pain (0) No LMP for male patient. Allergies and medications reviewed.     Medications: Medication refills not needed today.  Pharmacy name entered into [employer]:    [facility] [id number] [place] [place] [facility] [address] [facility] [facility] [address]  [facility] [address] [place], [place] - [address]    Comments: Pt vitals taken in lab.    6 minutes for nursing intake (face to face time)   Jessica Rodriguez CMA        "

## 2017-02-24 NOTE — PROGRESS NOTES
2/24/2017    Referring Provider: Keira Hernandez MD    Presenting Information:   I met with Darek Navarro again today for genetic counseling at the Cancer Risk Management Program at the [facility] to discuss his personal history of a squamous cell carcinoma of the tongue and family history of ovarian, kidney, prostate, and brain cancer. He is here today to readdress this history, cancer screening recommendations, and available genetic testing options.     We previously met on 5/31/2016, at which time we pursued a pre-authorization for Fanconi anemia and dyskeratosis congenita screening tests. We were unable to connect over the phone after that visit to coordinate a time to meet to sign paperwork and have his blood drawn for testing.     Personal History:  Darek is a 27 year old male and he reports no changes to his personal history since 5/31/2016. Please see the clinic note from that date for more information.    Updated Family History: (Please see scanned pedigree for detailed family history information)    Darek's sister, Karla, had previously signed a release of information form allowing me to access their mother's records, given their mother's history of ovarian cancer. Per her records, she had pursued genetic testing of the BRCA1 and BRCA2 genes alone before she passed away, which was normal/negative.    Darek's maternal grandfather has recently been diagnosed with a cancerous cyst of his other kidney at age 80; treatment has included cryotherapy.    Darek's maternal great grandmother (through his grandmother) was diagnosed with breast cancer in her 60's and passed away at a later age. Treatment included chemotherapy.    Discussion:    Darek's personal history of a squamous cell of the tongue and family history of ovarian, bilateral kidney, and brain cancer is still suggestive of a hereditary cancer syndrome.    We again discussed the natural history and genetics of several syndromes,  including Fanconi anemia, dyskeratosis congenita, and Brasher syndrome. A detailed handout regarding these syndromes and the information we discussed was provided to Darek at the end of our appointment today and can be found in the after visit summary.  Topics included: inheritance pattern, cancer risks, cancer screening recommendations, and also risks, benefits and limitations of testing.    We reviewed again that his personal history of a squamous cell of the tongue could be consistent with the syndromes Fanconi anemia (FA) and dyskeratosis congenita (DC). FA is caused by mutations in multiple genes and is associated with decreased repair of chromosome damage/breakage. Symptoms of FA include small stature, birth defects, birthmarks, intellectual delay, bone marrow failure, and increased risk for different cancers (including squamous cell of the head and neck). DC is caused by mutations in multiple genes and is associated with small telomere size. Symptoms of DC include birthmarks, fragile/absent fingernails, lung fibrosis, liver cirrhosis, premature greying, white oral plaques, bone marrow failure, and increased risk for certain cancers (including squamous cell of the head and neck).     Testing for these conditions typically begins with two screening tests. For FA, this screening test is a mutagen sensitivity test to measure the amount of chromosomal breakage and is offered by the [facility]. If there is significantly increased breakage, genetic testing for FA may be indicated. For DC, this screening test is a telomere length test through [facility]. If the telomeres are shorter than expected, genetic testing for DC may be indicated.    Darek again expressed interest in these two screening tests, but would prefer having a pre-authorization done first to better understand his out of pocket cost. We discussed that the pre-authorization we pursued when we last met showed  that he did not require pre-authorization for this testing. Darek explained he still felt most comfortable pursuing this pre-authorization again. Darek will be invited back to clinic for a blood draw after this pre-authorization is completed.     We then reviewed his mother's genetic testing of the BRCA1 and BRCA2 genes, which are the most common cause of hereditary ovarian cancer. As she was not found to carry a mutation in either of these genes, she could not have passed on a mutation in these genes to Darek.    His family history of ovarian cancer and kidney cancer could be consistent with other hereditary cancer syndromes, though. One example is Brasher syndrome, which is caused by mutations in the genes MLH1, MSH2, MSH6, PMS2, and EPCAM.    Another possible explanation for the family history is Li-Fraumeni syndrome, which is caused by mutations in the TP53 gene. Though Darek's cancer is not typical for this syndrome, his age at diagnosis and family history of brain tumors could be consistent with the condition.    We discussed that there are also additional genes that could cause increased risk for the cancers in Darek's family that may be separate from his own cancer history. As many of these genes present with overlapping features in a family, it would be reasonable for Darek to consider panel genetic testing to analyze multiple genes at once.    We reviewed genetic testing options for hereditary gynecologic cancers: actionable high/moderate risk panel testing (GYNplus, 13 genes) and expanded high and moderate risk panel testing (OvaNext, 24 genes). Darek expressed an interest in learning as much information as possible from the testing. He opted for the OvaNext panel.    Genetic testing is available for 24 genes associated with hereditary gynecologic cancers: OvaNext (LUISA, BARD1, BRCA1, BRCA2, BRIP1, CDH1, CHEK2, EPCAM, MLH1, MRE11A, MSH2, MSH6, MUTYH, NBN, NF1, PALB2, PMS2, PTEN, RAD50, RAD51C, RAD51D,  SMARCA4, STK11, and TP53).    We discussed that some of the genes in the OvaNext panel are associated with specific hereditary cancer syndromes and have published management guidelines: Hereditary Breast and Ovarian Cancer syndrome (BRCA1, BRCA2), Brasher syndrome (MLH1, MSH2, MSH6, PMS2, EPCAM), Hereditary Gastric Cancer syndrome (CDH1), Cowden syndrome (PTEN), Li Fraumeni syndrome (TP53), Peutz-Jeghers syndrome (STK11), MUTYH Associated Polyposis syndrome (MUTYH), and Neurofibromatosis type 1 (NF1).      Risk-reducing salpingo-oophorectomy can be considered in women with mutations in BRIP1, RAD51C, or RAD51D.  Breast cancer risk management guidelines are available for LUISA, CHEK2, PALB2, NF1, and NBN.    The remaining genes (BARD1, MRE11A, RAD50, and SMARCA4) are associated with increased cancer risk and may allow us to make medical recommendations when mutations are identified.      Darek was provided with a detailed brochure from [employer] explaining the OvaNext testing.    Darek explained that as he will likely need blood draws for the FA and DC screening tests, he would prefer to have his blood drawn for the OvaNext gene panel at that time. As such, this test will be readdressed when he returns to clinic for his other blood draws.    Formal cancer screening recommendations for Darek and his relatives will be made after his genetic testing/screening is completed.    Plan:  1) Today Darek elected to proceed with a pre-authorization to better understand his out of pocket cost for the DC and FA screening tests.  2) Darek will be invited back to clinic after we obtain a pre-authorization to have his blood drawn for the DC and FA screening tests, as well as the OvaNext gene panel through [employer].  3) Darek and his sister were again provided my contact information and encouraged to contact me with any questions. Darek shared that the best way to contact him will be through his sister Karla and he  provided me with her contact information.    Face to face time: 45 minutes    Yanique Wiggins MS, [facility] [place]  Certified Genetic Counselor  Office: [phone]  Pager: [phone]

## 2017-02-24 NOTE — LETTER
2/24/2017       RE: Darek Navarro  [address]  [place] [address]     Dear Colleague,    Thank you for referring your patient, Darek Navarro, to the [facility]. Please see a copy of my visit note below.    2/24/2017    Referring Provider: Keira Hernandez MD    Presenting Information:   I met with Darek Navarro again today for genetic counseling at the Cancer Risk Management Program at the [facility] to discuss his personal history of a squamous cell carcinoma of the tongue and family history of ovarian, kidney, prostate, and brain cancer. He is here today to readdress this history, cancer screening recommendations, and available genetic testing options.     We previously met on 5/31/2016, at which time we pursued a pre-authorization for Fanconi anemia and dyskeratosis congenita screening tests. We were unable to connect over the phone after that visit to coordinate a time to meet to sign paperwork and have his blood drawn for testing.     Personal History:  Darek is a 27 year old male and he reports no changes to his personal history since 5/31/2016. Please see the clinic note from that date for more information.    Updated Family History: (Please see scanned pedigree for detailed family history information)    Darek's sister, Karla, had previously signed a release of information form allowing me to access their mother's records, given their mother's history of ovarian cancer. Per her records, she had pursued genetic testing of the BRCA1 and BRCA2 genes alone before she passed away, which was normal/negative.    Darek's maternal grandfather has recently been diagnosed with a cancerous cyst of his other kidney at age 80; treatment has included cryotherapy.    Darek's maternal great grandmother (through his grandmother) was diagnosed with breast cancer in her 60's and passed away at a later age. Treatment included chemotherapy.    Discussion:    Darek's personal  history of a squamous cell of the tongue and family history of ovarian, bilateral kidney, and brain cancer is still suggestive of a hereditary cancer syndrome.    We again discussed the natural history and genetics of several syndromes, including Fanconi anemia, dyskeratosis congenita, and Brasher syndrome. A detailed handout regarding these syndromes and the information we discussed was provided to Darek at the end of our appointment today and can be found in the after visit summary.  Topics included: inheritance pattern, cancer risks, cancer screening recommendations, and also risks, benefits and limitations of testing.    We reviewed again that his personal history of a squamous cell of the tongue could be consistent with the syndromes Fanconi anemia (FA) and dyskeratosis congenita (DC). FA is caused by mutations in multiple genes and is associated with decreased repair of chromosome damage/breakage. Symptoms of FA include small stature, birth defects, birthmarks, intellectual delay, bone marrow failure, and increased risk for different cancers (including squamous cell of the head and neck). DC is caused by mutations in multiple genes and is associated with small telomere size. Symptoms of DC include birthmarks, fragile/absent fingernails, lung fibrosis, liver cirrhosis, premature greying, white oral plaques, bone marrow failure, and increased risk for certain cancers (including squamous cell of the head and neck).     Testing for these conditions typically begins with two screening tests. For FA, this screening test is a mutagen sensitivity test to measure the amount of chromosomal breakage and is offered by the [facility]. If there is significantly increased breakage, genetic testing for FA may be indicated. For DC, this screening test is a telomere length test through [facility]. If the telomeres are shorter than expected, genetic testing for DC may be  indicated.    Darek again expressed interest in these two screening tests, but would prefer having a pre-authorization done first to better understand his out of pocket cost. We discussed that the pre-authorization we pursued when we last met showed that he did not require pre-authorization for this testing. Darek explained he still felt most comfortable pursuing this pre-authorization again. Darek will be invited back to clinic for a blood draw after this pre-authorization is completed.     We then reviewed his mother's genetic testing of the BRCA1 and BRCA2 genes, which are the most common cause of hereditary ovarian cancer. As she was not found to carry a mutation in either of these genes, she could not have passed on a mutation in these genes to Darek.    His family history of ovarian cancer and kidney cancer could be consistent with other hereditary cancer syndromes, though. One example is Brasher syndrome, which is caused by mutations in the genes MLH1, MSH2, MSH6, PMS2, and EPCAM.    Another possible explanation for the family history is Li-Fraumeni syndrome, which is caused by mutations in the TP53 gene. Though Darek's cancer is not typical for this syndrome, his age at diagnosis and family history of brain tumors could be consistent with the condition.    We discussed that there are also additional genes that could cause increased risk for the cancers in Darek's family that may be separate from his own cancer history. As many of these genes present with overlapping features in a family, it would be reasonable for Darek to consider panel genetic testing to analyze multiple genes at once.    We reviewed genetic testing options for hereditary gynecologic cancers: actionable high/moderate risk panel testing (GYNplus, 13 genes) and expanded high and moderate risk panel testing (OvaNext, 24 genes). Darek expressed an interest in learning as much information as possible from the testing. He opted for the  OvaNext panel.    Genetic testing is available for 24 genes associated with hereditary gynecologic cancers: OvaNext (LUISA, BARD1, BRCA1, BRCA2, BRIP1, CDH1, CHEK2, EPCAM, MLH1, MRE11A, MSH2, MSH6, MUTYH, NBN, NF1, PALB2, PMS2, PTEN, RAD50, RAD51C, RAD51D, SMARCA4, STK11, and TP53).    We discussed that some of the genes in the OvaNext panel are associated with specific hereditary cancer syndromes and have published management guidelines: Hereditary Breast and Ovarian Cancer syndrome (BRCA1, BRCA2), Brasher syndrome (MLH1, MSH2, MSH6, PMS2, EPCAM), Hereditary Gastric Cancer syndrome (CDH1), Cowden syndrome (PTEN), Li Fraumeni syndrome (TP53), Peutz-Jeghers syndrome (STK11), MUTYH Associated Polyposis syndrome (MUTYH), and Neurofibromatosis type 1 (NF1).      Risk-reducing salpingo-oophorectomy can be considered in women with mutations in BRIP1, RAD51C, or RAD51D.  Breast cancer risk management guidelines are available for LUISA, CHEK2, PALB2, NF1, and NBN.    The remaining genes (BARD1, MRE11A, RAD50, and SMARCA4) are associated with increased cancer risk and may allow us to make medical recommendations when mutations are identified.      Darek was provided with a detailed brochure from [email or web address] explaining the OvaNext testing.    Darek explained that as he will likely need blood draws for the FA and DC screening tests, he would prefer to have his blood drawn for the OvaNext gene panel at that time. As such, this test will be readdressed when he returns to clinic for his other blood draws.    Formal cancer screening recommendations for Darek and his relatives will be made after his genetic testing/screening is completed.    Plan:  1) Today Darek elected to proceed with a pre-authorization to better understand his out of pocket cost for the DC and FA screening tests.  2) Darek will be invited back to clinic after we obtain a pre-authorization to have his blood drawn for the DC and FA screening tests, as well as  the OvaNext gene panel through [employer].  3) Darek and his sister were again provided my contact information and encouraged to contact me with any questions. Darek shared that the best way to contact him will be through his sister Karla and he provided me with her contact information.    Face to face time: 45 minutes    Yanique Wiggins MS, [facility]  Certified Genetic Counselor  Office: [phone]  Pager: [phone]

## 2017-02-24 NOTE — PATIENT INSTRUCTIONS
Assessing Cancer Risk  Only about 5-10% of cancers are thought to be due to an inherited cancer susceptibility gene.    These families often have:    Several people with the same or related types of cancer    Cancers diagnosed at a young age (before age 50)    Individuals with more than one primary cancer    Multiple generations of the family affected with cancer    Some people may be candidates for genetic testing of more than one gene.  For these families, genetic testing using a cancer panel may be offered.  These panels can test many genes at once known to increase the risk for gynecologic (and other) cancers:  BRCA1, BRCA2, BRIP1 MLH1, MSH2, MSH6, PMS2, EPCAM, PTEN, PALB2, RAD51C, RAD51D, and TP53. The purpose of this handout is to serve as a brief summary of the gynecologic cancer risk genes that have published clinical management guidelines for individuals who are found to carry a mutation.    _____________________________________________________________________________________    Hereditary Breast and Ovarian Cancer Syndrome   (BRCA1 and BRCA2)  A single mutation in one of the copies of BRCA1 or BRCA2 increases the risk for breast and ovarian cancer, among others.  The risk for pancreatic cancer and melanoma may also be slightly increased in some families.  The chart below shows the chance that someone with a BRCA mutation would develop cancer in his or her lifetime1,2,3,4.        A person s ethnic background is also important to consider, as individuals of Ashkenazi Restorationism ancestry have a higher chance of having a BRCA gene mutation.  There are three BRCA mutations that occur more frequently in this population.    Brasher Syndrome   (MLH1, MSH2, MSH6, PMS2, and EPCAM)  Currently five genes are known to cause Brasher Syndrome: MLH1, MSH2, MSH6, PMS2, and EPCAM.  A single mutation in one of the Brasher Syndrome genes increases the risk for colon, endometrial, ovarian, and stomach cancers.  Other cancers that  occur less commonly in Brasher Syndrome include urinary tract, skin, and brain cancers.  The chart below shows the chance that a person with Brasher syndrome would develop cancer in his or her lifetime7.      *Cancer risk varies depending on Brasher syndrome gene found    Cowden Syndrome   (PTEN)  Cowden syndrome is a hereditary condition that increases the risk for breast, thyroid, endometrial, and kidney cancer.  Cowden syndrome is caused by a mutation in the PTEN gene.  A single mutation in one of the copies of PTEN causes Cowden syndrome and increases cancer risk.  The chart below shows the chance that someone with a PTEN mutation would develop cancer in their lifetime5,6.  Other benign features seen in some individuals with Cowden syndrome include benign skin lesions (facial papules, keratoses, lipomas), learning disability, autism, thyroid nodules, colon polyps, and larger head size.      *One recent study found breast cancer risk to be increased to 85%  Li-Fraumeni Syndrome   (TP53)  Li-Fraumeni Syndrome (LFS) is a cancer predisposition syndrome caused by a mutation in the TP53 gene. A single mutation in one of the copies of TP53 increases the risk for multiple cancers. Individuals with LFS are at an increased risk for developing cancer at a young age. The general lifetime risk for development of cancer is 50% by age 30 and 90% by age 60.     Core Cancers: Sarcomas, Breast, Brain, Lung, Leukemias/Lymphomas, Adrenocortical carcinomas  Other Cancers: Gastrointestinal, Thyroid, Skin, Genitourinary    Additional Genes  PALB2  Mutations in PALB2 have been shown to increase the risk of breast cancer up to 33-58% in some families; where individuals fall within this risk range is dependent upon family history. PALB2 mutations have also been associated with increased risk for pancreatic cancer, although this risk has not been quantified yet.  Individuals who inherit two PALB2 mutations--one from their mother and one from  their father--have a condition called Fanconi Anemia.  This rare autosomal recessive condition is associated with short stature, developmental delay, bone marrow failure, and increased risk for childhood cancers.    BRIP1, RAD51C and RAD51D  Mutations in BRIP1, RAD51C, and RAD51D have been shown to increase the risk of ovarian cancer as well as female breast cancer.    _____________________________________________________________________________________    Inheritance  All of the cancer syndromes reviewed above are inherited in an autosomal dominant pattern.  This means that if a parent has a mutation, each of his or her children will have a 50% chance of inheriting that same mutation.  Therefore, each child--male or female--would have a 50% chance of being at increased risk for developing cancer.      Image obtained from [employer], 2013     Mutations in some genes can occur de maurice, which means that a person s mutation occurred for the first time in them and was not inherited from a parent.  Now that they have the mutation, however, it can be passed on to future generations.    Genetic Testing  Genetic testing involves a blood test and will look for any harmful mutations that are associated with increased cancer risk.  If possible, it is recommended that the person(s) who has had cancer be tested before other family members.  That person will give us the most useful information about whether or not a specific gene is associated with the cancer in the family.    Results  There are three possible results of genetic testing:    Positive--a harmful mutation was identified in one or more of the genes    Negative--no mutation was identified in any of the 9 genes on this panel    Variant of unknown significance--a variation in one of the genes was identified, but it is unclear how this impacts cancer risk in the family    Advantages and Disadvantages   There are advantages and disadvantages to genetic  testing.  Advantages    May clarify your cancer risk    Can help you make medical decisions    May explain the cancers in your family    May give useful information to your family members (if you share your results)    Disadvantages    Possible negative emotional impact of learning about inherited cancer risk    Uncertainty in interpreting a negative test result in some situations    Possible genetic discrimination concerns (see below)    Genetic Information Nondiscrimination Act (BOB)  BOB is a federal law that protects individuals from health insurance or employment discrimination based on a genetic test result alone.  Although rare, there are currently no legal discrimination protections in terms of life insurance, long term care, or disability insurances.  Visit the [employer] [place] website to learn more.    Reducing Cancer Risk  Each of the genes listed within this handout have nationally recognized cancer screening guidelines that would be recommended for individuals who test positive.  In addition to increased cancer screening, surgeries may be offered or recommended to reduce cancer risk.  Recommendations are based upon an individual s genetic test result as well as their personal and family history of cancer.    Questions to Think About Regarding Genetic Testing:    What effect will the test result have on me and my relationship with my family members if I have an inherited gene mutation?  If I don t have a gene mutation?    Should I share my test results, and how will my family react to this news, which may also affect them?    Are my children ready to learn new information that may one day affect their own health?        Hereditary Cancer Resources    FORCE: Facing Our Risk of Cancer Empowered facingourrisk.org   Bright Pink bebrightpink.org   Li-Fraumeni Syndrome Association lfsassociation.org   PTEN World PTENworld.com   Collaborative Group of the Americas on Inherited  Colorectal Cancer (CGA) [email or web address]/   Cancer Care cancercare.org   American Cancer Society (ACS) cancer.org   National Cancer [place] (NCI) cancer.gov       Cancer Risk Management Program [phone]-CANCER ([phone])  ? Leandra Bañuelos, [place], [facility]  [phone]  ? Opal Saba, [place], [facility]  [phone]  ? Yanique Wiggins, [place], [facility]  [phone]  ? Geetha Ames, MS   [phone]    References  1. Alfred HALL, Rene PDP, Jose E S, Kyra DE LA CRUZ, Laura JE, Viridiana JL, Yesenia N, Zuleika H, Scot O, Estefanía A, Pacheco B, Ana P, Jamarcus S, Tenisha DM, Silvio N, Ramses E, Tammi H, Lewis E, Jesus J, Kirit J, April B, Geovanna H, Thorlacius S, Eerola H, Clementina H, Bria K, Sampson OP. Average risks of breast and ovarian cancer associated with BRCA1 or BRCA2 mutations detected in case series unselected for family history: a combined analysis of 222 studies. Am J Hum Danica. 2003;72:1117-30.  2. Yadira N, Yolanda M, Meagan G.  BRCA1 and BRCA2 Hereditary Breast and Ovarian Cancer. Gene Reviews online. 2013.  3. Salvador YC, Desirae S, Monika G, Davidson S. Breast cancer risk among male BRCA1 and BRCA2 mutation carriers. J Natl Cancer Inst. 2007;99:1811-4.  4. Richy DG, Jaylene I, Flaquito J, Milana E, Daxa ER, Austyn F. Risk of breast cancer in male BRCA2 carriers. J Med Danica. 2010;47:710-1.  5. Greg MH, Danika J, Corine J, Rosanna ROMAN, Jarad MS, Roz C. Lifetime cancer risks in individuals with germline PTEN mutations. Clin Cancer Res. 2012;18:400-7.  6. Vickie MEDINA. Cowden Syndrome: A Critical Review of the Clinical Literature. J Danica . 2009:18:13-27.  7. National Comprehensive Cancer Network. Clinical practice guidelines in oncology, colorectal cancer screening. Available online (registration required). 2015.  8. Alfred GAMBLE et al. Breast-Cancer Risk in Families with Mutations in PALB2. NEJM. 2014; 371(6):497-506.

## 2017-02-24 NOTE — LETTER
Cancer Risk Management  Program Locations    [facility]  [facility]  Mailing Address  Cancer Risk Management Program  [facility]  [address]  [place] [address]    New patient appointments  [phone]  February 27, 2017    Darek Navarro  [address]  [place] [address]      Dear Darek,    It was a pleasure meeting with you again at the [facility] on February 24, 2017. Here is a copy of the progress note from your recent genetic counseling visit to the Cancer Risk Management Program. If you have any additional questions, please feel free to call.    2/24/2017    Referring Provider: Keira Hernandez MD    Presenting Information:   I met with Darek Webberwilnermaricarmen again today for genetic counseling at the Cancer Risk Management Program at the [facility] to discuss his personal history of a squamous cell carcinoma of the tongue and family history of ovarian, kidney, prostate, and brain cancer. He is here today to readdress this history, cancer screening recommendations, and available genetic testing options.     We previously met on 5/31/2016, at which time we pursued a pre-authorization for Fanconi anemia and dyskeratosis congenita screening tests. We were unable to connect over the phone after that visit to coordinate a time to meet to sign paperwork and have his blood drawn for testing.     Personal History:  Darek is a 27 year old male and he reports no changes to his personal history since 5/31/2016. Please see the clinic note from that date for more information.    Updated Family History: (Please see scanned pedigree for detailed family history information)    Darek's sister, Karla, had previously signed a release of information form allowing me to access their mother's records, given  their mother's history of ovarian cancer. Per her records, she had pursued genetic testing of the BRCA1 and BRCA2 genes alone before she passed away, which was normal/negative.    Darek's maternal grandfather has recently been diagnosed with a cancerous cyst of his other kidney at age 80; treatment has included cryotherapy.    Darek's maternal great grandmother (through his grandmother) was diagnosed with breast cancer in her 60's and passed away at a later age. Treatment included chemotherapy.    Discussion:    Darek's personal history of a squamous cell of the tongue and family history of ovarian, bilateral kidney, and brain cancer is still suggestive of a hereditary cancer syndrome.    We again discussed the natural history and genetics of several syndromes, including Fanconi anemia, dyskeratosis congenita, and Brasher syndrome. A detailed handout regarding these syndromes and the information we discussed was provided to Darek at the end of our appointment today and can be found in the after visit summary.  Topics included: inheritance pattern, cancer risks, cancer screening recommendations, and also risks, benefits and limitations of testing.    We reviewed again that his personal history of a squamous cell of the tongue could be consistent with the syndromes Fanconi anemia (FA) and dyskeratosis congenita (DC). FA is caused by mutations in multiple genes and is associated with decreased repair of chromosome damage/breakage. Symptoms of FA include small stature, birth defects, birthmarks, intellectual delay, bone marrow failure, and increased risk for different cancers (including squamous cell of the head and neck). DC is caused by mutations in multiple genes and is associated with small telomere size. Symptoms of DC include birthmarks, fragile/absent fingernails, lung fibrosis, liver cirrhosis, premature greying, white oral plaques, bone marrow failure, and increased risk for certain cancers (including  squamous cell of the head and neck).     Testing for these conditions typically begins with two screening tests. For FA, this screening test is a mutagen sensitivity test to measure the amount of chromosomal breakage and is offered by the [facility]. If there is significantly increased breakage, genetic testing for FA may be indicated. For DC, this screening test is a telomere length test through [facility]. If the telomeres are shorter than expected, genetic testing for DC may be indicated.    Darek again expressed interest in these two screening tests, but would prefer having a pre-authorization done first to better understand his out of pocket cost. We discussed that the pre-authorization we pursued when we last met showed that he did not require pre-authorization for this testing. Darek explained he still felt most comfortable pursuing this pre-authorization again. Darek will be invited back to clinic for a blood draw after this pre-authorization is completed.     We then reviewed his mother's genetic testing of the BRCA1 and BRCA2 genes, which are the most common cause of hereditary ovarian cancer. As she was not found to carry a mutation in either of these genes, she could not have passed on a mutation in these genes to Darek.    His family history of ovarian cancer and kidney cancer could be consistent with other hereditary cancer syndromes, though. One example is Brasher syndrome, which is caused by mutations in the genes MLH1, MSH2, MSH6, PMS2, and EPCAM.    Another possible explanation for the family history is Li-Fraumeni syndrome, which is caused by mutations in the TP53 gene. Though Darek's cancer is not typical for this syndrome, his age at diagnosis and family history of brain tumors could be consistent with the condition.    We discussed that there are also additional genes that could cause increased risk for the cancers in Darek's family that may be  separate from his own cancer history. As many of these genes present with overlapping features in a family, it would be reasonable for Darek to consider panel genetic testing to analyze multiple genes at once.    We reviewed genetic testing options for hereditary gynecologic cancers: actionable high/moderate risk panel testing (GYNplus, 13 genes) and expanded high and moderate risk panel testing (OvaNext, 24 genes). Darek expressed an interest in learning as much information as possible from the testing. He opted for the OvaNext panel.    Genetic testing is available for 24 genes associated with hereditary gynecologic cancers: OvaNext (LUISA, BARD1, BRCA1, BRCA2, BRIP1, CDH1, CHEK2, EPCAM, MLH1, MRE11A, MSH2, MSH6, MUTYH, NBN, NF1, PALB2, PMS2, PTEN, RAD50, RAD51C, RAD51D, SMARCA4, STK11, and TP53).    We discussed that some of the genes in the OvaNext panel are associated with specific hereditary cancer syndromes and have published management guidelines: Hereditary Breast and Ovarian Cancer syndrome (BRCA1, BRCA2), Brasher syndrome (MLH1, MSH2, MSH6, PMS2, EPCAM), Hereditary Gastric Cancer syndrome (CDH1), Cowden syndrome (PTEN), Li Fraumeni syndrome (TP53), Peutz-Jeghers syndrome (STK11), MUTYH Associated Polyposis syndrome (MUTYH), and Neurofibromatosis type 1 (NF1).      Risk-reducing salpingo-oophorectomy can be considered in women with mutations in BRIP1, RAD51C, or RAD51D.  Breast cancer risk management guidelines are available for LUISA, CHEK2, PALB2, NF1, and NBN.    The remaining genes (BARD1, MRE11A, RAD50, and SMARCA4) are associated with increased cancer risk and may allow us to make medical recommendations when mutations are identified.      Darek was provided with a detailed brochure from [employer] explaining the OvaNext testing.    Darek explained that as he will likely need blood draws for the FA and DC screening tests, he would prefer to have his blood drawn for the OvaNext gene panel at that  time. As such, this test will be readdressed when he returns to clinic for his other blood draws.    Formal cancer screening recommendations for Darek and his relatives will be made after his genetic testing/screening is completed.    Plan:  1) Today Darek elected to proceed with a pre-authorization to better understand his out of pocket cost for the DC and FA screening tests.  2) Darek will be invited back to clinic after we obtain a pre-authorization to have his blood drawn for the DC and FA screening tests, as well as the OvaNext gene panel through [facility].  3) Darek and his sister were again provided my contact information and encouraged to contact me with any questions. Darek shared that the best way to contact him will be through his sister Karla and he provided me with her contact information.    Face to face time: 45 minutes    Yanique Wiggins MS, [facility]  Certified Genetic Counselor  Office: [phone]  Pager: [phone]

## 2017-02-24 NOTE — MR AVS SNAPSHOT
After Visit Summary   2/24/2017    Darek Navarro    MRN: [id number]           Patient Information     Date Of Birth          1990        Visit Information        Provider Department      2/24/2017 1:15 PM Yanique Wiggins GC;  2 116 CONSULT [facility]        Care Instructions          Assessing Cancer Risk  Only about 5-10% of cancers are thought to be due to an inherited cancer susceptibility gene.    These families often have:    Several people with the same or related types of cancer    Cancers diagnosed at a young age (before age 50)    Individuals with more than one primary cancer    Multiple generations of the family affected with cancer    Some people may be candidates for genetic testing of more than one gene.  For these families, genetic testing using a cancer panel may be offered.  These panels can test many genes at once known to increase the risk for gynecologic (and other) cancers:  BRCA1, BRCA2, BRIP1 MLH1, MSH2, MSH6, PMS2, EPCAM, PTEN, PALB2, RAD51C, RAD51D, and TP53. The purpose of this handout is to serve as a brief summary of the gynecologic cancer risk genes that have published clinical management guidelines for individuals who are found to carry a mutation.    _____________________________________________________________________________________    Hereditary Breast and Ovarian Cancer Syndrome   (BRCA1 and BRCA2)  A single mutation in one of the copies of BRCA1 or BRCA2 increases the risk for breast and ovarian cancer, among others.  The risk for pancreatic cancer and melanoma may also be slightly increased in some families.  The chart below shows the chance that someone with a BRCA mutation would develop cancer in his or her lifetime1,2,3,4.        A person s ethnic background is also important to consider, as individuals of Ashkenazi Pentec[employer]al ancestry have a higher chance of having a BRCA gene mutation.  There are three BRCA mutations that occur  more frequently in this population.    Brasher Syndrome   (MLH1, MSH2, MSH6, PMS2, and EPCAM)  Currently five genes are known to cause Brasher Syndrome: MLH1, MSH2, MSH6, PMS2, and EPCAM.  A single mutation in one of the Brasher Syndrome genes increases the risk for colon, endometrial, ovarian, and stomach cancers.  Other cancers that occur less commonly in Brasher Syndrome include urinary tract, skin, and brain cancers.  The chart below shows the chance that a person with Brasher syndrome would develop cancer in his or her lifetime7.      *Cancer risk varies depending on Brasher syndrome gene found    Cowden Syndrome   (PTEN)  Cowden syndrome is a hereditary condition that increases the risk for breast, thyroid, endometrial, and kidney cancer.  Cowden syndrome is caused by a mutation in the PTEN gene.  A single mutation in one of the copies of PTEN causes Cowden syndrome and increases cancer risk.  The chart below shows the chance that someone with a PTEN mutation would develop cancer in their lifetime5,6.  Other benign features seen in some individuals with Cowden syndrome include benign skin lesions (facial papules, keratoses, lipomas), learning disability, autism, thyroid nodules, colon polyps, and larger head size.      *One recent study found breast cancer risk to be increased to 85%  Li-Fraumeni Syndrome   (TP53)  Li-Fraumeni Syndrome (LFS) is a cancer predisposition syndrome caused by a mutation in the TP53 gene. A single mutation in one of the copies of TP53 increases the risk for multiple cancers. Individuals with LFS are at an increased risk for developing cancer at a young age. The general lifetime risk for development of cancer is 50% by age 30 and 90% by age 60.     Core Cancers: Sarcomas, Breast, Brain, Lung, Leukemias/Lymphomas, Adrenocortical carcinomas  Other Cancers: Gastrointestinal, Thyroid, Skin, Genitourinary    Additional Genes  PALB2  Mutations in PALB2 have been shown to increase the risk of breast  cancer up to 33-58% in some families; where individuals fall within this risk range is dependent upon family history. PALB2 mutations have also been associated with increased risk for pancreatic cancer, although this risk has not been quantified yet.  Individuals who inherit two PALB2 mutations--one from their mother and one from their father--have a condition called Fanconi Anemia.  This rare autosomal recessive condition is associated with short stature, developmental delay, bone marrow failure, and increased risk for childhood cancers.    BRIP1, RAD51C and RAD51D  Mutations in BRIP1, RAD51C, and RAD51D have been shown to increase the risk of ovarian cancer as well as female breast cancer.    _____________________________________________________________________________________    Inheritance  All of the cancer syndromes reviewed above are inherited in an autosomal dominant pattern.  This means that if a parent has a mutation, each of his or her children will have a 50% chance of inheriting that same mutation.  Therefore, each child--male or female--would have a 50% chance of being at increased risk for developing cancer.      Image obtained from Genetics Home Reference, 2013     Mutations in some genes can occur de maurice, which means that a person s mutation occurred for the first time in them and was not inherited from a parent.  Now that they have the mutation, however, it can be passed on to future generations.    Genetic Testing  Genetic testing involves a blood test and will look for any harmful mutations that are associated with increased cancer risk.  If possible, it is recommended that the person(s) who has had cancer be tested before other family members.  That person will give us the most useful information about whether or not a specific gene is associated with the cancer in the family.    Results  There are three possible results of genetic testing:    Positive--a harmful mutation was identified in one  or more of the genes    Negative--no mutation was identified in any of the 9 genes on this panel    Variant of unknown significance--a variation in one of the genes was identified, but it is unclear how this impacts cancer risk in the family    Advantages and Disadvantages   There are advantages and disadvantages to genetic testing.  Advantages    May clarify your cancer risk    Can help you make medical decisions    May explain the cancers in your family    May give useful information to your family members (if you share your results)    Disadvantages    Possible negative emotional impact of learning about inherited cancer risk    Uncertainty in interpreting a negative test result in some situations    Possible genetic discrimination concerns (see below)    Genetic Information Nondiscrimination Act (BOB)  BOB is a federal law that protects individuals from health insurance or employment discrimination based on a genetic test result alone.  Although rare, there are currently no legal discrimination protections in terms of life insurance, long term care, or disability insurances.  Visit the National [employer] Emmalena website to learn more.    Reducing Cancer Risk  Each of the genes listed within this handout have nationally recognized cancer screening guidelines that would be recommended for individuals who test positive.  In addition to increased cancer screening, surgeries may be offered or recommended to reduce cancer risk.  Recommendations are based upon an individual s genetic test result as well as their personal and family history of cancer.    Questions to Think About Regarding Genetic Testing:    What effect will the test result have on me and my relationship with my family members if I have an inherited gene mutation?  If I don t have a gene mutation?    Should I share my test results, and how will my family react to this news, which may also affect them?    Are my children ready to learn  new information that may one day affect their own health?        Hereditary Cancer Resources    FORCE: Facing Our Risk of Cancer Empowered facingourrisk.org   Bright Pink bebrightpink.org   Li-Fraumeni Syndrome Association lfsassociation.org   PTEN World PTENworld.com   Collaborative Group of the Americas on Inherited Colorectal Cancer (CGA) cgaicc.GlobeTrotr.com http://www.facingourrisk.org/   Cancer Care cancercare.org   American Cancer Society (ACS) cancer.org   National Cancer [place] (NCI) cancer.gov       Cancer Risk Management Program [phone] ([phone])  ? Leandra Bañuelos, MS, [facility]  [phone]  ? Opal Wnady, MS, [facility]  [phone]  ? Yanique Wiggins, MS, [facility]  [phone]  ? Geetha Enmabud, MS   [phone]    References  1. Alfred HALL, Rene PDP, Jose E S, Kyra DE LA CRUZ, Laura JE, Viridiana JL, Yesenia N, Zuleika H, Scot O, Estefanía A, Pacheco B, Ana P, Manjody S, Tenisha DM, Anguiano N, Ramses E, Tammi H, Lewis E, Jesus J, Gronwald J, April B, Geovanna H, Thorlacius S, Eerola H, Clementina H, Bria K, Sampson OP. Average risks of breast and ovarian cancer associated with BRCA1 or BRCA2 mutations detected in case series unselected for family history: a combined analysis of 222 studies. Am J Hum Danica. 2003;72:1117-30.  2. Yadira N, Yolanda M, Meagan G.  BRCA1 and BRCA2 Hereditary Breast and Ovarian Cancer. Gene Reviews online. 2013.  3. Salvador YC, Desirae S, Monika G, Davidson S. Breast cancer risk among male BRCA1 and BRCA2 mutation carriers. J Natl Cancer Inst. 2007;99:1811-4.  4. Richy STAUFFER, Jaylene I, Flaquito J, Milana E, Daxa ER, Austyn F. Risk of breast cancer in male BRCA2 carriers. J Med Danica. 2010;47:710-1.  5. Greg CHONG, Danika J, Corine J, Rosanna LA, Jarad MS, Eng C. Lifetime cancer risks in individuals with germline PTEN mutations. Clin Cancer Res. 2012;18:400-7.  6. Vickie MEDINA. Cowden Syndrome: A Critical Review of the Clinical Literature. J Danica . 2009:18:13-27.  7. National  Comprehensive Cancer Network. Clinical practice guidelines in oncology, colorectal cancer screening. Available online (registration required). 2015.  8. Alfred GAMBLE et al. Breast-Cancer Risk in Families with Mutations in PALB2. NEJM. 2014; 371(6):497-506.        Follow-ups after your visit        Your next 10 appointments already scheduled     Feb 24, 2017  2:15 PM CST   Masonic Lab Draw with  MASONIC LAB DRAW   [facility] ([facility])    [address] [place] [address]   [phone]            Feb 28, 2017  7:00 AM CST   Masonic Lab Draw with  MASONIC LAB DRAW   [facility] ([facility])    [address] [place] [address]   [phone]            Feb 28, 2017  7:30 AM CST   (Arrive by 7:15 AM)   Return Visit with Keira Hernandez MD   [facility] ([facility])    [address] [place] [id number] [phone]            Feb 28, 2017  8:00 AM CST   Infusion 240 with  ONCOLOGY INFUSION, UC 15 ATC   [facility] ([facility])    [address] [place] [id number] [phone]              Who to contact     If you have questions or need follow up information about today's clinic visit or your schedule please contact [facility] directly at [phone].  Normal or non-critical lab and imaging results will be communicated to you by MyChart, letter or phone within 4 business days after the clinic has received the results. If you do not hear from us within 7 days, please contact the clinic through MyChart or phone. If you have a critical or abnormal lab result, we will notify you by phone as soon as possible.  Submit refill requests through Mytonomy or call your pharmacy and they will forward the refill request to us. Please allow 3  business days for your refill to be completed.          Additional Information About Your Visit        MyChart Information     kooabahart gives you secure access to your electronic health record. If you see a primary care provider, you can also send messages to your care team and make appointments. If you have questions, please call your primary care clinic.  If you do not have a primary care provider, please call [phone] and they will assist you.        Care EveryWhere ID     This is your Care EveryWhere ID. This could be used by other organizations to access your [place] medical records  [id number]         Blood Pressure from Last 3 Encounters:   02/21/17 118/75   02/14/17 130/81   02/08/17 128/71    Weight from Last 3 Encounters:   02/21/17 62.1 kg (136 lb 14.4 oz)   02/15/17 62 kg (136 lb 9.6 oz)   02/14/17 61.1 kg (134 lb 11.2 oz)              Today, you had the following     No orders found for display         Today's Medication Changes          These changes are accurate as of: 2/24/17  2:09 PM.  If you have any questions, ask your nurse or doctor.               These medicines have changed or have updated prescriptions.        Dose/Directions    mineral oil-hydrophilic petrolatum   This may have changed:    - when to take this  - reasons to take this   Used for:  Malignant neoplasm of tongue (H)        Apply topically every 8 hours   Quantity:  50 g   Refills:  0       pantoprazole 20 MG EC tablet   Commonly known as:  PROTONIX   This may have changed:    - when to take this  - reasons to take this   Used for:  Gastroesophageal reflux disease without esophagitis        Dose:  20 mg   Take 1 tablet (20 mg) by mouth daily   Quantity:  30 tablet   Refills:  3                Primary Care Provider Office Phone # Fax #    [facility] [phone] [address] [facility] [address]  [facility] [address]        Thank you!     Thank you for choosing [facility]  for your care. Our goal is always to provide you with excellent care. Hearing back from our patients is one way we can continue to improve our services. Please take a few minutes to complete the written survey that you may receive in the mail after your visit with us. Thank you!             Your Updated Medication List - Protect others around you: Learn how to safely use, store and throw away your medicines at www.disposemymeds.org.          This list is accurate as of: 2/24/17  2:09 PM.  Always use your most recent med list.                   Brand Name Dispense Instructions for use    citalopram 10 MG/5ML solution    celeXA    300 mL    Take 10 mLs (20 mg) by mouth daily       clindamycin 1 % lotion    CLEOCIN T    60 mL    Apply topically 2 times daily       ibuprofen 200 MG tablet    ADVIL/MOTRIN     Take 200 mg by mouth daily as needed       levofloxacin 500 MG tablet    LEVAQUIN    7 tablet    Take 1 tablet (500 mg) by mouth daily for 7 days       levothyroxine 25 MCG tablet    SYNTHROID/LEVOTHROID    90 tablet    Take 1 tablet (25 mcg) by mouth daily       lidocaine 2 % solution    XYLOCAINE    1000 mL    15 mL swish and spit q3h PRN, max 8 doses in 24 hours       lidocaine-prilocaine cream    EMLA    30 g    Apply to PORT site 45-60 minutes prior to procedure.  Cover with non-absorbent dressing.       * LORazepam 2 MG/ML (HIGH CONC) solution    ATIVAN    30 mL    Take 0.25-0.5 mLs (0.5-1 mg) by mouth every 4 hours as needed for anxiety, sleep, nausea or vomiting       * LORazepam 0.5 MG tablet    ATIVAN    30 tablet    Take 1 tablet (0.5 mg) by mouth every 4 hours as needed (Anxiety, Nausea/Vomiting or Sleep)       methylphenidate 5 MG tablet    RITALIN    60 tablet    Take 1 tablet (5 mg) by mouth 2 times daily       mineral oil-hydrophilic petrolatum     50 g    Apply topically every 8 hours       multivitamins with minerals Liqd liquid     1 Bottle    15 mLs by Per Feeding Tube route daily        ondansetron 4 MG tablet    ZOFRAN     Take 4 mg by mouth daily as needed       * oxyCODONE 5 MG/5ML solution    ROXICODONE    1000 mL    10 mLs (10 mg) by Oral or PEG tube route every 4 hours as needed for moderate to severe pain       * oxyCODONE 20 MG 12 hr tablet    OxyCONTIN    60 tablet    Take 1 tablet (20 mg) by mouth every 12 hours maximum 2 tablet(s) per day       pantoprazole 20 MG EC tablet    PROTONIX    30 tablet    Take 1 tablet (20 mg) by mouth daily       prochlorperazine 10 MG tablet    COMPAZINE    30 tablet    Take 1 tablet (10 mg) by mouth every 6 hours as needed (Nausea/Vomiting)       * Notice:  This list has 4 medication(s) that are the same as other medications prescribed for you. Read the directions carefully, and ask your doctor or other care provider to review them with you.

## 2017-02-26 NOTE — MR AVS SNAPSHOT
After Visit Summary   2/26/2017    Darek Navarro    MRN: [id number]           Patient Information     Date Of Birth          1990        Visit Information        Provider Department      2/26/2017 10:00 PM Jodee Pagan MD [facility]         Follow-ups after your visit        Your next 10 appointments already scheduled     Mar 07, 2017  2:00 PM CST   Masonic Lab Draw with UC MASONIC LAB DRAW   [facility] Masonic Lab Draw ([facility])    [address] [place] [address]  2nd [facility] [address]   [phone]            Mar 07, 2017  2:30 PM CST   Infusion 120 with UC ONCOLOGY INFUSION, UC 19 ATC   [facility] ([facility])    [address] [place] [address]  2nd [facility] [address]   [phone]            Mar 14, 2017  1:00 PM CDT   Masonic Lab Draw with UC MASONIC LAB DRAW   [facility] Masonic Lab Draw ([facility])    [address] [place] [address]   [phone]            Mar 14, 2017  1:30 PM CDT   Infusion 120 with UC ONCOLOGY INFUSION, UC 27 ATC   [facility] ([facility])    [address] [place] [address]   [phone]            Mar 21, 2017 10:00 AM CDT   Masonic Lab Draw with UC MASONIC LAB DRAW   [facility] Masonic Lab Draw ([facility])    [address] [place] [address]   [phone]            Mar 21, 2017 10:30 AM CDT   (Arrive by 10:15 AM)   Return Visit with Keira Hernandez MD   [facility] ([facility])    [address] [place] [id number]   [phone]            Mar 21, 2017 11:00 AM CDT   Infusion 240 with UC ONCOLOGY INFUSION, UC 32 ATC   [facility] ([facility])    [address] [place] [address]  [facility] [address] [phone]              Who to contact     Please call your clinic at [phone] to:    Ask questions about your health    Make or cancel appointments    Discuss your medicines    Learn about your test results    Speak to your doctor   If you have compliments or concerns about an experience at your clinic, or if you wish to file a complaint, please contact [facility] Patient Relations at [phone] or email us at [email or web address]         Additional Information About Your Visit        One on One [employer]et[employer]ric Information     Maven gives you secure access to your electronic health record. If you see a primary care provider, you can also send messages to your care team and make appointments. If you have questions, please call your primary care clinic.  If you do not have a primary care provider, please call [phone] and they will assist you.      Maven is an electronic gateway that provides easy, online access to your medical records. With Maven, you can request a clinic appointment, read your test results, renew a prescription or communicate with your care team.     To access your existing account, please contact your [facility] or call [phone] for assistance.        Care EveryWhere ID     This is your Care EveryWhere ID. This could be used by other organizations to access your [place] medical records  [id number]         Blood Pressure from Last 3 Encounters:   No data found for BP    Weight from Last 3 Encounters:   No data found for Wt              Today, you had the following     No orders found for display         Today's Medication Changes          These changes are accurate as of: 2/26/17 11:59 PM.  If you have any questions, ask your nurse or doctor.               These medicines have changed or have updated prescriptions.        Dose/Directions    mineral oil-hydrophilic petrolatum   This may have  changed:    - when to take this  - reasons to take this   Used for:  Malignant neoplasm of tongue (H)        Apply topically every 8 hours   Quantity:  50 g   Refills:  0       pantoprazole 20 MG EC tablet   Commonly known as:  PROTONIX   This may have changed:    - when to take this  - reasons to take this   Used for:  Gastroesophageal reflux disease without esophagitis        Dose:  20 mg   Take 1 tablet (20 mg) by mouth daily   Quantity:  30 tablet   Refills:  3                Primary Care Provider Office Phone # Fax #    [facility] [phone] [address]  [facility] [address]        Thank you!     Thank you for choosing [facility]  for your care. Our goal is always to provide you with excellent care. Hearing back from our patients is one way we can continue to improve our services. Please take a few minutes to complete the written survey that you may receive in the mail after your visit with us. Thank you!             Your Updated Medication List - Protect others around you: Learn how to safely use, store and throw away your medicines at www.disposemymeds.org.          This list is accurate as of: 2/26/17 11:59 PM.  Always use your most recent med list.                   Brand Name Dispense Instructions for use    citalopram 10 MG/5ML solution    celeXA    300 mL    Take 10 mLs (20 mg) by mouth daily       clindamycin 1 % lotion    CLEOCIN T    60 mL    Apply topically 2 times daily       ibuprofen 200 MG tablet    ADVIL/MOTRIN     Take 200 mg by mouth daily as needed       levofloxacin 500 MG tablet    LEVAQUIN    7 tablet    Take 1 tablet (500 mg) by mouth daily for 7 days       levothyroxine 25 MCG tablet    SYNTHROID/LEVOTHROID    90 tablet    Take 1 tablet (25 mcg) by mouth daily       lidocaine 2 % solution    XYLOCAINE    1000 mL    15 mL swish and spit q3h PRN, max 8 doses in 24 hours       * LORazepam 2 MG/ML (HIGH CONC)  solution    ATIVAN    30 mL    Take 0.25-0.5 mLs (0.5-1 mg) by mouth every 4 hours as needed for anxiety, sleep, nausea or vomiting       * LORazepam 0.5 MG tablet    ATIVAN    30 tablet    Take 1 tablet (0.5 mg) by mouth every 4 hours as needed (Anxiety, Nausea/Vomiting or Sleep)       methylphenidate 5 MG tablet    RITALIN    60 tablet    Take 1 tablet (5 mg) by mouth 2 times daily       mineral oil-hydrophilic petrolatum     50 g    Apply topically every 8 hours       multivitamins with minerals Liqd liquid     1 Bottle    15 mLs by Per Feeding Tube route daily       ondansetron 4 MG tablet    ZOFRAN     Take 4 mg by mouth daily as needed       oxyCODONE 20 MG 12 hr tablet    OxyCONTIN    60 tablet    Take 1 tablet (20 mg) by mouth every 12 hours maximum 2 tablet(s) per day       pantoprazole 20 MG EC tablet    PROTONIX    30 tablet    Take 1 tablet (20 mg) by mouth daily       prochlorperazine 10 MG tablet    COMPAZINE    30 tablet    Take 1 tablet (10 mg) by mouth every 6 hours as needed (Nausea/Vomiting)       * Notice:  This list has 2 medication(s) that are the same as other medications prescribed for you. Read the directions carefully, and ask your doctor or other care provider to review them with you.

## 2017-02-28 NOTE — NURSING NOTE
Chief Complaint   Patient presents with     Oncology Clinic Visit     Throat Ca, 3 Wk F/U     Port Draw     Pt with hx of throat ca labs collected via portacath.

## 2017-02-28 NOTE — MR AVS SNAPSHOT
After Visit Summary   2/28/2017    Darek Navarro    MRN: [id number]           Patient Information     Date Of Birth          1990        Visit Information        Provider Department      2/28/2017 8:00 AM  15 ATC;  ONCOLOGY INFUSION [facility]        Today's Diagnoses     Head and neck cancer (H)    -  1    Drug-induced neutropenia (H)          Care Instructions    Contact Numbers    [facility] Main Line: [phone]  [facility] Triage:  [phone]    Call triage with chills and/or temperature greater than or equal to 100.5, uncontrolled nausea/vomiting, diarrhea, constipation, dizziness, shortness of breath, chest pain, bleeding, unexplained bruising, or any new/concerning symptoms, questions/concerns.     If you are having any concerning symptoms or wish to speak to a provider before your next infusion visit, please call your care coordinator or triage to notify them so we can adequately serve you.       After Hours: [phone]    If after hours, weekends, or holidays, call main hospital  and ask for Oncology doctor on call.         February 2017 Sunday Monday Tuesday Wednesday Thursday Friday Saturday                  1     [facility] NEW TUMOR    8:00 AM   (15 min.)   Dayday Parker MD   [facility] 2     3     4       5     6     7     [facility] MASONIC LAB DRAW    9:00 AM   (15 min.)    MASONIC LAB DRAW   [facility] Lab Draw     P RETURN    9:15 AM   (30 min.)   Keira Hernandez MD   [facility] ONC INFUSION 240   10:00 AM   (240 min.)    ONCOLOGY INFUSION   [facility] 8     P CONSULT    9:30 AM   (90 min.)   Jodee Pagan MD   [facility]cology [facility]     [facility] TCT/SIM SUITE   10:00 AM   (60 min.)   Jodee Pagan MD   [facility] 9     [facility] TREATMENT PLAN VISIT    7:00 AM   (15 min.)   Jodee Pagan MD   [facility] 10     11       12      13     [facility] EXTERNAL RADIATION TREATMT   11:30 AM   (15 min.)   [facility] RAD ONC [facility]   [facility] Oncology [facility] 14     [facility] LAB DRAW    8:00 AM   (15 min.)   [facility] DRAW   [facility] Mason[facility] Lab Draw     [facility] RETURN    8:25 AM   (50 min.)   Opal Cannon PA-C   [facility]     [facility] ONC INFUSION 120   10:00 AM   (120 min.)   [facility] ONCOLOGY INFUSION   [facility]     [facility] EXTERNAL RADIATION TREATMT    1:30 PM   (15 min.)   [facility] RAD ONC [facility]   [facility] 15     [facility] EXTERNAL RADIATION TREATMT    1:30 PM   (15 min.)   [facility] RAD ONC [facility]   Radiation Oncology Clinic     [facility] ON TREATMENT VISIT    1:45 PM   (15 min.)   Jodee Pagan MD   [facility] 16     [facility] EXTERNAL RADIATION TREATMT    1:30 PM   (15 min.)   [facility] RAD ONC [facility]   Radiation Oncology Clinic     TREATMENT    2:30 PM   (30 min.)   Lana Calvillo SLP   [facility] 17     [facility] EXTERNAL RADIATION TREATMT    1:30 PM   (15 min.)   [facility] RAD ONC [facility]   [facility] 18       19     20     21     [facility] LAB DRAW   11:30 AM   (15 min.)   [facility] LAB DRAW   [facility] Lab Draw     CT CHEST/ABDOMEN/PELVIS W   11:45 AM   (20 min.)   UCCT2   [facility] CT     [facility] RETURN    1:25 PM   (50 min.)   Opal Cannon PA-C   [facility]     [facility] ONC INFUSION 120    2:30 PM   (120 min.)   [facility] ONCOLOGY INFUSION   [facility] 22     23     24     [facility] RETURN WITH ROOM    1:00 PM   (60 min.)   Yanique Wiggins GC   [facility] 25       26     27     28     [facility] LAB DRAW    7:00 AM   (15 min.)   [facility] DRAW   [facility]     [facility] RETURN    7:15 AM   (30 min.)   Keira Hernandez MD   [facility]     [facility] ONC INFUSION 240    8:00 AM   (240 min.)   [facility] ONCOLOGY INFUSION   [facility]                                March 2017 Sunday Monday  Tuesday Wednesday Thursday Friday Saturday                  1     2     3     4       5     6     7     [facility] LAB DRAW    2:00 PM   (15 min.)    MASONIC LAB DRAW   [facility] Mason[facility] Lab Draw     [facility] ONC INFUSION 120    2:30 PM   (120 min.)    ONCOLOGY INFUSION   [facility] 8     9     10     11       12     13     14     [facility] LAB DRAW    1:00 PM   (15 min.)    [facility]     [facility] ONC INFUSION 120    1:30 PM   (120 min.)    ONCOLOGY INFUSION   [facility] 15     16     17     18       19     20     21     [facility] LAB DRAW   10:00 AM   (15 min.)    [facility] LAB DRAW   [facility] Lab Draw     [facility] RETURN   10:15 AM   (30 min.)   Keira Hernandez MD   [facility]     [facility] ONC INFUSION 240   11:00 AM   (240 min.)    ONCOLOGY INFUSION   [facility] 22     23     24     25       26     27     28     [facility] LAB DRAW    1:30 PM   (15 min.)    [facility] LAB DRAW   [facility]     [facility] ONC INFUSION 120    2:00 PM   (120 min.)    ONCOLOGY INFUSION   [facility] 29     30     31                      Recent Results (from the past 24 hour(s))   CBC with platelets differential    Collection Time: 02/28/17  7:36 AM   Result Value Ref Range    WBC 2.2 (L) 4.0 - 11.0 10e9/L    RBC Count 3.68 (L) 4.4 - 5.9 10e12/L    Hemoglobin 9.6 (L) 13.3 - 17.7 g/dL    Hematocrit 30.3 (L) 40.0 - 53.0 %    MCV 82 78 - 100 fl    MCH 26.1 (L) 26.5 - 33.0 pg    MCHC 31.7 31.5 - 36.5 g/dL    RDW 22.5 (H) 10.0 - 15.0 %    Platelet Count 129 (L) 150 - 450 10e9/L    Diff Method Automated Method     % Neutrophils 63.8 %    % Lymphocytes 12.6 %    % Monocytes 19.4 %    % Eosinophils 3.2 %    % Basophils 0.5 %    % Immature Granulocytes 0.5 %    Nucleated RBCs 0 0 /100    Absolute Neutrophil 1.4 (L) 1.6 - 8.3 10e9/L    Absolute Lymphocytes 0.3 (L) 0.8 - 5.3 10e9/L    Absolute Monocytes  0.4 0.0 - 1.3 10e9/L    Absolute Eosinophils 0.1 0.0 - 0.7 10e9/L    Absolute Basophils 0.0 0.0 - 0.2 10e9/L    Abs Immature Granulocytes 0.0 0 - 0.4 10e9/L    Absolute Nucleated RBC 0.0    Comprehensive metabolic panel    Collection Time: 02/28/17  7:36 AM   Result Value Ref Range    Sodium 137 133 - 144 mmol/L    Potassium 3.8 3.4 - 5.3 mmol/L    Chloride 100 94 - 109 mmol/L    Carbon Dioxide 28 20 - 32 mmol/L    Anion Gap 9 3 - 14 mmol/L    Glucose 108 (H) 70 - 99 mg/dL    Urea Nitrogen 10 7 - 30 mg/dL    Creatinine 0.69 0.66 - 1.25 mg/dL    GFR Estimate >90  Non  GFR Calc   >60 mL/min/1.7m2    GFR Estimate If Black >90   GFR Calc   >60 mL/min/1.7m2    Calcium 9.0 8.5 - 10.1 mg/dL    Bilirubin Total 0.4 0.2 - 1.3 mg/dL    Albumin 3.2 (L) 3.4 - 5.0 g/dL    Protein Total 7.6 6.8 - 8.8 g/dL    Alkaline Phosphatase 71 40 - 150 U/L    ALT 26 0 - 70 U/L    AST 22 0 - 45 U/L               Follow-ups after your visit        Your next 10 appointments already scheduled     Mar 07, 2017  2:00 PM CST   [facility] Lab Draw with [facility] LAB DRAW   [facility] ([facility])    [address]  2nd [facility] [address] [phone]            Mar 07, 2017  2:30 PM CST   Infusion 120 with UC ONCOLOGY INFUSION, UC 19 ATC   [facility] ([facility])    [address]  2nd [facility] [id number] [phone]            Mar 14, 2017  1:00 PM CDT   [facility] Lab Draw with [facility] LAB DRAW   [facility] ([facility])    [address]  2nd [facility] [id number] [phone]            Mar 14, 2017  1:30 PM CDT   Infusion 120 with UC ONCOLOGY INFUSION, UC 27 ATC   [facility] ([facility])    [address]  2nd Floor  [facility] [id number] [phone]            Mar 21, 2017  10:00 AM CDT   Masonic Lab Draw with  MASONIC LAB DRAW   [facility] Lab Draw ([facility])    [address]  2nd Floor  [facility] [id number] [phone]            Mar 21, 2017 10:30 AM CDT   (Arrive by 10:15 AM)   Return Visit with Keira Hernandez MD   [facility] ([facility])    [address]  2nd Floor  [facility] [id number] [phone]            Mar 21, 2017 11:00 AM CDT   Infusion 240 with UC ONCOLOGY INFUSION, UC 32 ATC   [facility] ([facility])    [address]  2nd Floor  [facility] [id number] [phone]              Who to contact     If you have questions or need follow up information about today's clinic visit or your schedule please contact [facility] directly at [phone].  Normal or non-critical lab and imaging results will be communicated to you by [employer], letter or phone within 4 business days after the clinic has received the results. If you do not hear from us within 7 days, please contact the clinic through [employer] or phone. If you have a critical or abnormal lab result, we will notify you by phone as soon as possible.  Submit refill requests through [employer] or call your pharmacy and they will forward the refill request to us. Please allow 3 business days for your refill to be completed.          Additional Information About Your Visit        [employer] Information     [employer] gives you secure access to your electronic health record. If you see a primary care provider, you can also send messages to your care team and make appointments. If you have questions, please call your primary care clinic.  If you do not have a primary care provider, please call [phone] and they will assist you.        Care EveryWhere ID     This is your Care EveryWhere ID. This could be used by other organizations to access your [place]  medical records  [id number]         Blood Pressure from Last 3 Encounters:   02/28/17 115/67   02/21/17 118/75   02/14/17 130/81    Weight from Last 3 Encounters:   02/28/17 59.7 kg (131 lb 11.2 oz)   02/21/17 62.1 kg (136 lb 14.4 oz)   02/15/17 62 kg (136 lb 9.6 oz)              We Performed the Following     CBC with platelets differential     Comprehensive metabolic panel     Nursing observation     Road Map Alert     Treatment Conditions          Today's Medication Changes          These changes are accurate as of: 2/28/17 10:00 AM.  If you have any questions, ask your nurse or doctor.               These medicines have changed or have updated prescriptions.        Dose/Directions    mineral oil-hydrophilic petrolatum   This may have changed:    - when to take this  - reasons to take this   Used for:  Malignant neoplasm of tongue (H)        Apply topically every 8 hours   Quantity:  50 g   Refills:  0       * oxyCODONE 20 MG 12 hr tablet   Commonly known as:  OxyCONTIN   This may have changed:  Another medication with the same name was changed. Make sure you understand how and when to take each.   Used for:  Bone metastasis (H)   Changed by:  Opal Cannon PA-C        Dose:  20 mg   Take 1 tablet (20 mg) by mouth every 12 hours maximum 2 tablet(s) per day   Quantity:  60 tablet   Refills:  0       * oxyCODONE 5 MG/5ML solution   Commonly known as:  ROXICODONE   This may have changed:    - when to take this  - reasons to take this   Used for:  Malignant neoplasm of tongue (H), Head and neck cancer (H), Oral cancer (H)   Changed by:  Keira Hernandez MD        Dose:  10 mg   10 mLs (10 mg) by Oral or PEG tube route every 4 hours as needed for moderate to severe pain   Quantity:  1000 mL   Refills:  0       pantoprazole 20 MG EC tablet   Commonly known as:  PROTONIX   This may have changed:    - when to take this  - reasons to take this   Used for:  Gastroesophageal reflux disease without esophagitis         Dose:  20 mg   Take 1 tablet (20 mg) by mouth daily   Quantity:  30 tablet   Refills:  3       * Notice:  This list has 2 medication(s) that are the same as other medications prescribed for you. Read the directions carefully, and ask your doctor or other care provider to review them with you.      Stop taking these medicines if you haven't already. Please contact your care team if you have questions.     lidocaine-prilocaine cream   Commonly known as:  EMLA   Stopped by:  Keira Hernandez MD                Where to get your medicines      Some of these will need a paper prescription and others can be bought over the counter.  Ask your nurse if you have questions.     Bring a paper prescription for each of these medications     oxyCODONE 5 MG/5ML solution                Primary Care Provider Office Phone # Fax #    [facility] [phone] [address]  [facility] [address]        Thank you!     Thank you for choosing [facility]  for your care. Our goal is always to provide you with excellent care. Hearing back from our patients is one way we can continue to improve our services. Please take a few minutes to complete the written survey that you may receive in the mail after your visit with us. Thank you!             Your Updated Medication List - Protect others around you: Learn how to safely use, store and throw away your medicines at www.disposemymeds.org.          This list is accurate as of: 2/28/17 10:00 AM.  Always use your most recent med list.                   Brand Name Dispense Instructions for use    citalopram 10 MG/5ML solution    celeXA    300 mL    Take 10 mLs (20 mg) by mouth daily       clindamycin 1 % lotion    CLEOCIN T    60 mL    Apply topically 2 times daily       ibuprofen 200 MG tablet    ADVIL/MOTRIN     Take 200 mg by mouth daily as needed       levofloxacin 500 MG tablet    LEVAQUIN    7 tablet    Take 1 tablet  (500 mg) by mouth daily for 7 days       levothyroxine 25 MCG tablet    SYNTHROID/LEVOTHROID    90 tablet    Take 1 tablet (25 mcg) by mouth daily       lidocaine 2 % solution    XYLOCAINE    1000 mL    15 mL swish and spit q3h PRN, max 8 doses in 24 hours       * LORazepam 2 MG/ML (HIGH CONC) solution    ATIVAN    30 mL    Take 0.25-0.5 mLs (0.5-1 mg) by mouth every 4 hours as needed for anxiety, sleep, nausea or vomiting       * LORazepam 0.5 MG tablet    ATIVAN    30 tablet    Take 1 tablet (0.5 mg) by mouth every 4 hours as needed (Anxiety, Nausea/Vomiting or Sleep)       methylphenidate 5 MG tablet    RITALIN    60 tablet    Take 1 tablet (5 mg) by mouth 2 times daily       mineral oil-hydrophilic petrolatum     50 g    Apply topically every 8 hours       multivitamins with minerals Liqd liquid     1 Bottle    15 mLs by Per Feeding Tube route daily       ondansetron 4 MG tablet    ZOFRAN     Take 4 mg by mouth daily as needed       * oxyCODONE 20 MG 12 hr tablet    OxyCONTIN    60 tablet    Take 1 tablet (20 mg) by mouth every 12 hours maximum 2 tablet(s) per day       * oxyCODONE 5 MG/5ML solution    ROXICODONE    1000 mL    10 mLs (10 mg) by Oral or PEG tube route every 4 hours as needed for moderate to severe pain       pantoprazole 20 MG EC tablet    PROTONIX    30 tablet    Take 1 tablet (20 mg) by mouth daily       prochlorperazine 10 MG tablet    COMPAZINE    30 tablet    Take 1 tablet (10 mg) by mouth every 6 hours as needed (Nausea/Vomiting)       * Notice:  This list has 4 medication(s) that are the same as other medications prescribed for you. Read the directions carefully, and ask your doctor or other care provider to review them with you.

## 2017-02-28 NOTE — PROGRESS NOTES
Infusion Nursing Note:  Darek Navarro presents today for Cycle 3 Day 1 Erbitux, Carboplatin, Fluorouracil pump hook-up.    Patient seen by provider today: Yes: Dr. Hernandez    Note: C-series pump and thermoregulator intact upon patient's discharge. Connections double checked by Partha Pulido, RN. [facility] aware of pump hook-up time and will disconnect patient's pump on 3/4/17 at 1130.    Intravenous Access:  Implanted Port.    Treatment Conditions:  Lab Results   Component Value Date    HGB 9.6 02/28/2017     Lab Results   Component Value Date    WBC 2.2 02/28/2017      Lab Results   Component Value Date    ANEU 1.4 02/28/2017     Lab Results   Component Value Date     02/28/2017      Lab Results   Component Value Date     02/28/2017                   Lab Results   Component Value Date    POTASSIUM 3.8 02/28/2017           Lab Results   Component Value Date    MAG 2.2 02/21/2017            Lab Results   Component Value Date    CR 0.69 02/28/2017                   Lab Results   Component Value Date    COLE 9.0 02/28/2017                Lab Results   Component Value Date    BILITOTAL 0.4 02/28/2017           Lab Results   Component Value Date    ALBUMIN 3.2 02/28/2017                    Lab Results   Component Value Date    ALT 26 02/28/2017           Lab Results   Component Value Date    AST 22 02/28/2017     Results reviewed, labs MET treatment parameters, ok to proceed with treatment.      Post Infusion Assessment:  Patient tolerated infusion without incident.  Blood return noted pre and post infusion.  Patient observed for 30 minutes after the completion of Erbitutx, per protocol.   Site patent and intact, free from redness, edema or discomfort.  No evidence of extravasations.    Discharge Plan:   Prescription refills given for Oxycodone.  Copy of AVS reviewed with patient and/or family. Patient will return 3/21/17 for next appointment.  Patient discharged in stable condition accompanied by: brother and  sister.  Departure Mode: Ambulatory.    Keisha Carter RN

## 2017-02-28 NOTE — NURSING NOTE
"Darek Navarro is a 27 year old male who presents for:  Chief Complaint   Patient presents with     Oncology Clinic Visit     Throat Ca, 3 Wk F/U     Port Draw     Pt with hx of throat ca labs collected via portacath.         Initial Vitals:  /67  Pulse 120  Temp 98.7  F (37.1  C) (Oral)  Resp 20  Ht 1.88 m (6' 2.02\")  Wt 59.7 kg (131 lb 11.2 oz)  SpO2 100%  BMI 16.9 kg/m2 Estimated body mass index is 16.9 kg/(m^2) as calculated from the following:    Height as of this encounter: 1.88 m (6' 2.02\").    Weight as of this encounter: 59.7 kg (131 lb 11.2 oz).. Body surface area is 1.77 meters squared. BP completed using cuff size: regular  No Pain (0) No LMP for male patient. Allergies and medications reviewed.     Medications: MEDICATION REFILLS NEEDED TODAY.  Pharmacy name entered into [facility]:    [id number] [place], [place] - [address] [address] [place], [place] - [address]    Comments: Roxicodone    7 minutes for nursing intake (face to face time)   Gertrude Mcgill LPN        "

## 2017-02-28 NOTE — PROGRESS NOTES
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 Pathologic staging: [id number]. Recurrence with distant metastasis after initial chemoRT.       TREATMENT:    1- April 28, 2016 transmandibular subtotal oral glossectomy, right base of tongue resection with partial pharyngectomy, bilateral neck dissections, and free flap reconstruction. Concurrent ChemoRt with high dose Cisplatin 6/6/16. day 22 cisplatin on 6/27/16. He completed day 43 on 7/19/16. Radiation completed, 6600cGy on 7/21/16.   2- Palliative intent treatment with Pembrolizumab 12/6/2016  3- Due to rapid progression of disease the treatment was switched to Carboplatin, 5-FU and Cetuximab on 1/18/17     SUBJECTIVE:  The patient was seen as a followup today.His weight has stabilized. He has seen a genetic counselor and insurance approval for testing is in progress.     REVIEW OF SYSTEMS:  A complete review of systems was performed and found to be negative other than pertinent positives mentioned in history of present illness.     Past medical, social histories reviewed.    Meds- Reviewed.     PHYSICAL EXAMINATION:   VITAL SIGNS: ./67  Pulse 120  Temp 98.7  F (37.1  C) (Oral)  Resp 20  Wt 59.7 kg (131 lb 11.2 oz)  SpO2 100%  BMI 16.9 kg/m2  GENERAL: Sitting comfortably.   HEENT: Pupils are equal. Oropharynx is clear.   NECK: No cervical or supraclavicular lymphadenopathy.   LUNGS: Clear bilaterally.   HEART: S1, S2, regular.   ABDOMEN: Soft, nontender, nondistended, no hepatosplenomegaly.   EXTREMITIES: Warm, well perfused.   NEUROLOGIC: Alert, awake.   SKIN: No rash.   LYMPHATICS: No edema.     DATA:  Recent Labs   Lab Test  02/21/17   1204  02/14/17   0817   05/05/16   0750  05/04/16   0739   NA  138  135   < >  140  138   POTASSIUM  4.2  3.6    < >  4.9  4.1   CHLORIDE  103  99   < >  105  105   CO2  27  27   < >  28  29   ANIONGAP  8  8   < >  6  4   BUN  11  12   < >  16  14   CR  0.58*  0.55*   < >  0.64*  0.64*   GLC  117*  114*   < >  106*  102*   COLE  8.8  8.8   < >  9.6  9.3   MAG  2.2  2.0   < >  2.3  2.2   PHOS   --    --    --   5.3*  4.0    < > = values in this interval not displayed.     Recent Labs   Lab Test  02/21/17   1204  02/14/17   0817  02/07/17   0937   WBC  1.0*  3.4*  3.0*   HGB  9.3*  9.7*  10.0*   PLT  151  299  225   MCV  83  78  82   NEUTROPHIL  49.5  79.9  66.0     Recent Labs   Lab Test  02/21/17   1204  02/14/17   0817  02/07/17   0937   BILITOTAL  0.4  0.7  0.4   ALKPHOS  71  76  84   ALT  18  27  28   AST  15  18  19   ALBUMIN  3.1*  3.3*  3.4         Results for orders placed or performed in visit on 02/21/17   CT Chest/Abdomen/Pelvis w Contrast    Narrative    EXAMINATION: CT CHEST/ABDOMEN/PELVIS W CONTRAST, 2/21/2017 12:30 PM    TECHNIQUE:  Helical CT images from the thoracic inlet through the  symphysis pubis were obtained  with IV contrast.    COMPARISON: CT CAP 1/16/2017 and 12/6/2016.    HISTORY: follow up head and neck cancer, Malignant neoplasm of head,  face and neck    DLP: 275 mGy*cm    FINDINGS:    Chest:    Redemonstrated cavitary mass with air-fluid level seen in the left  upper lobe, on series 3 image 47, measures 4.1 x 5.0 cm, and decreased  in size, having previously measured 5.1 x 6.7 cm. Additional mass  component seen posterolateral to this cavitating mass, has similarly  decreased in size. Exact measurements are difficult to obtain given  irregular shape, however this measures 4.2 x 2.5 cm on series 3 image  71, having previously measured 5.8 x 3.7 cm when measured in a similar  fashion. Adjacent left upper lobe lesion seen anterolaterally, on  series 3 image 83, is completely fluid-filled, previously having  demonstrated cavitation, is decreased, now measures 4.1 x 3.3 cm,  previously 4.9 x 3.7  cm.    There has been significant decrease in left hilar lymphadenopathy, for  example a 1.1 cm left hilar lymph node on series 3 image 101,  previously measured 2.0 cm. Previously identified prominent  prevascular lymph nodes to the left of the descending aorta, on series  3 image 96, which measures 0.7 cm, and 0.9 cm in short axis dimension,  previously 1.3 cm, and 1.2 cm, respectively. There has been interval  significant decrease in right hilar adenopathy as well.    Pleural-based mass along the superior right lower lobe, on series 6  image 663 has significantly decreased, and measures 1.2 cm in short  axis dimension, previously 2.1 cm, and occupying a smaller width of  the pleural space.    There is been marked interval decrease in nodular mass density along  the superior major fissure, seen on series 6 image 82, now measuring  1.2 x 4.3 cm, previously 3.3 x 8.7 cm.    Cavitating nodule seen in the lateral right upper lobe, on series 6  image 52, have significantly decreased in size. Left lower lobe  cavitating lesion on series 6 image 101, measures 2.3 cm in diameter,  previously 3.4 cm.    Other scattered nodules throughout the lungs are decreased in size.  There is, however new scattered tree-in-bud nodularity seen in the  left base.    Left chest wall Port-A-Cath seen with tip at the superior atriocaval  junction.    No pleural effusion. No pneumothorax. The heart size is within normal  limits. Thoracic aorta is normal in caliber. No large central  pulmonary embolus. No pericardial effusion. Treated bronchial tree is  patent. Thyroid gland is unremarkable.    Abdomen and pelvis:   Redemonstrated numerous hypoenhancing renal masses bilaterally,  overall appear decrease in size. For example: On series 3 image 246,  is a right superior interpolar region mass measuring 3.2 x 2.3 cm,  previously 3.1 x 3.5 cm.    Interval decrease in size of a segment 5 hypodense hepatic lesion,  seen on series 3 image 369,  measures 0.7 cm, previously 1.3 cm.    The spleen is unremarkable. No identified lesion. Normal adrenal  glands. Normal pancreas. Gallbladder is normal. Normal caliber small  bowel. Large stool burden throughout the colon, and into the rectum.  Urinary bladder is unremarkable.    Involving the iliacus muscle, along the medial aspect of the right  iliac bone, as a new well-circumscribed hypodense rounded lesion  measuring 2.5 x 1.5 cm. There appears to be no effect on the adjacent  iliac bone, without periosteal reaction.    There is mild free fluid in the lower abdomen, not significantly  changed.    No pathologic-appearing lymphadenopathy in the abdomen or pelvis.    Bones and soft tissues:   Lytic lesions seen throughout the skeleton, at T1, T10, and T11. The  T1 vertebral body is not fully visualized within the field-of-view.  Anterior wedge compression deformity again seen at T10, slightly  worsened, with anterior vertebral body height of 1.2 cm, previously  1.6 cm. Left iliac lesion, not significantly changed. Small lytic  lesions seen throughout the right iliac bone also identified. There is  significant decrease in paravertebral soft tissue mass density. The  spinal canal appears patent.      Impression    IMPRESSION:   In this patient with history of squamous cell carcinoma of the tongue  1. Overall marked improvement in disease, characterized by decreased  size of lesions in the lungs, john, kidneys, liver, and paravertebral  soft tissue.  2. New nodular and tree-in-bud opacities in the left lower lobe,  concerning for infection versus metastatic disease.  3. New fluid density lesion within the right iliacus muscle. Given  improvement in metastatic disease elsewhere, this is favored to  represent in a nonneoplastic process such as an organizing hematoma or  potentially infection.   4. Interval worsening of anterior compression deformity fracture at  T10.    I have personally reviewed the examination and  initial interpretation  and I agree with the findings.    REBECCA GANNON MD     ECOG PS: 1     ASSESSMENT:  A 26-year-old gentleman  [id number] squamous cell carcinoma of the right oral tongue.Status post April 28, 2016 transmandibular subtotal oral glossectomy, right base of tongue resection with partial pharyngectomy, bilateral neck dissections, and free flap. Initiated treatment by 6/6/16.   He completed his concurrent chemoRT 7/21/16.  Follow up PET CT scan 10/28/16 was reviewed at multidisciplinary head and neck tumor board. It was consistent with local and distant recurrence. L- His case was subsequently reviewed at Thoracic oncology conference and we will proceed with biopsy by IR.glendy biopsy results were reviewed with the patient and family members which confirmed metastatic disease.   He was seen at [facility] and no first line trials were available. He was switched to Pembrolizumab with a plan to do short term scans to assess the pace of disease progression.   CT scan 1/16/17 showed rapid disease progression with thoracic vertebral fracture. Multiple lytic/destructive spine lesions were reviewed by spine surgery and were not felt to be unstable. He completed palliative radiation to bones.   - He is on chemotherapy with carboplatin/5-FU and Cetuximab and is starting 3rd cycle today.   - He is on Zometa  - CT scan 2/21/17 showed an excellent response to treatment. There was a focus in right iliacus muscle, I think we can observe it for now- likely related to a small hematoma.     PLAN:  1- Continue carboplatin, 5-FU and cetuximab. Cycle 3 starts today.  2- RTC MD 3 weeks      CLAUS CAT MD    2/28/2017    cc: Andrea Pagan MD

## 2017-02-28 NOTE — PATIENT INSTRUCTIONS
Contact Numbers    [facility] Main Line: [phone]  [facility] Triage:  [phone]    Call triage with chills and/or temperature greater than or equal to 100.5, uncontrolled nausea/vomiting, diarrhea, constipation, dizziness, shortness of breath, chest pain, bleeding, unexplained bruising, or any new/concerning symptoms, questions/concerns.     If you are having any concerning symptoms or wish to speak to a provider before your next infusion visit, please call your care coordinator or triage to notify them so we can adequately serve you.       After Hours: [phone]    If after hours, weekends, or holidays, call main hospital  and ask for Oncology doctor on call.         February 2017 Sunday Monday Tuesday Wednesday Thursday Friday Saturday                  1     [facility] NEW TUMOR    8:00 AM   (15 min.)   Dayday Parker MD   [facility] 2     3     4       5     6     7     [facility] DRAW    9:00 AM   (15 min.)    [facility] DRAW   [facility] Lab Draw     P RETURN    9:15 AM   (30 min.)   Keira Hernandez MD   [facility] ONC INFUSION 240   10:00 AM   (240 min.)    ONCOLOGY INFUSION   [facility] 8     P CONSULT    9:30 AM   (90 min.)   Jodee Pagan MD   [facility]     [facility] TCT/SIM SUITE   10:00 AM   (60 min.)   Jodee Pagan MD   [facility] 9     [facility] TREATMENT PLAN VISIT    7:00 AM   (15 min.)   Jodee Pagan MD   [facility] 10     11       12     13     [facility] EXTERNAL RADIATION TREATMT   11:30 AM   (15 min.)   [facility] RAD ONC VARIAN   [facility] 14     [facility] DRAW    8:00 AM   (15 min.)    [facility] DRAW   [facility] Lab Draw     P RETURN    8:25 AM   (50 min.)   Opal Cannon PA-C   [facility] ONC INFUSION 120   10:00 AM   (120 min.)    ONCOLOGY INFUSION   [facility]      [facility] EXTERNAL RADIATION TREATMT    1:30 PM   (15 min.)   [facility] RAD ONC [facility]   [facility] 15     [facility] EXTERNAL RADIATION TREATMT    1:30 PM   (15 min.)   [facility] RAD ONC [facility]   [facility]cology Clinic     [facility] ON TREATMENT VISIT    1:45 PM   (15 min.)   Jodee Pagan MD   [facility] 16     [facility] EXTERNAL RADIATION TREATMT    1:30 PM   (15 min.)   [facility] RAD ONC [facility]   Radiation Oncology Clinic     TREATMENT    2:30 PM   (30 min.)   Lana Calvillo SLP   [facility] 17     [facility] EXTERNAL RADIATION TREATMT    1:30 PM   (15 min.)   [facility] RAD ONC [facility]   [facility] 18       19     20     21     [facility] DRAW   11:30 AM   (15 min.)   [facility]     CT CHEST/ABDOMEN/PELVIS W   11:45 AM   (20 min.)   UCCT2   [facility] CT     [facility] RETURN    1:25 PM   (50 min.)   Opal Cannon PA-C   [facility]     [facility] ONC INFUSION 120    2:30 PM   (120 min.)   [facility] ONCOLOGY INFUSION   [facility] 22     23     24     [facility] RETURN WITH ROOM    1:00 PM   (60 min.)   Yanique Wiggins GC   [facility] 25       26     27     28     [facility] DRAW    7:00 AM   (15 min.)   [facility] DRAW   [facility] Draw     [facility] RETURN    7:15 AM   (30 min.)   Keira Hernandez MD   [facility]     [facility] ONC INFUSION 240    8:00 AM   (240 min.)   [facility] ONCOLOGY INFUSION   [facility]                                March 2017 Sunday Monday Tuesday Wednesday Thursday Friday Saturday                  1     2     3     4       5     6     7     [facility] DRAW    2:00 PM   (15 min.)   [facility] DRAW   [facility] Draw     [facility] ONC INFUSION 120    2:30 PM   (120 min.)   [facility] ONCOLOGY INFUSION   [facility] 8     9     10     11       12     13     14     [facility] DRAW    1:00 PM   (15 min.)   [facility] DRAW     [facility] Draw     [facility] ONC INFUSION 120    1:30 PM   (120 min.)    ONCOLOGY INFUSION   [facility] 15     16     17     18       19     20     21     [facility] DRAW   10:00 AM   (15 min.)    [facility]     [facility] RETURN   10:15 AM   (30 min.)   Keira Hernandez MD   [facility]     [facility] ONC INFUSION 240   11:00 AM   (240 min.)    ONCOLOGY INFUSION   [facility] 22     23     24     25       26     27     28     [facility] DRAW    1:30 PM   (15 min.)    [facility] DRAW   [facility] Lab Draw     P ONC INFUSION 120    2:00 PM   (120 min.)    ONCOLOGY INFUSION   [facility] 29     30     31                      Recent Results (from the past 24 hour(s))   CBC with platelets differential    Collection Time: 02/28/17  7:36 AM   Result Value Ref Range    WBC 2.2 (L) 4.0 - 11.0 10e9/L    RBC Count 3.68 (L) 4.4 - 5.9 10e12/L    Hemoglobin 9.6 (L) 13.3 - 17.7 g/dL    Hematocrit 30.3 (L) 40.0 - 53.0 %    MCV 82 78 - 100 fl    MCH 26.1 (L) 26.5 - 33.0 pg    MCHC 31.7 31.5 - 36.5 g/dL    RDW 22.5 (H) 10.0 - 15.0 %    Platelet Count 129 (L) 150 - 450 10e9/L    Diff Method Automated Method     % Neutrophils 63.8 %    % Lymphocytes 12.6 %    % Monocytes 19.4 %    % Eosinophils 3.2 %    % Basophils 0.5 %    % Immature Granulocytes 0.5 %    Nucleated RBCs 0 0 /100    Absolute Neutrophil 1.4 (L) 1.6 - 8.3 10e9/L    Absolute Lymphocytes 0.3 (L) 0.8 - 5.3 10e9/L    Absolute Monocytes 0.4 0.0 - 1.3 10e9/L    Absolute Eosinophils 0.1 0.0 - 0.7 10e9/L    Absolute Basophils 0.0 0.0 - 0.2 10e9/L    Abs Immature Granulocytes 0.0 0 - 0.4 10e9/L    Absolute Nucleated RBC 0.0    Comprehensive metabolic panel    Collection Time: 02/28/17  7:36 AM   Result Value Ref Range    Sodium 137 133 - 144 mmol/L    Potassium 3.8 3.4 - 5.3 mmol/L    Chloride 100 94 - 109 mmol/L    Carbon Dioxide 28 20 - 32 mmol/L     Anion Gap 9 3 - 14 mmol/L    Glucose 108 (H) 70 - 99 mg/dL    Urea Nitrogen 10 7 - 30 mg/dL    Creatinine 0.69 0.66 - 1.25 mg/dL    GFR Estimate >90  Non  GFR Calc   >60 mL/min/1.7m2    GFR Estimate If Black >90   GFR Calc   >60 mL/min/1.7m2    Calcium 9.0 8.5 - 10.1 mg/dL    Bilirubin Total 0.4 0.2 - 1.3 mg/dL    Albumin 3.2 (L) 3.4 - 5.0 g/dL    Protein Total 7.6 6.8 - 8.8 g/dL    Alkaline Phosphatase 71 40 - 150 U/L    ALT 26 0 - 70 U/L    AST 22 0 - 45 U/L

## 2017-02-28 NOTE — MR AVS SNAPSHOT
After Visit Summary   2/28/2017    Darek Navarro    MRN: [id number]           Patient Information     Date Of Birth          1990        Visit Information        Provider Department      2/28/2017 7:30 AM Keira Hernandez MD [facility]        Today's Diagnoses     Malignant neoplasm of tongue (H)        Head and neck cancer (H)        Oral cancer (H)        Drug-induced neutropenia (H)           Follow-ups after your visit        Your next 10 appointments already scheduled     Mar 07, 2017  2:00 PM CST   [facility] Lab Draw with [facility]IC LAB DRAW   [facility] ([facility])    [address] [place] [address]   [phone]            Mar 07, 2017  2:30 PM CST   Infusion 120 with UC ONCOLOGY INFUSION, UC 19 ATC   [facility] ([facility])    [address] [place] [address]   [phone]            Mar 14, 2017  1:00 PM CDT   [facility] Lab Draw with [facility] LAB DRAW   [facility] ([facility])    [address] [place] [address]   [phone]            Mar 14, 2017  1:30 PM CDT   Infusion 120 with UC ONCOLOGY INFUSION, UC 27 ATC   [facility] ([facility])    [address] [place] [address]   [phone]            Mar 21, 2017 10:00 AM CDT   [facility] Lab Draw with UC [facility] LAB DRAW   [facility] ([facility])    [address] [place] [address]   [phone]            Mar 21, 2017 10:30 AM CDT   (Arrive by 10:15 AM)   Return Visit with Keira Heranndez MD   [facility] ([facility])    [address] [place] [address]   [phone]            Mar 21, 2017 11:00 AM CDT   Infusion  "240 with  ONCOLOGY INFUSION, UC 32 ATC   [facility] ([facility])    [address]  2nd Floor  [facility] [address] [phone]              Who to contact     If you have questions or need follow up information about today's clinic visit or your schedule please contact [facility] directly at [phone].  Normal or non-critical lab and imaging results will be communicated to you by EverTunehart, letter or phone within 4 business days after the clinic has received the results. If you do not hear from us within 7 days, please contact the clinic through SayHello [employer] or phone. If you have a critical or abnormal lab result, we will notify you by phone as soon as possible.  Submit refill requests through NUVETA or call your pharmacy and they will forward the refill request to us. Please allow 3 business days for your refill to be completed.          Additional Information About Your Visit        [employer] Information     [employer] gives you secure access to your electronic health record. If you see a primary care provider, you can also send messages to your care team and make appointments. If you have questions, please call your primary care clinic.  If you do not have a primary care provider, please call [phone] and they will assist you.        Care EveryWhere ID     This is your Care EveryWhere ID. This could be used by other organizations to access your [place] medical records  [id number]        Your Vitals Were     Pulse Temperature Respirations Height Pulse Oximetry BMI (Body Mass Index)    120 98.7  F (37.1  C) (Oral) 20 1.88 m (6' 2.02\") 100% 16.9 kg/m2       Blood Pressure from Last 3 Encounters:   02/28/17 115/67   02/21/17 118/75   02/14/17 130/81    Weight from Last 3 Encounters:   02/28/17 59.7 kg (131 lb 11.2 oz)   02/21/17 62.1 kg (136 lb 14.4 oz)   02/15/17 62 kg (136 lb 9.6 oz)              Today, you had the following     No " orders found for display         Today's Medication Changes          These changes are accurate as of: 2/28/17 11:59 PM.  If you have any questions, ask your nurse or doctor.               These medicines have changed or have updated prescriptions.        Dose/Directions    mineral oil-hydrophilic petrolatum   This may have changed:    - when to take this  - reasons to take this   Used for:  Malignant neoplasm of tongue (H)        Apply topically every 8 hours   Quantity:  50 g   Refills:  0       * oxyCODONE 20 MG 12 hr tablet   Commonly known as:  OxyCONTIN   This may have changed:  Another medication with the same name was changed. Make sure you understand how and when to take each.   Used for:  Bone metastasis (H)   Changed by:  Opal Cannon PA-C        Dose:  20 mg   Take 1 tablet (20 mg) by mouth every 12 hours maximum 2 tablet(s) per day   Quantity:  60 tablet   Refills:  0       * oxyCODONE 5 MG/5ML solution   Commonly known as:  ROXICODONE   This may have changed:    - when to take this  - reasons to take this   Used for:  Malignant neoplasm of tongue (H), Head and neck cancer (H), Oral cancer (H)   Changed by:  Keira Hernandez MD        Dose:  10 mg   10 mLs (10 mg) by Oral or PEG tube route every 4 hours as needed for moderate to severe pain   Quantity:  1000 mL   Refills:  0       pantoprazole 20 MG EC tablet   Commonly known as:  PROTONIX   This may have changed:    - when to take this  - reasons to take this   Used for:  Gastroesophageal reflux disease without esophagitis        Dose:  20 mg   Take 1 tablet (20 mg) by mouth daily   Quantity:  30 tablet   Refills:  3       * Notice:  This list has 2 medication(s) that are the same as other medications prescribed for you. Read the directions carefully, and ask your doctor or other care provider to review them with you.      Stop taking these medicines if you haven't already. Please contact your care team if you have questions.      lidocaine-prilocaine cream   Commonly known as:  EMLA   Stopped by:  Keira Hernandez MD                Where to get your medicines      Some of these will need a paper prescription and others can be bought over the counter.  Ask your nurse if you have questions.     Bring a paper prescription for each of these medications     oxyCODONE 5 MG/5ML solution                Primary Care Provider Office Phone # Fax #    [facility] [place] [phone] [address]  [place] [address]        Thank you!     Thank you for choosing [facility]  for your care. Our goal is always to provide you with excellent care. Hearing back from our patients is one way we can continue to improve our services. Please take a few minutes to complete the written survey that you may receive in the mail after your visit with us. Thank you!             Your Updated Medication List - Protect others around you: Learn how to safely use, store and throw away your medicines at www.disposemymeds.org.          This list is accurate as of: 2/28/17 11:59 PM.  Always use your most recent med list.                   Brand Name Dispense Instructions for use    citalopram 10 MG/5ML solution    celeXA    300 mL    Take 10 mLs (20 mg) by mouth daily       clindamycin 1 % lotion    CLEOCIN T    60 mL    Apply topically 2 times daily       ibuprofen 200 MG tablet    ADVIL/MOTRIN     Take 200 mg by mouth daily as needed       levofloxacin 500 MG tablet    LEVAQUIN    7 tablet    Take 1 tablet (500 mg) by mouth daily for 7 days       levothyroxine 25 MCG tablet    SYNTHROID/LEVOTHROID    90 tablet    Take 1 tablet (25 mcg) by mouth daily       lidocaine 2 % solution    XYLOCAINE    1000 mL    15 mL swish and spit q3h PRN, max 8 doses in 24 hours       * LORazepam 2 MG/ML (HIGH CONC) solution    ATIVAN    30 mL    Take 0.25-0.5 mLs (0.5-1 mg) by mouth every 4 hours as needed for anxiety, sleep,  nausea or vomiting       * LORazepam 0.5 MG tablet    ATIVAN    30 tablet    Take 1 tablet (0.5 mg) by mouth every 4 hours as needed (Anxiety, Nausea/Vomiting or Sleep)       methylphenidate 5 MG tablet    RITALIN    60 tablet    Take 1 tablet (5 mg) by mouth 2 times daily       mineral oil-hydrophilic petrolatum     50 g    Apply topically every 8 hours       multivitamins with minerals Liqd liquid     1 Bottle    15 mLs by Per Feeding Tube route daily       ondansetron 4 MG tablet    ZOFRAN     Take 4 mg by mouth daily as needed       * oxyCODONE 20 MG 12 hr tablet    OxyCONTIN    60 tablet    Take 1 tablet (20 mg) by mouth every 12 hours maximum 2 tablet(s) per day       * oxyCODONE 5 MG/5ML solution    ROXICODONE    1000 mL    10 mLs (10 mg) by Oral or PEG tube route every 4 hours as needed for moderate to severe pain       pantoprazole 20 MG EC tablet    PROTONIX    30 tablet    Take 1 tablet (20 mg) by mouth daily       prochlorperazine 10 MG tablet    COMPAZINE    30 tablet    Take 1 tablet (10 mg) by mouth every 6 hours as needed (Nausea/Vomiting)       * Notice:  This list has 4 medication(s) that are the same as other medications prescribed for you. Read the directions carefully, and ask your doctor or other care provider to review them with you.

## 2017-02-28 NOTE — LETTER
2/28/2017       RE: Darek Navarro  [address]  [place] [address]     Dear Colleague,    Thank you for referring your patient, Darek Navarro, to the [facility]. Please see a copy of my visit note below.    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 Pathologic staging: [id number]. Recurrence with distant metastasis after initial chemoRT.       TREATMENT:    1- April 28, 2016 transmandibular subtotal oral glossectomy, right base of tongue resection with partial pharyngectomy, bilateral neck dissections, and free flap reconstruction. Concurrent ChemoRt with high dose Cisplatin 6/6/16. day 22 cisplatin on 6/27/16. He completed day 43 on 7/19/16. Radiation completed, 6600cGy on 7/21/16.   2- Palliative intent treatment with Pembrolizumab 12/6/2016  3- Due to rapid progression of disease the treatment was switched to Carboplatin, 5-FU and Cetuximab on 1/18/17     SUBJECTIVE:  The patient was seen as a followup today.His weight has stabilized. He has seen a genetic counselor and insurance approval for testing is in progress.     REVIEW OF SYSTEMS:  A complete review of systems was performed and found to be negative other than pertinent positives mentioned in history of present illness.     Past medical, social histories reviewed.    Meds- Reviewed.     PHYSICAL EXAMINATION:   VITAL SIGNS: ./67  Pulse 120  Temp 98.7  F (37.1  C) (Oral)  Resp 20  Wt 59.7 kg (131 lb 11.2 oz)  SpO2 100%  BMI 16.9 kg/m2  GENERAL: Sitting comfortably.   HEENT: Pupils are equal. Oropharynx is clear.   NECK: No cervical or supraclavicular lymphadenopathy.   LUNGS: Clear bilaterally.   HEART: S1, S2, regular.   ABDOMEN: Soft, nontender, nondistended, no hepatosplenomegaly.   EXTREMITIES: Warm,  well perfused.   NEUROLOGIC: Alert, awake.   SKIN: No rash.   LYMPHATICS: No edema.     DATA:  Recent Labs   Lab Test  02/21/17   1204  02/14/17   0817   05/05/16   0750  05/04/16   0739   NA  138  135   < >  140  138   POTASSIUM  4.2  3.6   < >  4.9  4.1   CHLORIDE  103  99   < >  105  105   CO2  27  27   < >  28  29   ANIONGAP  8  8   < >  6  4   BUN  11  12   < >  16  14   CR  0.58*  0.55*   < >  0.64*  0.64*   GLC  117*  114*   < >  106*  102*   COLE  8.8  8.8   < >  9.6  9.3   MAG  2.2  2.0   < >  2.3  2.2   PHOS   --    --    --   5.3*  4.0    < > = values in this interval not displayed.     Recent Labs   Lab Test  02/21/17   1204  02/14/17   0817  02/07/17   0937   WBC  1.0*  3.4*  3.0*   HGB  9.3*  9.7*  10.0*   PLT  151  299  225   MCV  83  78  82   NEUTROPHIL  49.5  79.9  66.0     Recent Labs   Lab Test  02/21/17   1204  02/14/17   0817  02/07/17   0937   BILITOTAL  0.4  0.7  0.4   ALKPHOS  71  76  84   ALT  18  27  28   AST  15  18  19   ALBUMIN  3.1*  3.3*  3.4         Results for orders placed or performed in visit on 02/21/17   CT Chest/Abdomen/Pelvis w Contrast    Narrative    EXAMINATION: CT CHEST/ABDOMEN/PELVIS W CONTRAST, 2/21/2017 12:30 PM    TECHNIQUE:  Helical CT images from the thoracic inlet through the  symphysis pubis were obtained  with IV contrast.    COMPARISON: CT CAP 1/16/2017 and 12/6/2016.    HISTORY: follow up head and neck cancer, Malignant neoplasm of head,  face and neck    DLP: 275 mGy*cm    FINDINGS:    Chest:    Redemonstrated cavitary mass with air-fluid level seen in the left  upper lobe, on series 3 image 47, measures 4.1 x 5.0 cm, and decreased  in size, having previously measured 5.1 x 6.7 cm. Additional mass  component seen posterolateral to this cavitating mass, has similarly  decreased in size. Exact measurements are difficult to obtain given  irregular shape, however this measures 4.2 x 2.5 cm on series 3 image  71, having previously measured 5.8 x 3.7 cm when measured  in a similar  fashion. Adjacent left upper lobe lesion seen anterolaterally, on  series 3 image 83, is completely fluid-filled, previously having  demonstrated cavitation, is decreased, now measures 4.1 x 3.3 cm,  previously 4.9 x 3.7 cm.    There has been significant decrease in left hilar lymphadenopathy, for  example a 1.1 cm left hilar lymph node on series 3 image 101,  previously measured 2.0 cm. Previously identified prominent  prevascular lymph nodes to the left of the descending aorta, on series  3 image 96, which measures 0.7 cm, and 0.9 cm in short axis dimension,  previously 1.3 cm, and 1.2 cm, respectively. There has been interval  significant decrease in right hilar adenopathy as well.    Pleural-based mass along the superior right lower lobe, on series 6  image 663 has significantly decreased, and measures 1.2 cm in short  axis dimension, previously 2.1 cm, and occupying a smaller width of  the pleural space.    There is been marked interval decrease in nodular mass density along  the superior major fissure, seen on series 6 image 82, now measuring  1.2 x 4.3 cm, previously 3.3 x 8.7 cm.    Cavitating nodule seen in the lateral right upper lobe, on series 6  image 52, have significantly decreased in size. Left lower lobe  cavitating lesion on series 6 image 101, measures 2.3 cm in diameter,  previously 3.4 cm.    Other scattered nodules throughout the lungs are decreased in size.  There is, however new scattered tree-in-bud nodularity seen in the  left base.    Left chest wall Port-A-Cath seen with tip at the superior atriocaval  junction.    No pleural effusion. No pneumothorax. The heart size is within normal  limits. Thoracic aorta is normal in caliber. No large central  pulmonary embolus. No pericardial effusion. Treated bronchial tree is  patent. Thyroid gland is unremarkable.    Abdomen and pelvis:   Redemonstrated numerous hypoenhancing renal masses bilaterally,  overall appear decrease in size.  For example: On series 3 image 246,  is a right superior interpolar region mass measuring 3.2 x 2.3 cm,  previously 3.1 x 3.5 cm.    Interval decrease in size of a segment 5 hypodense hepatic lesion,  seen on series 3 image 369, measures 0.7 cm, previously 1.3 cm.    The spleen is unremarkable. No identified lesion. Normal adrenal  glands. Normal pancreas. Gallbladder is normal. Normal caliber small  bowel. Large stool burden throughout the colon, and into the rectum.  Urinary bladder is unremarkable.    Involving the iliacus muscle, along the medial aspect of the right  iliac bone, as a new well-circumscribed hypodense rounded lesion  measuring 2.5 x 1.5 cm. There appears to be no effect on the adjacent  iliac bone, without periosteal reaction.    There is mild free fluid in the lower abdomen, not significantly  changed.    No pathologic-appearing lymphadenopathy in the abdomen or pelvis.    Bones and soft tissues:   Lytic lesions seen throughout the skeleton, at T1, T10, and T11. The  T1 vertebral body is not fully visualized within the field-of-view.  Anterior wedge compression deformity again seen at T10, slightly  worsened, with anterior vertebral body height of 1.2 cm, previously  1.6 cm. Left iliac lesion, not significantly changed. Small lytic  lesions seen throughout the right iliac bone also identified. There is  significant decrease in paravertebral soft tissue mass density. The  spinal canal appears patent.      Impression    IMPRESSION:   In this patient with history of squamous cell carcinoma of the tongue  1. Overall marked improvement in disease, characterized by decreased  size of lesions in the lungs, john, kidneys, liver, and paravertebral  soft tissue.  2. New nodular and tree-in-bud opacities in the left lower lobe,  concerning for infection versus metastatic disease.  3. New fluid density lesion within the right iliacus muscle. Given  improvement in metastatic disease elsewhere, this is favored  to  represent in a nonneoplastic process such as an organizing hematoma or  potentially infection.   4. Interval worsening of anterior compression deformity fracture at  T10.    I have personally reviewed the examination and initial interpretation  and I agree with the findings.    REBECCA GANNON MD     ECOG PS: 1     ASSESSMENT:  A 26-year-old gentleman  [id number] squamous cell carcinoma of the right oral tongue.Status post April 28, 2016 transmandibular subtotal oral glossectomy, right base of tongue resection with partial pharyngectomy, bilateral neck dissections, and free flap. Initiated treatment by 6/6/16.   He completed his concurrent chemoRT 7/21/16.  Follow up PET CT scan 10/28/16 was reviewed at multidisciplinary head and neck tumor board. It was consistent with local and distant recurrence. L- His case was subsequently reviewed at Thoracic oncology conference and we will proceed with biopsy by IR.glendy biopsy results were reviewed with the patient and family members which confirmed metastatic disease.   He was seen at [facility] and no first line trials were available. He was switched to Pembrolizumab with a plan to do short term scans to assess the pace of disease progression.   CT scan 1/16/17 showed rapid disease progression with thoracic vertebral fracture. Multiple lytic/destructive spine lesions were reviewed by spine surgery and were not felt to be unstable. He completed palliative radiation to bones.   - He is on chemotherapy with carboplatin/5-FU and Cetuximab and is starting 3rd cycle today.   - He is on Zometa  - CT scan 2/21/17 showed an excellent response to treatment. There was a focus in right iliacus muscle, I think we can observe it for now- likely related to a small hematoma.     PLAN:  1- Continue carboplatin, 5-FU and cetuximab. Cycle 3 starts today.  2- RTC MD 3 weeks      CLAUS CAT MD    2/28/2017    cc: Andrea Pagan MD

## 2017-03-01 NOTE — TELEPHONE ENCOUNTER
3/1/2017    Per Darek's request, I called his sister Heena today in order to discuss the pre-authorization we had submitted for Fanconi anemia and dyskeratosis congenita screening tests for Darek. As she was not available, I left a voicemail message with my contact information and encouraged either Heena or Darek to return my call.    Yanique Wiggins MS, [facility]  Certified Genetic Counselor  Office: [phone]  Pager: [phone]

## 2017-03-04 NOTE — PROCEDURES
RADIOTHERAPY TREATMENT SUMMARY  DATE OF REPORT: 2017    PATIENT: MICHEAL GARCIA  MEDICAL RECORD NO: [id number]  : 1990    DIAGNOSIS: Squamous Cell Carcinoma of the oral cavity, Stage IV, C79.51 Secondary malignant neoplasm of bone  INTENT OF RADIOTHERAPY:  Palliative    TREATMENT SUMMARY:  Details of the treatments summarized below are found in records kept in the Department of Radiation Oncology at [facility].    Radiation Oncology - Course: 2  Treatment Site Dose Modality From To Days Fx.  2 T1 2,000 cGy 06 X 2/13/2017 2017 4 5  3 T10 2,000 cGy 18 X 2/13/2017 2017 4 5  Total 2,000 cGy    Dose per fraction: 400 cGy    COMMENTS: Mr. Garcia is a 27-year-old man with squamous cell carcinoma of the oral tongue who developed local and distant recurrence after short interval from definitive treatment attempt. Most recently he developed painful metastases at T1 and T10 with slightly more pain at T1. He was seen by Orthopedic Surgery, and surgical intervention was not recommended. He therefore underwent palliative radiation as above. Treatment was completed with expected acute toxicity and no unplanned treatment breaks.  ACUTE TOXICITY PROFILE BY CTC AE4: None    PAIN MANAGEMENT: He continued with Oxycontin and oxycodone regimen through treatment with slight improvement in pain by the end of treatment.     FOLLOW UP PLAN:   1. Follow-up with Medical Oncology.     Resident Physician: Giovanni Ervin M.D.  Staff Physician: Jodee Pagan M.D.  Physicist: Shaheen Vazquez MS    CC:   MD Dayday Knowles MD

## 2017-03-07 NOTE — LETTER
3/7/2017       RE: Darek Navarro  [address]  [place] [address]     Dear Colleague,    Thank you for referring your patient, Darek Navarro, to the [facility]. Please see a copy of my visit note below.    3/7/2017    Referring Provider: Keira Hernandez MD    Presenting Information:   Darek Navarro came in to clinic for a follow-up visit today. He had previously been seen in the Cancer Risk Management Program at the [facility] on 5/31/2016 and [facility] on 2/24/2017. At his most recent visit, Darek requested that a pre-authorization be pursued through his insurance company to better understand his coverage and estimated out of pocket cost for the Fanconi anemia (FA) and dyskeratosis congenita (DC) screening tests.     Per Darek's insurance company, a pre-authorization is not required for these two screening tests. Given this information, Darek felt comfortable returning to clinic today to have his blood drawn for the FA and DC screening tests, as well as genetic testing to address his family history of ovarian cancer.     Discussion:    We previously reviewed the details of Darek's family and personal history. Please see the notes from the previous appointments on 5/31/2016 and 2/24/2017 for more details.    We briefly reviewed the two screening tests for FA and DC. For FA, the screening test is a chromosomal breakage test performed at the [facility]. Increased chromosomal breakage suggests a further work-up for FA, including genetic testing, may be indicated. For DC, the screening test is a telomere length test performed by [facility]. Significantly reduced telomere lengths suggest a further work-up for DC, including genetic testing, may be indicated. Darek verbalized understanding and interest in pursing these two screening tests.    We also briefly reviewed his family history of ovarian cancer, kidney  cancer, and brain tumors. Though Darek's mother pursued genetic testing of the BRCA1 and BRCA2 genes, which was negative/normal, there are several other genes associated with increased risk for the cancers seen in Darek's family. One example if Brasher syndrome, which is associated with increased risk for ovarian, kidney, and several other cancers. Another example is Li-Fraumeni syndrome, which is associated with increased risk for brain tumors and cancers at significantly younger ages.    We again reviewed his genetic testing options for ovarian cancer, including the OvaNext gene panel. Darek explained that he would be interested in gaining as much information as possible from genetic testing. As such, he felt comfortable pursuing genetic testing using the OvaNext gene panel.     Genetic testing is available for 24 genes associated with hereditary gynecologic cancers: OvaNext (LUISA, BARD1, BRCA1, BRCA2, BRIP1, CDH1, CHEK2, EPCAM, MLH1, MRE11A, MSH2, MSH6, MUTYH, NBN, NF1, PALB2, PMS2, PTEN, RAD50, RAD51C, RAD51D, SMARCA4, STK11, and TP53).    We discussed that some of the genes in the OvaNext panel are associated with specific hereditary cancer syndromes and have published management guidelines: Hereditary Breast and Ovarian Cancer syndrome (BRCA1, BRCA2), Brasher syndrome (MLH1, MSH2, MSH6, PMS2, EPCAM), Hereditary Diffuse Gastric Cancer (CDH1), Cowden syndrome (PTEN), Li Fraumeni syndrome (TP53), Peutz-Jeghers syndrome (STK11), MUTYH Associated Polyposis (MUTYH), and Neurofibromatosis type 1 (NF1).      Risk-reducing salpingo-oophorectomy can be considered in women with mutations in BRIP1, RAD51C, or RAD51D.  Breast cancer risk management guidelines are available for LUISA, CHEK2, PALB2, NF1, and NBN.    The remaining genes (BARD1, MRE11A, RAD50, and SMARCA4) are associated with increased cancer risk and may allow us to make medical recommendations when mutations are identified.      Darek was previously provided  with a detailed brochure from [employer] explaining the OvaNext testing.    Consent was obtained and genetic testing for OvaNext was sent to [facility]. Turn around time: approximately 4-5 weeks.    Consent was also obtained and blood was collected for the FA chromosome breakage test through the [facility] and DC telomere length test through [employer]. Turn around time: approximately 4-5 weeks.    The information from this test may determine cancer screening recommendations as well as options for risk reducing surgeries for Darek and family members. These recommendations will be discussed in detail once genetic testing is completed.    Plan:  1. All of Darek's questions were answered to his satisfaction.   2. Darek signed consent forms at the conclusion of this visit and had his blood drawn for the OvaNext gene panel, FA chromosome breakage test, and DC telomere length test.   3. Darek will return to clinic to discuss the results. Turn around time: approximately 4-5 weeks.     Face to face time: 15 minutes.    Yanique Wiggins MS, JD [facility]  Certified Genetic Counselor  Office: [phone]  Pager: [phone]

## 2017-03-07 NOTE — MR AVS SNAPSHOT
After Visit Summary   3/7/2017    Darek Navarro    MRN: [id number]           Patient Information     Date Of Birth          1990        Visit Information        Provider Department      3/7/2017 2:30 PM  19 ATC;  ONCOLOGY INFUSION [facility]        Today's Diagnoses     Head and neck cancer (H)    -  1    Drug-induced neutropenia (H)          Care Instructions    Contact Numbers    [facility] Main Line: [phone]  [facility] Triage:  [phone]    Call triage with chills and/or temperature greater than or equal to 100.5, uncontrolled nausea/vomiting, diarrhea, constipation, dizziness, shortness of breath, chest pain, bleeding, unexplained bruising, or any new/concerning symptoms, questions/concerns.     If you are having any concerning symptoms or wish to speak to a provider before your next infusion visit, please call your care coordinator or triage to notify them so we can adequately serve you.       After Hours: [phone]    If after hours, weekends, or holidays, call main hospital  and ask for Oncology doctor on call.           March 2017 Sunday Monday Tuesday Wednesday Thursday Friday Saturday                  1     2     3     4       5     6     7     [facility] LAB DRAW    2:00 PM   (15 min.)   [facility] DRAW   [facility] Lab Draw     [facility] ONC INFUSION 120    2:30 PM   (120 min.)    ONCOLOGY INFUSION   [facility]     [facility] RETURN WITH ROOM    3:15 PM   (60 min.)   Yanique Wiggins GC   [facility] LAB DRAW    4:00 PM   (15 min.)    [facility] 8     9     10     11       12     13     14     [facility] DRAW    1:00 PM   (15 min.)   [facility] DRAW   [facility] Lab Draw     [facility] ON[facility] INFUSION 120    1:30 PM   (120 min.)    ONCOLOGY INFUSION   [facility] 15     16     17     18       19     20     21     [facility] DRAW    10:00 AM   (15 min.)    [facility] LAB DRAW   [facility] Lab Draw     [facility] RETURN   10:15 AM   (30 min.)   Keira Hernandez MD   [facility]     [facility] ONC INFUSION 240   11:00 AM   (240 min.)    ONCOLOGY INFUSION   [facility] 22     23     24     25       26     27     28     [facility] LAB DRAW    1:30 PM   (15 min.)    [facility] LAB DRAW   [facility] Lab Draw     [facility] ONC INFUSION 120    2:00 PM   (120 min.)    ONCOLOGY INFUSION   [facility] 29     30     31 April 2017 Sunday Monday Tuesday Wednesday Thursday Friday Saturday                                 1       2     3     4     [facility] DRAW    1:30 PM   (15 min.)    [facility] LAB DRAW   [facility] Lab Draw     [facility] ONC INFUSION 120    2:00 PM   (120 min.)    ONCOLOGY INFUSION   [facility] 5     6     7     8       9     10     11     12     13     14     15       16     17     18     19     20     21     22       23     24     25     26     27     28     29       30                                               Recent Results (from the past 24 hour(s))   CBC with platelets differential    Collection Time: 03/07/17  2:41 PM   Result Value Ref Range    WBC 2.0 (L) 4.0 - 11.0 10e9/L    RBC Count 3.58 (L) 4.4 - 5.9 10e12/L    Hemoglobin 9.5 (L) 13.3 - 17.7 g/dL    Hematocrit 29.4 (L) 40.0 - 53.0 %    MCV 82 78 - 100 fl    MCH 26.5 26.5 - 33.0 pg    MCHC 32.3 31.5 - 36.5 g/dL    RDW 21.9 (H) 10.0 - 15.0 %    Platelet Count 278 150 - 450 10e9/L    Diff Method Automated Method     % Neutrophils 76.9 %    % Lymphocytes 10.3 %    % Monocytes 10.3 %    % Eosinophils 1.0 %    % Basophils 0.5 %    % Immature Granulocytes 1.0 %    Nucleated RBCs 0 0 /100    Absolute Neutrophil 1.5 (L) 1.6 - 8.3 10e9/L    Absolute Lymphocytes 0.2 (L) 0.8 - 5.3 10e9/L    Absolute Monocytes 0.2 0.0 - 1.3 10e9/L    Absolute Eosinophils 0.0 0.0 - 0.7 10e9/L    Absolute  Basophils 0.0 0.0 - 0.2 10e9/L    Abs Immature Granulocytes 0.0 0 - 0.4 10e9/L    Absolute Nucleated RBC 0.0    Comprehensive metabolic panel    Collection Time: 03/07/17  2:41 PM   Result Value Ref Range    Sodium 135 133 - 144 mmol/L    Potassium 3.7 3.4 - 5.3 mmol/L    Chloride 101 94 - 109 mmol/L    Carbon Dioxide 26 20 - 32 mmol/L    Anion Gap 8 3 - 14 mmol/L    Glucose 118 (H) 70 - 99 mg/dL    Urea Nitrogen 10 7 - 30 mg/dL    Creatinine 0.55 (L) 0.66 - 1.25 mg/dL    GFR Estimate >90  Non  GFR Calc   >60 mL/min/1.7m2    GFR Estimate If Black >90   GFR Calc   >60 mL/min/1.7m2    Calcium 8.5 8.5 - 10.1 mg/dL    Bilirubin Total 0.4 0.2 - 1.3 mg/dL    Albumin 3.3 (L) 3.4 - 5.0 g/dL    Protein Total 7.6 6.8 - 8.8 g/dL    Alkaline Phosphatase 64 40 - 150 U/L    ALT 23 0 - 70 U/L    AST 19 0 - 45 U/L   Magnesium    Collection Time: 03/07/17  2:41 PM   Result Value Ref Range    Magnesium 2.2 1.6 - 2.3 mg/dL             Follow-ups after your visit        Your next 10 appointments already scheduled     Mar 14, 2017  1:00 PM CDT   [facility] Lab Draw with  MASONIC LAB DRAW   [facility] Masonic Lab Draw ([facility])    [address] [place] [address]  2nd [facility] [id number] [phone]            Mar 14, 2017  1:30 PM CDT   Infusion 120 with  ONCOLOGY INFUSION, UC 27 ATC   [facility]    [address]  2nd [facility] [id number] [phone]            Mar 21, 2017 10:00 AM CDT   Masonic Lab Draw with  MASONIC LAB DRAW   [facility] Mason[facility] Lab Draw ([facility])    [address]  2nd [facility] [id number] [phone]            Mar 21, 2017 10:30 AM CDT   (Arrive by 10:15 AM)   Return Visit with Keira Hernandez MD   [facility] ([facility])    [address]  2nd [address]  [facility] [address]    [phone]            Mar 21, 2017 11:00 AM CDT   Infusion 240 with UC ONCOLOGY INFUSION, UC 32 ATC   [facility])    [address]  2nd [facility] [id number] [phone]            Mar 28, 2017  1:30 PM CDT   [facility] Lab Draw with UC MASONIC LAB DRAW   [facility] Lab Draw ([facility])    [address] [id number] [phone]            Mar 28, 2017  2:00 PM CDT   Infusion 120 with UC ONCOLOGY INFUSION, UC 11 ATC   [facility] ([facility])    [address]  2nd [facility] [id number] [phone]              Future tests that were ordered for you today     Open Future Orders        Priority Expected Expires Ordered    OvaNext genetic testing, [place] Test: Laboratory Miscellaneous Order Routine  3/7/2018 3/7/2017            Who to contact     If you have questions or need follow up information about today's clinic visit or your schedule please contact [facility]ER [facility] directly at [phone].  Normal or non-critical lab and imaging results will be communicated to you by MyChart, letter or phone within 4 business days after the clinic has received the results. If you do not hear from us within 7 days, please contact the clinic through [employer] or phone. If you have a critical or abnormal lab result, we will notify you by phone as soon as possible.  Submit refill requests through [employer] or call your pharmacy and they will forward the refill request to us. Please allow 3 business days for your refill to be completed.          Additional Information About Your Visit        [employer] Information     [employer] gives you secure access to your electronic health record. If you see a primary care provider, you can also send messages to your care team and make appointments. If you have questions, please call  your primary care clinic.  If you do not have a primary care provider, please call [phone] and they will assist you.        Care EveryWhere ID     This is your Care EveryWhere ID. This could be used by other organizations to access your [place] medical records  [id number]        Your Vitals Were     Pulse Temperature Respirations Pulse Oximetry BMI (Body Mass Index)       115 98.9  F (37.2  C) (Oral) 14 100% 16.98 kg/m2        Blood Pressure from Last 3 Encounters:   03/07/17 (!) 133/93   02/28/17 115/67   02/21/17 118/75    Weight from Last 3 Encounters:   03/07/17 60 kg (132 lb 4.8 oz)   02/28/17 59.7 kg (131 lb 11.2 oz)   02/21/17 62.1 kg (136 lb 14.4 oz)              We Performed the Following     CBC with platelets differential     Comprehensive metabolic panel     Magnesium          Today's Medication Changes          These changes are accurate as of: 3/7/17  5:42 PM.  If you have any questions, ask your nurse or doctor.               These medicines have changed or have updated prescriptions.        Dose/Directions    mineral oil-hydrophilic petrolatum   This may have changed:    - when to take this  - reasons to take this   Used for:  Malignant neoplasm of tongue (H)        Apply topically every 8 hours   Quantity:  50 g   Refills:  0       pantoprazole 20 MG EC tablet   Commonly known as:  PROTONIX   This may have changed:    - when to take this  - reasons to take this   Used for:  Gastroesophageal reflux disease without esophagitis        Dose:  20 mg   Take 1 tablet (20 mg) by mouth daily   Quantity:  30 tablet   Refills:  3                Primary Care Provider Office Phone # Fax #    [facility] [phone] [address]  [facility] [address]        Thank you!     Thank you for choosing [facility]  for your care. Our goal is always to provide you with excellent care. Hearing back from our patients is one way we can  continue to improve our services. Please take a few minutes to complete the written survey that you may receive in the mail after your visit with us. Thank you!             Your Updated Medication List - Protect others around you: Learn how to safely use, store and throw away your medicines at www.disposemymeds.org.          This list is accurate as of: 3/7/17  5:42 PM.  Always use your most recent med list.                   Brand Name Dispense Instructions for use    citalopram 10 MG/5ML solution    celeXA    300 mL    Take 10 mLs (20 mg) by mouth daily       clindamycin 1 % lotion    CLEOCIN T    60 mL    Apply topically 2 times daily       ibuprofen 200 MG tablet    ADVIL/MOTRIN     Take 200 mg by mouth daily as needed       levothyroxine 25 MCG tablet    SYNTHROID/LEVOTHROID    90 tablet    Take 1 tablet (25 mcg) by mouth daily       lidocaine 2 % solution    XYLOCAINE    1000 mL    15 mL swish and spit q3h PRN, max 8 doses in 24 hours       * LORazepam 2 MG/ML (HIGH CONC) solution    ATIVAN    30 mL    Take 0.25-0.5 mLs (0.5-1 mg) by mouth every 4 hours as needed for anxiety, sleep, nausea or vomiting       * LORazepam 0.5 MG tablet    ATIVAN    30 tablet    Take 1 tablet (0.5 mg) by mouth every 4 hours as needed (Anxiety, Nausea/Vomiting or Sleep)       methylphenidate 5 MG tablet    RITALIN    60 tablet    Take 1 tablet (5 mg) by mouth 2 times daily       mineral oil-hydrophilic petrolatum     50 g    Apply topically every 8 hours       multivitamins with minerals Liqd liquid     1 Bottle    15 mLs by Per Feeding Tube route daily       ondansetron 4 MG tablet    ZOFRAN     Take 4 mg by mouth daily as needed       * oxyCODONE 20 MG 12 hr tablet    OxyCONTIN    60 tablet    Take 1 tablet (20 mg) by mouth every 12 hours maximum 2 tablet(s) per day       * oxyCODONE 5 MG/5ML solution    ROXICODONE    1000 mL    10 mLs (10 mg) by Oral or PEG tube route every 4 hours as needed for moderate to severe pain        pantoprazole 20 MG EC tablet    PROTONIX    30 tablet    Take 1 tablet (20 mg) by mouth daily       prochlorperazine 10 MG tablet    COMPAZINE    30 tablet    Take 1 tablet (10 mg) by mouth every 6 hours as needed (Nausea/Vomiting)       * Notice:  This list has 4 medication(s) that are the same as other medications prescribed for you. Read the directions carefully, and ask your doctor or other care provider to review them with you.

## 2017-03-07 NOTE — LETTER
Cancer Risk Management  Program Locations    [facility]  [facility]  Mailing Address  Cancer Risk Management Program  [facility]  [address]  [place] [address]    New patient appointments  [phone]  March 8, 2017    Darek Navarro  [address]  [place] [address]      Dear Darek,    It was a pleasure meeting with you again at the [facility] on March 7, 2017. Here is a copy of the progress note from your recent genetic counseling visit to the Cancer Risk Management Program. If you have any additional questions, please feel free to call.    3/7/2017    Referring Provider: Keira Hernandez MD    Presenting Information:   Darek Navarro came in to clinic for a follow-up visit today. He had previously been seen in the Cancer Risk Management Program at the [facility] on 5/31/2016 and [facility] on 2/24/2017. At his most recent visit, Darek requested that a pre-authorization be pursued through his insurance company to better understand his coverage and estimated out of pocket cost for the Fanconi anemia (FA) and dyskeratosis congenita (DC) screening tests.     Per Darek's insurance company, a pre-authorization is not required for these two screening tests. Given this information, Darek felt comfortable returning to clinic today to have his blood drawn for the FA and DC screening tests, as well as genetic testing to address his family history of ovarian cancer.     Discussion:    We previously reviewed the details of Darek's family and personal history. Please see the notes from the previous appointments on 5/31/2016 and 2/24/2017 for more details.    We briefly reviewed the two screening tests for FA and DC. For FA, the screening test is a chromosomal breakage test  performed at the [facility]. Increased chromosomal breakage suggests a further work-up for FA, including genetic testing, may be indicated. For DC, the screening test is a telomere length test performed by [facility]. Significantly reduced telomere lengths suggest a further work-up for DC, including genetic testing, may be indicated. Darek verbalized understanding and interest in pursing these two screening tests.    We also briefly reviewed his family history of ovarian cancer, kidney cancer, and brain tumors. Though Darek's mother pursued genetic testing of the BRCA1 and BRCA2 genes, which was negative/normal, there are several other genes associated with increased risk for the cancers seen in Darek's family. One example if Brasher syndrome, which is associated with increased risk for ovarian, kidney, and several other cancers. Another example is Li-Fraumeni syndrome, which is associated with increased risk for brain tumors and cancers at significantly younger ages.    We again reviewed his genetic testing options for ovarian cancer, including the OvaNext gene panel. Darek explained that he would be interested in gaining as much information as possible from genetic testing. As such, he felt comfortable pursuing genetic testing using the OvaNext gene panel.     Genetic testing is available for 24 genes associated with hereditary gynecologic cancers: OvaNext (LUISA, BARD1, BRCA1, BRCA2, BRIP1, CDH1, CHEK2, EPCAM, MLH1, MRE11A, MSH2, MSH6, MUTYH, NBN, NF1, PALB2, PMS2, PTEN, RAD50, RAD51C, RAD51D, SMARCA4, STK11, and TP53).    We discussed that some of the genes in the OvaNext panel are associated with specific hereditary cancer syndromes and have published management guidelines: Hereditary Breast and Ovarian Cancer syndrome (BRCA1, BRCA2), Brasher syndrome (MLH1, MSH2, MSH6, PMS2, EPCAM), Hereditary Diffuse Gastric Cancer (CDH1), Cowden syndrome (PTEN), Li Fraumeni syndrome  (TP53), Peutz-Jeghers syndrome (STK11), MUTYH Associated Polyposis (MUTYH), and Neurofibromatosis type 1 (NF1).      Risk-reducing salpingo-oophorectomy can be considered in women with mutations in BRIP1, RAD51C, or RAD51D.  Breast cancer risk management guidelines are available for LUISA, CHEK2, PALB2, NF1, and NBN.    The remaining genes (BARD1, MRE11A, RAD50, and SMARCA4) are associated with increased cancer risk and may allow us to make medical recommendations when mutations are identified.      Darek was previously provided with a detailed brochure from [employer] explaining the OvaNext testing.    Consent was obtained and genetic testing for OvaNext was sent to [facility]. Turn around time: approximately 4-5 weeks.    Consent was also obtained and blood was collected for the FA chromosome breakage test through the [facility] and DC telomere length test through [employer]. Turn around time: approximately 4-5 weeks.    The information from this test may determine cancer screening recommendations as well as options for risk reducing surgeries for Darek and family members. These recommendations will be discussed in detail once genetic testing is completed.    Plan:  1. All of Darek's questions were answered to his satisfaction.   2. Darek signed consent forms at the conclusion of this visit and had his blood drawn for the OvaNext gene panel, FA chromosome breakage test, and DC telomere length test.   3. Darek will return to clinic to discuss the results. Turn around time: approximately 4-5 weeks.     Face to face time: 15 minutes.    Yanique Wiggins MS, [facility]  Certified Genetic Counselor  Office: [phone]  Pager: [phone]

## 2017-03-07 NOTE — PROGRESS NOTES
3/7/2017    Referring Provider: Keira Hernandez MD    Presenting Information:   Darek Navarro came in to clinic for a follow-up visit today. He had previously been seen in the Cancer Risk Management Program at the [facility] on 5/31/2016 and [facility] on 2/24/2017. At his most recent visit, Darek requested that a pre-authorization be pursued through his insurance company to better understand his coverage and estimated out of pocket cost for the Fanconi anemia (FA) and dyskeratosis congenita (DC) screening tests.     Per Darek's insurance company, a pre-authorization is not required for these two screening tests. Given this information, Darek felt comfortable returning to clinic today to have his blood drawn for the FA and DC screening tests, as well as genetic testing to address his family history of ovarian cancer.     Discussion:    We previously reviewed the details of Darek's family and personal history. Please see the notes from the previous appointments on 5/31/2016 and 2/24/2017 for more details.    We briefly reviewed the two screening tests for FA and DC. For FA, the screening test is a chromosomal breakage test performed at the [facility]. Increased chromosomal breakage suggests a further work-up for FA, including genetic testing, may be indicated. For DC, the screening test is a telomere length test performed by [facility]. Significantly reduced telomere lengths suggest a further work-up for DC, including genetic testing, may be indicated. Darek verbalized understanding and interest in pursing these two screening tests.    We also briefly reviewed his family history of ovarian cancer, kidney cancer, and brain tumors. Though Darek's mother pursued genetic testing of the BRCA1 and BRCA2 genes, which was negative/normal, there are several other genes associated with increased risk for the cancers seen in Darek's family. One  example if Brasher syndrome, which is associated with increased risk for ovarian, kidney, and several other cancers. Another example is Li-Fraumeni syndrome, which is associated with increased risk for brain tumors and cancers at significantly younger ages.    We again reviewed his genetic testing options for ovarian cancer, including the OvaNext gene panel. Darek explained that he would be interested in gaining as much information as possible from genetic testing. As such, he felt comfortable pursuing genetic testing using the OvaNext gene panel.     Genetic testing is available for 24 genes associated with hereditary gynecologic cancers: OvaNext (LUISA, BARD1, BRCA1, BRCA2, BRIP1, CDH1, CHEK2, EPCAM, MLH1, MRE11A, MSH2, MSH6, MUTYH, NBN, NF1, PALB2, PMS2, PTEN, RAD50, RAD51C, RAD51D, SMARCA4, STK11, and TP53).    We discussed that some of the genes in the OvaNext panel are associated with specific hereditary cancer syndromes and have published management guidelines: Hereditary Breast and Ovarian Cancer syndrome (BRCA1, BRCA2), Brasher syndrome (MLH1, MSH2, MSH6, PMS2, EPCAM), Hereditary Diffuse Gastric Cancer (CDH1), Cowden syndrome (PTEN), Li Fraumeni syndrome (TP53), Peutz-Jeghers syndrome (STK11), MUTYH Associated Polyposis (MUTYH), and Neurofibromatosis type 1 (NF1).      Risk-reducing salpingo-oophorectomy can be considered in women with mutations in BRIP1, RAD51C, or RAD51D.  Breast cancer risk management guidelines are available for LUISA, CHEK2, PALB2, NF1, and NBN.    The remaining genes (BARD1, MRE11A, RAD50, and SMARCA4) are associated with increased cancer risk and may allow us to make medical recommendations when mutations are identified.      Darek was previously provided with a detailed brochure from [employer] explaining the OvaNext testing.    Consent was obtained and genetic testing for OvaNext was sent to [facility]. Turn around time: approximately 4-5 weeks.    Consent was also  obtained and blood was collected for the FA chromosome breakage test through the [facility] and DC telomere length test through [facility]. Turn around time: approximately 4-5 weeks.    The information from this test may determine cancer screening recommendations as well as options for risk reducing surgeries for Darek and family members. These recommendations will be discussed in detail once genetic testing is completed.    Plan:  1. All of Darek's questions were answered to his satisfaction.   2. Darek signed consent forms at the conclusion of this visit and had his blood drawn for the OvaNext gene panel, FA chromosome breakage test, and DC telomere length test.   3. Darek will return to clinic to discuss the results. Turn around time: approximately 4-5 weeks.     Face to face time: 15 minutes.    Yanique Wiggins MS, [facility] [place]  Certified Genetic Counselor  Office: [phone]  Pager: [phone]

## 2017-03-07 NOTE — PROGRESS NOTES
Infusion Nursing Note:    Patient presents today for Cycle 3 Day 8 Erbitux.  Arrived with family.    Lab Results   Component Value Date    HGB 9.5 03/07/2017     Lab Results   Component Value Date    WBC 2.0 03/07/2017      Lab Results   Component Value Date    ANEU 1.5 03/07/2017     Lab Results   Component Value Date     03/07/2017      Lab Results   Component Value Date     03/07/2017                   Lab Results   Component Value Date    POTASSIUM 3.7 03/07/2017           Lab Results   Component Value Date    MAG 2.2 03/07/2017            Lab Results   Component Value Date    CR 0.55 03/07/2017                   Lab Results   Component Value Date    COLE 8.5 03/07/2017                Lab Results   Component Value Date    BILITOTAL 0.4 03/07/2017           Lab Results   Component Value Date    ALBUMIN 3.3 03/07/2017                    Lab Results   Component Value Date    ALT 23 03/07/2017           Lab Results   Component Value Date    AST 19 03/07/2017       Note: Labs drawn for genetic counselor.    Intravenous Access:  Implanted Port.    Post Infusion Assessment:  Patient tolerated infusion without incident.  Patient observed for 30 minutes post erbitux per protocol.  Blood return noted pre and post infusion.  Access discontinued per protocol.    Discharge Plan:   Prescription refills given for ativan.  AVS to patient via [employer].  Patient will return 3/14 for next appointment.   Patient discharged in stable condition accompanied by: family.  Departure Mode: Ambulatory.

## 2017-03-07 NOTE — PATIENT INSTRUCTIONS
Assessing Cancer Risk  Only about 5-10% of cancers are thought to be due to an inherited cancer susceptibility gene.    These families often have:    Several people with the same or related types of cancer    Cancers diagnosed at a young age (before age 50)    Individuals with more than one primary cancer    Multiple generations of the family affected with cancer    Some people may be candidates for genetic testing of more than one gene.  For these families, genetic testing using a cancer panel may be offered.  These panels can test many genes at once known to increase the risk for gynecologic (and other) cancers:  BRCA1, BRCA2, BRIP1 MLH1, MSH2, MSH6, PMS2, EPCAM, PTEN, PALB2, RAD51C, RAD51D, and TP53. The purpose of this handout is to serve as a brief summary of the gynecologic cancer risk genes that have published clinical management guidelines for individuals who are found to carry a mutation.    _____________________________________________________________________________________    Hereditary Breast and Ovarian Cancer Syndrome   (BRCA1 and BRCA2)  A single mutation in one of the copies of BRCA1 or BRCA2 increases the risk for breast and ovarian cancer, among others.  The risk for pancreatic cancer and melanoma may also be slightly increased in some families.  The chart below shows the chance that someone with a BRCA mutation would develop cancer in his or her lifetime1,2,3,4.        A person s ethnic background is also important to consider, as individuals of Ashkenazi Latter-day ancestry have a higher chance of having a BRCA gene mutation.  There are three BRCA mutations that occur more frequently in this population.    Brasher Syndrome   (MLH1, MSH2, MSH6, PMS2, and EPCAM)  Currently five genes are known to cause Brasher Syndrome: MLH1, MSH2, MSH6, PMS2, and EPCAM.  A single mutation in one of the Brasher Syndrome genes increases the risk for colon, endometrial, ovarian, and stomach cancers.  Other cancers that  occur less commonly in Brasher Syndrome include urinary tract, skin, and brain cancers.  The chart below shows the chance that a person with Brasher syndrome would develop cancer in his or her lifetime7.      *Cancer risk varies depending on Brasher syndrome gene found    Cowden Syndrome   (PTEN)  Cowden syndrome is a hereditary condition that increases the risk for breast, thyroid, endometrial, and kidney cancer.  Cowden syndrome is caused by a mutation in the PTEN gene.  A single mutation in one of the copies of PTEN causes Cowden syndrome and increases cancer risk.  The chart below shows the chance that someone with a PTEN mutation would develop cancer in their lifetime5,6.  Other benign features seen in some individuals with Cowden syndrome include benign skin lesions (facial papules, keratoses, lipomas), learning disability, autism, thyroid nodules, colon polyps, and larger head size.      *One recent study found breast cancer risk to be increased to 85%  Li-Fraumeni Syndrome   (TP53)  Li-Fraumeni Syndrome (LFS) is a cancer predisposition syndrome caused by a mutation in the TP53 gene. A single mutation in one of the copies of TP53 increases the risk for multiple cancers. Individuals with LFS are at an increased risk for developing cancer at a young age. The general lifetime risk for development of cancer is 50% by age 30 and 90% by age 60.     Core Cancers: Sarcomas, Breast, Brain, Lung, Leukemias/Lymphomas, Adrenocortical carcinomas  Other Cancers: Gastrointestinal, Thyroid, Skin, Genitourinary    Additional Genes  PALB2  Mutations in PALB2 have been shown to increase the risk of breast cancer up to 33-58% in some families; where individuals fall within this risk range is dependent upon family history. PALB2 mutations have also been associated with increased risk for pancreatic cancer, although this risk has not been quantified yet.  Individuals who inherit two PALB2 mutations--one from their mother and one from  their father--have a condition called Fanconi Anemia.  This rare autosomal recessive condition is associated with short stature, developmental delay, bone marrow failure, and increased risk for childhood cancers.    BRIP1, RAD51C and RAD51D  Mutations in BRIP1, RAD51C, and RAD51D have been shown to increase the risk of ovarian cancer as well as female breast cancer.    _____________________________________________________________________________________    Inheritance  All of the cancer syndromes reviewed above are inherited in an autosomal dominant pattern.  This means that if a parent has a mutation, each of his or her children will have a 50% chance of inheriting that same mutation.  Therefore, each child--male or female--would have a 50% chance of being at increased risk for developing cancer.      Image obtained from [employer], 2013     Mutations in some genes can occur de maurice, which means that a person s mutation occurred for the first time in them and was not inherited from a parent.  Now that they have the mutation, however, it can be passed on to future generations.    Genetic Testing  Genetic testing involves a blood test and will look for any harmful mutations that are associated with increased cancer risk.  If possible, it is recommended that the person(s) who has had cancer be tested before other family members.  That person will give us the most useful information about whether or not a specific gene is associated with the cancer in the family.    Results  There are three possible results of genetic testing:    Positive--a harmful mutation was identified in one or more of the genes    Negative--no mutation was identified in any of the 9 genes on this panel    Variant of unknown significance--a variation in one of the genes was identified, but it is unclear how this impacts cancer risk in the family    Advantages and Disadvantages   There are advantages and disadvantages to genetic  testing.  Advantages    May clarify your cancer risk    Can help you make medical decisions    May explain the cancers in your family    May give useful information to your family members (if you share your results)    Disadvantages    Possible negative emotional impact of learning about inherited cancer risk    Uncertainty in interpreting a negative test result in some situations    Possible genetic discrimination concerns (see below)    Genetic Information Nondiscrimination Act (BOB)  BOB is a federal law that protects individuals from health insurance or employment discrimination based on a genetic test result alone.  Although rare, there are currently no legal discrimination protections in terms of life insurance, long term care, or disability insurances.  Visit the FAD [employer] [place] website to learn more.    Reducing Cancer Risk  Each of the genes listed within this handout have nationally recognized cancer screening guidelines that would be recommended for individuals who test positive.  In addition to increased cancer screening, surgeries may be offered or recommended to reduce cancer risk.  Recommendations are based upon an individual s genetic test result as well as their personal and family history of cancer.    Questions to Think About Regarding Genetic Testing:    What effect will the test result have on me and my relationship with my family members if I have an inherited gene mutation?  If I don t have a gene mutation?    Should I share my test results, and how will my family react to this news, which may also affect them?    Are my children ready to learn new information that may one day affect their own health?        Hereditary Cancer Resources    FORCE: Facing Our Risk of Cancer Empowered facingourrisk.org   Bright Pink bebrightpink.org   Li-Fraumeni Syndrome Association lfsassociation.org   PTEN World PTENworld.com   Collaborative Group of the Americas on Inherited  Colorectal Cancer (CGA) cgaLehigh [facility].com http://[email or web address].[email or web address]/   Cancer Care cancercare.org   American Cancer Society (ACS) cancer.org   National Cancer [place] (NCI) cancer.gov       Cancer Risk Management Program [phone]-[facility] ([phone])  ? Leandra Bañuelos, MS, [facility] [place]  [phone]  ? Opal Saba, MS, [facility]  [phone]  ? Yanique Wiggins, MS, [facility]  [phone]  ? Geetha Ames, MS   [phone]    References  1. Alfred HALL, Rene PDP, Jose E S, Kyra DE LA CRUZ, Laura JE, Viridiana JL, Yesenia N, Zuleika H, Scot O, Estefanía A, Pacheco B, Ana P, Jamarcus S, Tenisha DM, Silvio N, Ramses E, Tammi H, Lewis E, Jesus J, Kirit J, April B, Geovanna H, Thorlacius S, Eerola H, Clementina H, Bria K, Sampson OP. Average risks of breast and ovarian cancer associated with BRCA1 or BRCA2 mutations detected in case series unselected for family history: a combined analysis of 222 studies. Am J Hum Danica. 2003;72:1117-30.  2. Yadira N, Yolanda M, Meagan G.  BRCA1 and BRCA2 Hereditary Breast and Ovarian Cancer. Gene Reviews online. 2013.  3. Salvador YC, Desirae S, Monika G, Davidson S. Breast cancer risk among male BRCA1 and BRCA2 mutation carriers. J Natl Cancer Inst. 2007;99:1811-4.  4. Richy DG, Jaylene I, Flaquito J, Milana E, Daxa ER, Austyn F. Risk of breast cancer in male BRCA2 carriers. J Med Danica. 2010;47:710-1.  5. Greg MH, Danika J, Corine J, Rosanna ROMAN, Jarad MS, Roz C. Lifetime cancer risks in individuals with germline PTEN mutations. Clin Cancer Res. 2012;18:400-7.  6. Vickie MEDINA. Cowden Syndrome: A Critical Review of the Clinical Literature. J Danica . 2009:18:13-27.  7. National Comprehensive Cancer Network. Clinical practice guidelines in oncology, colorectal cancer screening. Available online (registration required). 2015.  8. Alfred GAMBLE et al. Breast-Cancer Risk in Families with Mutations in PALB2. NEJM. 2014; 371(6):497-506.

## 2017-03-07 NOTE — MR AVS SNAPSHOT
After Visit Summary   3/7/2017    Darek Navarro    MRN: [id number]           Patient Information     Date Of Birth          1990        Visit Information        Provider Department      3/7/2017 3:30 PM Yanique Wiggins GC;  2 114 CONSULT [facility]        Today's Diagnoses     Malignant neoplasm of tongue (H)    -  1    Family history of malignant neoplasm of ovary        Family history of kidney cancer        Family history of brain cancer          Care Instructions          Assessing Cancer Risk  Only about 5-10% of cancers are thought to be due to an inherited cancer susceptibility gene.    These families often have:    Several people with the same or related types of cancer    Cancers diagnosed at a young age (before age 50)    Individuals with more than one primary cancer    Multiple generations of the family affected with cancer    Some people may be candidates for genetic testing of more than one gene.  For these families, genetic testing using a cancer panel may be offered.  These panels can test many genes at once known to increase the risk for gynecologic (and other) cancers:  BRCA1, BRCA2, BRIP1 MLH1, MSH2, MSH6, PMS2, EPCAM, PTEN, PALB2, RAD51C, RAD51D, and TP53. The purpose of this handout is to serve as a brief summary of the gynecologic cancer risk genes that have published clinical management guidelines for individuals who are found to carry a mutation.    _____________________________________________________________________________________    Hereditary Breast and Ovarian Cancer Syndrome   (BRCA1 and BRCA2)  A single mutation in one of the copies of BRCA1 or BRCA2 increases the risk for breast and ovarian cancer, among others.  The risk for pancreatic cancer and melanoma may also be slightly increased in some families.  The chart below shows the chance that someone with a BRCA mutation would develop cancer in his or her lifetime1,2,3,4.        A  person s ethnic background is also important to consider, as individuals of Ashkenazi Anglican ancestry have a higher chance of having a BRCA gene mutation.  There are three BRCA mutations that occur more frequently in this population.    Brasher Syndrome   (MLH1, MSH2, MSH6, PMS2, and EPCAM)  Currently five genes are known to cause Brasher Syndrome: MLH1, MSH2, MSH6, PMS2, and EPCAM.  A single mutation in one of the Brasher Syndrome genes increases the risk for colon, endometrial, ovarian, and stomach cancers.  Other cancers that occur less commonly in Brasher Syndrome include urinary tract, skin, and brain cancers.  The chart below shows the chance that a person with Brasher syndrome would develop cancer in his or her lifetime7.      *Cancer risk varies depending on Brasher syndrome gene found    Cowden Syndrome   (PTEN)  Cowden syndrome is a hereditary condition that increases the risk for breast, thyroid, endometrial, and kidney cancer.  Cowden syndrome is caused by a mutation in the PTEN gene.  A single mutation in one of the copies of PTEN causes Cowden syndrome and increases cancer risk.  The chart below shows the chance that someone with a PTEN mutation would develop cancer in their lifetime5,6.  Other benign features seen in some individuals with Cowden syndrome include benign skin lesions (facial papules, keratoses, lipomas), learning disability, autism, thyroid nodules, colon polyps, and larger head size.      *One recent study found breast cancer risk to be increased to 85%  Li-Fraumeni Syndrome   (TP53)  Li-Fraumeni Syndrome (LFS) is a cancer predisposition syndrome caused by a mutation in the TP53 gene. A single mutation in one of the copies of TP53 increases the risk for multiple cancers. Individuals with LFS are at an increased risk for developing cancer at a young age. The general lifetime risk for development of cancer is 50% by age 30 and 90% by age 60.     Core Cancers: Sarcomas, Breast, Brain, Lung,  Leukemias/Lymphomas, Adrenocortical carcinomas  Other Cancers: Gastrointestinal, Thyroid, Skin, Genitourinary    Additional Genes  PALB2  Mutations in PALB2 have been shown to increase the risk of breast cancer up to 33-58% in some families; where individuals fall within this risk range is dependent upon family history. PALB2 mutations have also been associated with increased risk for pancreatic cancer, although this risk has not been quantified yet.  Individuals who inherit two PALB2 mutations--one from their mother and one from their father--have a condition called Fanconi Anemia.  This rare autosomal recessive condition is associated with short stature, developmental delay, bone marrow failure, and increased risk for childhood cancers.    BRIP1, RAD51C and RAD51D  Mutations in BRIP1, RAD51C, and RAD51D have been shown to increase the risk of ovarian cancer as well as female breast cancer.    _____________________________________________________________________________________    Inheritance  All of the cancer syndromes reviewed above are inherited in an autosomal dominant pattern.  This means that if a parent has a mutation, each of his or her children will have a 50% chance of inheriting that same mutation.  Therefore, each child--male or female--would have a 50% chance of being at increased risk for developing cancer.      Image obtained from Genetics Home Reference, 2013     Mutations in some genes can occur de maurice, which means that a person s mutation occurred for the first time in them and was not inherited from a parent.  Now that they have the mutation, however, it can be passed on to future generations.    Genetic Testing  Genetic testing involves a blood test and will look for any harmful mutations that are associated with increased cancer risk.  If possible, it is recommended that the person(s) who has had cancer be tested before other family members.  That person will give us the most useful  information about whether or not a specific gene is associated with the cancer in the family.    Results  There are three possible results of genetic testing:    Positive--a harmful mutation was identified in one or more of the genes    Negative--no mutation was identified in any of the 9 genes on this panel    Variant of unknown significance--a variation in one of the genes was identified, but it is unclear how this impacts cancer risk in the family    Advantages and Disadvantages   There are advantages and disadvantages to genetic testing.  Advantages    May clarify your cancer risk    Can help you make medical decisions    May explain the cancers in your family    May give useful information to your family members (if you share your results)    Disadvantages    Possible negative emotional impact of learning about inherited cancer risk    Uncertainty in interpreting a negative test result in some situations    Possible genetic discrimination concerns (see below)    Genetic Information Nondiscrimination Act (BOB)  BOB is a federal law that protects individuals from health insurance or employment discrimination based on a genetic test result alone.  Although rare, there are currently no legal discrimination protections in terms of life insurance, long term care, or disability insurances.  Visit the National Human [employer] [place] website to learn more.    Reducing Cancer Risk  Each of the genes listed within this handout have nationally recognized cancer screening guidelines that would be recommended for individuals who test positive.  In addition to increased cancer screening, surgeries may be offered or recommended to reduce cancer risk.  Recommendations are based upon an individual s genetic test result as well as their personal and family history of cancer.    Questions to Think About Regarding Genetic Testing:    What effect will the test result have on me and my relationship with my family members  if I have an inherited gene mutation?  If I don t have a gene mutation?    Should I share my test results, and how will my family react to this news, which may also affect them?    Are my children ready to learn new information that may one day affect their own health?        Hereditary Cancer Resources    FORCE: Facing Our Risk of Cancer Empowered facingourrisk.org   Bright Pink bebrdorinapink.org   Li-Fraumeni Syndrome Association lfsassociation.org   PTEN World PTENworld.com   Collaborative Group of the Americas on Inherited Colorectal Cancer (CGA) cgaicc.com http://www.facingourrisk.org/   Cancer Care cancercare.org   American Cancer Society (ACS) cancer.org   National Cancer [place] (NCI) cancer.gov       Cancer Risk Management Program [phone] ([phone])  ? Leandra Bañuelos, MS, [facility]  [phone]  ? Opal Wandy, MS, [facility]  [phone]  ? Yanique Wiggins, MS, [facility]  [phone]  ? Geetha Ames, MS   [phone]    References  1. Rene Mullins PDP, Jose E S, Risch DE LA CRUZ, Laura JE, Viridiana JL, Shanellman N, Zuleika H, Scot O, Estefanía A, Pacheco B, Ana P, Jamarcus S, Tenisha DM, Silvio N, Ramses E, Tammi H, Saucedo E, Lubinski J, Gronwald J, Arpil B, Tulinius H, Thorlacius S, Eerola H, Clementina H, Bria K, Sampson OP. Average risks of breast and ovarian cancer associated with BRCA1 or BRCA2 mutations detected in case series unselected for family history: a combined analysis of 222 studies. Am J Hum Danica. 2003;72:1117-30.  2. Yadira N, Yolanda M, Meagan G.  BRCA1 and BRCA2 Hereditary Breast and Ovarian Cancer. Gene Reviews online. 2013.  3. Salvador YC, Desirae S, Monika G, Davidson S. Breast cancer risk among male BRCA1 and BRCA2 mutation carriers. J Natl Cancer Inst. 2007;99:1811-4.  4. Richy STAUFFER, Jaylene I, Flaquito J, Milana E, Daxa ER, Austyn F. Risk of breast cancer in male BRCA2 carriers. J Med Danica. 2010;47:710-1.  5. Greg CHONG, Danika J, Corine J, Rosanna ROMAN, Jarad DELGADILLO, Roz C. Lifetime  cancer risks in individuals with germline PTEN mutations. Clin Cancer Res. 2012;18:400-7.  6. Vickie MEDINA. Cowden Syndrome: A Critical Review of the Clinical Literature. J Danica . 2009:18:13-27.  7. National Comprehensive Cancer Network. Clinical practice guidelines in oncology, colorectal cancer screening. Available online (registration required). 2015.  8. Alfred GAMBLE et al. Breast-Cancer Risk in Families with Mutations in PALB2. NEJM. 2014; 371(6):497-506.        Follow-ups after your visit        Follow-up notes from your care team     Return in about 4 weeks (around 4/4/2017).      Your next 10 appointments already scheduled     Apr 18, 2017 10:00 AM CDT   (Arrive by 9:45 AM)   NEW WITH ROOM with Kesha Maloney [facility],  2 119 CONSULT [facility] ([facility])    [address] [place] [address]   [phone]            Apr 24, 2017  1:00 PM CDT   Consult HOD with Renea Borges RD   [facility] [place], [facility] Services ([facility], [facility])    [address]  [facility] [address]               Apr 25, 2017 10:00 AM CDT   (Arrive by 9:45 AM)   Return Visit with Keira Hernandez MD   [facility] ([facility])    [address] [place] [address]   [phone]            Apr 25, 2017 12:00 PM CDT   [facility] Draw with [facility]   [facility] Lab Draw ([facility])    [address] [place] [address]   [phone]            Apr 25, 2017 12:30 PM CDT   Infusion 180 with  ONCOLOGY INFUSION, UC 30 ATC   [facility] ([facility])    [address] [place] [address]   [phone]            May 09, 2017  1:15 PM CDT   (Arrive by 1:00 PM)   RETURN WITH ROOM with Yanique  ARMAAN Wiggins,  2 114 CONSULT [facility] ([facility])    [address]  [address]  [facility] [address] [phone]              Who to contact     If you have questions or need follow up information about today's clinic visit or your schedule please contact [facility] directly at [phone].  Normal or non-critical lab and imaging results will be communicated to you by [employer], letter or phone within 4 business days after the clinic has received the results. If you do not hear from us within 7 days, please contact the clinic through eHarmonyt or phone. If you have a critical or abnormal lab result, we will notify you by phone as soon as possible.  Submit refill requests through SET or call your pharmacy and they will forward the refill request to us. Please allow 3 business days for your refill to be completed.          Additional Information About Your Visit        [employer] Information     SET gives you secure access to your electronic health record. If you see a primary care provider, you can also send messages to your care team and make appointments. If you have questions, please call your primary care clinic.  If you do not have a primary care provider, please call [phone] and they will assist you.        Care EveryWhere ID     This is your Care EveryWhere ID. This could be used by other organizations to access your [place] medical records  [id number]         Blood Pressure from Last 3 Encounters:   04/11/17 117/77   04/04/17 109/73   03/29/17 118/84    Weight from Last 3 Encounters:   04/11/17 58 kg (127 lb 12.8 oz)   04/04/17 57.2 kg (126 lb 1.8 oz)   03/29/17 58.4 kg (128 lb 12.8 oz)              We Performed the Following     [facility] genetic testing, Ambry Test: Laboratory Miscellaneous Order     Send outs misc test          Today's Medication Changes          These changes are accurate as of: 3/7/17 11:59 PM.   If you have any questions, ask your nurse or doctor.               These medicines have changed or have updated prescriptions.        Dose/Directions    mineral oil-hydrophilic petrolatum   This may have changed:    - when to take this  - reasons to take this   Used for:  Malignant neoplasm of tongue (H)        Apply topically every 8 hours   Quantity:  50 g   Refills:  0       pantoprazole 20 MG EC tablet   Commonly known as:  PROTONIX   This may have changed:    - when to take this  - reasons to take this   Used for:  Gastroesophageal reflux disease without esophagitis        Dose:  20 mg   Take 1 tablet (20 mg) by mouth daily   Quantity:  30 tablet   Refills:  3                Primary Care Provider Office Phone # Fax #    Mary Jane Nicollet [facility] [phone] [address]  [facility] [address]        Thank you!     Thank you for choosing [facility]  for your care. Our goal is always to provide you with excellent care. Hearing back from our patients is one way we can continue to improve our services. Please take a few minutes to complete the written survey that you may receive in the mail after your visit with us. Thank you!             Your Updated Medication List - Protect others around you: Learn how to safely use, store and throw away your medicines at www.disposemymeds.org.          This list is accurate as of: 3/7/17 11:59 PM.  Always use your most recent med list.                   Brand Name Dispense Instructions for use    citalopram 10 MG/5ML solution    celeXA    300 mL    Take 10 mLs (20 mg) by mouth daily       clindamycin 1 % lotion    CLEOCIN T    60 mL    Apply topically 2 times daily       ibuprofen 200 MG tablet    ADVIL/MOTRIN     Take 200 mg by mouth daily as needed       lidocaine 2 % solution    XYLOCAINE    1000 mL    15 mL swish and spit q3h PRN, max 8 doses in 24 hours       LORazepam 2 MG/ML (HIGH CONC) solution     ATIVAN    30 mL    Take 0.25-0.5 mLs (0.5-1 mg) by mouth every 4 hours as needed for anxiety, sleep, nausea or vomiting       mineral oil-hydrophilic petrolatum     50 g    Apply topically every 8 hours       multivitamins with minerals Liqd liquid     1 Bottle    15 mLs by Per Feeding Tube route daily       ondansetron 4 MG tablet    ZOFRAN     Take 4 mg by mouth daily as needed       pantoprazole 20 MG EC tablet    PROTONIX    30 tablet    Take 1 tablet (20 mg) by mouth daily       prochlorperazine 10 MG tablet    COMPAZINE    30 tablet    Take 1 tablet (10 mg) by mouth every 6 hours as needed (Nausea/Vomiting)

## 2017-03-07 NOTE — PATIENT INSTRUCTIONS
Contact Numbers    [facility] Main Line: [phone]  [facility] Triage:  [phone]    Call triage with chills and/or temperature greater than or equal to 100.5, uncontrolled nausea/vomiting, diarrhea, constipation, dizziness, shortness of breath, chest pain, bleeding, unexplained bruising, or any new/concerning symptoms, questions/concerns.     If you are having any concerning symptoms or wish to speak to a provider before your next infusion visit, please call your care coordinator or triage to notify them so we can adequately serve you.       After Hours: [phone]    If after hours, weekends, or holidays, call main hospital  and ask for Oncology doctor on call.           March 2017 Sunday Monday Tuesday Wednesday Thursday Friday Saturday                  1     2     3     4       5     6     7     [facility]    2:00 PM   (15 min.)    [facility]     P ONC INFUSION 120    2:30 PM   (120 min.)    [facility]US[facility]    3:15 PM   (60 min.)   Yanique Wiggins GC   [facility]    4:00 PM   (15 min.)    [facility] DRAW   [facility] Draw 8     9     10     11       12     13     14     [facility] D[facility]    1:00 PM   (15 min.)    [facility] DRAW   [facility] Draw     P ONC INFUSION 120    1:30 PM   (120 min.)    ONCOLOGY INFUSION   [facility] 15     16     17     18       19     20     21     [facility] D[facility]   10:00 AM   (15 min.)    [facility] DRAW   [facility] Draw     [facility] RETURN   10:15 AM   (30 min.)   Keira Hernandez MD   [facility]   11:00 AM   (240 min.)    ONCOLOGY INFUSION   [facility] 22     23     24     25       26     27     28     [facility] D[facility]    1:30 PM   (15 min.)   [facility]      [facility] ONC INFUSION 120    2:00 PM   (120 min.)    ONCOLOGY INFUSION   [facility] 29 30 31 April 2017 Sunday Monday Tuesday Wednesday Thursday Friday Saturday                                 1       2     3     4     [facility] DRAW    1:30 PM   (15 min.)    [facility]   [facility]     [facility] ONC INFUSION 120    2:00 PM   (120 min.)    ONCOLOGY INFUSION   [facility] 5     6     7     8       9     10     11     12     13     14     15       16     17     18     19     20     21     22       23     24     25     26     27     28     29       30                                               Recent Results (from the past 24 hour(s))   CBC with platelets differential    Collection Time: 03/07/17  2:41 PM   Result Value Ref Range    WBC 2.0 (L) 4.0 - 11.0 10e9/L    RBC Count 3.58 (L) 4.4 - 5.9 10e12/L    Hemoglobin 9.5 (L) 13.3 - 17.7 g/dL    Hematocrit 29.4 (L) 40.0 - 53.0 %    MCV 82 78 - 100 fl    MCH 26.5 26.5 - 33.0 pg    MCHC 32.3 31.5 - 36.5 g/dL    RDW 21.9 (H) 10.0 - 15.0 %    Platelet Count 278 150 - 450 10e9/L    Diff Method Automated Method     % Neutrophils 76.9 %    % Lymphocytes 10.3 %    % Monocytes 10.3 %    % Eosinophils 1.0 %    % Basophils 0.5 %    % Immature Granulocytes 1.0 %    Nucleated RBCs 0 0 /100    Absolute Neutrophil 1.5 (L) 1.6 - 8.3 10e9/L    Absolute Lymphocytes 0.2 (L) 0.8 - 5.3 10e9/L    Absolute Monocytes 0.2 0.0 - 1.3 10e9/L    Absolute Eosinophils 0.0 0.0 - 0.7 10e9/L    Absolute Basophils 0.0 0.0 - 0.2 10e9/L    Abs Immature Granulocytes 0.0 0 - 0.4 10e9/L    Absolute Nucleated RBC 0.0    Comprehensive metabolic panel    Collection Time: 03/07/17  2:41 PM   Result Value Ref Range    Sodium 135 133 - 144 mmol/L    Potassium 3.7 3.4 - 5.3 mmol/L    Chloride 101 94 - 109 mmol/L    Carbon Dioxide 26 20 - 32 mmol/L    Anion Gap 8 3 - 14 mmol/L    Glucose 118 (H) 70 - 99 mg/dL    Urea Nitrogen  10 7 - 30 mg/dL    Creatinine 0.55 (L) 0.66 - 1.25 mg/dL    GFR Estimate >90  Non  GFR Calc   >60 mL/min/1.7m2    GFR Estimate If Black >90   GFR Calc   >60 mL/min/1.7m2    Calcium 8.5 8.5 - 10.1 mg/dL    Bilirubin Total 0.4 0.2 - 1.3 mg/dL    Albumin 3.3 (L) 3.4 - 5.0 g/dL    Protein Total 7.6 6.8 - 8.8 g/dL    Alkaline Phosphatase 64 40 - 150 U/L    ALT 23 0 - 70 U/L    AST 19 0 - 45 U/L   Magnesium    Collection Time: 03/07/17  2:41 PM   Result Value Ref Range    Magnesium 2.2 1.6 - 2.3 mg/dL

## 2017-03-10 NOTE — NURSING NOTE
Chief Complaint   Patient presents with     Blood Draw     Patient is here today for labs to be drawn via his Right AC area by RN.     Nolvia Rm RN

## 2017-03-10 NOTE — LETTER
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 Pathologic staging: [id number]. Recurrence with distant metastasis after initial chemoRT.       TREATMENT:    1- April 28, 2016 transmandibular subtotal oral glossectomy, right base of tongue resection with partial pharyngectomy, bilateral neck dissections, and free flap reconstruction. Concurrent ChemoRt with high dose Cisplatin 6/6/16. day 22 cisplatin on 6/27/16. He completed day 43 on 7/19/16. Radiation completed, 6600cGy on 7/21/16.   2- Palliative intent treatment with Pembrolizumab 12/6/2016  3- Due to rapid progression of disease the treatment was switched to Carboplatin, 5-FU and Cetuximab on 1/18/17 and has received 2 cycles with response seen on CT CAP on 2/21/17. Cycle 3 started on 2/28/17.     Patient's sister called in yesterday as she was concerned about how the patient is doing with nausea, vomiting, difficulty swallowing, dyspnea on exertion, and weakness. He declined going to the ED yesterday. He comes in today for evaluation of symptoms.     SUBJECTIVE: Patient reports that he is drinking 3-5 Boost/day and 0-2 shakes/day. He eats some mashed potatoes and minimal solid foods. He has more difficulty getting in nutrition the first week after each cycle of chemotherapy. He finds it harder to swallow. He finds some foods get stuck. He found it difficult to eat Chipotle. He has been doing swallowing exercises, but has not seen much improvement yet. He has had increased nausea since day 2 of this cycle. He gets some relief from lorazepam every 4 hours. He finds no benefit from Zofran or Compazine. He feels his pain is well managed with OxyContin 20 mg  bid and oxycodone 10 mg qid. He had mild mouth sores and is using salt/soda swishes and lidocaine for that. He has dry skin and uses lotion for that. He is sleeping okay, usually up about twice at night. His energy is fair. He does find a benefit from Ritalin 5 mg bid. He napped much of the day 2 days ago, but did not feel the need to take a nap yesterday. He denies other concerns.    Meds  Current Outpatient Prescriptions   Medication Sig Dispense Refill     OLANZapine (ZYPREXA) 5 MG tablet Take 1 tablet (5 mg) by mouth 2 times daily as needed For nausea 60 tablet 3     oxyCODONE (ROXICODONE) 5 MG/5ML solution 10 mLs (10 mg) by Oral or PEG tube route every 4 hours as needed for moderate to severe pain 1000 mL 0     methylphenidate (RITALIN) 5 MG tablet Take 1 tablet (5 mg) by mouth 2 times daily 60 tablet 0     oxyCODONE (OXYCONTIN) 20 MG 12 hr tablet Take 1 tablet (20 mg) by mouth every 12 hours maximum 2 tablet(s) per day 60 tablet 0     clindamycin (CLEOCIN T) 1 % lotion Apply topically 2 times daily 60 mL 3     lidocaine (XYLOCAINE) 2 % solution 15 mL swish and spit q3h PRN, max 8 doses in 24 hours 1000 mL 3     LORazepam (ATIVAN) 0.5 MG tablet Take 1 tablet (0.5 mg) by mouth every 4 hours as needed (Anxiety, Nausea/Vomiting or Sleep) 30 tablet 5     prochlorperazine (COMPAZINE) 10 MG tablet Take 1 tablet (10 mg) by mouth every 6 hours as needed (Nausea/Vomiting) 30 tablet 5     pantoprazole (PROTONIX) 20 MG EC tablet Take 1 tablet (20 mg) by mouth daily (Patient taking differently: Take 20 mg by mouth daily as needed ) 30 tablet 3     levothyroxine (SYNTHROID/LEVOTHROID) 25 MCG tablet Take 1 tablet (25 mcg) by mouth daily 90 tablet 3     ibuprofen (ADVIL/MOTRIN) 200 MG tablet Take 200 mg by mouth daily as needed        ondansetron (ZOFRAN) 4 MG tablet Take 4 mg by mouth daily as needed        citalopram (CELEXA) 10 MG/5ML solution Take 10 mLs (20 mg) by mouth daily 300 mL 3     multivitamins with minerals  (CERTAVITE) LIQD 15 mLs by Per Feeding Tube route daily 1 Bottle 1     mineral oil-hydrophilic petrolatum (AQUAPHOR) ointment Apply topically every 8 hours (Patient taking differently: Apply topically every 8 hours as needed ) 50 g 0     LORazepam (ATIVAN) 2 MG/ML (HIGH CONC) solution Take 0.25-0.5 mLs (0.5-1 mg) by mouth every 4 hours as needed for anxiety, sleep, nausea or vomiting (Patient not taking: Reported on 2/28/2017) 30 mL 3      PHYSICAL EXAMINATION:   VITAL SIGNS: .BP (!) 130/99  Pulse 109  Wt 59.4 kg (131 lb)  SpO2 100%  BMI 16.81 kg/m2   Wt Readings from Last 10 Encounters:   03/10/17 59.4 kg (131 lb)   03/07/17 60 kg (132 lb 4.8 oz)   02/28/17 59.7 kg (131 lb 11.2 oz)   02/21/17 62.1 kg (136 lb 14.4 oz)   02/15/17 62 kg (136 lb 9.6 oz)   02/14/17 61.1 kg (134 lb 11.2 oz)   02/08/17 64.4 kg (142 lb)   02/07/17 64.3 kg (141 lb 11.2 oz)   02/01/17 64.5 kg (142 lb 4.8 oz)   01/31/17 64.4 kg (142 lb)   GENERAL: Sitting comfortably. Here today with his sister.  HEENT: Pupils are equal. Oropharynx is with mild mucositis, no thrush. Moderate-severe trismus.  NECK: No cervical or supraclavicular lymphadenopathy.   LUNGS: Clear bilaterally.   HEART: S1, S2, regular.   ABDOMEN: Soft, nontender, nondistended, no hepatosplenomegaly.   EXTREMITIES: Warm, well perfused.   NEUROLOGIC: Alert, awake.   SKIN: No rash.   LYMPHATICS: No edema.     DATA:   3/10/2017 08:29   Sodium 138   Potassium 4.1   Chloride 101   Carbon Dioxide 27   Urea Nitrogen 10   Creatinine 0.66   GFR Estimate >90...   GFR Estimate If Black >90...   Calcium 9.1   Anion Gap 9   Magnesium 2.2   Albumin 3.4   Protein Total 7.6   Bilirubin Total 0.4   Alkaline Phosphatase 68   ALT 34   AST 32   Glucose 73   WBC 2.0 (L)   Hemoglobin 9.8 (L)   Hematocrit 31.0 (L)   Platelet Count 250   RBC Count 3.68 (L)   MCV 84   MCH 26.6   MCHC 31.6   RDW 22.7 (H)   Diff Method Manual Differential   % Neutrophils 60.2   % Lymphocytes 12.4   % Monocytes 23.0   %  Eosinophils 4.4   % Basophils 0.0   Absolute Neutrophil 1.2 (L)   Absolute Lymphocytes 0.2 (L)   Absolute Monocytes 0.5   Absolute Eosinophils 0.1   Absolute Basophils 0.0   Anisocytosis Moderate   Poikilocytosis Moderate   Teardrop Cells Slight   Acanthocytes Slight   Ovalocytes Slight   Microcytes Present       ECOG PS: 2     ASSESSMENT:  A 27 year old gentleman  [id number] squamous cell carcinoma of the right oral tongue.Status post April 28, 2016 transmandibular subtotal oral glossectomy, right base of tongue resection with partial pharyngectomy, bilateral neck dissections, and free flap. Initiated treatment by 6/6/16.   He completed his concurrent chemoRT 7/21/16.  Follow up PET CT scan 10/28/16 was reviewed at multidisciplinary head and neck tumor board. It was consistent with local and distant recurrence. L- His case was subsequently reviewed at Thoracic oncology conference and we will proceed with biopsy by IR.glendy biopsy results were reviewed with the patient and family members which confirmed metastatic disease.   He was seen at [facility] and no first line trials were available. He was switched to Pembrolizumab with a plan to do short term scans to assess the pace of disease progression.   CT scan 1/16/17 showed rapid disease progression with thoracic vertebral fracture. Multiple lytic/destructive spine lesions were reviewed by spine surgery and were not felt to be unstable. He completed palliative radiation to bones.   - He is on chemotherapy with carboplatin/5-FU and Cetuximab. He started his third cycle last week. CT after 2 cycles showed a good response. The first week of each cycle is always the hardest for him with more symptoms, including nausea, mouth sores, dysphagia, fatigue, and anorexia.   - He is on Zometa monthly.  - He will return to see Dr. Hernandez on 3/21 prior to cycle 4.    Nausea. Patient is not getting adequate relief of nausea with Zofran, Compazine, or Ativan. He will try Zyprexa 5 mg bid  prn.      Dysphagia. Patient's swallowing was improving following chemorads, but worsened again with starting chemotherapy with intermittent mouth sores than likely extend into his throat. He requests that another feeding tube be placed. I have reviewed this with Dr. Hernandez and will put in the order for a new PEG.     Coping. After discussion with Dr. Hernandez, I have placed an order for a palliative care consult. He is already on Celexa 20 mg daily.    Throat pain and mucositis. Adequately controlled with OxyContin 20 mg bid and oxycodone 10 mg qid. He also continues to use salt/soda swishes and lidocaine.    Hypothyroidism. Will recheck TSHR at next visit.     Opal Cannon PA-C  [facility]  [address], [place] [id number] [phone]

## 2017-03-10 NOTE — MR AVS SNAPSHOT
After Visit Summary   3/10/2017    Darek Navarro    MRN: [id number]           Patient Information     Date Of Birth          1990        Visit Information        Provider Department      3/10/2017 9:00 AM UC 18 ATC;  ONCOLOGY INFUSION [facility]        Today's Diagnoses     Malignant neoplasm of tongue (H)    -  1      Care Instructions    Contact numbers:  Triage Main Line: [phone]  After hours: [phone]    Call with chills and/or temperature greater than or equal to 100.5 and questions or concerns.    If after hours, weekends, or holidays, call main hospital  at  [phone] and ask for Oncology doctor on call.           March 2017 Sunday Monday Tuesday Wednesday Thursday Friday Saturday                  1     2     3     4       5     6     7     [facility] D[facility]    2:00 PM   (15 min.)    [facility]     [facility] ONC INFUSION 120    2:30 PM   (120 min.)    ONCOLOGY INFUSION   [facility]     [facility] RETURN WITH ROOM    3:15 PM   (60 min.)   Yanique Wiggins GC   [facility]     [facility] DRAW    4:00 PM   (15 min.)    [facility] DRAW   [facility] Draw 8     9     10     [facility]    7:45 AM   (15 min.)    [facility] DRAW   [facility] Draw     [facility] RETURN    8:05 AM   (50 min.)   Opal Cannon PA-C   [facility]     [facility] ONC INFUSION 120    9:00 AM   (120 min.)    ONCOLOGY INFUSION   [facility] 11       12     13     14     [facility] DRAW    1:00 PM   (15 min.)    [facility] DRAW   [facility] Draw     [facility] ONC INFUSION 120    1:30 PM   (120 min.)    ONCOLOGY INFUSION   [facility] 15     16     17     18       19     20     21     [facility] DRAW   10:00 AM   (15 min.)    [facility]     [facility] RETURN   10:15 AM   (30  min.)   Keira Hernandez MD   [facility]     [facility] ONC INFUSION 240   11:00 AM   (240 min.)    ONCOLOGY INFUSION   [facility] 22     23     24     25       26     27     28     [facility] DRAW    1:30 PM   (15 min.)    [facility] LAB DRAW   [facility] Draw     [facility] ONC INFUSION 120    2:00 PM   (120 min.)    ONCOLOGY INFUSION   [facility] 29 30 31 April 2017 Sunday Monday Tuesday Wednesday Thursday Friday Saturday                                 1       2     3     4     [facility] DRAW    1:30 PM   (15 min.)    [facility]     [facility] ONC INFUSION 120    2:00 PM   (120 min.)    ONCOLOGY INFUSION   [facility] 5     6     7     8       9     10     11     12     13     14     15       16     17     18     19     20     21     22       23     24     25     26     27     28     29       30                                                Lab Results:  Recent Results (from the past 12 hour(s))   Magnesium    Collection Time: 03/10/17  8:29 AM   Result Value Ref Range    Magnesium 2.2 1.6 - 2.3 mg/dL   Comprehensive metabolic panel    Collection Time: 03/10/17  8:29 AM   Result Value Ref Range    Sodium 138 133 - 144 mmol/L    Potassium 4.1 3.4 - 5.3 mmol/L    Chloride 101 94 - 109 mmol/L    Carbon Dioxide 27 20 - 32 mmol/L    Anion Gap 9 3 - 14 mmol/L    Glucose 73 70 - 99 mg/dL    Urea Nitrogen 10 7 - 30 mg/dL    Creatinine 0.66 0.66 - 1.25 mg/dL    GFR Estimate >90  Non  GFR Calc   >60 mL/min/1.7m2    GFR Estimate If Black >90   GFR Calc   >60 mL/min/1.7m2    Calcium 9.1 8.5 - 10.1 mg/dL    Bilirubin Total 0.4 0.2 - 1.3 mg/dL    Albumin 3.4 3.4 - 5.0 g/dL    Protein Total 7.6 6.8 - 8.8 g/dL    Alkaline Phosphatase 68 40 - 150 U/L    ALT 34 0 - 70 U/L    AST 32 0 - 45 U/L   CBC with platelets differential    Collection Time: 03/10/17   8:29 AM   Result Value Ref Range    WBC 2.0 (L) 4.0 - 11.0 10e9/L    RBC Count 3.68 (L) 4.4 - 5.9 10e12/L    Hemoglobin 9.8 (L) 13.3 - 17.7 g/dL    Hematocrit 31.0 (L) 40.0 - 53.0 %    MCV 84 78 - 100 fl    MCH 26.6 26.5 - 33.0 pg    MCHC 31.6 31.5 - 36.5 g/dL    RDW 22.7 (H) 10.0 - 15.0 %    Platelet Count 250 150 - 450 10e9/L    Diff Method Manual Differential     % Neutrophils 60.2 %    % Lymphocytes 12.4 %    % Monocytes 23.0 %    % Eosinophils 4.4 %    % Basophils 0.0 %    Absolute Neutrophil 1.2 (L) 1.6 - 8.3 10e9/L    Absolute Lymphocytes 0.2 (L) 0.8 - 5.3 10e9/L    Absolute Monocytes 0.5 0.0 - 1.3 10e9/L    Absolute Eosinophils 0.1 0.0 - 0.7 10e9/L    Absolute Basophils 0.0 0.0 - 0.2 10e9/L    Anisocytosis Moderate     Poikilocytosis Moderate     Teardrop Cells Slight     Acanthocytes Slight     Ovalocytes Slight     Microcytes Present              Follow-ups after your visit        Your next 10 appointments already scheduled     Mar 14, 2017  1:00 PM CDT   [facility] Draw with  [facility] DRAW   [facility] Draw ([facility])    [address] [place] [id number] [phone]            Mar 14, 2017  1:30 PM CDT   Infusion 120 with  ONCOLOGY INFUSION, UC 27 ATC   [facility] ([facility])    [address] [place] [id number] [phone]            Mar 21, 2017 10:00 AM CDT   [facility] Lab Draw with  [facility] LAB DRAW   [facility] ([facility])    [address] [place] [id number]   [phone]            Mar 21, 2017 10:30 AM CDT   (Arrive by 10:15 AM)   Return Visit with Keira Hernandez MD   [facility] ([facility])    [address]  2nd Floor  [facility] [id number] [phone]            Mar 21, 2017 11:00 AM CDT   Infusion 240 with UC ONCOLOGY INFUSION,  UC 32 ATC   [facility] ([facility])    [address]  2nd Floor  [facility] [id number] [phone]            Mar 22, 2017  1:00 PM CDT   (Arrive by 12:45 PM)   NEW WITH ROOM with Kesha Maloney [facility],  2 118 CONSULT RM   [facility] ([facility])    [address]  2nd Floor  [facility] [id number] [phone]            Mar 22, 2017  2:45 PM CDT   (Arrive by 2:30 PM)   New Patient Visit with Tessy Flores MD   [facility] ([facility])    [address]  2nd Floor  [facility] [id number] [phone]              Future tests that were ordered for you today     Open Future Orders        Priority Expected Expires Ordered    IR Gastrostomy Tube Percutaneous Plcmnt Routine 3/13/2017 3/10/2018 3/10/2017            Who to contact     If you have questions or need follow up information about today's clinic visit or your schedule please contact [facility] directly at [phone].  Normal or non-critical lab and imaging results will be communicated to you by [address], letter or phone within 4 business days after the clinic has received the results. If you do not hear from us within 7 days, please contact the clinic through [address] or phone. If you have a critical or abnormal lab result, we will notify you by phone as soon as possible.  Submit refill requests through Cell[employer] or call your pharmacy and they will forward the refill request to us. Please allow 3 business days for your refill to be completed.          Additional Information About Your Visit        Cell>Point Information     Cell[employer]Point gives you secure access to your electronic health record. If you see a primary care provider, you can also send messages to your care team and make appointments. If you have questions, please call your primary care clinic.  If you  do not have a primary care provider, please call [phone] and they will assist you.        Care EveryWhere ID     This is your Care EveryWhere ID. This could be used by other organizations to access your [facility] medical records  [id number]         Blood Pressure from Last 3 Encounters:   03/10/17 (!) 130/99   03/07/17 (!) 133/93   02/28/17 115/67    Weight from Last 3 Encounters:   03/10/17 59.4 kg (131 lb)   03/07/17 60 kg (132 lb 4.8 oz)   02/28/17 59.7 kg (131 lb 11.2 oz)              Today, you had the following     No orders found for display         Today's Medication Changes          These changes are accurate as of: 3/10/17 11:34 AM.  If you have any questions, ask your nurse or doctor.               Start taking these medicines.        Dose/Directions    OLANZapine 5 MG tablet   Commonly known as:  zyPREXA   Used for:  Malignant neoplasm of tongue (H), Nausea   Started by:  Opal Cannon PA-C        Dose:  5 mg   Take 1 tablet (5 mg) by mouth 2 times daily as needed For nausea   Quantity:  60 tablet   Refills:  3         These medicines have changed or have updated prescriptions.        Dose/Directions    mineral oil-hydrophilic petrolatum   This may have changed:    - when to take this  - reasons to take this   Used for:  Malignant neoplasm of tongue (H)        Apply topically every 8 hours   Quantity:  50 g   Refills:  0       pantoprazole 20 MG EC tablet   Commonly known as:  PROTONIX   This may have changed:    - when to take this  - reasons to take this   Used for:  Gastroesophageal reflux disease without esophagitis        Dose:  20 mg   Take 1 tablet (20 mg) by mouth daily   Quantity:  30 tablet   Refills:  3            Where to get your medicines      These medications were sent to [facility] [place], [place] - [address]  [address] [address]    Hours:  TRANSPLANT PHONE NUMBER [phone] Phone:  [phone]      OLANZapine 5 MG tablet                Primary Care Provider Office Phone # Fax #    [facility] [phone] [address]  [facility] [address]        Thank you!     Thank you for choosing [facility]  for your care. Our goal is always to provide you with excellent care. Hearing back from our patients is one way we can continue to improve our services. Please take a few minutes to complete the written survey that you may receive in the mail after your visit with us. Thank you!             Your Updated Medication List - Protect others around you: Learn how to safely use, store and throw away your medicines at www.disposemymeds.org.          This list is accurate as of: 3/10/17 11:34 AM.  Always use your most recent med list.                   Brand Name Dispense Instructions for use    citalopram 10 MG/5ML solution    celeXA    300 mL    Take 10 mLs (20 mg) by mouth daily       clindamycin 1 % lotion    CLEOCIN T    60 mL    Apply topically 2 times daily       ibuprofen 200 MG tablet    ADVIL/MOTRIN     Take 200 mg by mouth daily as needed       levothyroxine 25 MCG tablet    SYNTHROID/LEVOTHROID    90 tablet    Take 1 tablet (25 mcg) by mouth daily       lidocaine 2 % solution    XYLOCAINE    1000 mL    15 mL swish and spit q3h PRN, max 8 doses in 24 hours       * LORazepam 2 MG/ML (HIGH CONC) solution    ATIVAN    30 mL    Take 0.25-0.5 mLs (0.5-1 mg) by mouth every 4 hours as needed for anxiety, sleep, nausea or vomiting       * LORazepam 0.5 MG tablet    ATIVAN    30 tablet    Take 1 tablet (0.5 mg) by mouth every 4 hours as needed (Anxiety, Nausea/Vomiting or Sleep)       methylphenidate 5 MG tablet    RITALIN    60 tablet    Take 1 tablet (5 mg) by mouth 2 times daily       mineral oil-hydrophilic petrolatum     50 g    Apply topically every 8 hours       multivitamins with minerals Liqd liquid     1 Bottle    15 mLs by Per  Feeding Tube route daily       OLANZapine 5 MG tablet    zyPREXA    60 tablet    Take 1 tablet (5 mg) by mouth 2 times daily as needed For nausea       ondansetron 4 MG tablet    ZOFRAN     Take 4 mg by mouth daily as needed       * oxyCODONE 20 MG 12 hr tablet    OxyCONTIN    60 tablet    Take 1 tablet (20 mg) by mouth every 12 hours maximum 2 tablet(s) per day       * oxyCODONE 5 MG/5ML solution    ROXICODONE    1000 mL    10 mLs (10 mg) by Oral or PEG tube route every 4 hours as needed for moderate to severe pain       pantoprazole 20 MG EC tablet    PROTONIX    30 tablet    Take 1 tablet (20 mg) by mouth daily       prochlorperazine 10 MG tablet    COMPAZINE    30 tablet    Take 1 tablet (10 mg) by mouth every 6 hours as needed (Nausea/Vomiting)       * Notice:  This list has 4 medication(s) that are the same as other medications prescribed for you. Read the directions carefully, and ask your doctor or other care provider to review them with you.

## 2017-03-10 NOTE — MR AVS SNAPSHOT
After Visit Summary   3/10/2017    Darek Navarro    MRN: [id number]           Patient Information     Date Of Birth          1990        Visit Information        Provider Department      3/10/2017 8:20 AM Opal Cannon PA-C [facility]        Today's Diagnoses     Malignant neoplasm of tongue (H)    -  1    Nausea        Bone metastasis (H)           Follow-ups after your visit        Additional Services     PALLIATIVE CARE REFERRAL       Please set up with MD and social work to help with pain management and coping.                  Your next 10 appointments already scheduled     Mar 20, 2017 12:30 PM CDT   Procedure 1.5 hr with U2A ROOM 16   Unit 2A [facility] ([facility])    [address] [address]               Mar 20, 2017  2:00 PM CDT   IR GASTROSTOMY TUBE PERCUTANEOUS PLCMNT with UUIR4   [facility] [place], Interventional Radiology ([facility])    [address] [facility] [facility] [address] [phone]           1. Laboratory test are to be obtained by your doctor prior to the exam (Hgb/Hct, platelet count, and INR) 2. If you are or may be pregnant, contact your doctor or a Radiology nurse prior to the day of the exam. 3. If you have diabetes, check with your doctor or a Radiology nurse to see if your insulin needs to be adjusted for the exam. 4. If you are taking Coumadin (to thin your blood) please contact your doctor or a Radiology nurse at least 5 days before the exam for special instructions. 5. If you are taking aspirin and/or Clopidegrel (Plavix) please contact your doctor at least 7 days before the exam for special instructions. 6. The day before your exam you may eat your regular diet. Drink no alcoholic beverages for 24 hours prior to the exam. 7. You will be asked to drink 1 bottle of oral CT contrast the night before your procedure  (12 hours prior to procedure). You can obtain this contrast in the Imaging Department from your exam site 8. Do not eat or drink anything (or take any tube feedings) for 8 hours prior to the exam. It is very important that your stomach be totally empty for this procedure. 9. DO NOT take any medications the morning of the exam. 10. The morning of the exam you may brush your teeth. 11. Bring a list of all drugs you are taking; including supplements and over-the-counter medications. 12. Wear comfortable clothes and leave your valuables at home. 13. Tell the Radiology nurse if you have any allergies. 14. You will be asked to empty your bladder before the exam begins. 15. Following the exam you will be admitted to the hospital for 1-3 days. 16. Nothing to eat or drink for oral intake and gastric feedings for  4 hours after insertion of Percutaneous Gastrostomy Tube (G tube). 17. If the tube was placed for decompression then it will remain attached to a drainage bag. 18. You will be taught to care for your tube before you leave the hospital. You may need to obtain supplies from your local pharmacy.            Mar 21, 2017 10:00 AM CDT   [facility]   [facility] Lab Draw ([facility])    [address]  2nd [facility] [address]   [phone]            Mar 21, 2017 10:30 AM CDT   (Arrive by 10:15 AM)   Return Visit with Keira Hernandez MD   [facility] ([facility])    [address] [place] [address]  2nd [facility] [address]   [phone]            Mar 21, 2017 11:00 AM CDT   Infusion 240 with  ONCOLOGY INFUSION,  32 ATC   [facility] ([facility])    [address]  2nd Floor  [facility] [address]   [phone]            Mar 22, 2017  1:00 PM CDT   (Arrive by 12:45 PM)   NEW WITH ROOM with Kesha Maloney [facility],  2 118 CONSULT RM    [facility] ([facility])    [address] [address] [phone]            Mar 22, 2017  2:45 PM CDT   (Arrive by 2:30 PM)   New Patient Visit with Tessy Flores MD   [facility] ([facility])    [address] [id number] [phone]            Mar 28, 2017  1:30 PM CDT   [facility] Draw with  [facility] LAB DRAW   [facility] Lab Draw ([facility])    [address] [place] [id number] [phone]              Who to contact     If you have questions or need follow up information about today's clinic visit or your schedule please contact [facility] directly at [phone].  Normal or non-critical lab and imaging results will be communicated to you by [employer], letter or phone within 4 business days after the clinic has received the results. If you do not hear from us within 7 days, please contact the clinic through [employer] or phone. If you have a critical or abnormal lab result, we will notify you by phone as soon as possible.  Submit refill requests through [employer] or call your pharmacy and they will forward the refill request to us. Please allow 3 business days for your refill to be completed.          Additional Information About Your Visit        [employer] Information     [employer] gives you secure access to your electronic health record. If you see a primary care provider, you can also send messages to your care team and make appointments. If you have questions, please call your primary care clinic.  If you do not have a primary care provider, please call [phone] and they will assist you.        Care EveryWhere ID     This is your Care EveryWhere ID. This could be used by other organizations to access your [facility] medical records  [id number]        Your Vitals  Were     Pulse Pulse Oximetry BMI (Body Mass Index)             109 100% 16.81 kg/m2          Blood Pressure from Last 3 Encounters:   03/14/17 118/67   03/10/17 (!) 130/99   03/07/17 (!) 133/93    Weight from Last 3 Encounters:   03/14/17 57.6 kg (126 lb 14.4 oz)   03/10/17 59.4 kg (131 lb)   03/07/17 60 kg (132 lb 4.8 oz)              We Performed the Following     PALLIATIVE CARE REFERRAL          Today's Medication Changes          These changes are accurate as of: 3/10/17 11:59 PM.  If you have any questions, ask your nurse or doctor.               Start taking these medicines.        Dose/Directions    OLANZapine 5 MG tablet   Commonly known as:  zyPREXA   Used for:  Malignant neoplasm of tongue (H), Nausea   Started by:  Opal Cannon PA-C        Dose:  5 mg   Take 1 tablet (5 mg) by mouth 2 times daily as needed For nausea   Quantity:  60 tablet   Refills:  3         These medicines have changed or have updated prescriptions.        Dose/Directions    mineral oil-hydrophilic petrolatum   This may have changed:    - when to take this  - reasons to take this   Used for:  Malignant neoplasm of tongue (H)        Apply topically every 8 hours   Quantity:  50 g   Refills:  0       pantoprazole 20 MG EC tablet   Commonly known as:  PROTONIX   This may have changed:    - when to take this  - reasons to take this   Used for:  Gastroesophageal reflux disease without esophagitis        Dose:  20 mg   Take 1 tablet (20 mg) by mouth daily   Quantity:  30 tablet   Refills:  3            Where to get your medicines      These medications were sent to [address] [place] [address] [address] [place] [address]    Hours:  TRANSPLANT PHONE NUMBER [phone] Phone:  [phone]     OLANZapine 5 MG tablet         Some of these will need a paper prescription and others can be bought over the counter.  Ask your nurse if you have questions.      Bring a paper prescription for each of these medications     methylphenidate 5 MG tablet    oxyCODONE 20 MG 12 hr tablet    oxyCODONE 5 MG/5ML solution                Primary Care Provider Office Phone # Fax #    [facility] [phone] [address]  [facility] [address]        Thank you!     Thank you for choosing [facility]CER [facility]  for your care. Our goal is always to provide you with excellent care. Hearing back from our patients is one way we can continue to improve our services. Please take a few minutes to complete the written survey that you may receive in the mail after your visit with us. Thank you!             Your Updated Medication List - Protect others around you: Learn how to safely use, store and throw away your medicines at www.disposemymeds.org.          This list is accurate as of: 3/10/17 11:59 PM.  Always use your most recent med list.                   Brand Name Dispense Instructions for use    citalopram 10 MG/5ML solution    celeXA    300 mL    Take 10 mLs (20 mg) by mouth daily       clindamycin 1 % lotion    CLEOCIN T    60 mL    Apply topically 2 times daily       ibuprofen 200 MG tablet    ADVIL/MOTRIN     Take 200 mg by mouth daily as needed       levothyroxine 25 MCG tablet    SYNTHROID/LEVOTHROID    90 tablet    Take 1 tablet (25 mcg) by mouth daily       lidocaine 2 % solution    XYLOCAINE    1000 mL    15 mL swish and spit q3h PRN, max 8 doses in 24 hours       * LORazepam 2 MG/ML (HIGH CONC) solution    ATIVAN    30 mL    Take 0.25-0.5 mLs (0.5-1 mg) by mouth every 4 hours as needed for anxiety, sleep, nausea or vomiting       * LORazepam 0.5 MG tablet    ATIVAN    30 tablet    Take 1 tablet (0.5 mg) by mouth every 4 hours as needed (Anxiety, Nausea/Vomiting or Sleep)       methylphenidate 5 MG tablet    RITALIN    60 tablet    Take 1 tablet (5 mg) by mouth 2 times daily       mineral oil-hydrophilic  petrolatum     50 g    Apply topically every 8 hours       multivitamins with minerals Liqd liquid     1 Bottle    15 mLs by Per Feeding Tube route daily       OLANZapine 5 MG tablet    zyPREXA    60 tablet    Take 1 tablet (5 mg) by mouth 2 times daily as needed For nausea       ondansetron 4 MG tablet    ZOFRAN     Take 4 mg by mouth daily as needed       * oxyCODONE 5 MG/5ML solution    ROXICODONE    1000 mL    10 mLs (10 mg) by Oral or PEG tube route every 4 hours as needed for moderate to severe pain       * oxyCODONE 20 MG 12 hr tablet    OxyCONTIN    60 tablet    Take 1 tablet (20 mg) by mouth every 12 hours maximum 2 tablet(s) per day       pantoprazole 20 MG EC tablet    PROTONIX    30 tablet    Take 1 tablet (20 mg) by mouth daily       prochlorperazine 10 MG tablet    COMPAZINE    30 tablet    Take 1 tablet (10 mg) by mouth every 6 hours as needed (Nausea/Vomiting)       * Notice:  This list has 4 medication(s) that are the same as other medications prescribed for you. Read the directions carefully, and ask your doctor or other care provider to review them with you.

## 2017-03-10 NOTE — PROGRESS NOTES
Infusion Nursing Note:  Darek Ramon presents for add on IVF/antiemetics  Met with CLARY Cardenas before infusion.    Note: N/A.    Treatment Conditions:  Lab Results   Component Value Date    HGB 9.8 03/10/2017     Lab Results   Component Value Date    WBC 2.0 03/10/2017      Lab Results   Component Value Date    ANEU 1.2 03/10/2017     Lab Results   Component Value Date     03/10/2017      Lab Results   Component Value Date     03/10/2017                   Lab Results   Component Value Date    POTASSIUM 4.1 03/10/2017           Lab Results   Component Value Date    MAG 2.2 03/10/2017            Lab Results   Component Value Date    CR 0.66 03/10/2017                   Lab Results   Component Value Date    COLE 9.1 03/10/2017                Lab Results   Component Value Date    BILITOTAL 0.4 03/10/2017           Lab Results   Component Value Date    ALBUMIN 3.4 03/10/2017                    Lab Results   Component Value Date    ALT 34 03/10/2017           Lab Results   Component Value Date    AST 32 03/10/2017       Intravenous Access:  Implanted Port.    Post Infusion Assessment:  Patient tolerated infusion without incident.  Blood return noted pre and post infusion.  No evidence of extravasations.  Access discontinued per protocol.    Discharge Plan:   Prescription refills given for zyprexa.  Discharge instructions reviewed with: Patient and sister  Patient and/or family verbalized understanding of discharge instructions and all questions answered.  AVS to patient via Outcome Referrals.  Patient will return 3/31 for next appointment.     Eileen Langford RN

## 2017-03-10 NOTE — Clinical Note
3/10/2017       RE: Darek Navarro  [address]  [place] [address]     Dear Colleague,    Thank you for referring your patient, Darek Navarro, to the [facility]. Please see a copy of my visit note below.    No notes on file    Again, thank you for allowing me to participate in the care of your patient.      Sincerely,    Opal Cannon PA-C

## 2017-03-10 NOTE — PATIENT INSTRUCTIONS
Contact numbers:  Triage Main Line: [phone]  After hours: [phone]    Call with chills and/or temperature greater than or equal to 100.5 and questions or concerns.    If after hours, weekends, or holidays, call main hospital  at  [phone] and ask for Oncology doctor on call.           March 2017 Sunday Monday Tuesday Wednesday Thursday Friday Saturday                  1     2     3     4       5     6     7     [facility] D[facility]    2:00 PM   (15 min.)    [facility]   [facility]     [facility] ONC INFUSION 120    2:30 PM   (120 min.)    ONCOLOGY INFUSION   [facility]     [facility] RETURN WITH ROOM    3:15 PM   (60 min.)   Yanique Wiggins GC   [facility]     [facility] DRAW    4:00 PM   (15 min.)    [facility] DRAW   [facility] 8     9     10     [facility]    7:45 AM   (15 min.)    [facility] DRAW   [facility]     [facility] RETURN    8:05 AM   (50 min.)   Opal Cannon PA-C   [facility]     [facility] ONC INFUSION 120    9:00 AM   (120 min.)    ONCOLOGY INFUSION   [facility] 11       12     13     14     [facility] DRAW    1:00 PM   (15 min.)    [facility] DRAW   [facility]     [facility] ONC INFUSION 120    1:30 PM   (120 min.)    ONCOLOGY INFUSION   [facility] 15     16     17     18       19     20     21     [facility] DRAW   10:00 AM   (15 min.)    [facility] LAB DRAW   [facility] Lab Draw     [facility] RETURN   10:15 AM   (30 min.)   Keira Hernandez MD   [facility]     [facility] ONC INFUSION 240   11:00 AM   (240 min.)    ONCOLOGY INFUSION   [facility] 22     23     24     25       26     27     28     [facility] DRAW    1:30 PM   (15 min.)    [facility] DRAW   [facility]     [facility] ONC INFUSION 120    2:00 PM   (120 min.)    ONCOLOGY INFUSION   [facility]  [facility] 29 30 31 April 2017 Sunday Monday Tuesday Wednesday Thursday Friday Saturday                                 1       2     3     4     [facility] LAB DRAW    1:30 PM   (15 min.)    [facility] LAB DRAW   [facility] Lab Draw     [facility] ONC INFUSION 120    2:00 PM   (120 min.)    ONCOLOGY INFUSION   [facility] 5     6     7     8       9     10     11     12     13     14     15       16     17     18     19     20     21     22       23     24     25     26     27     28     29       30                                                Lab Results:  Recent Results (from the past 12 hour(s))   Magnesium    Collection Time: 03/10/17  8:29 AM   Result Value Ref Range    Magnesium 2.2 1.6 - 2.3 mg/dL   Comprehensive metabolic panel    Collection Time: 03/10/17  8:29 AM   Result Value Ref Range    Sodium 138 133 - 144 mmol/L    Potassium 4.1 3.4 - 5.3 mmol/L    Chloride 101 94 - 109 mmol/L    Carbon Dioxide 27 20 - 32 mmol/L    Anion Gap 9 3 - 14 mmol/L    Glucose 73 70 - 99 mg/dL    Urea Nitrogen 10 7 - 30 mg/dL    Creatinine 0.66 0.66 - 1.25 mg/dL    GFR Estimate >90  Non  GFR Calc   >60 mL/min/1.7m2    GFR Estimate If Black >90   GFR Calc   >60 mL/min/1.7m2    Calcium 9.1 8.5 - 10.1 mg/dL    Bilirubin Total 0.4 0.2 - 1.3 mg/dL    Albumin 3.4 3.4 - 5.0 g/dL    Protein Total 7.6 6.8 - 8.8 g/dL    Alkaline Phosphatase 68 40 - 150 U/L    ALT 34 0 - 70 U/L    AST 32 0 - 45 U/L   CBC with platelets differential    Collection Time: 03/10/17  8:29 AM   Result Value Ref Range    WBC 2.0 (L) 4.0 - 11.0 10e9/L    RBC Count 3.68 (L) 4.4 - 5.9 10e12/L    Hemoglobin 9.8 (L) 13.3 - 17.7 g/dL    Hematocrit 31.0 (L) 40.0 - 53.0 %    MCV 84 78 - 100 fl    MCH 26.6 26.5 - 33.0 pg    MCHC 31.6 31.5 - 36.5 g/dL    RDW 22.7 (H) 10.0 - 15.0 %    Platelet Count 250 150 - 450 10e9/L    Diff Method Manual Differential     % Neutrophils 60.2  %    % Lymphocytes 12.4 %    % Monocytes 23.0 %    % Eosinophils 4.4 %    % Basophils 0.0 %    Absolute Neutrophil 1.2 (L) 1.6 - 8.3 10e9/L    Absolute Lymphocytes 0.2 (L) 0.8 - 5.3 10e9/L    Absolute Monocytes 0.5 0.0 - 1.3 10e9/L    Absolute Eosinophils 0.1 0.0 - 0.7 10e9/L    Absolute Basophils 0.0 0.0 - 0.2 10e9/L    Anisocytosis Moderate     Poikilocytosis Moderate     Teardrop Cells Slight     Acanthocytes Slight     Ovalocytes Slight     Microcytes Present

## 2017-03-10 NOTE — PROGRESS NOTES
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 Pathologic staging: [id number]. Recurrence with distant metastasis after initial chemoRT.       TREATMENT:    1- April 28, 2016 transmandibular subtotal oral glossectomy, right base of tongue resection with partial pharyngectomy, bilateral neck dissections, and free flap reconstruction. Concurrent ChemoRt with high dose Cisplatin 6/6/16. day 22 cisplatin on 6/27/16. He completed day 43 on 7/19/16. Radiation completed, 6600cGy on 7/21/16.   2- Palliative intent treatment with Pembrolizumab 12/6/2016  3- Due to rapid progression of disease the treatment was switched to Carboplatin, 5-FU and Cetuximab on 1/18/17 and has received 2 cycles with response seen on CT CAP on 2/21/17. Cycle 3 started on 2/28/17.     Patient's sister called in yesterday as she was concerned about how the patient is doing with nausea, vomiting, difficulty swallowing, dyspnea on exertion, and weakness. He declined going to the ED yesterday. He comes in today for evaluation of symptoms.     SUBJECTIVE: Patient reports that he is drinking 3-5 Boost/day and 0-2 shakes/day. He eats some mashed potatoes and minimal solid foods. He has more difficulty getting in nutrition the first week after each cycle of chemotherapy. He finds it harder to swallow. He finds some foods get stuck. He found it difficult to eat [employer]. He has been doing swallowing exercises, but has not seen much improvement yet. He has had increased nausea since day 2 of this cycle. He gets some relief from lorazepam every 4 hours. He finds no benefit from Zofran or Compazine. He feels his pain is well managed with OxyContin 20 mg bid and oxycodone 10 mg qid. He had mild mouth sores and is using salt/soda  swishes and lidocaine for that. He has dry skin and uses lotion for that. He is sleeping okay, usually up about twice at night. His energy is fair. He does find a benefit from Ritalin 5 mg bid. He napped much of the day 2 days ago, but did not feel the need to take a nap yesterday. He denies other concerns.    Meds  Current Outpatient Prescriptions   Medication Sig Dispense Refill     OLANZapine (ZYPREXA) 5 MG tablet Take 1 tablet (5 mg) by mouth 2 times daily as needed For nausea 60 tablet 3     oxyCODONE (ROXICODONE) 5 MG/5ML solution 10 mLs (10 mg) by Oral or PEG tube route every 4 hours as needed for moderate to severe pain 1000 mL 0     methylphenidate (RITALIN) 5 MG tablet Take 1 tablet (5 mg) by mouth 2 times daily 60 tablet 0     oxyCODONE (OXYCONTIN) 20 MG 12 hr tablet Take 1 tablet (20 mg) by mouth every 12 hours maximum 2 tablet(s) per day 60 tablet 0     clindamycin (CLEOCIN T) 1 % lotion Apply topically 2 times daily 60 mL 3     lidocaine (XYLOCAINE) 2 % solution 15 mL swish and spit q3h PRN, max 8 doses in 24 hours 1000 mL 3     LORazepam (ATIVAN) 0.5 MG tablet Take 1 tablet (0.5 mg) by mouth every 4 hours as needed (Anxiety, Nausea/Vomiting or Sleep) 30 tablet 5     prochlorperazine (COMPAZINE) 10 MG tablet Take 1 tablet (10 mg) by mouth every 6 hours as needed (Nausea/Vomiting) 30 tablet 5     pantoprazole (PROTONIX) 20 MG EC tablet Take 1 tablet (20 mg) by mouth daily (Patient taking differently: Take 20 mg by mouth daily as needed ) 30 tablet 3     levothyroxine (SYNTHROID/LEVOTHROID) 25 MCG tablet Take 1 tablet (25 mcg) by mouth daily 90 tablet 3     ibuprofen (ADVIL/MOTRIN) 200 MG tablet Take 200 mg by mouth daily as needed        ondansetron (ZOFRAN) 4 MG tablet Take 4 mg by mouth daily as needed        citalopram (CELEXA) 10 MG/5ML solution Take 10 mLs (20 mg) by mouth daily 300 mL 3     multivitamins with minerals (CERTAVITE) LIQD 15 mLs by Per Feeding Tube route daily 1 Bottle 1     mineral  oil-hydrophilic petrolatum (AQUAPHOR) ointment Apply topically every 8 hours (Patient taking differently: Apply topically every 8 hours as needed ) 50 g 0     LORazepam (ATIVAN) 2 MG/ML (HIGH CONC) solution Take 0.25-0.5 mLs (0.5-1 mg) by mouth every 4 hours as needed for anxiety, sleep, nausea or vomiting (Patient not taking: Reported on 2/28/2017) 30 mL 3      PHYSICAL EXAMINATION:   VITAL SIGNS: .BP (!) 130/99  Pulse 109  Wt 59.4 kg (131 lb)  SpO2 100%  BMI 16.81 kg/m2   Wt Readings from Last 10 Encounters:   03/10/17 59.4 kg (131 lb)   03/07/17 60 kg (132 lb 4.8 oz)   02/28/17 59.7 kg (131 lb 11.2 oz)   02/21/17 62.1 kg (136 lb 14.4 oz)   02/15/17 62 kg (136 lb 9.6 oz)   02/14/17 61.1 kg (134 lb 11.2 oz)   02/08/17 64.4 kg (142 lb)   02/07/17 64.3 kg (141 lb 11.2 oz)   02/01/17 64.5 kg (142 lb 4.8 oz)   01/31/17 64.4 kg (142 lb)   GENERAL: Sitting comfortably. Here today with his sister.  HEENT: Pupils are equal. Oropharynx is with mild mucositis, no thrush. Moderate-severe trismus.  NECK: No cervical or supraclavicular lymphadenopathy.   LUNGS: Clear bilaterally.   HEART: S1, S2, regular.   ABDOMEN: Soft, nontender, nondistended, no hepatosplenomegaly.   EXTREMITIES: Warm, well perfused.   NEUROLOGIC: Alert, awake.   SKIN: No rash.   LYMPHATICS: No edema.     DATA:   3/10/2017 08:29   Sodium 138   Potassium 4.1   Chloride 101   Carbon Dioxide 27   Urea Nitrogen 10   Creatinine 0.66   GFR Estimate >90...   GFR Estimate If Black >90...   Calcium 9.1   Anion Gap 9   Magnesium 2.2   Albumin 3.4   Protein Total 7.6   Bilirubin Total 0.4   Alkaline Phosphatase 68   ALT 34   AST 32   Glucose 73   WBC 2.0 (L)   Hemoglobin 9.8 (L)   Hematocrit 31.0 (L)   Platelet Count 250   RBC Count 3.68 (L)   MCV 84   MCH 26.6   MCHC 31.6   RDW 22.7 (H)   Diff Method Manual Differential   % Neutrophils 60.2   % Lymphocytes 12.4   % Monocytes 23.0   % Eosinophils 4.4   % Basophils 0.0   Absolute Neutrophil 1.2 (L)   Absolute  Lymphocytes 0.2 (L)   Absolute Monocytes 0.5   Absolute Eosinophils 0.1   Absolute Basophils 0.0   Anisocytosis Moderate   Poikilocytosis Moderate   Teardrop Cells Slight   Acanthocytes Slight   Ovalocytes Slight   Microcytes Present       ECOG PS: 2     ASSESSMENT:  A 27 year old gentleman  [id number] squamous cell carcinoma of the right oral tongue.Status post April 28, 2016 transmandibular subtotal oral glossectomy, right base of tongue resection with partial pharyngectomy, bilateral neck dissections, and free flap. Initiated treatment by 6/6/16.   He completed his concurrent chemoRT 7/21/16.  Follow up PET CT scan 10/28/16 was reviewed at multidisciplinary head and neck tumor board. It was consistent with local and distant recurrence. L- His case was subsequently reviewed at Thoracic oncology conference and we will proceed with biopsy by IR.glendy biopsy results were reviewed with the patient and family members which confirmed metastatic disease.   He was seen at [facility] and no first line trials were available. He was switched to Pembrolizumab with a plan to do short term scans to assess the pace of disease progression.   CT scan 1/16/17 showed rapid disease progression with thoracic vertebral fracture. Multiple lytic/destructive spine lesions were reviewed by spine surgery and were not felt to be unstable. He completed palliative radiation to bones.   - He is on chemotherapy with carboplatin/5-FU and Cetuximab. He started his third cycle last week. CT after 2 cycles showed a good response. The first week of each cycle is always the hardest for him with more symptoms, including nausea, mouth sores, dysphagia, fatigue, and anorexia.   - He is on Zometa monthly.  - He will return to see Dr. Hernandez on 3/21 prior to cycle 4.    Nausea. Patient is not getting adequate relief of nausea with Zofran, Compazine, or Ativan. He will try Zyprexa 5 mg bid prn.      Dysphagia. Patient's swallowing was improving following  chemorads, but worsened again with starting chemotherapy with intermittent mouth sores than likely extend into his throat. He requests that another feeding tube be placed. I have reviewed this with Dr. Hernandez and will put in the order for a new PEG.     Coping. After discussion with Dr. Hernandez, I have placed an order for a palliative care consult. He is already on Celexa 20 mg daily.    Throat pain and mucositis. Adequately controlled with OxyContin 20 mg bid and oxycodone 10 mg qid. He also continues to use salt/soda swishes and lidocaine.    Hypothyroidism. Will recheck TSHR at next visit.     Opal Cannon PA-C  [facility]  [address] [place] [address] [phone]

## 2017-03-10 NOTE — NURSING NOTE
"Darek Navarro is a 27 year old male who presents for:  Chief Complaint   Patient presents with     Blood Draw     Patient is here today for labs to be drawn via his Right AC area by RN.     Oncology Clinic Visit     Return patient visit- Oral cancer (H)        Initial Vitals:  BP (!) 130/99  Pulse 109  Wt 59.4 kg (131 lb)  SpO2 100%  BMI 16.81 kg/m2 Estimated body mass index is 16.81 kg/(m^2) as calculated from the following:    Height as of 2/28/17: 1.88 m (6' 2.02\").    Weight as of this encounter: 59.4 kg (131 lb).. Body surface area is 1.76 meters squared. BP completed using cuff size: NA (Not Taken)  Data Unavailable No LMP for male patient. Allergies and medications reviewed.     Medications: Medication refills not needed today.  Pharmacy name entered into [employer]:    [facility] [id number] [place], [place] - [address]  [facility] [address] [place] [facility] [address]    Comments: vitals done in lab    5 minutes for nursing intake (face to face time)   Arcadio Gonsalves CMA        "

## 2017-03-14 NOTE — NURSING NOTE
Chief Complaint   Patient presents with     Port Draw     Pt with hx of head and neck ca labs collected via portacath.

## 2017-03-14 NOTE — PATIENT INSTRUCTIONS
Contact Numbers    [facility] [place]: [phone]  [facility] [place]:  [phone]    Call triage with chills and/or temperature greater than or equal to 100.5, uncontrolled nausea/vomiting, diarrhea, constipation, dizziness, shortness of breath, chest pain, bleeding, unexplained bruising, or any new/concerning symptoms, questions/concerns.     If you are having any concerning symptoms or wish to speak to a provider before your next infusion visit, please call your care coordinator or triage to notify them so we can adequately serve you.       After Hours: [phone]    If after hours, weekends, or holidays, call main hospital  and ask for Oncology doctor on call.         March 2017 Sunday Monday Tuesday Wednesday Thursday Friday Saturday                  1     2     3     4       5     6     7     [facility] LAB DRAW    2:00 PM   (15 min.)    [facility]RAW   [facility]     [facility] ONC INFUSION 120    2:30 PM   (120 min.)    ONCOLOGY INFUSION   [facility] RETURN WITH ROOM    3:15 PM   (60 min.)   Yanique Wiggins GC   [facility] LAB DRAW    4:00 PM   (15 min.)    [facility] LAB DRAW   [facility] Lab Draw 8     9     10     [facility] LAB DRAW    7:45 AM   (15 min.)    [facility] DRAW   [facility]     [facility] RETURN    8:05 AM   (50 min.)   Opal Cannon PA-C   [facility] ONC INFUSION 120    9:00 AM   (120 min.)    ONCOLOGY INFUSION   [facility] 11       12     13     14     P [facility] LAB DRAW    1:00 PM   (15 min.)    [facility] LAB DRAW   [facility] Lab Draw     [facility] ONC INFUSION 120    1:30 PM   (120 min.)    ONCOLOGY INFUSION   [facility] 15     16     17     18       19     20     PROCEDURE - 1.5 HR   12:30 PM   (90 min.)   U2A ROOM 16   [address] [facility] [place]     IR G TUBE PERCUTANEOUS PLCMNT    2:00 PM   (120 min.)    [id number]   [facility] [place], Interventional Radiology 21     [facility] DRAW   10:00 AM   (15 min.)    [facility]RAW   [facility]     [facility] RETURN   10:15 AM   (30 min.)   Keira Hernandez MD   [facility] ONC INFUSION 240   11:00 AM   (240 min.)    ONCOLOGY INFUSION   [facility] 22     [facility] NEW WITH ROOM   12:45 PM   (60 min.)   Kesha Maloney LICSW   [facility] NEW    2:30 PM   (75 min.)   Tessy Flores MD   [facility] 23     24     25       26     27     28     [facility] LAB DRAW    1:30 PM   (15 min.)    [facility] DRAW   [facility]     [facility] ONC INFUSION 120    2:00 PM   (120 min.)    ONCOLOGY INFUSION   [facility] 29     30 31 April 2017 Sunday Monday Tuesday Wednesday Thursday Friday Saturday                                 1       2     3     4     [facility] LAB DRAW    1:30 PM   (15 min.)    [facility] LAB DRAW   [facility] Lab Draw     [facility] ONC INFUSION 120    2:00 PM   (120 min.)    ONCOLOGY INFUSION   [facility] 5     6     7     8       9     10     11     12     13     14     15       16     17     18     19     20     21     22       23     24     25     26     27     28     29       30                                                Lab Results:  Recent Results (from the past 12 hour(s))   CBC with platelets differential    Collection Time: 03/14/17  1:26 PM   Result Value Ref Range    WBC 2.4 (L) 4.0 - 11.0 10e9/L    RBC Count 3.57 (L) 4.4 - 5.9 10e12/L    Hemoglobin 9.7 (L) 13.3 - 17.7 g/dL    Hematocrit 30.4 (L) 40.0 - 53.0 %    MCV 85 78 - 100 fl    MCH 27.2 26.5 - 33.0 pg    MCHC 31.9 31.5 - 36.5 g/dL    RDW 23.8 (H) 10.0 - 15.0 %    Platelet Count 113 (L) 150 - 450 10e9/L    Diff Method Automated Method     % Neutrophils 57.7 %    % Lymphocytes 15.7 %    % Monocytes 25.0 %     % Eosinophils 0.8 %    % Basophils 0.4 %    % Immature Granulocytes 0.4 %    Nucleated RBCs 0 0 /100    Absolute Neutrophil 1.4 (L) 1.6 - 8.3 10e9/L    Absolute Lymphocytes 0.4 (L) 0.8 - 5.3 10e9/L    Absolute Monocytes 0.6 0.0 - 1.3 10e9/L    Absolute Eosinophils 0.0 0.0 - 0.7 10e9/L    Absolute Basophils 0.0 0.0 - 0.2 10e9/L    Abs Immature Granulocytes 0.0 0 - 0.4 10e9/L    Absolute Nucleated RBC 0.0    Comprehensive metabolic panel    Collection Time: 03/14/17  1:26 PM   Result Value Ref Range    Sodium 139 133 - 144 mmol/L    Potassium 3.9 3.4 - 5.3 mmol/L    Chloride 104 94 - 109 mmol/L    Carbon Dioxide 26 20 - 32 mmol/L    Anion Gap 9 3 - 14 mmol/L    Glucose 106 (H) 70 - 99 mg/dL    Urea Nitrogen 12 7 - 30 mg/dL    Creatinine 0.59 (L) 0.66 - 1.25 mg/dL    GFR Estimate >90  Non  GFR Calc   >60 mL/min/1.7m2    GFR Estimate If Black >90   GFR Calc   >60 mL/min/1.7m2    Calcium 9.2 8.5 - 10.1 mg/dL    Bilirubin Total 0.5 0.2 - 1.3 mg/dL    Albumin 3.3 (L) 3.4 - 5.0 g/dL    Protein Total 7.8 6.8 - 8.8 g/dL    Alkaline Phosphatase 75 40 - 150 U/L    ALT 71 (H) 0 - 70 U/L    AST 38 0 - 45 U/L   Magnesium    Collection Time: 03/14/17  1:26 PM   Result Value Ref Range    Magnesium 2.3 1.6 - 2.3 mg/dL

## 2017-03-14 NOTE — PROGRESS NOTES
Infusion Nursing Note:  Darek Navarro presents today for Cycle 3, day 15 Erbitux and Zometa infusion.    Patient seen by provider today: No    Note: Patient presents to clinic today feeling well with no questions.  Patient verbalized understanding of appt for G-tube placement next week and has no questions or concerns.    Intravenous Access:  Implanted Port.    Treatment Conditions:  Lab Results   Component Value Date    HGB 9.7 03/14/2017     Lab Results   Component Value Date    WBC 2.4 03/14/2017      Lab Results   Component Value Date    ANEU 1.4 03/14/2017     Lab Results   Component Value Date     03/14/2017      Lab Results   Component Value Date     03/14/2017                   Lab Results   Component Value Date    POTASSIUM 3.9 03/14/2017           Lab Results   Component Value Date    MAG 2.3 03/14/2017            Lab Results   Component Value Date    CR 0.59 03/14/2017                   Lab Results   Component Value Date    COLE 9.2 03/14/2017                Lab Results   Component Value Date    BILITOTAL 0.5 03/14/2017           Lab Results   Component Value Date    ALBUMIN 3.3 03/14/2017                    Lab Results   Component Value Date    ALT 71 03/14/2017           Lab Results   Component Value Date    AST 38 03/14/2017     Results reviewed, labs MET treatment parameters, ok to proceed with treatment.  Mag=WNL    Post Infusion Assessment:  Patient tolerated infusion without incident.  Patient observed for 30 minutes post Erbitux per protocol.  Site patent and intact, free from redness, edema or discomfort.  No evidence of extravasations.  Access discontinued per protocol.  Zometa end time: 1600    Discharge Plan:   Prescription refills given for Oxycodone, Oxycontin, Ritalin and Celexa.  Discharge instructions reviewed with: Patient.  Patient and/or family verbalized understanding of discharge instructions and all questions answered.  Patient declined copy of AVS.  Patient will  return 3/21/2017 for next appointment.  Departure Mode: Ambulatory.    Clarice Huerta RN

## 2017-03-14 NOTE — MR AVS SNAPSHOT
After Visit Summary   3/14/2017    Darek Navarro    MRN: [id number]           Patient Information     Date Of Birth          1990        Visit Information        Provider Department      3/14/2017 1:30 PM  27 ATC;  ONCOLOGY INFUSION [facility]        Today's Diagnoses     Head and neck cancer (H)    -  1    Drug-induced neutropenia (H)        Bone metastasis (H)        Malignant neoplasm of tongue (H)          Care Instructions    Contact Numbers    [facility] Main Line: [phone]  [facility] Triage:  [phone]    Call triage with chills and/or temperature greater than or equal to 100.5, uncontrolled nausea/vomiting, diarrhea, constipation, dizziness, shortness of breath, chest pain, bleeding, unexplained bruising, or any new/concerning symptoms, questions/concerns.     If you are having any concerning symptoms or wish to speak to a provider before your next infusion visit, please call your care coordinator or triage to notify them so we can adequately serve you.       After Hours: [phone]    If after hours, weekends, or holidays, call main hospital  and ask for Oncology doctor on call.         March 2017 Sunday Monday Tuesday Wednesday Thursday Friday Saturday                  1     2     3     4       5     6     7     [facility] LAB DRAW    2:00 PM   (15 min.)    [facility] DRAW   [facility] Lab Draw     [facility] ONC INFUSION 120    2:30 PM   (120 min.)    ONCOLOGY INFUSION   [facility] RETURN WITH ROOM    3:15 PM   (60 min.)   Yanique Wiggins GC   [facility] DRAW    4:00 PM   (15 min.)    [facility]   [facility] 8     9     10     [facility] DRAW    7:45 AM   (15 min.)    [facility] DRAW   [facility] Lab Draw     [facility] RETURN    8:05 AM   (50 min.)   Opal Cannon PA-C   [facility] ONC INFUSION 120    9:00 AM   (120  min.)    ONCOLOGY INFUSION   [facility] 11       12     13     14     [facility] DRAW    1:00 PM   (15 min.)    [facility]RAW   [facility]     [facility] ONC INFUSION 120    1:30 PM   (120 min.)    ONCOLOGY INFUSION   [facility] 15     16     17     18       19     20     PROCEDURE - 1.5 HR   12:30 PM   (90 min.)   U2A ROOM 16   [address] [facility]     IR G TUBE PERCUTANEOUS PLCMNT    2:00 PM   (120 min.)   UUIR4   [facility], [place], Interventional Radiology 21     [facility] DRAW   10:00 AM   (15 min.)    [facility] LAB DRAW   [facility] Lab Draw     [facility] RETURN   10:15 AM   (30 min.)   Keira Hernandez MD   [facility] ONC INFUSION 240   11:00 AM   (240 min.)    ONCOLOGY INFUSION   [facility] 22     [facility] WITH ROOM   12:45 PM   (60 min.)   Kesha Maloney LICSW   [facility] NEW    2:30 PM   (75 min.)   Tessy Flores MD   [facility] 23     24     25       26     27     28     [facility] DRAW    1:30 PM   (15 min.)    [facility] DRAW   [facility]     [facility] ONC INFUSION 120    2:00 PM   (120 min.)    ONCOLOGY INFUSION   [facility] 29 30 31 April 2017 Sunday Monday Tuesday Wednesday Thursday Friday Saturday                                 1       2     3     4     [facility] DRAW    1:30 PM   (15 min.)    [facility] DRAW   [facility]     P ONC INFUSION 120    2:00 PM   (120 min.)    ONCOLOGY INFUSION   [facility] 5     6     7     8       9     10     11     12     13     14     15       16     17     18     19     20     21     22       23     24     25     26     27     28     29       30                                                Lab Results:  Recent Results (from the past 12 hour(s))   CBC with platelets  differential    Collection Time: 03/14/17  1:26 PM   Result Value Ref Range    WBC 2.4 (L) 4.0 - 11.0 10e9/L    RBC Count 3.57 (L) 4.4 - 5.9 10e12/L    Hemoglobin 9.7 (L) 13.3 - 17.7 g/dL    Hematocrit 30.4 (L) 40.0 - 53.0 %    MCV 85 78 - 100 fl    MCH 27.2 26.5 - 33.0 pg    MCHC 31.9 31.5 - 36.5 g/dL    RDW 23.8 (H) 10.0 - 15.0 %    Platelet Count 113 (L) 150 - 450 10e9/L    Diff Method Automated Method     % Neutrophils 57.7 %    % Lymphocytes 15.7 %    % Monocytes 25.0 %    % Eosinophils 0.8 %    % Basophils 0.4 %    % Immature Granulocytes 0.4 %    Nucleated RBCs 0 0 /100    Absolute Neutrophil 1.4 (L) 1.6 - 8.3 10e9/L    Absolute Lymphocytes 0.4 (L) 0.8 - 5.3 10e9/L    Absolute Monocytes 0.6 0.0 - 1.3 10e9/L    Absolute Eosinophils 0.0 0.0 - 0.7 10e9/L    Absolute Basophils 0.0 0.0 - 0.2 10e9/L    Abs Immature Granulocytes 0.0 0 - 0.4 10e9/L    Absolute Nucleated RBC 0.0    Comprehensive metabolic panel    Collection Time: 03/14/17  1:26 PM   Result Value Ref Range    Sodium 139 133 - 144 mmol/L    Potassium 3.9 3.4 - 5.3 mmol/L    Chloride 104 94 - 109 mmol/L    Carbon Dioxide 26 20 - 32 mmol/L    Anion Gap 9 3 - 14 mmol/L    Glucose 106 (H) 70 - 99 mg/dL    Urea Nitrogen 12 7 - 30 mg/dL    Creatinine 0.59 (L) 0.66 - 1.25 mg/dL    GFR Estimate >90  Non  GFR Calc   >60 mL/min/1.7m2    GFR Estimate If Black >90   GFR Calc   >60 mL/min/1.7m2    Calcium 9.2 8.5 - 10.1 mg/dL    Bilirubin Total 0.5 0.2 - 1.3 mg/dL    Albumin 3.3 (L) 3.4 - 5.0 g/dL    Protein Total 7.8 6.8 - 8.8 g/dL    Alkaline Phosphatase 75 40 - 150 U/L    ALT 71 (H) 0 - 70 U/L    AST 38 0 - 45 U/L   Magnesium    Collection Time: 03/14/17  1:26 PM   Result Value Ref Range    Magnesium 2.3 1.6 - 2.3 mg/dL             Follow-ups after your visit        Your next 10 appointments already scheduled     Mar 20, 2017 12:30 PM CDT   Procedure 1.5 hr with U2A ROOM 16   Unit 2A [facility]  [place], [facility])    [address] [address] [facility] [address]               Mar 20, 2017  2:00 PM CDT   IR GASTROSTOMY TUBE PERCUTANEOUS PLCMNT with UUIR4   [facility], [place], Interventional Radiology ([facility])    [address] [facility] [address]   [phone]           1. Laboratory test are to be obtained by your doctor prior to the exam (Hgb/Hct, platelet count, and INR) 2. If you are or may be pregnant, contact your doctor or a Radiology nurse prior to the day of the exam. 3. If you have diabetes, check with your doctor or a Radiology nurse to see if your insulin needs to be adjusted for the exam. 4. If you are taking Coumadin (to thin your blood) please contact your doctor or a Radiology nurse at least 5 days before the exam for special instructions. 5. If you are taking aspirin and/or Clopidegrel (Plavix) please contact your doctor at least 7 days before the exam for special instructions. 6. The day before your exam you may eat your regular diet. Drink no alcoholic beverages for 24 hours prior to the exam. 7. You will be asked to drink 1 bottle of oral CT contrast the night before your procedure (12 hours prior to procedure). You can obtain this contrast in the Imaging Department from your exam site 8. Do not eat or drink anything (or take any tube feedings) for 8 hours prior to the exam. It is very important that your stomach be totally empty for this procedure. 9. DO NOT take any medications the morning of the exam. 10. The morning of the exam you may brush your teeth. 11. Bring a list of all drugs you are taking; including supplements and over-the-counter medications. 12. Wear comfortable clothes and leave your valuables at home. 13. Tell the Radiology nurse if you have any allergies. 14. You will be asked to empty your bladder before the exam begins. 15. Following the exam you will be admitted to the hospital for 1-3 days. 16. Nothing  to eat or drink for oral intake and gastric feedings for  4 hours after insertion of Percutaneous Gastrostomy Tube (G tube). 17. If the tube was placed for decompression then it will remain attached to a drainage bag. 18. You will be taught to care for your tube before you leave the hospital. You may need to obtain supplies from your local pharmacy.            Mar 21, 2017 10:00 AM CDT   [facility] Draw with [facility] DRAW   [facility] Lab Draw ([facility])    [address] [place] [address]   [phone]            Mar 21, 2017 10:30 AM CDT   (Arrive by 10:15 AM)   Return Visit with Keira Hernandez MD   [facility] ([facility])    [address] [place] [address]   [phone]            Mar 21, 2017 11:00 AM CDT   Infusion 240 with  ONCOLOGY INFUSION,  32 ATC   [facility])    [address] [place] [address]   [phone]            Mar 22, 2017  1:00 PM CDT   (Arrive by 12:45 PM)   NEW WITH ROOM with Kesha Maloney [facility],  2 118 CONSULT RM   [facility] ([facility])    [address] [place] [address]   [phone]            Mar 22, 2017  2:45 PM CDT   (Arrive by 2:30 PM)   New Patient Visit with Tessy Flores MD   [facility] ([facility])    [address] [place] [address]   [phone]            Mar 28, 2017  1:30 PM CDT   [facility] Draw with  [facility] DRAW   [facility] Lab Draw ([facility])    [address] [place] [address]   [phone]              Who to contact     If you have questions or need follow up information about today's clinic visit or your schedule  please contact [facility] directly at [phone].  Normal or non-critical lab and imaging results will be communicated to you by MyChart, letter or phone within 4 business days after the clinic has received the results. If you do not hear from us within 7 days, please contact the clinic through [employer] or phone. If you have a critical or abnormal lab result, we will notify you by phone as soon as possible.  Submit refill requests through [employer] or call your pharmacy and they will forward the refill request to us. Please allow 3 business days for your refill to be completed.          Additional Information About Your Visit        [employer] Information     [employer] gives you secure access to your electronic health record. If you see a primary care provider, you can also send messages to your care team and make appointments. If you have questions, please call your primary care clinic.  If you do not have a primary care provider, please call [phone] and they will assist you.        Care EveryWhere ID     This is your Care EveryWhere ID. This could be used by other organizations to access your [place] medical records  [id number]        Your Vitals Were     Pulse Temperature Respirations Pulse Oximetry BMI (Body Mass Index)       106 98.7  F (37.1  C) (Oral) 18 100% 16.29 kg/m2        Blood Pressure from Last 3 Encounters:   03/14/17 118/67   03/10/17 (!) 130/99   03/07/17 (!) 133/93    Weight from Last 3 Encounters:   03/14/17 57.6 kg (126 lb 14.4 oz)   03/10/17 59.4 kg (131 lb)   03/07/17 60 kg (132 lb 4.8 oz)              We Performed the Following     CBC with platelets differential     Comprehensive metabolic panel     Magnesium     Nursing Communication 1          Today's Medication Changes          These changes are accurate as of: 3/14/17  2:17 PM.  If you have any questions, ask your nurse or doctor.               These medicines have changed or have updated prescriptions.         Dose/Directions    mineral oil-hydrophilic petrolatum   This may have changed:    - when to take this  - reasons to take this   Used for:  Malignant neoplasm of tongue (H)        Apply topically every 8 hours   Quantity:  50 g   Refills:  0       pantoprazole 20 MG EC tablet   Commonly known as:  PROTONIX   This may have changed:    - when to take this  - reasons to take this   Used for:  Gastroesophageal reflux disease without esophagitis        Dose:  20 mg   Take 1 tablet (20 mg) by mouth daily   Quantity:  30 tablet   Refills:  3                Primary Care Provider Office Phone # Fax #    [facility] [phone] [address]  [facility] [address]        Thank you!     Thank you for choosing [facility]  for your care. Our goal is always to provide you with excellent care. Hearing back from our patients is one way we can continue to improve our services. Please take a few minutes to complete the written survey that you may receive in the mail after your visit with us. Thank you!             Your Updated Medication List - Protect others around you: Learn how to safely use, store and throw away your medicines at www.disposemymeds.org.          This list is accurate as of: 3/14/17  2:17 PM.  Always use your most recent med list.                   Brand Name Dispense Instructions for use    citalopram 10 MG/5ML solution    celeXA    300 mL    Take 10 mLs (20 mg) by mouth daily       clindamycin 1 % lotion    CLEOCIN T    60 mL    Apply topically 2 times daily       ibuprofen 200 MG tablet    ADVIL/MOTRIN     Take 200 mg by mouth daily as needed       levothyroxine 25 MCG tablet    SYNTHROID/LEVOTHROID    90 tablet    Take 1 tablet (25 mcg) by mouth daily       lidocaine 2 % solution    XYLOCAINE    1000 mL    15 mL swish and spit q3h PRN, max 8 doses in 24 hours       * LORazepam 2 MG/ML (HIGH CONC) solution    ATIVAN    30 mL    Take  0.25-0.5 mLs (0.5-1 mg) by mouth every 4 hours as needed for anxiety, sleep, nausea or vomiting       * LORazepam 0.5 MG tablet    ATIVAN    30 tablet    Take 1 tablet (0.5 mg) by mouth every 4 hours as needed (Anxiety, Nausea/Vomiting or Sleep)       methylphenidate 5 MG tablet    RITALIN    60 tablet    Take 1 tablet (5 mg) by mouth 2 times daily       mineral oil-hydrophilic petrolatum     50 g    Apply topically every 8 hours       multivitamins with minerals Liqd liquid     1 Bottle    15 mLs by Per Feeding Tube route daily       OLANZapine 5 MG tablet    zyPREXA    60 tablet    Take 1 tablet (5 mg) by mouth 2 times daily as needed For nausea       ondansetron 4 MG tablet    ZOFRAN     Take 4 mg by mouth daily as needed       * oxyCODONE 5 MG/5ML solution    ROXICODONE    1000 mL    10 mLs (10 mg) by Oral or PEG tube route every 4 hours as needed for moderate to severe pain       * oxyCODONE 20 MG 12 hr tablet    OxyCONTIN    60 tablet    Take 1 tablet (20 mg) by mouth every 12 hours maximum 2 tablet(s) per day       pantoprazole 20 MG EC tablet    PROTONIX    30 tablet    Take 1 tablet (20 mg) by mouth daily       prochlorperazine 10 MG tablet    COMPAZINE    30 tablet    Take 1 tablet (10 mg) by mouth every 6 hours as needed (Nausea/Vomiting)       * Notice:  This list has 4 medication(s) that are the same as other medications prescribed for you. Read the directions carefully, and ask your doctor or other care provider to review them with you.

## 2017-03-15 NOTE — PROGRESS NOTES
Patient called at the request of Opal Cannon PA-C and advised to drink as much Boost, Ensure, [place] Instant Breakfast as possible due to weight loss.  This RN also suggested large malts from [employer] for high caloric intake.  Patient enjoyed this recommendation and indicated understanding.      Yadira Ferraro MSN, RN  RN Care Coordinator  [facility]  [phone]

## 2017-03-20 PROBLEM — Z43.1 ATTENTION TO G-TUBE (H): Status: ACTIVE | Noted: 2017-01-01

## 2017-03-20 PROBLEM — Z48.89 POSTOPERATIVE OBSERVATION: Status: ACTIVE | Noted: 2017-01-01

## 2017-03-20 NOTE — IP AVS SNAPSHOT
MRN:[id number]                      After Visit Summary   3/20/2017    Darek Navarro    MRN: [id number]           Thank you!     Thank you for choosing [facility] for your care. Our goal is always to provide you with excellent care. Hearing back from our patients is one way we can continue to improve our services. Please take a few minutes to complete the written survey that you may receive in the mail after you visit with us. Thank you!        Patient Information     Date Of Birth          1990        About your hospital stay     You were admitted on:  March 20, 2017 You last received care in the:  [facility]    You were discharged on:  March 21, 2017       Who to Call     For medical emergencies, please call 911.  For non-urgent questions about your medical care, please call your primary care provider or clinic, [phone]          Attending Provider     Provider Specialty    Migue Briscoe MD Radiology    Donato Tripathi MD Internal Medicine    Suyapa Junior MD Hematology & Oncology       Primary Care Provider Office Phone # Fax #    [facility] [phone] [address] [address]        After Care Instructions     Discharge Instructions       If questions or problems arise regarding tube function (e.g. leaking, dislodges, etc.) Contact Interventional Radiology department 24 hours a day.    For procedures that were done at the [facility] sites,   8:00-4:30 PM Monday through Friday    Contact:[phone].    For afterhours and weekends call the [facility] main phone line [phone] and ask for the [facility] on call physician number.    If DIRECTED by the RADIOLOGIST, related to specific problems with the tube functioning,  go to the Emergency Department.                  Your next 10 appointments already scheduled     Mar 22, 2017 10:45 AM CDT   [facility] Lab Draw  with [facility]   [facility]    [address] [place] [id number]   [phone]            Mar 22, 2017 11:20 AM CDT   (Arrive by 11:05 AM)   Return Visit with Maribel Harper PA-C   [facility] ([facility])    [address] [place] [address]   [phone]            Mar 22, 2017  1:00 PM CDT   (Arrive by 12:45 PM)   NEW WITH ROOM with YONIS Hernandez,  2 118 CONSULT RM   [facility] ([facility])    [address] [place] [address]   [phone]            Mar 22, 2017  2:45 PM CDT   (Arrive by 2:30 PM)   New Patient Visit with Tessy Flores MD   [facility] ([facility])    [address] [place] [address]   [phone]            Mar 23, 2017 12:30 PM CDT   [facility] Draw with [facility]   [facility])    [address] [place] [address]   [phone]            Mar 23, 2017  1:00 PM CDT   (Arrive by 12:45 PM)   Return Visit with Maribel Harper PA-C   [facility] ([facility])    [address] [place] [address]   [phone]            Mar 23, 2017  1:30 PM CDT   Infusion 180 with [facility]OGY INFUSION, UC 15 ATC   [facility] ([facility])    [address] [place] [address]   [phone]            Mar 28, 2017  1:30 PM CDT   [facility] Lab Draw with [facility]   [facility] ([facility])    [address] [place] [address]   [phone]              Additional Services     Home  infusion referral       New home enterals per physician orders.    ____________________      Gastrostomy/Enterostomy Gastrostomy 18 fr           Adult Formula Bolus Feeding: QID Isosource 1.5; Route: Gastrostomy; 7; Can(s); Additional free water (mL): 90 ml before and after each feed; Medication - Tube Feeding Flush Frequency: At least 15-30 mL water before and after medication administrat...  _________________________________    The following homecare is recommended:  RN skilled nursing visit. RN to assess vital signs and weight, respiratory and cardiac status, pain level and activity tolerance, hydration, nutrition and bowel status and home safety.  RN to teach medication management.  RN to provide teaching and assist w/ management of new home enterals and gtube.    _______________________________  [facility]  Phone  [phone]  Fax  [phone]  ______________________________                  Further instructions from your care team       Enteral Feedings:    Adult Formula Bolus Feeding: Four times a day using Isosource 1.5; Route: Gastrostomy; 7; Can(s)   Flush Feeding Tube with Additional free water (mL): 90 ml before and after each feeding   Flush Feeding Tube with At least 15-30 mL water before and after medication administration    Pending Results     Date and Time Order Name Status Description    3/20/2017 1236 IR Gastrostomy Tube Percutaneous Plcmnt Preliminary             Statement of Approval     Ordered          03/21/17 1138  I have reviewed and agree with all the recommendations and orders detailed in this document.  EFFECTIVE NOW     Approved and electronically signed by:  Suyapa Junior MD             Admission Information     Date & Time Provider Department Dept. Phone    3/20/2017 Suyapa Junior MD Unit 6D Observation [facility] [phone]      Your Vitals Were     Blood Pressure Pulse Temperature Respirations Height Weight    120/80 75 98.1  F (36.7  C) (Oral) 18  "1.88 m (6' 2\") 56.2 kg (123 lb 12.8 oz)    Pulse Oximetry BMI (Body Mass Index)                100% 15.89 kg/m2          [employer] Information     [employer] [facility] gives you secure access to your electronic health record. If you see a primary care provider, you can also send messages to your care team and make appointments. If you have questions, please call your primary care clinic.  If you do not have a primary care provider, please call [phone] and they will assist you.        Care EveryWhere ID     This is your Care EveryWhere ID. This could be used by other organizations to access your [facility] medical records  [id number]           Review of your medicines      CONTINUE these medicines which may have CHANGED, or have new prescriptions. If we are uncertain of the size of tablets/capsules you have at home, strength may be listed as something that might have changed.        Dose / Directions    LORazepam 0.5 MG tablet   Commonly known as:  ATIVAN   This may have changed:  Another medication with the same name was removed. Continue taking this medication, and follow the directions you see here.   Used for:  Head and neck cancer (H)        Dose:  0.5 mg   Take 1 tablet (0.5 mg) by mouth every 4 hours as needed (Anxiety, Nausea/Vomiting or Sleep)   Quantity:  30 tablet   Refills:  5       mineral oil-hydrophilic petrolatum   This may have changed:    - when to take this  - reasons to take this   Used for:  Malignant neoplasm of tongue (H)        Apply topically every 8 hours   Quantity:  50 g   Refills:  0       pantoprazole 20 MG EC tablet   Commonly known as:  PROTONIX   This may have changed:    - when to take this  - reasons to take this   Used for:  Gastroesophageal reflux disease without esophagitis        Dose:  20 mg   Take 1 tablet (20 mg) by mouth daily   Quantity:  30 tablet   Refills:  3         CONTINUE these medicines which have NOT CHANGED        Dose / Directions    citalopram 10 MG/5ML solution "   Commonly known as:  celeXA   Used for:  Malignant neoplasm of tongue (H)        Dose:  20 mg   Take 10 mLs (20 mg) by mouth daily   Quantity:  300 mL   Refills:  3       clindamycin 1 % lotion   Commonly known as:  CLEOCIN T   Used for:  Rash        Apply topically 2 times daily   Quantity:  60 mL   Refills:  3       ibuprofen 200 MG tablet   Commonly known as:  ADVIL/MOTRIN   Used for:  Malignant neoplasm of tongue (H), Head and neck cancer (H), Oral cancer (H)        Dose:  200 mg   Take 200 mg by mouth daily as needed   Refills:  0       levothyroxine 25 MCG tablet   Commonly known as:  SYNTHROID/LEVOTHROID   Used for:  Malignant neoplasm of tongue (H), Head and neck cancer (H), Oral cancer (H)        Dose:  25 mcg   Take 1 tablet (25 mcg) by mouth daily   Quantity:  90 tablet   Refills:  3       lidocaine 2 % solution   Commonly known as:  XYLOCAINE   Used for:  Malignant neoplasm of tongue (H)        15 mL swish and spit q3h PRN, max 8 doses in 24 hours   Quantity:  1000 mL   Refills:  3       methylphenidate 5 MG tablet   Commonly known as:  RITALIN   Used for:  Neoplastic malignant related fatigue, Malignant neoplasm of tongue (H)        Dose:  5 mg   Take 1 tablet (5 mg) by mouth 2 times daily   Quantity:  60 tablet   Refills:  0       multivitamins with minerals Liqd liquid   Used for:  Nutritional deficiency        Dose:  15 mL   15 mLs by Per Feeding Tube route daily   Quantity:  1 Bottle   Refills:  1       OLANZapine 5 MG tablet   Commonly known as:  zyPREXA   Used for:  Malignant neoplasm of tongue (H), Nausea        Dose:  5 mg   Take 1 tablet (5 mg) by mouth 2 times daily as needed For nausea   Quantity:  60 tablet   Refills:  3       ondansetron 4 MG tablet   Commonly known as:  ZOFRAN   Used for:  Malignant neoplasm of tongue (H), Head and neck cancer (H), Oral cancer (H)        Dose:  4 mg   Take 4 mg by mouth daily as needed   Refills:  0       * oxyCODONE 5 MG/5ML solution   Commonly known as:   ROXICODONE   Used for:  Malignant neoplasm of tongue (H), Head and neck cancer (H), Oral cancer (H)        Dose:  10 mg   10 mLs (10 mg) by Oral or PEG tube route every 4 hours as needed for moderate to severe pain   Quantity:  1000 mL   Refills:  0       * oxyCODONE 20 MG 12 hr tablet   Commonly known as:  OxyCONTIN   Used for:  Bone metastasis (H)        Dose:  20 mg   Take 1 tablet (20 mg) by mouth every 12 hours maximum 2 tablet(s) per day   Quantity:  60 tablet   Refills:  0       prochlorperazine 10 MG tablet   Commonly known as:  COMPAZINE   Used for:  Drug-induced neutropenia (H), Head and neck cancer (H)        Dose:  10 mg   Take 1 tablet (10 mg) by mouth every 6 hours as needed (Nausea/Vomiting)   Quantity:  30 tablet   Refills:  5       * Notice:  This list has 2 medication(s) that are the same as other medications prescribed for you. Read the directions carefully, and ask your doctor or other care provider to review them with you.             Protect others around you: Learn how to safely use, store and throw away your medicines at www.disposemymeds.org.             Medication List: This is a list of all your medications and when to take them. Check marks below indicate your daily home schedule. Keep this list as a reference.      Medications           Morning Afternoon Evening Bedtime As Needed    citalopram 10 MG/5ML solution   Commonly known as:  celeXA   Take 10 mLs (20 mg) by mouth daily   Last time this was given:  20 mg on 3/21/2017  7:58 AM                                clindamycin 1 % lotion   Commonly known as:  CLEOCIN T   Apply topically 2 times daily                                ibuprofen 200 MG tablet   Commonly known as:  ADVIL/MOTRIN   Take 200 mg by mouth daily as needed                                levothyroxine 25 MCG tablet   Commonly known as:  SYNTHROID/LEVOTHROID   Take 1 tablet (25 mcg) by mouth daily   Last time this was given:  25 mcg on 3/21/2017  7:58 AM                                 lidocaine 2 % solution   Commonly known as:  XYLOCAINE   15 mL swish and spit q3h PRN, max 8 doses in 24 hours                                LORazepam 0.5 MG tablet   Commonly known as:  ATIVAN   Take 1 tablet (0.5 mg) by mouth every 4 hours as needed (Anxiety, Nausea/Vomiting or Sleep)                                methylphenidate 5 MG tablet   Commonly known as:  RITALIN   Take 1 tablet (5 mg) by mouth 2 times daily                                mineral oil-hydrophilic petrolatum   Apply topically every 8 hours                                multivitamins with minerals Liqd liquid   15 mLs by Per Feeding Tube route daily                                OLANZapine 5 MG tablet   Commonly known as:  zyPREXA   Take 1 tablet (5 mg) by mouth 2 times daily as needed For nausea                                ondansetron 4 MG tablet   Commonly known as:  ZOFRAN   Take 4 mg by mouth daily as needed                                * oxyCODONE 5 MG/5ML solution   Commonly known as:  ROXICODONE   10 mLs (10 mg) by Oral or PEG tube route every 4 hours as needed for moderate to severe pain   Last time this was given:  10 mg on 3/21/2017  2:57 AM                                * oxyCODONE 20 MG 12 hr tablet   Commonly known as:  OxyCONTIN   Take 1 tablet (20 mg) by mouth every 12 hours maximum 2 tablet(s) per day   Last time this was given:  20 mg on 3/20/2017  9:13 PM                                pantoprazole 20 MG EC tablet   Commonly known as:  PROTONIX   Take 1 tablet (20 mg) by mouth daily                                prochlorperazine 10 MG tablet   Commonly known as:  COMPAZINE   Take 1 tablet (10 mg) by mouth every 6 hours as needed (Nausea/Vomiting)                                * Notice:  This list has 2 medication(s) that are the same as other medications prescribed for you. Read the directions carefully, and ask your doctor or other care provider to review them with you.

## 2017-03-20 NOTE — IP AVS SNAPSHOT
Unit 6D Observation [address] [address]    Phone:  [phone]    Fax:  [phone]                                       After Visit Summary   3/20/2017    Darek Navarro    MRN: [id number]           After Visit Summary Signature Page     I have received my discharge instructions, and my questions have been answered. I have discussed any challenges I see with this plan with the nurse or doctor.    ..........................................................................................................................................  Patient/Patient Representative Signature      ..........................................................................................................................................  Patient Representative Print Name and Relationship to Patient    ..................................................               ................................................  Date                                            Time    ..........................................................................................................................................  Reviewed by Signature/Title    ...................................................              ..............................................  Date                                                            Time

## 2017-03-20 NOTE — BRIEF OP NOTE
Procedures 3/20/2017:  Image guided gastrostomy tube placement.    History: Metastatic oral squamous cell carcinoma. Percutaneous gastrostomy requested for nutrition.    Comparison: 2/11/2017    Fellow: NASREEN Jones M.D.    Staff: TOY Ellis M.D.    Medications:   1. 150 mcg Fentanyl  2. 3 mg Versed  3. 1 mg Glucagon    Moderate sedation administered by the IR nurse at the supervision of the attending. Vital signs and oxygenation continuously monitored. The patient remained stable throughout the procedure.    Sedation time: 55 minutes    Fluoroscopy time: 6.1 minutes    Contrast: Contrast 20 cc Visipaque 320    Findings/procedure:     Prior to the procedure, both verbal and written informed consent obtained from the patient. Patient placed supine on the fluoroscopy table. Nasogastric tube placed by IR nurse.     The left upper quadrant prepped and draped. Time out performed. Limited abdominal ultrasound performed to evaluate the liver margins. Buffered 1% Lidocaine used for local anesthesia. Stomach inflated with air through the nasogastric tube. Gastropexy performed with two T-tacs. Gastrostomy made between the T-tacs with a needle. Needle removed over guidewire. Tract dilated with the telescopic dilator and 22F peel-away sheath advanced over guidewire. However, on tract dilatation, both of the gastropexy T-tacs fractured. 18F gastrostomy tube was still able to be placed through the peel-away sheath over the guidewire into the stomach without difficulty. Gastrostomy balloon inflated and pulled tight to oppose the stomach wall with the anterior abdominal wall. Position documented with contrast with no spillage of contrast into the peritoneal cavity. Guidewire removed. Gastrostomy tube secured. Sterile dressing applied. Gastrostomy tube to gravity drainage. No immediate complication.    Impression:  Image guided placement of percutaneous gastrostomy tube.     Plan:  - Admit to observation with 4 hour NPO period. If  patient tolerates the NPO period, use of the gastrostomy tube can be initiated.  - Anticipate discharge a.m. of 3/21.

## 2017-03-20 NOTE — PROGRESS NOTES
Interventional Radiology Pre-Procedure Sedation Assessment   Time of Assessment: 2:38 PM    Expected Level: Moderate Sedation    Indication: Sedation is required for the following type of Procedure: G-tube    Sedation and procedural consent: Risks, benefits and alternatives were discussed with Patient    PO Intake: Appropriately NPO for procedure    ASA Class: Class 2 - MILD SYSTEMIC DISEASE, NO ACUTE PROBLEMS, NO FUNCTIONAL LIMITATIONS.    Mallampati: Grade 3:  Soft palate visible, posterior pharyngeal wall not visible    Lungs: Lungs Clear with good breath sounds bilaterally    Heart: Normal heart sounds and rate    History and physical reviewed and no updates needed. I have reviewed the lab findings, diagnostic data, medications, and the plan for sedation. I have determined this patient to be an appropriate candidate for the planned sedation and procedure and have reassessed the patient IMMEDIATELY PRIOR to sedation and procedure.    Griffin Frey MD

## 2017-03-20 NOTE — IP AVS SNAPSHOT
"    UNIT 6D OBSERVATION [facility]: [phone]                                              INTERAGENCY TRANSFER FORM - PHYSICIAN ORDERS   3/20/2017                    Hospital Admission Date: 3/20/2017  MICHEAL GARCIA   : 1990  Sex: Male        Attending Provider: Suyapa Junior MD     Allergies:  No Known Allergies    Infection:  None   Service:  IR    Ht:  1.88 m (6' 2\")   Wt:  56.2 kg (123 lb 12.8 oz)   Admission Wt:  59 kg (130 lb)    BMI:  15.89 kg/m 2   BSA:  1.71 m 2            Patient PCP Information     Provider PCP Type    [facility]      ED Clinical Impression     Diagnosis Description Comment Added By Time Added    Malignant neoplasm of tongue (H) [C02.9] Malignant neoplasm of tongue (H) [C02.9]  Epic, Userprod 3/20/2017 12:36 PM      Hospital Problems as of 3/21/2017              Priority Class Noted POA    Postoperative observation Medium  3/20/2017 Yes    Attention to G-tube (H) Medium  3/20/2017 Yes      Non-Hospital Problems as of 3/21/2017              Priority Class Noted    Malignant neoplasm of tongue (H) Medium  2016    Head and neck cancer (H) Medium  2016    Oral cancer (H) Medium  2016    Drug-induced neutropenia (H)   2016    Bone metastasis (H) Medium  2016      Code Status History     Date Active Date Inactive Code Status Order ID Comments User Context    2016  1:23 PM 3/20/2017  6:40 PM Full Code [id number]  Zandra Walker PA-C Outpatient    2016 12:10 AM 2016  1:23 PM Full Code [id number]  Heena Canada MD Inpatient    2016 11:30 PM 2016 12:10 AM Full Code [id number]  Merline Kirk MD Inpatient         Medication Review      CONTINUE these medications which may have CHANGED, or have new prescriptions. If we are uncertain of the size of tablets/capsules you have at home, strength may be listed as something that might have changed.        Dose / Directions Comments    LORazepam " 0.5 MG tablet   Commonly known as:  ATIVAN   This may have changed:  Another medication with the same name was removed. Continue taking this medication, and follow the directions you see here.   Used for:  Head and neck cancer (H)        Dose:  0.5 mg   Take 1 tablet (0.5 mg) by mouth every 4 hours as needed (Anxiety, Nausea/Vomiting or Sleep)   Quantity:  30 tablet   Refills:  5        mineral oil-hydrophilic petrolatum   This may have changed:    - when to take this  - reasons to take this   Used for:  Malignant neoplasm of tongue (H)        Apply topically every 8 hours   Quantity:  50 g   Refills:  0        pantoprazole 20 MG EC tablet   Commonly known as:  PROTONIX   This may have changed:    - when to take this  - reasons to take this   Used for:  Gastroesophageal reflux disease without esophagitis        Dose:  20 mg   Take 1 tablet (20 mg) by mouth daily   Quantity:  30 tablet   Refills:  3          CONTINUE these medications which have NOT CHANGED        Dose / Directions Comments    citalopram 10 MG/5ML solution   Commonly known as:  celeXA   Used for:  Malignant neoplasm of tongue (H)        Dose:  20 mg   Take 10 mLs (20 mg) by mouth daily   Quantity:  300 mL   Refills:  3        clindamycin 1 % lotion   Commonly known as:  CLEOCIN T   Used for:  Rash        Apply topically 2 times daily   Quantity:  60 mL   Refills:  3        ibuprofen 200 MG tablet   Commonly known as:  ADVIL/MOTRIN   Used for:  Malignant neoplasm of tongue (H), Head and neck cancer (H), Oral cancer (H)        Dose:  200 mg   Take 200 mg by mouth daily as needed   Refills:  0        levothyroxine 25 MCG tablet   Commonly known as:  SYNTHROID/LEVOTHROID   Used for:  Malignant neoplasm of tongue (H), Head and neck cancer (H), Oral cancer (H)        Dose:  25 mcg   Take 1 tablet (25 mcg) by mouth daily   Quantity:  90 tablet   Refills:  3        lidocaine 2 % solution   Commonly known as:  XYLOCAINE   Used for:  Malignant neoplasm of  tongue (H)        15 mL swish and spit q3h PRN, max 8 doses in 24 hours   Quantity:  1000 mL   Refills:  3        methylphenidate 5 MG tablet   Commonly known as:  RITALIN   Used for:  Neoplastic malignant related fatigue, Malignant neoplasm of tongue (H)        Dose:  5 mg   Take 1 tablet (5 mg) by mouth 2 times daily   Quantity:  60 tablet   Refills:  0        multivitamins with minerals Liqd liquid   Used for:  Nutritional deficiency        Dose:  15 mL   15 mLs by Per Feeding Tube route daily   Quantity:  1 Bottle   Refills:  1        OLANZapine 5 MG tablet   Commonly known as:  zyPREXA   Used for:  Malignant neoplasm of tongue (H), Nausea        Dose:  5 mg   Take 1 tablet (5 mg) by mouth 2 times daily as needed For nausea   Quantity:  60 tablet   Refills:  3        ondansetron 4 MG tablet   Commonly known as:  ZOFRAN   Used for:  Malignant neoplasm of tongue (H), Head and neck cancer (H), Oral cancer (H)        Dose:  4 mg   Take 4 mg by mouth daily as needed   Refills:  0        * oxyCODONE 5 MG/5ML solution   Commonly known as:  ROXICODONE   Used for:  Malignant neoplasm of tongue (H), Head and neck cancer (H), Oral cancer (H)        Dose:  10 mg   10 mLs (10 mg) by Oral or PEG tube route every 4 hours as needed for moderate to severe pain   Quantity:  1000 mL   Refills:  0        * oxyCODONE 20 MG 12 hr tablet   Commonly known as:  OxyCONTIN   Used for:  Bone metastasis (H)        Dose:  20 mg   Take 1 tablet (20 mg) by mouth every 12 hours maximum 2 tablet(s) per day   Quantity:  60 tablet   Refills:  0        prochlorperazine 10 MG tablet   Commonly known as:  COMPAZINE   Used for:  Drug-induced neutropenia (H), Head and neck cancer (H)        Dose:  10 mg   Take 1 tablet (10 mg) by mouth every 6 hours as needed (Nausea/Vomiting)   Quantity:  30 tablet   Refills:  5        * Notice:  This list has 2 medication(s) that are the same as other medications prescribed for you. Read the directions carefully, and  ask your doctor or other care provider to review them with you.              Further instructions from your care team       Enteral Feedings:    Adult Formula Bolus Feeding: Four times a day using Isosource 1.5; Route: Gastrostomy; 7; Can(s)   Flush Feeding Tube with Additional free water (mL): 90 ml before and after each feeding   Flush Feeding Tube with At least 15-30 mL water before and after medication administration    After Care     Discharge Instructions       If questions or problems arise regarding tube function (e.g. leaking, dislodges, etc.) Contact Interventional Radiology department 24 hours a day.    For procedures that were done at the [facility] sites,   8:00-4:30 PM Monday through Friday    Contact:[phone].    For afterhours and weekends call the [facility] main phone line [phone] and ask for the [facility] IR on call physician number.    If DIRECTED by the RADIOLOGIST, related to specific problems with the tube functioning,  go to the Emergency Department.             Referrals     Home infusion referral       New home enterals per physician orders.    ____________________      Gastrostomy/Enterostomy Gastrostomy 18 fr           Adult Formula Bolus Feeding: QID Isosource 1.5; Route: Gastrostomy; 7; Can(s); Additional free water (mL): 90 ml before and after each feed; Medication - Tube Feeding Flush Frequency: At least 15-30 mL water before and after medication administrat...  _________________________________    The following homecare is recommended:  RN skilled nursing visit. RN to assess vital signs and weight, respiratory and cardiac status, pain level and activity tolerance, hydration, nutrition and bowel status and home safety.  RN to teach medication management.  RN to provide teaching and assist w/ management of new home enterals and gtube.    _______________________________  [facility]  Phone  [phone]  Fax   [phone]  ______________________________             Your next 10 appointments already scheduled     Mar 22, 2017 10:45 AM CDT   [facility] Draw with  [facility] DRAW   [facility] ([facility])    [address] [place] [address]   [phone]            Mar 22, 2017 11:20 AM CDT   (Arrive by 11:05 AM)   Return Visit with Maribel Harper PA-C   [facility] ([facility])    [address] [place] [address]   [phone]            Mar 22, 2017  1:00 PM CDT   (Arrive by 12:45 PM)   NEW WITH ROOM with YONIS Hernandez,  2 118 CONSULT RM   [facility] ([facility])    [address] [place] [address]   [phone]            Mar 22, 2017  2:45 PM CDT   (Arrive by 2:30 PM)   New Patient Visit with Tessy Flores MD   [facility] ([facility])    [address] [place] [address]   [phone]            Mar 23, 2017 12:30 PM CDT   [facility] Lab Draw with  [facility] LAB DRAW   [facility] Draw ([facility])    [address] [place] [address]   [phone]            Mar 23, 2017  1:00 PM CDT   (Arrive by 12:45 PM)   Return Visit with Maribel Harper PA-C   [facility] ([facility])    [address] [place] [address]   [phone]            Mar 23, 2017  1:30 PM CDT   Infusion 180 with  ONCOLOGY INFUSION, UC 15 ATC   [facility] ([facility])    [address] [place] [address]   [phone]            Mar 28, 2017  1:30 PM CDT   [facility] Lab Draw with  [facility] LAB DRAW   [facility] ([facility])    [address]  [address]nd Floor  [facility] [address] [phone]              Statement of Approval     Ordered          03/21/17 1138  I have reviewed and agree with all the recommendations and orders detailed in this document.  EFFECTIVE NOW     Approved and electronically signed by:  Suyapa Junior MD

## 2017-03-20 NOTE — PHARMACY-CONSULT NOTE
Pharmacy Tube Feeding Consult    Medication reviewed for administration by feeding tube and for potential food/drug interactions.     Recommendation: No inpatient orders entered at the time of this note. Reviewed prior to admission medication list. No changes are needed at this time.     Pharmacy will continue to follow as new medications are ordered.    Jessica James, Pharm.D., [facility]  Pager [phone]

## 2017-03-20 NOTE — PROGRESS NOTES
Interventional Radiology Intra-procedural Nursing Note    Patient Name: Darek Navarro  Medical Record Number: [id number]  Today's Date: 2017    Start Time: 1615  End of procedure time: 1700  Procedure:Gasxtrostomy Tube Placement  Report given to: Shelton LIMON RN  Time pt departs:  1720  : No    Other Notes: Escorted from 2 A by another RFN who did [id number] ID et  checks.  Checked again by titus RN on [id number] to IR #4 Pt has hx of Malignant Ca of tongue w/  Surgical intervention  [place]\eeds nutreitional support as quite emaciated et thin w/ rib bones poking through skin  quite visible.Tolerated procedure well w/ mod. amt sed meds given...Elizabeth M. Agerbeck

## 2017-03-20 NOTE — LETTER
Transition Communication Hand-off for Care Transitions to Next Level of Care Provider    Name: Darek Navarro  MRN #: [id number]  Primary Care Provider: [facility]     Primary Clinic: [address] [place] [address]  [facility] [address]     Reason for Hospitalization:  vascular issues  Postoperative observation  Attention to G-tube (H)  Admit Date/Time: 3/20/2017 12:35 PM  Discharge Date: 3/21/17  Payor Source: Payor: BCBS / Plan: [place] / Product Type: Indemnity /     Reason for Communication Hand-off Referral: Multiple providers/specialties    Discharge Plan: home with OP follow up       Concern for non-adherence with plan of care:   Y/N no  Discharge Needs Assessment:  Needs       Most Recent Value    Home Infusion Provider [facility] [phone], Fax: [phone] [enterals]        Follow-up specialty is recommended: Yes    Follow-up plan:  Future Appointments  Date Time Provider Department Center   3/22/2017 10:45 AM  [facility] DRAW [facility]   3/22/2017 11:20 AM Maribel Harper PA-C [facility]   3/22/2017 1:00 PM Kesha Maloeny LICSW [facility]   3/22/2017 2:45 PM Tessy Flores MD [facility]   3/23/2017 12:00 PM  ONCOLOGY INFUSION [facility] [place]   3/23/2017 12:30 PM  [facility] DRAW [facility]   3/23/2017 1:00 PM Maribel Harper PA-C [facility]   3/28/2017 1:30 PM  [facility] DRAW [facility]   3/28/2017 2:00 PM  ONCOLOGY INFUSION [facility] [place]   4/4/2017 1:30 PM  [facility]   4/4/2017 2:00 PM  ONCOLOGY INFUSION [facility]           Referrals     Future Labs/Procedures    Home infusion referral     Comments:    New home enterals per physician orders.    ____________________      Gastrostomy/Enterostomy Gastrostomy 18 fr           Adult Formula Bolus Feeding: QID Isosource 1.5; Route: Gastrostomy; 7; Can(s); Additional free water (mL): 90 ml before and after each feed;  Medication - Tube Feeding Flush Frequency: At least 15-30 mL water before and after medication administrat...  _________________________________    The following homecare is recommended:  RN skilled nursing visit. RN to assess vital signs and weight, respiratory and cardiac status, pain level and activity tolerance, hydration, nutrition and bowel status and home safety.  RN to teach medication management.  RN to provide teaching and assist w/ management of new home enterals and gtube.    _______________________________  [facility]  Phone  [phone]  Fax  [phone]  ______________________________            Key Recommendations:  See AVS    Yadira Lockhart RN, BSN  Care Coordinator [facility]  Pager: [phone]  Phone: [phone]    AVS/Discharge Summary is the source of truth; this is a helpful guide for improved communication of patient story

## 2017-03-20 NOTE — H&P
[facility]   History & Physical    Darek Navarro MRN# [id number]   Age: 27 year old YOB: 1990     Date of Admission: 3/20/2017  Service: Gold Night    Primary Care Provider: [facility]         Assessment and Plan:   Darek Navarro is a 27 year old male with metastatic SCC of the tongue s/p radiation and current chemotherapy who presented for planned Gtube placement for nutritional support, per IR.      S/p Gtube placement: For nutritional support in patient with hx of metastatic SCC of the tongue. Tolerated procedure well. Patient able to tolerate small soft foods and pills PO. Slight pain at Gtube site. Has had previous Gtube, though needs nutrition consult for TF formula.   - NPO x 4 hours  - Mechanical soft diet  - Pain medication per PTA dosing  - PLC in am for TF  - Nutrition consult   - Patient may be at risk for refeeding syndrome. Check Mg, K, phos    Metastatic SCC of the tongue: Dx in 4/2016. S/p subtotal oral glossectomy, right base tongue resection with partial pharyngectomy, bilateral neck dissections, and free flap reconstruction. Recurrence with distant mets after initial chemoRT. Rapid progression led to treatment change to current Carboplatin, 5-FU, Cetuximab on 1/18/17. S/p 2 cycles with improvement seen on CT C/A/P (2/18/17). Started cycle 3 on 2/28/17. Follows with [facility] with most recent clinic appointment 3/10/17.   - Chemotherapy appointment tomorrow at 10am. Please discuss with Oncology   - F/u with Oncology as scheduled    Pancytopenia: On admission platelets 45 (115), WBC 2.4 (2.0), Hgb 9.7 (9.4). Likely 2/2 chemotherapy.   - CBC in am  - Discuss with oncology     Discussed with Dr. Tripathi     FEN: Mechanical Soft, TF initiation in am  Prophylaxis:MEchanical, SCD's  Code Status: Full Code    Radha De La Torre PA-C  Hospitalist Medicine  Pager: [phone]                      Chief Complaint:   S/c  Marques placement         History of Present Illness:   Darek Navarro is a 27 year old male with metastatic SCC of the tongue who presented for planned Gtube placement for nutritional support.     Patient states that he completed radiation 8/2016 and is currently on chemotherapy with a 10 am appointment tomorrow, 3/21/17. He has been maintained on TF previously, though not for some time. He states that he has minimal pain.    Patient denies headaches, changes in vision, chest pain, palpitations, upper respiratory symptoms of rhinorrhea or congestion, shortness of breath, cough, sputum production, wheezing, abdominal pain, nausea, emesis, constipation, diarrhea, dysuria, edema, rashes, weakness, focal neurologic deficits, recent travel, illness, fever, chills.              Review of Systems:   A 10 point review of systems was performed and is negative unless otherwise noted in HPI.          Past Medical History:     Past Medical History   Diagnosis Date     Anxiety      Cancer (H) 2016     tongue     Hypertension              Past Surgical History:      Past Surgical History   Procedure Laterality Date     [place] teeth extraction  2005     Panendoscopy N/A 4/14/2016     Procedure: PANENDOSCOPY;  Surgeon: Andrea Guzman MD;  Location: [facility] OR     Laryngoscopy N/A 4/28/2016     Procedure: LARYNGOSCOPY;  Surgeon: Andrea Guzman MD;  Location: [facility] OR     Glossectomy partial N/A 4/28/2016     Procedure: GLOSSECTOMY PARTIAL;  Surgeon: Andrea Guzman MD;  Location: [facility] OR     Resect mouth floor with mandibular split Right 4/28/2016     Procedure: RESECT MOUTH FLOOR WITH MANDIBULAR SPLIT;  Surgeon: Andrea Guzman MD;  Location: [facility] OR     Dissection radical neck bilateral Bilateral 4/28/2016     Procedure: DISSECTION RADICAL NECK BILATERAL;  Surgeon: Andrea Guzman MD;  Location: [facility] OR     Tracheostomy N/A 4/28/2016     Procedure: TRACHEOSTOMY;  Surgeon: Andrea Guzman MD;  Location: [facility] OR     Graft free vascularized (location) N/A  4/28/2016     Procedure: GRAFT FREE VASCULARIZED (LOCATION);  Surgeon: Sam Laureano MD;  Location: [facility] OR     Graft skin split thickness from extremity N/A 4/28/2016     Procedure: GRAFT SKIN SPLIT THICKNESS FROM EXTREMITY;  Surgeon: Sam Laureano MD;  Location: [facility] OR             Family History:     Family History   Problem Relation Age of Onset     CANCER Mother      Ovarian     CANCER Father      Brain tumor, Astrocytoma     CANCER Paternal Grandfather      Prostate and Renal Cell/Throat Cancer             Social History:     Social History   Substance Use Topics     Smoking status: Never Smoker     Smokeless tobacco: Never Used     Alcohol use 0.0 oz/week     0 Standard drinks or equivalent per week      Comment: 2-4/week             Medications:     No current facility-administered medications on file prior to encounter.   Current Outpatient Prescriptions on File Prior to Encounter:  oxyCODONE (ROXICODONE) 5 MG/5ML solution 10 mLs (10 mg) by Oral or PEG tube route every 4 hours as needed for moderate to severe pain   oxyCODONE (OXYCONTIN) 20 MG 12 hr tablet Take 1 tablet (20 mg) by mouth every 12 hours maximum 2 tablet(s) per day   methylphenidate (RITALIN) 5 MG tablet Take 1 tablet (5 mg) by mouth 2 times daily   OLANZapine (ZYPREXA) 5 MG tablet Take 1 tablet (5 mg) by mouth 2 times daily as needed For nausea   clindamycin (CLEOCIN T) 1 % lotion Apply topically 2 times daily   lidocaine (XYLOCAINE) 2 % solution 15 mL swish and spit q3h PRN, max 8 doses in 24 hours   LORazepam (ATIVAN) 0.5 MG tablet Take 1 tablet (0.5 mg) by mouth every 4 hours as needed (Anxiety, Nausea/Vomiting or Sleep)   prochlorperazine (COMPAZINE) 10 MG tablet Take 1 tablet (10 mg) by mouth every 6 hours as needed (Nausea/Vomiting)   pantoprazole (PROTONIX) 20 MG EC tablet Take 1 tablet (20 mg) by mouth daily (Patient taking differently: Take 20 mg by mouth daily as needed )   levothyroxine (SYNTHROID/LEVOTHROID) 25 MCG tablet  "Take 1 tablet (25 mcg) by mouth daily   ibuprofen (ADVIL/MOTRIN) 200 MG tablet Take 200 mg by mouth daily as needed    ondansetron (ZOFRAN) 4 MG tablet Take 4 mg by mouth daily as needed    LORazepam (ATIVAN) 2 MG/ML (HIGH CONC) solution Take 0.25-0.5 mLs (0.5-1 mg) by mouth every 4 hours as needed for anxiety, sleep, nausea or vomiting   citalopram (CELEXA) 10 MG/5ML solution Take 10 mLs (20 mg) by mouth daily   multivitamins with minerals (CERTAVITE) LIQD 15 mLs by Per Feeding Tube route daily   mineral oil-hydrophilic petrolatum (AQUAPHOR) ointment Apply topically every 8 hours (Patient taking differently: Apply topically every 8 hours as needed )            Allergies:   No Known Allergies          Physical Exam:   /79  Pulse 75  Temp 98.5  F (36.9  C) (Oral)  Resp 16  Ht 1.88 m (6' 2\")  Wt 59 kg (130 lb)  SpO2 99%  BMI 16.69 kg/m2   GENERAL: Pleasant young male lying in bed. Cachetic. Alert and oriented x 3. NAD.   HEENT: Anicteric sclera. PERRL. Mucous membranes moist and without lesions. Multiple surgical scars present.   CV: RRR. S1, S2. No murmurs appreciated.   RESPIRATORY: Effort normal. Lungs CTAB with no wheezing, rales, rhonchi.   GI: Abdomen soft and non distended with normoactive bowel sounds present in all quadrants. No tenderness, rebound, guarding.   MUSCULOSKELETAL: No joint swelling or tenderness. Moves all extremities.   NEUROLOGICAL: No focal deficits. Strength 5/5 bilaterally in upper and lower extremities.   EXTREMITIES: No peripheral edema. Intact bilateral pedal pulses.   SKIN: No jaundice. No rashes.          Labs:   CBC:  Recent Labs   Lab Test  03/20/17   1310  03/14/17   1326   WBC   --   2.4*   RBC   --   3.57*   HGB   --   9.7*   HCT   --   30.4*   MCV   --   85   MCH   --   27.2   MCHC   --   31.9   RDW   --   23.8*   PLT  45*  113*       CMP:  Recent Labs   Lab Test  03/14/17   1326   NA  139   POTASSIUM  3.9   CHLORIDE  104   COLE  9.2   CO2  26   BUN  12   CR  0.59*   GLC  " 106*   AST  38   ALT  71*   BILITOTAL  0.5   ALBUMIN  3.3*   PROTTOTAL  7.8   ALKPHOS  75       INR:   Recent Labs   Lab Test  03/20/17   1310   INR  1.12

## 2017-03-20 NOTE — PROGRESS NOTES
1345   Prep is complete for procedure.  Corey is here for G-tube placement.  Denies any pain.  Sister, Karla is here with him.  IV is power port, Left upper chest, which is infusing.  He had his contrast this AM at home.  Labs have been sent.  H&P is current.  Awaiting consent.

## 2017-03-21 NOTE — PLAN OF CARE
Problem: Goal Outcome Summary  Goal: Goal Outcome Summary  Outcome: Improving  Observation Charting          Goals  Nutrition consult: YES  Tolerating TF: No  VSS: YES  Labs stable: NO

## 2017-03-21 NOTE — PLAN OF CARE
"Problem: Goal Outcome Summary  Goal: Goal Outcome Summary  Outcome: No Change  Observation Charting     BP (!) 131/93 (BP Location: Left arm)  Pulse 75  Temp 98.2  F (36.8  C) (Oral)  Resp 16  Ht 1.88 m (6' 2\")  Wt 59 kg (130 lb)  SpO2 96%  BMI 16.69 kg/m2     Goals  Nutrition consult: Goal not met, to meet with nutrition in the morning  Tolerating TF: Not met, pt to meet with nutrition in the morning.  Tolerating Mechanical/dental soft diet.   VSS: Met  Labs stable: met     G-tube clamped, no evidence of pain or abnormal discharge.  Will continue to monitor.       "

## 2017-03-21 NOTE — DISCHARGE SUMMARY
[facility], [place]    Discharge Summary  Hematology / Oncology    Date of Admission:  3/20/2017  Date of Discharge:  3/21/2017  1:26 PM  Discharging Provider: Suyapa Junior    Discharge Diagnoses   Moderate protein malnutrition  Metastatic SCC BOT    History of Present Illness   Darek Navarro is an 27 year old male who was admitted after Gtube placement.     Hospital Course   Moderate protein malnutrition: Has been losing weight and unable to keep up with oral intake due to dysphagia/odynophagia from chemo. Had Gtube before during chemoradiation last year and requested another one be placed. Admitted per routine afterward, monitored on observation, initially to medicine service. Transferred to oncology service the following morning. Minimal pain the following morning. Tolerated soft mechanical diet prior to discharge. Will start Isosource at home. Not at particular risk of refeeding. Has follow up in [facility] on Thurs 3/23.     Metastatic SCC BOT: Scheduled for carbo cetuximab today. Rescheduled to Thurs. Visit with Maribel Harper as well.     Hypothyroidism: TSH and Free T4 returned after discharge. On levothyroxine 25 mcg daily. Recommend increasing to 50 mcg daily at clinic visit in 2 days. Inbox message sent to Maribel Harper as well as care coordinator Yadira Ferraro.    Mucositis/odynophagia: Pain well controlled with oxycodone and oxycontin at home doses.    Suyapa Junior    Significant Results and Procedures   Gtube placement    Pending Results   None    Code Status   Full Code    Primary Care Physician   [facility]    Physical Exam   Temp: 98.1  F (36.7  C) Temp src: Oral BP: 120/80   Heart Rate: 90 Resp: 18 SpO2: 100 % O2 Device: None (Room air)    Vitals:    03/20/17 1336 03/21/17 0730 03/21/17 0841   Weight: 59 kg (130 lb) 58.5 kg (129 lb) 56.2 kg (123 lb 12.8 oz)     Vital Signs with Ranges  Temp:  [98.1  F (36.7  C)-98.2  F (36.8  C)] 98.1  F  (36.7  C)  Heart Rate:  [87-94] 90  Resp:  [16-18] 18  BP: (120-131)/(80-94) 120/80  SpO2:  [96 %-100 %] 100 %  I/O last 3 completed shifts:  In: 300 [NG/GT:300]  Out: -   GEN: NAD  HEENT: no oral ulcers, no thrush  LUNGS: clear  CV: regular  ABD: soft,NT/ND, NABS, Gtube site c/d/i  EXT: warm, no edema. Scars from prior flaps    Time Spent on this Encounter   ISuyapa, personally saw the patient today and spent greater than 30 minutes discharging this patient.    Discharge Disposition   Discharged to home  Condition at discharge: Stable    Consultations This Hospital Stay   NUTRITION SERVICES ADULT IP CONSULT  PHARMACY IP CONSULT  PATIENT LEARNING CENTER IP CONSULT  PHARMACY IP CONSULT    Discharge Orders     Home infusion referral     Discharge Instructions   If questions or problems arise regarding tube function (e.g. leaking, dislodges, etc.) Contact Interventional Radiology department 24 hours a day.    For procedures that were done at the [facility] sites,   8:00-4:30 PM Monday through Friday    Contact:[phone].    For afterhours and weekends call the [facility] main phone line [phone] and ask for the [facility] IR on call physician number.    If DIRECTED by the RADIOLOGIST, related to specific problems with the tube functioning,  go to the Emergency Department.       Discharge Medications   Current Discharge Medication List      CONTINUE these medications which have NOT CHANGED    Details   oxyCODONE (ROXICODONE) 5 MG/5ML solution 10 mLs (10 mg) by Oral or PEG tube route every 4 hours as needed for moderate to severe pain  Qty: 1000 mL, Refills: 0    Associated Diagnoses: Malignant neoplasm of tongue (H); Head and neck cancer (H); Oral cancer (H)      oxyCODONE (OXYCONTIN) 20 MG 12 hr tablet Take 1 tablet (20 mg) by mouth every 12 hours maximum 2 tablet(s) per day  Qty: 60 tablet, Refills: 0    Associated Diagnoses: Bone metastasis (H)      methylphenidate (RITALIN) 5 MG tablet  Take 1 tablet (5 mg) by mouth 2 times daily  Qty: 60 tablet, Refills: 0    Associated Diagnoses: Neoplastic malignant related fatigue; Malignant neoplasm of tongue (H)      OLANZapine (ZYPREXA) 5 MG tablet Take 1 tablet (5 mg) by mouth 2 times daily as needed For nausea  Qty: 60 tablet, Refills: 3    Associated Diagnoses: Malignant neoplasm of tongue (H); Nausea      clindamycin (CLEOCIN T) 1 % lotion Apply topically 2 times daily  Qty: 60 mL, Refills: 3    Associated Diagnoses: Rash      lidocaine (XYLOCAINE) 2 % solution 15 mL swish and spit q3h PRN, max 8 doses in 24 hours  Qty: 1000 mL, Refills: 3    Associated Diagnoses: Malignant neoplasm of tongue (H)      LORazepam (ATIVAN) 0.5 MG tablet Take 1 tablet (0.5 mg) by mouth every 4 hours as needed (Anxiety, Nausea/Vomiting or Sleep)  Qty: 30 tablet, Refills: 5    Associated Diagnoses: Head and neck cancer (H)      prochlorperazine (COMPAZINE) 10 MG tablet Take 1 tablet (10 mg) by mouth every 6 hours as needed (Nausea/Vomiting)  Qty: 30 tablet, Refills: 5    Associated Diagnoses: Drug-induced neutropenia (H); Head and neck cancer (H)      pantoprazole (PROTONIX) 20 MG EC tablet Take 1 tablet (20 mg) by mouth daily  Qty: 30 tablet, Refills: 3    Associated Diagnoses: Gastroesophageal reflux disease without esophagitis      levothyroxine (SYNTHROID/LEVOTHROID) 25 MCG tablet Take 1 tablet (25 mcg) by mouth daily  Qty: 90 tablet, Refills: 3    Associated Diagnoses: Malignant neoplasm of tongue (H); Head and neck cancer (H); Oral cancer (H)      ibuprofen (ADVIL/MOTRIN) 200 MG tablet Take 200 mg by mouth daily as needed     Associated Diagnoses: Malignant neoplasm of tongue (H); Head and neck cancer (H); Oral cancer (H)      ondansetron (ZOFRAN) 4 MG tablet Take 4 mg by mouth daily as needed     Associated Diagnoses: Malignant neoplasm of tongue (H); Head and neck cancer (H); Oral cancer (H)      citalopram (CELEXA) 10 MG/5ML solution Take 10 mLs (20 mg) by mouth  daily  Qty: 300 mL, Refills: 3    Associated Diagnoses: Malignant neoplasm of tongue (H)      multivitamins with minerals (CERTAVITE) LIQD 15 mLs by Per Feeding Tube route daily  Qty: 1 Bottle, Refills: 1    Associated Diagnoses: Nutritional deficiency      mineral oil-hydrophilic petrolatum (AQUAPHOR) ointment Apply topically every 8 hours  Qty: 50 g, Refills: 0    Associated Diagnoses: Malignant neoplasm of tongue (H)         STOP taking these medications       LORazepam (ATIVAN) 2 MG/ML (HIGH CONC) solution Comments:   Reason for Stopping:             Allergies   No Known Allergies  Data   Most Recent 3 CBC's:  Recent Labs   Lab Test  03/21/17 0247 03/20/17   1310  03/14/17   1326  03/10/17   0829   WBC  2.2*   --   2.4*  2.0*   HGB  8.6*   --   9.7*  9.8*   MCV  86   --   85  84   PLT  65*  45*  113*  250      Most Recent 3 BMP's:  Recent Labs   Lab Test  03/21/17 0247 03/14/17   1326  03/10/17   0829   NA  139  139  138   POTASSIUM  4.2  3.9  4.1   CHLORIDE  105  104  101   CO2  25  26  27   BUN  8  12  10   CR  0.65*  0.59*  0.66   ANIONGAP  8  9  9   COLE  8.6  9.2  9.1   GLC  89  106*  73     Most Recent 2 LFT's:  Recent Labs   Lab Test  03/21/17 0247 03/14/17   1326   AST  15  38   ALT  34  71*   ALKPHOS  68  75   BILITOTAL  0.4  0.5     Most Recent TSH, T4 and A1c Labs:  Recent Labs   Lab Test  03/21/17 0247   TSH  93.09*   T4  0.60*

## 2017-03-21 NOTE — PLAN OF CARE
Jonathan-cath needle discontinued per protocol. DC instructions reviewed with patient, his ride is here to take him home.

## 2017-03-21 NOTE — PROGRESS NOTES
CLINICAL NUTRITION SERVICES - ASSESSMENT NOTE     Nutrition Prescription    RECOMMENDATIONS FOR MDs/PROVIDERS TO ORDER:  - Pt possibly at risk for refeeding, pt's labs need to be monitored closely (K+, Mg++ and Phosphorus) and replete as appropriate. If pt discharges today, Pt would need close monitoring from Home care RN with daily lab draw and would need supplementation of lytes.       Malnutrition Status:    - Severe malnutrition in the context of chronic illness    Recommendations already ordered by Registered Dietitian (RD):   - With pt possibly discharging for chemo appointment, Initiate Bolus feeds slowly while monitoring labs closely (Mg++, K+ and Phos, Do not advance TF if K+, Mg++ labs are low and Phos level is less than 1.9, until these lytes are repleted). Recommend: Isosource 1.5 - 7 cartons /day (goal) - can be given as 2 cartons in am, 2 cartons at noon and 2 cartons in pm and 1 carton at HS. This would provide 1750 ml of TF per day with 2625 kcal (44 kcal/kg), 118 gm protein (2.0 gm/kg), 294 gm CHO, 14 gm fiber, 1358 ml free water.   - Start with 125 ml x 3 feeds on day 1 with at least 3-4 hours in between each feed.   - If tolerating this initial goal can increase bolus feeds by 1/2 can with each feed until goal of 7 cartons a day.   - Recommend 90 ml fluid flush before and after each feed to provide additional 720 ml fluids/day  - Potassium level was ordered as an add on lab for today (Mg++ and Phos levels have been drawn already) to monitor refeeding risk    Future/Additional Recommendations:  - Monitor tolerance, wt changes, tolerance and adequacy of oral intake closely and adjust TF accordingly       REASON FOR ASSESSMENT  Darek Navarro is a/an 27 year old male assessed by the dietitian for Provider Order - Registered Dietitian to Assess and Order TF per Medical Nutrition Therapy Protocol. Pt with hx of metastatic SCC of the tongue s/p radiation and current chemotherapy. Pt admitted for  "planned G-tube placement for nutrition.     NUTRITION HISTORY  Pt reports he was on TF via NGT initially and then transitioned to G tube from April - October 2016. Pt TF and FT was d/kylie due to improving oral intake. Pt reports he has been eating soft textured foods such as mashed potatoes, Macaroni and Cheese, Rice.  Pt reports some difficult swallowing and pt states he has to eat slow. Pt takes Regular Boost or Ensure as well. Pt had met with RD as an OP in January 2017.     CURRENT NUTRITION ORDERS  Diet: NPO  Intake/Tolerance: Per RN pt was able to tolerate apple sauce    LABS  Labs reviewed  Date:3/21/17  Potassium: N/A  Phosphorus: 3.1  Magnesium: 2.4    MEDICATIONS  Medications reviewed    ANTHROPOMETRICS  Height: 188 cm (6' 2\")  Most Recent Weight: 58.5 kg (129 lb)    IBW: 86 kg  BMI: Underweight BMI <18.5  Weight History:   Wt Readings from Last 15 Encounters:   03/21/17 (P) 56.2 kg (123 lb 12.8 oz)   03/14/17 57.6 kg (126 lb 14.4 oz)   03/10/17 59.4 kg (131 lb)   03/07/17 60 kg (132 lb 4.8 oz)   02/28/17 59.7 kg (131 lb 11.2 oz)   02/21/17 62.1 kg (136 lb 14.4 oz)   02/15/17 62 kg (136 lb 9.6 oz)   02/14/17 61.1 kg (134 lb 11.2 oz)   02/08/17 64.4 kg (142 lb)   02/07/17 64.3 kg (141 lb 11.2 oz)   02/01/17 64.5 kg (142 lb 4.8 oz)   01/31/17 64.4 kg (142 lb)   01/24/17 66.6 kg (146 lb 14.4 oz)   01/17/17 68.2 kg (150 lb 6.4 oz)   12/27/16 70.6 kg (155 lb 9.6 oz)   32 lb wt loss in the past 3 months or so (21%). Pt was 68.2 kg (150 lbs) on 1/17/17 (27 lb wt loss in  The past 2 months - 18%).   Dosing Weight: 59 kg (CBW)    ASSESSED NUTRITION NEEDS  Estimated Energy Needs: 2065 - 2655 kcals/day (35 - 45 kcals/kg)  Justification: Repletion and Underweight with significant wt loss recently  Estimated Protein Needs: 89 - 118 grams protein/day (1.5 - 2 grams of pro/kg)  Justification: Repletion  Estimated Fluid Needs:  (1 mL/kcal)   Justification: Maintenance and Per provider pending fluid status    PHYSICAL " FINDINGS  See malnutrition section below.       MALNUTRITION  % Intake: </=50% for >/= 1 month (severe)  % Weight Loss: > 7.5% in 3 months (severe)  Subcutaneous Fat Loss: Thoracic/intercostal:  moderate  Muscle Loss: Thoracic region (clavicle, acromium bone, deltoid, trapezius, pectoral):  moderate, Upper arm (bicep, tricep):  moderate and Lower arm  (forearm):  moderate  Fluid Accumulation/Edema: None noted  Malnutrition Diagnosis: Severe malnutrition in the context of chronic illness    NUTRITION DIAGNOSIS  Inadequate oral intake related to difficulty swallowing and slow eating pace associated with metastatic SCC of the tongue and undergoing chemotherapy as evidenced by 32 lb wt loss since December 27, 2016 (in ~ 3 months)      INTERVENTIONS  Implementation  Nutrition Education: Provided education on role of RD in pt's POC, Plans for TF, Diet hx   Enteral Nutrition - Initiate     Goals  Total avg nutritional intake to meet a minimum of 35 kcal/kg and 1.5 g PRO/kg daily (per dosing wt 59 kg).     Monitoring/Evaluation  Progress toward goals will be monitored and evaluated per protocol.    Emi Clayton RD LD  Weekday pager - [phone]  Weekend pager - [phone]

## 2017-03-21 NOTE — PROGRESS NOTES
"  Care Coordinator Progress Note     Admission Date/Time:  3/20/2017  Attending MD:  Donato Tripathi MD     Data  Chart reviewed, discussed with interdisciplinary team.   Patient was admitted for: Malignant neoplasm of tongue (H).    Concerns with insurance coverage for discharge needs: None.  Current Living Situation: Patient lives with family.  Support System: Supportive and Involved  Services Involved: DME  Transportation: Family or Friend will provide  Barriers to Discharge: chronically ill    Coordination of Care and Referrals: Provided patient/family with options for G-tube PLC.         Assessment  At bedside to talk with pt about DC, especially tube feedings, as writer was told in report pt would need teaching. Pt states he just had his G tube replaced and states he's \"been on tube feedings for months\"; pt states he does not need any teaching on how to use G-tube. Pt states he calls [employer] for his formula.     [employer] [phone]  Yadira Ferraro RN CC ([facility]) [phone]     Plan                                                                                                                                                                                                                                                                                                                                                                                                                                                                   Anticipated Discharge Date:  03/21/17  Anticipated Discharge Plan: DC with provider recommendations    Shira Ontiveros RN CC  [facility]/6D Obs  [phone]          "

## 2017-03-21 NOTE — PLAN OF CARE
"Problem: Goal Outcome Summary  Goal: Goal Outcome Summary  Outcome: Improving  Observation Charting     BP (!) 131/93 (BP Location: Left arm)  Pulse 75  Temp 98.2  F (36.8  C) (Oral)  Resp 16  Ht 1.88 m (6' 2\")  Wt 59 kg (130 lb)  SpO2 96%  BMI 16.69 kg/m2     Goals  Nutrition consult: Goal not met, to meet with nutrition in the morning  Tolerating TF: Not met, pt to meet with nutrition in the morning. Tolerating Mechanical/dental soft diet.   VSS: Met  Labs stable: met     Pt had episode of drain sponge becoming saturated with blood around g-tube insertion site, tube irrigated without difficulty, dressing changed and no new drainage noted to insertion site.  Pt denied pain, or the g-tube becoming caught on any object.  MD notified, no new orders at this time.  Will continue to monitor per POC.      "

## 2017-03-21 NOTE — DISCHARGE INSTRUCTIONS
Enteral Feedings:    Adult Formula Bolus Feeding: Four times a day using Isosource 1.5; Route: Gastrostomy; 7; Can(s)   Flush Feeding Tube with Additional free water (mL): 90 ml before and after each feeding   Flush Feeding Tube with At least 15-30 mL water before and after medication administration

## 2017-03-21 NOTE — PLAN OF CARE
"Problem: Goal Outcome Summary  Goal: Goal Outcome Summary  Outcome: No Change  Observation charting     BP (!) 131/93 (BP Location: Left arm)  Pulse 75  Temp 98.2  F (36.8  C) (Oral)  Resp 16  Ht 1.88 m (6' 2\")  Wt 59 kg (130 lb)  SpO2 96%  BMI 16.69 kg/m2     Goals:  Nutrition consult: Not met, pt to meet with nutrition in the morning  Tolerating TF: Not met, NPO until 2130  VSS: Met  Labs stable: No, continue to monitor          "

## 2017-03-23 NOTE — PROGRESS NOTES
Infusion Nursing Note:  Darek Navarro presents today for Cycle 4 Day 1 Erbitux, Carboplatin, Fluorouracil pump hook-up.    Patient seen by provider today: Yes: CLARY Silverman    Note: Patient reminded of neutropenic precautions.     5-FU pump and thermoregulator intact upon patient's discharge. [facility] notified of patient's pump hook-up time and will disconnect patient's pump on 3/27/17 at 1800.    Intravenous Access:  Implanted Port.    Treatment Conditions:  Lab Results   Component Value Date    HGB 8.5 03/23/2017     Lab Results   Component Value Date    WBC 2.0 03/23/2017      Lab Results   Component Value Date    ANEU 1.3 03/23/2017     Lab Results   Component Value Date     03/23/2017      Lab Results   Component Value Date     03/23/2017                   Lab Results   Component Value Date    POTASSIUM 4.0 03/23/2017           Lab Results   Component Value Date    MAG 2.4 03/21/2017            Lab Results   Component Value Date    CR 0.56 03/23/2017                   Lab Results   Component Value Date    COLE 8.5 03/23/2017                Lab Results   Component Value Date    BILITOTAL 0.4 03/23/2017           Lab Results   Component Value Date    ALBUMIN 3.0 03/23/2017                    Lab Results   Component Value Date    ALT 37 03/23/2017           Lab Results   Component Value Date    AST 25 03/23/2017     Results reviewed, labs MET treatment parameters, ok to proceed with treatment.    Post Infusion Assessment:  Patient tolerated infusion without incident.  Patient observed for 30 minutes post Erbitux per protocol.  Blood return noted pre and post infusion.  Site patent and intact, free from redness, edema or discomfort.  No evidence of extravasations.    Discharge Plan:   Prescription refills given for Ritalin.  Patient and/or family verbalized understanding of discharge instructions and all questions answered.  Copy of AVS reviewed with patient and/or family.  Patient will return  3/29/17 for next appointment.  Patient discharged in stable condition accompanied by: sister.  Departure Mode: Ambulatory.    Keisha Carter RN

## 2017-03-23 NOTE — NURSING NOTE
"Darek Navarro is a 27 year old male who presents for:  Chief Complaint   Patient presents with     Port Draw     Labs collected via port by RN.     Oncology Clinic Visit     Throat Cancer        Initial Vitals:  /81  Pulse 88  Temp 97.5  F (36.4  C) (Oral)  Resp 16  Wt 58.9 kg (129 lb 14.4 oz)  SpO2 98%  BMI 16.68 kg/m2 Estimated body mass index is 16.68 kg/(m^2) as calculated from the following:    Height as of 3/20/17: 1.88 m (6' 2\").    Weight as of this encounter: 58.9 kg (129 lb 14.4 oz).. Body surface area is 1.75 meters squared. BP completed using cuff size: NA (Not Taken)  No Pain (0) No LMP for male patient. Allergies and medications reviewed.     Medications: MEDICATION REFILLS NEEDED TODAY.  Pharmacy name entered into appbackr:    [facility] [id number] [place] [place] [facility] [address] [place] [facility] [address]    Comments: Patient is not having any pain today.     6 minutes for nursing intake (face to face time)   Sheri London CMA       "

## 2017-03-23 NOTE — LETTER
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 Pathologic staging: [id number]. Recurrence with distant metastasis after initial chemoRT.       TREATMENT:    1- April 28, 2016 transmandibular subtotal oral glossectomy, right base of tongue resection with partial pharyngectomy, bilateral neck dissections, and free flap reconstruction. Concurrent ChemoRt with high dose Cisplatin 6/6/16. day 22 cisplatin on 6/27/16. He completed day 43 on 7/19/16. Radiation completed, 6600cGy on 7/21/16.   2- Palliative intent treatment with Pembrolizumab 12/6/2016  3- Due to rapid progression of disease the treatment was switched to Carboplatin, 5-FU and Cetuximab on 1/18/17 and has received 2 cycles with response seen on CT CAP on 2/21/17. Cycle 3 started on 2/28/17.    He had some issues during cycle 3 regarding intake, nausea, weakness etc. He recently had a PEG tube placed to address the nutrition concerns and is here to consider cycle 4 today.        SUBJECTIVE: Patient reports feeling quite well the past few weeks and already feels stronger since he has been able to start tube feeds to help supplement his diet. He has some nausea after this previous cycle and feels the addition of zyprexa has been beneficial. He has some phlegm and is a bit bothered by this. He has less neck and back pain since his XRT. He is taking oxycodone about 10mg every 6 hours and oxycontin. He continues with swallow exercises and can eat foods like mac and cheese. He had some mouth soreness and uses lidocaine to help. He is here with his sister today.     No fevers/chills/sweats, HA, dizziness, cough, congestion, sore throat, chest pain, SOB, n/v,  constipation, loose stools, swelling, bleeding, or hand/foot syndrome.     Meds  Current Outpatient Prescriptions   Medication Sig Dispense Refill     levothyroxine (SYNTHROID/LEVOTHROID) 25 MCG tablet Take 2 tablets (50 mcg) by mouth daily 90 tablet 3     oxyCODONE (ROXICODONE) 5 MG/5ML solution 10 mLs (10 mg) by Oral or PEG tube route every 4 hours as needed for moderate to severe pain 1000 mL 0     oxyCODONE (OXYCONTIN) 20 MG 12 hr tablet Take 1 tablet (20 mg) by mouth every 12 hours maximum 2 tablet(s) per day 60 tablet 0     methylphenidate (RITALIN) 5 MG tablet Take 1 tablet (5 mg) by mouth 2 times daily 60 tablet 0     OLANZapine (ZYPREXA) 5 MG tablet Take 1 tablet (5 mg) by mouth 2 times daily as needed For nausea 60 tablet 3     clindamycin (CLEOCIN T) 1 % lotion Apply topically 2 times daily 60 mL 3     lidocaine (XYLOCAINE) 2 % solution 15 mL swish and spit q3h PRN, max 8 doses in 24 hours 1000 mL 3     LORazepam (ATIVAN) 0.5 MG tablet Take 1 tablet (0.5 mg) by mouth every 4 hours as needed (Anxiety, Nausea/Vomiting or Sleep) 30 tablet 5     prochlorperazine (COMPAZINE) 10 MG tablet Take 1 tablet (10 mg) by mouth every 6 hours as needed (Nausea/Vomiting) 30 tablet 5     pantoprazole (PROTONIX) 20 MG EC tablet Take 1 tablet (20 mg) by mouth daily (Patient taking differently: Take 20 mg by mouth daily as needed ) 30 tablet 3     ibuprofen (ADVIL/MOTRIN) 200 MG tablet Take 200 mg by mouth daily as needed        ondansetron (ZOFRAN) 4 MG tablet Take 4 mg by mouth daily as needed        citalopram (CELEXA) 10 MG/5ML solution Take 10 mLs (20 mg) by mouth daily 300 mL 3     multivitamins with minerals (CERTAVITE) LIQD 15 mLs by Per Feeding Tube route daily 1 Bottle 1     mineral oil-hydrophilic petrolatum (AQUAPHOR) ointment Apply topically every 8 hours (Patient taking differently: Apply topically every 8 hours as needed ) 50 g 0      PHYSICAL EXAMINATION:   VITAL SIGNS: ./81  Pulse 88  Temp 97.5  F  (36.4  C) (Oral)  Resp 16  Wt 58.9 kg (129 lb 14.4 oz)  SpO2 98%  BMI 16.68 kg/m2   Wt Readings from Last 10 Encounters:   03/23/17 58.9 kg (129 lb 14.4 oz)   03/21/17 56.2 kg (123 lb 12.8 oz)   03/14/17 57.6 kg (126 lb 14.4 oz)   03/10/17 59.4 kg (131 lb)   03/07/17 60 kg (132 lb 4.8 oz)   02/28/17 59.7 kg (131 lb 11.2 oz)   02/21/17 62.1 kg (136 lb 14.4 oz)   02/15/17 62 kg (136 lb 9.6 oz)   02/14/17 61.1 kg (134 lb 11.2 oz)   02/08/17 64.4 kg (142 lb)   GENERAL: Sitting comfortably. Here today with his sister.  HEENT: Pupils are equal. Oropharynx is withut mucositis, no thrush. Moderate-severe trismus. Acne like rash on face   NECK: No cervical or supraclavicular lymphadenopathy.   LUNGS: Clear bilaterally.   HEART: S1, S2, regular.   ABDOMEN: Soft, nontender, nondistended, no hepatosplenomegaly. Tender near new PEG tube c/d/i  EXTREMITIES: Warm, well perfused.   NEUROLOGIC: Alert, awake.   SKIN: No rash.   LYMPHATICS: No edema.     DATA:  Results for MICHEAL GARCIA (MRN [id number]) as of 3/25/2017 13:36   Ref. Range 3/21/2017 02:47 3/23/2017 13:06   Sodium Latest Ref Range: 133 - 144 mmol/L 139 137   Potassium Latest Ref Range: 3.4 - 5.3 mmol/L 4.2 4.0   Chloride Latest Ref Range: 94 - 109 mmol/L 105 102   Carbon Dioxide Latest Ref Range: 20 - 32 mmol/L 25 27   Urea Nitrogen Latest Ref Range: 7 - 30 mg/dL 8 12   Creatinine Latest Ref Range: 0.66 - 1.25 mg/dL 0.65 (L) 0.56 (L)   GFR Estimate Latest Ref Range: >60 mL/min/1.7m2 >90... >90...   GFR Estimate If Black Latest Ref Range: >60 mL/min/1.7m2 >90... >90...   Calcium Latest Ref Range: 8.5 - 10.1 mg/dL 8.6 8.5   Anion Gap Latest Ref Range: 3 - 14 mmol/L 8 8   Magnesium Latest Ref Range: 1.6 - 2.3 mg/dL 2.4 (H)    Phosphorus Latest Ref Range: 2.5 - 4.5 mg/dL 3.1    Albumin Latest Ref Range: 3.4 - 5.0 g/dL 2.8 (L) 3.0 (L)   Protein Total Latest Ref Range: 6.8 - 8.8 g/dL 7.0 7.4   Bilirubin Total Latest Ref Range: 0.2 - 1.3 mg/dL 0.4 0.4   Alkaline  Phosphatase Latest Ref Range: 40 - 150 U/L 68 69   ALT Latest Ref Range: 0 - 70 U/L 34 37   AST Latest Ref Range: 0 - 45 U/L 15 25   T4 Free Latest Ref Range: 0.76 - 1.46 ng/dL 0.60 (L)    TSH Latest Ref Range: 0.40 - 4.00 mU/L 93.09 (H)    Results for MICHEAL GARCIA (MRN [id number]) as of 3/25/2017 13:36   Ref. Range 3/21/2017 02:47 3/23/2017 13:06   WBC Latest Ref Range: 4.0 - 11.0 10e9/L 2.2 (L) 2.0 (L)   Hemoglobin Latest Ref Range: 13.3 - 17.7 g/dL 8.6 (L) 8.5 (L)   Hematocrit Latest Ref Range: 40.0 - 53.0 % 26.5 (L) 26.1 (L)   Platelet Count Latest Ref Range: 150 - 450 10e9/L 65 (L) 105 (L)   RBC Count Latest Ref Range: 4.4 - 5.9 10e12/L 3.08 (L) 2.97 (L)   MCV Latest Ref Range: 78 - 100 fl 86 88   MCH Latest Ref Range: 26.5 - 33.0 pg 27.9 28.6   MCHC Latest Ref Range: 31.5 - 36.5 g/dL 32.5 32.6   RDW Latest Ref Range: 10.0 - 15.0 % 24.9 (H) 25.0 (H)   Diff Method Unknown Automated Method Automated Method   % Neutrophils Latest Units: % 67.5 63.1   % Lymphocytes Latest Units: % 17.0 15.7   % Monocytes Latest Units: % 13.8 18.2   % Eosinophils Latest Units: % 1.3 2.0   % Basophils Latest Units: % 0.4 0.5   % Immature Granulocytes Latest Units: % 0.0 0.5   Nucleated RBCs Latest Ref Range: 0 /100 0 0   Absolute Neutrophil Latest Ref Range: 1.6 - 8.3 10e9/L 1.5 (L) 1.3 (L)   Absolute Lymphocytes Latest Ref Range: 0.8 - 5.3 10e9/L 0.4 (L) 0.3 (L)   Absolute Monocytes Latest Ref Range: 0.0 - 1.3 10e9/L 0.3 0.4   Absolute Eosinophils Latest Ref Range: 0.0 - 0.7 10e9/L 0.0 0.0   Absolute Basophils Latest Ref Range: 0.0 - 0.2 10e9/L 0.0 0.0   Abs Immature Granulocytes Latest Ref Range: 0 - 0.4 10e9/L 0.0 0.0   Absolute Nucleated RBC Unknown 0.0 0.0          ASSESSMENT:  A 27 year old gentleman  [id number] squamous cell carcinoma of the right oral tongue.Status post April 28, 2016 transmandibular subtotal oral glossectomy, right base of tongue resection with partial pharyngectomy, bilateral neck dissections, and free  flap. Initiated treatment by 6/6/16.   He completed his concurrent chemoRT 7/21/16.  Follow up PET CT scan 10/28/16 was reviewed at multidisciplinary head and neck tumor board. It was consistent with local and distant recurrence. L- His case was subsequently reviewed at Thoracic oncology conference and we will proceed with biopsy by IR.glendy biopsy results were reviewed with the patient and family members which confirmed metastatic disease.   [facility] was seen at [facility] and no first line trials were available. He was switched to Pembrolizumab with a plan to do short term scans to assess the pace of disease progression.   CT scan 1/16/17 showed rapid disease progression with thoracic vertebral fracture. Multiple lytic/destructive spine lesions were reviewed by spine surgery and were not felt to be unstable. He completed palliative radiation to bones.   - He is on chemotherapy with carboplatin/5-FU and Cetuximab. He is here for cycle 4 today. He had some issues with nausea and intake and now has a PEG in place. He has been started on zyprexa and I have adjusted his nausea meds with this cycle to include premeds of dex/aloxi/emend. Given difficulties during cycle 2 I recommended he return to see a provider with cycle 4 day 8 next week. To be scanned after cycle 4.  On zometa monthly.     Dysphagia. Patient's swallowing was improving following chemorads, but worsened again with starting chemotherapy with intermittent mouth sores than likely extend into his throat. He had a PEG tube placed which has been helpful with intake and overall well being. Continue swallowing exercises.     Coping.Palliative care appt next week. He is on Celexa 20 mg daily.    Throat pain and mucositis. Adequately controlled with OxyContin 20 mg bid and oxycodone 10 mg qid. He also continues to use salt/soda swishes and lidocaine.     Hypothyroidism. Increased to 50mcg given levels drawn earlier in the week. Recheck in 4-6 weeks.     Phlegm: for  increased mucus okay to try mucinex.     It is my privilege to be involved in the care of the above patient.     Maribel Harper PA-C  [facility] [address] [address] [phone]

## 2017-03-23 NOTE — MR AVS SNAPSHOT
After Visit Summary   3/23/2017    Darek Navarro    MRN: [id number]           Patient Information     Date Of Birth          1990        Visit Information        Provider Department      3/23/2017 1:30 PM  15 ATC;  ONCOLOGY INFUSION [facility]        Today's Diagnoses     Head and neck cancer (H)    -  1    Drug-induced neutropenia (H)          Care Instructions    Contact Numbers    [facility] Main Line: [phone]  [facility] Triage:  [phone]    Call triage with chills and/or temperature greater than or equal to 100.5, uncontrolled nausea/vomiting, diarrhea, constipation, dizziness, shortness of breath, chest pain, bleeding, unexplained bruising, or any new/concerning symptoms, questions/concerns.     If you are having any concerning symptoms or wish to speak to a provider before your next infusion visit, please call your care coordinator or triage to notify them so we can adequately serve you.       After Hours: [phone]    If after hours, weekends, or holidays, call main hospital  and ask for Oncology doctor on call.         March 2017 Sunday Monday Tuesday Wednesday Thursday Friday Saturday                  1     2     3     4       5     6     7     [facility] LAB DRAW    2:00 PM   (15 min.)   [facility]   [facility] Lab Draw     [facility] ONC INFUSION 120    2:30 PM   (120 min.)    ONCOLOGY INFUSION   [facility] RETURN WITH ROOM    3:15 PM   (60 min.)   Yanique Wiggins GC   [facility] DRAW    4:00 PM   (15 min.)    [facility] DRAW   [facility] Lab Draw 8     9     10     [facility] DRAW    7:45 AM   (15 min.)   [facility] DRAW   [facility] Lab Draw     [facility] RETURN    8:05 AM   (50 min.)   pOal Cannon PA-C   [facility] ONC INFUSION 120    9:00 AM   (120 min.)    ONCOLOGY INFUSION   [facility]  11       12     13     14     [facility] DRAW    1:00 PM   (15 min.)   [facility] DRAW   [facility] Draw     P ONC INFUSION 120    1:30 PM   (120 min.)   [facility] ONCOLOGY INFUSION   [facility] 15     16     17     18       19     20     PROCEDURE - 1.5 HR   12:30 PM   (90 min.)   U2A ROOM 16   Unit 2A [facility]     Admission   12:35 PM   Suyapa Junior MD   Unit 6D Observation [facility]   (Discharge: 3/21/2017)     IR G TUBE PERCUTANEOUS PLCMNT    2:30 PM   (120 min.)   UUIR4   [facility] [place], Interventional Radiology 21     22     23     P [facility] LAB DRAW   12:30 PM   (15 min.)   [facility] DRAW   [facility] Lab Draw     [facility] RETURN   12:45 PM   (50 min.)   Maribel Harper [facility]-C   [facility]    1:30 PM   (180 min.)   UC ONCOLOGY INFUSION   [facility] 24     25       26     27     28     29     P [facility] LAB DRAW    1:30 PM   (15 min.)   [facility] DRAW   [facility] Lab Draw     [facility] RETURN    1:45 PM   (50 min.)   Maribel Harper PA-C   [facility]    3:30 PM   (120 min.)   [facility] ONCOLOGY INFUSION   [facility] 30     [facility] WITH [facility]OM   10:45 AM   (60 min.)   Mary Anne Pool LICSW   [facility] NEW   12:30 PM   (75 min.)   Quynh Mcguire DO   [facility] 31 April 2017 Sunday Monday Tuesday Wednesday Thursday Friday Saturday                                 1       2     3     4     [facility] DRAW    1:30 PM   (15 min.)   [facility]   [facility]     P ONC INFUSION 120    2:00 PM   (120 min.)   UC ONCOLOGY INFUSION   [facility] 5     6     7     8       9     10     11     12     13     14     15       16     17     18     19     20     21     22       23     24     25     26     27      28     29       30                                               Recent Results (from the past 24 hour(s))   CBC with platelets differential    Collection Time: 03/23/17  1:06 PM   Result Value Ref Range    WBC 2.0 (L) 4.0 - 11.0 10e9/L    RBC Count 2.97 (L) 4.4 - 5.9 10e12/L    Hemoglobin 8.5 (L) 13.3 - 17.7 g/dL    Hematocrit 26.1 (L) 40.0 - 53.0 %    MCV 88 78 - 100 fl    MCH 28.6 26.5 - 33.0 pg    MCHC 32.6 31.5 - 36.5 g/dL    RDW 25.0 (H) 10.0 - 15.0 %    Platelet Count 105 (L) 150 - 450 10e9/L    Diff Method Automated Method     % Neutrophils 63.1 %    % Lymphocytes 15.7 %    % Monocytes 18.2 %    % Eosinophils 2.0 %    % Basophils 0.5 %    % Immature Granulocytes 0.5 %    Nucleated RBCs 0 0 /100    Absolute Neutrophil 1.3 (L) 1.6 - 8.3 10e9/L    Absolute Lymphocytes 0.3 (L) 0.8 - 5.3 10e9/L    Absolute Monocytes 0.4 0.0 - 1.3 10e9/L    Absolute Eosinophils 0.0 0.0 - 0.7 10e9/L    Absolute Basophils 0.0 0.0 - 0.2 10e9/L    Abs Immature Granulocytes 0.0 0 - 0.4 10e9/L    Absolute Nucleated RBC 0.0    Comprehensive metabolic panel    Collection Time: 03/23/17  1:06 PM   Result Value Ref Range    Sodium 137 133 - 144 mmol/L    Potassium 4.0 3.4 - 5.3 mmol/L    Chloride 102 94 - 109 mmol/L    Carbon Dioxide 27 20 - 32 mmol/L    Anion Gap 8 3 - 14 mmol/L    Glucose 114 (H) 70 - 99 mg/dL    Urea Nitrogen 12 7 - 30 mg/dL    Creatinine 0.56 (L) 0.66 - 1.25 mg/dL    GFR Estimate >90  Non  GFR Calc   >60 mL/min/1.7m2    GFR Estimate If Black >90   GFR Calc   >60 mL/min/1.7m2    Calcium 8.5 8.5 - 10.1 mg/dL    Bilirubin Total 0.4 0.2 - 1.3 mg/dL    Albumin 3.0 (L) 3.4 - 5.0 g/dL    Protein Total 7.4 6.8 - 8.8 g/dL    Alkaline Phosphatase 69 40 - 150 U/L    ALT 37 0 - 70 U/L    AST 25 0 - 45 U/L               Follow-ups after your visit        Your next 10 appointments already scheduled     Mar 29, 2017  1:30 PM CDT   [facility] Draw with  [facility] DRAW   [facility] Lab Draw (M  [facility] [place])    [address]  2nd Floor  [facility] [address]   [phone]            Mar 29, 2017  2:00 PM CDT   (Arrive by 1:45 PM)   Return Visit with Maribel Harper PA-C   [facility]    [address] [place] [address]  [address]nd [facility] [address]   [phone]            Mar 29, 2017  3:30 PM CDT   Infusion 120 with UC ONCOLOGY INFUSION, UC 25 ATC   [facility]    [address] [place] [address]  2nd Floor  [facility] [address]   [phone]            Mar 30, 2017 11:00 AM CDT   (Arrive by 10:45 AM)   NEW WITH ROOM with DANIELA Garrison, UC 2 118 CONSULT RM   [facility]    [address] [place] [address]  [address] [facility] [address] [phone]            Mar 30, 2017 12:45 PM CDT   (Arrive by 12:30 PM)   New Patient Visit with Quynh Mcguire DO   [facility]    [address] [place] [address]   [phone]            Apr 04, 2017  1:30 PM CDT   [facility] Draw with [facility] DRAW   [facility]    [address] [place] [address]  [address] [facility] [address]   [phone]            Apr 04, 2017  2:00 PM CDT   Infusion 120 with UC ONCOLOGY INFUSION, UC 11 ATC   [facility]    [address] [place] [address]  2[address] [facility] [address] [phone]              Who to contact     If you have questions or need follow up information about today's clinic visit or your schedule please contact [facility] directly at [phone].  Normal or non-critical lab and imaging results will be communicated to you by MyChart, letter or phone within 4 business days after the clinic has received  the results. If you do not hear from us within 7 days, please contact the clinic through [employer] or phone. If you have a critical or abnormal lab result, we will notify you by phone as soon as possible.  Submit refill requests through [employer] or call your pharmacy and they will forward the refill request to us. Please allow 3 business days for your refill to be completed.          Additional Information About Your Visit        [employer] Information     [employer] gives you secure access to your electronic health record. If you see a primary care provider, you can also send messages to your care team and make appointments. If you have questions, please call your primary care clinic.  If you do not have a primary care provider, please call [phone] and they will assist you.        Care EveryWhere ID     This is your Care EveryWhere ID. This could be used by other organizations to access your [place] medical records  [id number]         Blood Pressure from Last 3 Encounters:   03/23/17 122/81   03/21/17 120/80   03/14/17 118/67    Weight from Last 3 Encounters:   03/23/17 58.9 kg (129 lb 14.4 oz)   03/21/17 56.2 kg (123 lb 12.8 oz)   03/14/17 57.6 kg (126 lb 14.4 oz)              We Performed the Following     Treatment Conditions          Today's Medication Changes          These changes are accurate as of: 3/23/17  2:34 PM.  If you have any questions, ask your nurse or doctor.               These medicines have changed or have updated prescriptions.        Dose/Directions    levothyroxine 25 MCG tablet   Commonly known as:  SYNTHROID/LEVOTHROID   This may have changed:  how much to take   Used for:  Malignant neoplasm of tongue (H), Head and neck cancer (H), Oral cancer (H)   Changed by:  Maribel Harper PA-C        Dose:  50 mcg   Take 2 tablets (50 mcg) by mouth daily   Quantity:  90 tablet   Refills:  3       mineral oil-hydrophilic petrolatum   This may have changed:    - when to take this  - reasons to  take this   Used for:  Malignant neoplasm of tongue (H)        Apply topically every 8 hours   Quantity:  50 g   Refills:  0       pantoprazole 20 MG EC tablet   Commonly known as:  PROTONIX   This may have changed:    - when to take this  - reasons to take this   Used for:  Gastroesophageal reflux disease without esophagitis        Dose:  20 mg   Take 1 tablet (20 mg) by mouth daily   Quantity:  30 tablet   Refills:  3                Primary Care Provider Office Phone # Fax #    [facility] [phone] [address]  [facility] [address]        Thank you!     Thank you for choosing [facility]  for your care. Our goal is always to provide you with excellent care. Hearing back from our patients is one way we can continue to improve our services. Please take a few minutes to complete the written survey that you may receive in the mail after your visit with us. Thank you!             Your Updated Medication List - Protect others around you: Learn how to safely use, store and throw away your medicines at www.disposemymeds.org.          This list is accurate as of: 3/23/17  2:34 PM.  Always use your most recent med list.                   Brand Name Dispense Instructions for use    citalopram 10 MG/5ML solution    celeXA    300 mL    Take 10 mLs (20 mg) by mouth daily       clindamycin 1 % lotion    CLEOCIN T    60 mL    Apply topically 2 times daily       ibuprofen 200 MG tablet    ADVIL/MOTRIN     Take 200 mg by mouth daily as needed       levothyroxine 25 MCG tablet    SYNTHROID/LEVOTHROID    90 tablet    Take 2 tablets (50 mcg) by mouth daily       lidocaine 2 % solution    XYLOCAINE    1000 mL    15 mL swish and spit q3h PRN, max 8 doses in 24 hours       LORazepam 0.5 MG tablet    ATIVAN    30 tablet    Take 1 tablet (0.5 mg) by mouth every 4 hours as needed (Anxiety, Nausea/Vomiting or Sleep)       methylphenidate 5 MG tablet     RITALIN    60 tablet    Take 1 tablet (5 mg) by mouth 2 times daily       mineral oil-hydrophilic petrolatum     50 g    Apply topically every 8 hours       multivitamins with minerals Liqd liquid     1 Bottle    15 mLs by Per Feeding Tube route daily       OLANZapine 5 MG tablet    zyPREXA    60 tablet    Take 1 tablet (5 mg) by mouth 2 times daily as needed For nausea       ondansetron 4 MG tablet    ZOFRAN     Take 4 mg by mouth daily as needed       * oxyCODONE 5 MG/5ML solution    ROXICODONE    1000 mL    10 mLs (10 mg) by Oral or PEG tube route every 4 hours as needed for moderate to severe pain       * oxyCODONE 20 MG 12 hr tablet    OxyCONTIN    60 tablet    Take 1 tablet (20 mg) by mouth every 12 hours maximum 2 tablet(s) per day       pantoprazole 20 MG EC tablet    PROTONIX    30 tablet    Take 1 tablet (20 mg) by mouth daily       prochlorperazine 10 MG tablet    COMPAZINE    30 tablet    Take 1 tablet (10 mg) by mouth every 6 hours as needed (Nausea/Vomiting)       * Notice:  This list has 2 medication(s) that are the same as other medications prescribed for you. Read the directions carefully, and ask your doctor or other care provider to review them with you.

## 2017-03-23 NOTE — NURSING NOTE
Chief Complaint   Patient presents with     Port Draw     Labs collected via port by RN.     Port accessed, flushed with NS and Heparin.   Marj Castellanos RN

## 2017-03-23 NOTE — PATIENT INSTRUCTIONS
Contact Numbers    [facility] Main Line: [phone]  [facility] Triage:  [phone]    Call triage with chills and/or temperature greater than or equal to 100.5, uncontrolled nausea/vomiting, diarrhea, constipation, dizziness, shortness of breath, chest pain, bleeding, unexplained bruising, or any new/concerning symptoms, questions/concerns.     If you are having any concerning symptoms or wish to speak to a provider before your next infusion visit, please call your care coordinator or triage to notify them so we can adequately serve you.       After Hours: [phone]    If after hours, weekends, or holidays, call main hospital  and ask for Oncology doctor on call.         March 2017 Sunday Monday Tuesday Wednesday Thursday Friday Saturday                  1     2     3     4       5     6     7     P [facility] DRAW    2:00 PM   (15 min.)    [facility]   [facility]     [facility] ONC INFUSION 120    2:30 PM   (120 min.)    ONCOLOGY INFUSION   [facility] RETURN WITH ROOM    3:15 PM   (60 min.)   Yanique Wiggins GC   [facility] LAB DRAW    4:00 PM   (15 min.)    [facility] LAB DRAW   [facility] Lab Draw 8     9     10     P [facility] DRAW    7:45 AM   (15 min.)    [facility] DRAW   [facility] Lab Draw     [facility] RETURN    8:05 AM   (50 min.)   Opal Cannon PA-C   [facility] ONC INFUSION 120    9:00 AM   (120 min.)    ONCOLOGY INFUSION   [facility] 11       12     13     14     [facility] DRAW    1:00 PM   (15 min.)    [facility]RAW   [facility]     [facility] ONC INFUSION 120    1:30 PM   (120 min.)    ONCOLOGY INFUSION   [facility] 15     16     17     18       19     20     PROCEDURE - 1.5 HR   12:30 PM   (90 min.)   U2A ROOM 16   Unit 2A [facility]     Admission   12:35 PM   Suyapa Junior MD   Unit 6D  Observation [facility]   (Discharge: 3/21/2017)     IR G TUBE PERCUTANEOUS PLCMNT    2:30 PM   (120 min.)   UUIR4   [facility], Interventional Radiology 21 22     23     [facility] LAB DRAW   12:30 PM   (15 min.)    [facility]RAW   [facility]     [facility] RETURN   12:45 PM   (50 min.)   Maribel Harper PA-C [facility] ONC INFUSION 180    1:30 PM   (180 min.)    ONCOLOGY INFUSION   [facility] 24     25       26     27     28     29     [facility] LAB DRAW    1:30 PM   (15 min.)    [facility] LAB DRAW   [facility] Lab Draw     [facility] RETURN    1:45 PM   (50 min.)   Maribel Harper PA-C   [facility] ONC INFUSION 120    3:30 PM   (120 min.)    ONCOLOGY INFUSION   [facility] 30     [facility] NEW WITH ROOM   10:45 AM   (60 min.)   Mary Anne Pool LICSW   [facility] NEW   12:30 PM   (75 min.)   Quynh Mcguire DO   [facility] 31 April 2017 Sunday Monday Tuesday Wednesday Thursday Friday Saturday                                 1       2     3     4     [facility] LAB DRAW    1:30 PM   (15 min.)    [facility] LAB DRAW   [facility] Lab Draw     [facility] ONC INFUSION 120    2:00 PM   (120 min.)    ONCOLOGY INFUSION   [facility] 5     6     7     8       9     10     11     12     13     14     15       16     17     18     19     20     21     22       23     24     25     26     27     28     29       30                                               Recent Results (from the past 24 hour(s))   CBC with platelets differential    Collection Time: 03/23/17  1:06 PM   Result Value Ref Range    WBC 2.0 (L) 4.0 - 11.0 10e9/L    RBC Count 2.97 (L) 4.4 - 5.9 10e12/L    Hemoglobin 8.5 (L) 13.3 - 17.7 g/dL    Hematocrit 26.1 (L) 40.0 - 53.0 %    MCV 88 78 - 100 fl    MCH 28.6 26.5 - 33.0 pg    MCHC  32.6 31.5 - 36.5 g/dL    RDW 25.0 (H) 10.0 - 15.0 %    Platelet Count 105 (L) 150 - 450 10e9/L    Diff Method Automated Method     % Neutrophils 63.1 %    % Lymphocytes 15.7 %    % Monocytes 18.2 %    % Eosinophils 2.0 %    % Basophils 0.5 %    % Immature Granulocytes 0.5 %    Nucleated RBCs 0 0 /100    Absolute Neutrophil 1.3 (L) 1.6 - 8.3 10e9/L    Absolute Lymphocytes 0.3 (L) 0.8 - 5.3 10e9/L    Absolute Monocytes 0.4 0.0 - 1.3 10e9/L    Absolute Eosinophils 0.0 0.0 - 0.7 10e9/L    Absolute Basophils 0.0 0.0 - 0.2 10e9/L    Abs Immature Granulocytes 0.0 0 - 0.4 10e9/L    Absolute Nucleated RBC 0.0    Comprehensive metabolic panel    Collection Time: 03/23/17  1:06 PM   Result Value Ref Range    Sodium 137 133 - 144 mmol/L    Potassium 4.0 3.4 - 5.3 mmol/L    Chloride 102 94 - 109 mmol/L    Carbon Dioxide 27 20 - 32 mmol/L    Anion Gap 8 3 - 14 mmol/L    Glucose 114 (H) 70 - 99 mg/dL    Urea Nitrogen 12 7 - 30 mg/dL    Creatinine 0.56 (L) 0.66 - 1.25 mg/dL    GFR Estimate >90  Non  GFR Calc   >60 mL/min/1.7m2    GFR Estimate If Black >90   GFR Calc   >60 mL/min/1.7m2    Calcium 8.5 8.5 - 10.1 mg/dL    Bilirubin Total 0.4 0.2 - 1.3 mg/dL    Albumin 3.0 (L) 3.4 - 5.0 g/dL    Protein Total 7.4 6.8 - 8.8 g/dL    Alkaline Phosphatase 69 40 - 150 U/L    ALT 37 0 - 70 U/L    AST 25 0 - 45 U/L

## 2017-03-23 NOTE — MR AVS SNAPSHOT
After Visit Summary   3/23/2017    Darek Navarro    MRN: [id number]           Patient Information     Date Of Birth          1990        Visit Information        Provider Department      3/23/2017 1:00 PM Maribel Harper PA-C [facility]        Today's Diagnoses     Malignant neoplasm of tongue (H)    -  1    Head and neck cancer (H)        Oral cancer (H)        Drug-induced neutropenia (H)           Follow-ups after your visit        Your next 10 appointments already scheduled     Mar 29, 2017  1:30 PM CDT   [facility] Draw with  [facility] DRAW   [facility]    [address] [place] [address]  2nd Floor  [facility] [address]   [phone]            Mar 29, 2017  2:00 PM CDT   (Arrive by 1:45 PM)   Return Visit with Maribel Harper PA-C   [facility] ([facility])    [address] [place] [address]  2nd Floor  [facility] [address]   [phone]            Mar 29, 2017  3:30 PM CDT   Infusion 120 with  ONCOLOGY INFUSION, UC 25 ATC   [facility] ([facility])    [address] [place] [address]  2nd Floor  [facility] [address]   [phone]            Mar 30, 2017 11:00 AM CDT   (Arrive by 10:45 AM)   NEW WITH ROOM with DANIELA Garrison UC 2 118 CONSULT RM   [facility] ([facility])    [address] [place] [address]  2nd Floor  [facility] [address]   [phone]            Mar 30, 2017 12:45 PM CDT   (Arrive by 12:30 PM)   New Patient Visit with Quynh Mcguire DO   [facility] ([facility])    [address] [place] [address]  [address] [facility] [address]   [phone]            Apr 04, 2017  1:30 PM CDT   [facility] Draw with  [facility] DRAW   [facility])    [address] [place] [address]  Floor  [facility] [address] [phone]            Apr 04, 2017  2:00 PM CDT   Infusion 120 with UC ONCOLOGY INFUSION, UC 11 ATC   [facility] ([facility])    [address] [facility] [address] [facility]  2nd Floor  [facility] [address] [phone]              Who to contact     If you have questions or need follow up information about today's clinic visit or your schedule please contact [facility] directly at [phone].  Normal or non-critical lab and imaging results will be communicated to you by Instabankhart, letter or phone within 4 business days after the clinic has received the results. If you do not hear from us within 7 days, please contact the clinic through Trendmeon or phone. If you have a critical or abnormal lab result, we will notify you by phone as soon as possible.  Submit refill requests through Trendmeon or call your pharmacy and they will forward the refill request to us. Please allow 3 business days for your refill to be completed.          Additional Information About Your Visit        Trendmeon Information     T[employer]meon gives you secure access to your electronic health record. If you see a primary care provider, you can also send messages to your care team and make appointments. If you have questions, please call your primary care clinic.  If you do not have a primary care provider, please call [phone] and they will assist you.        Care EveryWhere ID     This is your Care EveryWhere ID. This could be used by other organizations to access your [place] medical records  [id number]        Your Vitals Were     Pulse Temperature Respirations Pulse Oximetry BMI (Body Mass Index)       88 97.5  F (36.4  C) (Oral) 16 98% 16.68 kg/m2        Blood Pressure from Last 3 Encounters:   03/23/17 122/81   03/21/17 120/80   03/14/17 118/67    Weight from Last 3 Encounters:   03/23/17 58.9 kg (129 lb 14.4 oz)   03/21/17 56.2 kg (123 lb 12.8 oz)   03/14/17  57.6 kg (126 lb 14.4 oz)              We Performed the Following     CBC with platelets differential     Comprehensive metabolic panel          Today's Medication Changes          These changes are accurate as of: 3/23/17 11:59 PM.  If you have any questions, ask your nurse or doctor.               These medicines have changed or have updated prescriptions.        Dose/Directions    levothyroxine 25 MCG tablet   Commonly known as:  SYNTHROID/LEVOTHROID   This may have changed:  how much to take   Used for:  Malignant neoplasm of tongue (H), Head and neck cancer (H), Oral cancer (H)   Changed by:  Maribel Harper PA-C        Dose:  50 mcg   Take 2 tablets (50 mcg) by mouth daily   Quantity:  90 tablet   Refills:  3       mineral oil-hydrophilic petrolatum   This may have changed:    - when to take this  - reasons to take this   Used for:  Malignant neoplasm of tongue (H)        Apply topically every 8 hours   Quantity:  50 g   Refills:  0       pantoprazole 20 MG EC tablet   Commonly known as:  PROTONIX   This may have changed:    - when to take this  - reasons to take this   Used for:  Gastroesophageal reflux disease without esophagitis        Dose:  20 mg   Take 1 tablet (20 mg) by mouth daily   Quantity:  30 tablet   Refills:  3                Primary Care Provider Office Phone # Fax #    [facility] [phone] [address] [phone]        Thank you!     Thank you for choosing [facility]  for your care. Our goal is always to provide you with excellent care. Hearing back from our patients is one way we can continue to improve our services. Please take a few minutes to complete the written survey that you may receive in the mail after your visit with us. Thank you!             Your Updated Medication List - Protect others around you: Learn how to safely use, store and throw away your medicines at  www.disposemymeds.org.          This list is accurate as of: 3/23/17 11:59 PM.  Always use your most recent med list.                   Brand Name Dispense Instructions for use    citalopram 10 MG/5ML solution    celeXA    300 mL    Take 10 mLs (20 mg) by mouth daily       clindamycin 1 % lotion    CLEOCIN T    60 mL    Apply topically 2 times daily       ibuprofen 200 MG tablet    ADVIL/MOTRIN     Take 200 mg by mouth daily as needed       levothyroxine 25 MCG tablet    SYNTHROID/LEVOTHROID    90 tablet    Take 2 tablets (50 mcg) by mouth daily       lidocaine 2 % solution    XYLOCAINE    1000 mL    15 mL swish and spit q3h PRN, max 8 doses in 24 hours       LORazepam 0.5 MG tablet    ATIVAN    30 tablet    Take 1 tablet (0.5 mg) by mouth every 4 hours as needed (Anxiety, Nausea/Vomiting or Sleep)       methylphenidate 5 MG tablet    RITALIN    60 tablet    Take 1 tablet (5 mg) by mouth 2 times daily       mineral oil-hydrophilic petrolatum     50 g    Apply topically every 8 hours       multivitamins with minerals Liqd liquid     1 Bottle    15 mLs by Per Feeding Tube route daily       OLANZapine 5 MG tablet    zyPREXA    60 tablet    Take 1 tablet (5 mg) by mouth 2 times daily as needed For nausea       ondansetron 4 MG tablet    ZOFRAN     Take 4 mg by mouth daily as needed       * oxyCODONE 5 MG/5ML solution    ROXICODONE    1000 mL    10 mLs (10 mg) by Oral or PEG tube route every 4 hours as needed for moderate to severe pain       * oxyCODONE 20 MG 12 hr tablet    OxyCONTIN    60 tablet    Take 1 tablet (20 mg) by mouth every 12 hours maximum 2 tablet(s) per day       pantoprazole 20 MG EC tablet    PROTONIX    30 tablet    Take 1 tablet (20 mg) by mouth daily       prochlorperazine 10 MG tablet    COMPAZINE    30 tablet    Take 1 tablet (10 mg) by mouth every 6 hours as needed (Nausea/Vomiting)       * Notice:  This list has 2 medication(s) that are the same as other medications prescribed for you. Read the  directions carefully, and ask your doctor or other care provider to review them with you.

## 2017-03-25 NOTE — PROGRESS NOTES
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 Pathologic staging: [id number]. Recurrence with distant metastasis after initial chemoRT.       TREATMENT:    1- April 28, 2016 transmandibular subtotal oral glossectomy, right base of tongue resection with partial pharyngectomy, bilateral neck dissections, and free flap reconstruction. Concurrent ChemoRt with high dose Cisplatin 6/6/16. day 22 cisplatin on 6/27/16. He completed day 43 on 7/19/16. Radiation completed, 6600cGy on 7/21/16.   2- Palliative intent treatment with Pembrolizumab 12/6/2016  3- Due to rapid progression of disease the treatment was switched to Carboplatin, 5-FU and Cetuximab on 1/18/17 and has received 2 cycles with response seen on CT CAP on 2/21/17. Cycle 3 started on 2/28/17.    He had some issues during cycle 3 regarding intake, nausea, weakness etc. He recently had a PEG tube placed to address the nutrition concerns and is here to consider cycle 4 today.        SUBJECTIVE: Patient reports feeling quite well the past few weeks and already feels stronger since he has been able to start tube feeds to help supplement his diet. He has some nausea after this previous cycle and feels the addition of zyprexa has been beneficial. He has some phlegm and is a bit bothered by this. He has less neck and back pain since his XRT. He is taking oxycodone about 10mg every 6 hours and oxycontin. He continues with swallow exercises and can eat foods like mac and cheese. He had some mouth soreness and uses lidocaine to help. He is here with his sister today.     No fevers/chills/sweats, HA, dizziness, cough, congestion, sore throat, chest pain, SOB, n/v, constipation, loose stools, swelling, bleeding, or hand/foot syndrome.      Meds  Current Outpatient Prescriptions   Medication Sig Dispense Refill     levothyroxine (SYNTHROID/LEVOTHROID) 25 MCG tablet Take 2 tablets (50 mcg) by mouth daily 90 tablet 3     oxyCODONE (ROXICODONE) 5 MG/5ML solution 10 mLs (10 mg) by Oral or PEG tube route every 4 hours as needed for moderate to severe pain 1000 mL 0     oxyCODONE (OXYCONTIN) 20 MG 12 hr tablet Take 1 tablet (20 mg) by mouth every 12 hours maximum 2 tablet(s) per day 60 tablet 0     methylphenidate (RITALIN) 5 MG tablet Take 1 tablet (5 mg) by mouth 2 times daily 60 tablet 0     OLANZapine (ZYPREXA) 5 MG tablet Take 1 tablet (5 mg) by mouth 2 times daily as needed For nausea 60 tablet 3     clindamycin (CLEOCIN T) 1 % lotion Apply topically 2 times daily 60 mL 3     lidocaine (XYLOCAINE) 2 % solution 15 mL swish and spit q3h PRN, max 8 doses in 24 hours 1000 mL 3     LORazepam (ATIVAN) 0.5 MG tablet Take 1 tablet (0.5 mg) by mouth every 4 hours as needed (Anxiety, Nausea/Vomiting or Sleep) 30 tablet 5     prochlorperazine (COMPAZINE) 10 MG tablet Take 1 tablet (10 mg) by mouth every 6 hours as needed (Nausea/Vomiting) 30 tablet 5     pantoprazole (PROTONIX) 20 MG EC tablet Take 1 tablet (20 mg) by mouth daily (Patient taking differently: Take 20 mg by mouth daily as needed ) 30 tablet 3     ibuprofen (ADVIL/MOTRIN) 200 MG tablet Take 200 mg by mouth daily as needed        ondansetron (ZOFRAN) 4 MG tablet Take 4 mg by mouth daily as needed        citalopram (CELEXA) 10 MG/5ML solution Take 10 mLs (20 mg) by mouth daily 300 mL 3     multivitamins with minerals (CERTAVITE) LIQD 15 mLs by Per Feeding Tube route daily 1 Bottle 1     mineral oil-hydrophilic petrolatum (AQUAPHOR) ointment Apply topically every 8 hours (Patient taking differently: Apply topically every 8 hours as needed ) 50 g 0      PHYSICAL EXAMINATION:   VITAL SIGNS: ./81  Pulse 88  Temp 97.5  F (36.4  C) (Oral)  Resp 16  Wt 58.9 kg (129 lb 14.4 oz)  SpO2 98%  BMI  16.68 kg/m2   Wt Readings from Last 10 Encounters:   03/23/17 58.9 kg (129 lb 14.4 oz)   03/21/17 56.2 kg (123 lb 12.8 oz)   03/14/17 57.6 kg (126 lb 14.4 oz)   03/10/17 59.4 kg (131 lb)   03/07/17 60 kg (132 lb 4.8 oz)   02/28/17 59.7 kg (131 lb 11.2 oz)   02/21/17 62.1 kg (136 lb 14.4 oz)   02/15/17 62 kg (136 lb 9.6 oz)   02/14/17 61.1 kg (134 lb 11.2 oz)   02/08/17 64.4 kg (142 lb)   GENERAL: Sitting comfortably. Here today with his sister.  HEENT: Pupils are equal. Oropharynx is withut mucositis, no thrush. Moderate-severe trismus. Acne like rash on face   NECK: No cervical or supraclavicular lymphadenopathy.   LUNGS: Clear bilaterally.   HEART: S1, S2, regular.   ABDOMEN: Soft, nontender, nondistended, no hepatosplenomegaly. Tender near new PEG tube c/d/i  EXTREMITIES: Warm, well perfused.   NEUROLOGIC: Alert, awake.   SKIN: No rash.   LYMPHATICS: No edema.     DATA:  Results for MICHEAL GARCIA (MRN [id number]) as of 3/25/2017 13:36   Ref. Range 3/21/2017 02:47 3/23/2017 13:06   Sodium Latest Ref Range: 133 - 144 mmol/L 139 137   Potassium Latest Ref Range: 3.4 - 5.3 mmol/L 4.2 4.0   Chloride Latest Ref Range: 94 - 109 mmol/L 105 102   Carbon Dioxide Latest Ref Range: 20 - 32 mmol/L 25 27   Urea Nitrogen Latest Ref Range: 7 - 30 mg/dL 8 12   Creatinine Latest Ref Range: 0.66 - 1.25 mg/dL 0.65 (L) 0.56 (L)   GFR Estimate Latest Ref Range: >60 mL/min/1.7m2 >90... >90...   GFR Estimate If Black Latest Ref Range: >60 mL/min/1.7m2 >90... >90...   Calcium Latest Ref Range: 8.5 - 10.1 mg/dL 8.6 8.5   Anion Gap Latest Ref Range: 3 - 14 mmol/L 8 8   Magnesium Latest Ref Range: 1.6 - 2.3 mg/dL 2.4 (H)    Phosphorus Latest Ref Range: 2.5 - 4.5 mg/dL 3.1    Albumin Latest Ref Range: 3.4 - 5.0 g/dL 2.8 (L) 3.0 (L)   Protein Total Latest Ref Range: 6.8 - 8.8 g/dL 7.0 7.4   Bilirubin Total Latest Ref Range: 0.2 - 1.3 mg/dL 0.4 0.4   Alkaline Phosphatase Latest Ref Range: 40 - 150 U/L 68 69   ALT Latest Ref Range: 0 - 70  U/L 34 37   AST Latest Ref Range: 0 - 45 U/L 15 25   T4 Free Latest Ref Range: 0.76 - 1.46 ng/dL 0.60 (L)    TSH Latest Ref Range: 0.40 - 4.00 mU/L 93.09 (H)    Results for MICHEAL GARCIA (MRN [id number]) as of 3/25/2017 13:36   Ref. Range 3/21/2017 02:47 3/23/2017 13:06   WBC Latest Ref Range: 4.0 - 11.0 10e9/L 2.2 (L) 2.0 (L)   Hemoglobin Latest Ref Range: 13.3 - 17.7 g/dL 8.6 (L) 8.5 (L)   Hematocrit Latest Ref Range: 40.0 - 53.0 % 26.5 (L) 26.1 (L)   Platelet Count Latest Ref Range: 150 - 450 10e9/L 65 (L) 105 (L)   RBC Count Latest Ref Range: 4.4 - 5.9 10e12/L 3.08 (L) 2.97 (L)   MCV Latest Ref Range: 78 - 100 fl 86 88   MCH Latest Ref Range: 26.5 - 33.0 pg 27.9 28.6   MCHC Latest Ref Range: 31.5 - 36.5 g/dL 32.5 32.6   RDW Latest Ref Range: 10.0 - 15.0 % 24.9 (H) 25.0 (H)   Diff Method Unknown Automated Method Automated Method   % Neutrophils Latest Units: % 67.5 63.1   % Lymphocytes Latest Units: % 17.0 15.7   % Monocytes Latest Units: % 13.8 18.2   % Eosinophils Latest Units: % 1.3 2.0   % Basophils Latest Units: % 0.4 0.5   % Immature Granulocytes Latest Units: % 0.0 0.5   Nucleated RBCs Latest Ref Range: 0 /100 0 0   Absolute Neutrophil Latest Ref Range: 1.6 - 8.3 10e9/L 1.5 (L) 1.3 (L)   Absolute Lymphocytes Latest Ref Range: 0.8 - 5.3 10e9/L 0.4 (L) 0.3 (L)   Absolute Monocytes Latest Ref Range: 0.0 - 1.3 10e9/L 0.3 0.4   Absolute Eosinophils Latest Ref Range: 0.0 - 0.7 10e9/L 0.0 0.0   Absolute Basophils Latest Ref Range: 0.0 - 0.2 10e9/L 0.0 0.0   Abs Immature Granulocytes Latest Ref Range: 0 - 0.4 10e9/L 0.0 0.0   Absolute Nucleated RBC Unknown 0.0 0.0          ASSESSMENT:  A 27 year old gentleman  [id number] squamous cell carcinoma of the right oral tongue.Status post April 28, 2016 transmandibular subtotal oral glossectomy, right base of tongue resection with partial pharyngectomy, bilateral neck dissections, and free flap. Initiated treatment by 6/6/16.   He completed his concurrent chemoRT  7/21/16.  Follow up PET CT scan 10/28/16 was reviewed at multidisciplinary head and neck tumor board. It was consistent with local and distant recurrence. L- His case was subsequently reviewed at Thoracic oncology conference and we will proceed with biopsy by IR.glendy biopsy results were reviewed with the patient and family members which confirmed metastatic disease.   He was seen at [facility] and no first line trials were available. He was switched to Pembrolizumab with a plan to do short term scans to assess the pace of disease progression.   CT scan 1/16/17 showed rapid disease progression with thoracic vertebral fracture. Multiple lytic/destructive spine lesions were reviewed by spine surgery and were not felt to be unstable. He completed palliative radiation to bones.   - He is on chemotherapy with carboplatin/5-FU and Cetuximab. He is here for cycle 4 today. He had some issues with nausea and intake and now has a PEG in place. He has been started on zyprexa and I have adjusted his nausea meds with this cycle to include premeds of dex/aloxi/emend. Given difficulties during cycle 2 I recommended he return to see a provider with cycle 4 day 8 next week. To be scanned after cycle 4.  On zometa monthly.     Dysphagia. Patient's swallowing was improving following chemorads, but worsened again with starting chemotherapy with intermittent mouth sores than likely extend into his throat. He had a PEG tube placed which has been helpful with intake and overall well being. Continue swallowing exercises.     Coping.Palliative care appt next week. He is on Celexa 20 mg daily.    Throat pain and mucositis. Adequately controlled with OxyContin 20 mg bid and oxycodone 10 mg qid. He also continues to use salt/soda swishes and lidocaine.     Hypothyroidism. Increased to 50mcg given levels drawn earlier in the week. Recheck in 4-6 weeks.     Phlegm: for increased mucus okay to try mucinex.     It is my privilege to be involved in  the care of the above patient.     Maribel Harper PA-C  [facility] [address] [address] [phone]

## 2017-03-29 NOTE — MR AVS SNAPSHOT
After Visit Summary   3/29/2017    Darek Navarro    MRN: [id number]           Patient Information     Date Of Birth          1990        Visit Information        Provider Department      3/29/2017 3:30 PM  25 ATC;  ONCOLOGY INFUSION [facility]        Today's Diagnoses     Head and neck cancer (H)    -  1    Drug-induced neutropenia (H)          Care Instructions    Contact Numbers  [facility]: [phone]    After Hours:  [phone]  Triage: [phone]    Please call the [facility] Triage line if you experience a temperature greater than or equal to 100.5, shaking chills, have uncontrolled nausea, vomiting and/or diarrhea, dizziness, shortness of breath, chest pain, bleeding, unexplained bruising, or if you have any other new/concerning symptoms, questions or concerns.     If it is after hours, weekends, or holidays, please call the main hospital  at  [phone] and ask to speak to the Oncology doctor on call.     If you are having any concerning symptoms or wish to speak to a provider before your next infusion visit, please call your care coordinator or triage to notify them so we can adequately serve you.     If you need a refill on a narcotic prescription or other medication, please call triage before your infusion appointment.         March 2017 Sunday Monday Tuesday Wednesday Thursday Friday Saturday                  1     2     3     4       5     6     7     [facility] MASONIC LAB DRAW    2:00 PM   (15 min.)    [facility]   [facility]     [facility] ONC INFUSION 120    2:30 PM   (120 min.)    ONCOLOGY INFUSION   [facility] RETURN WITH ROOM    3:15 PM   (60 min.)   Yanique Wiggins GC   [facility] MASONIC LAB DRAW    4:00 PM   (15 min.)    [facility] LAB [facility]RAW   [facility] 8     9     10     [facility] MASONIC LAB DRAW    7:45 AM   (15 min.)   [facility] MASONIC LAB DRAW   M  [facility] Masonic Lab Draw     [facility] RETURN    8:05 AM   (50 min.)   Opal Cannon PA-C   [facility] 120    9:00 AM   (120 min.)   UC ONCOLOGY INFUSION   [facility] 11       12     13     14     [facility] MASONIC LAB DRAW    1:00 PM   (15 min.)   [facility] LAB DRAW   [facility]     P ONC INFUSION 120    1:30 PM   (120 min.)   [facility] ONCOLOGY INFUSION   [facility] 15     16     17     18       19     20     PROCEDURE - 1.5 HR   12:30 PM   (90 min.)   U2A ROOM 16   Unit 2A [facility]     Admission   12:35 PM   Suyapa Junior MD   Unit 6D Observation [facility]   (Discharge: 3/21/2017)     IR G TUBE PERCUTANEOUS PLCMNT    2:30 PM   (120 min.)   UUIR4   [facility] [place], Interventional Radiology 21     22     23     P MASONIC LAB DRAW   12:30 PM   (15 min.)   [facility] MASONIC LAB DRAW   [facility] Masonic Lab Draw     [facility] RETURN   12:45 PM   (50 min.)   Mar[facility] Harper [facility]C [facility] 180    1:30 PM   (180 min.)   UC ONCOLOGY INFUSION   [facility] 24     25       26     27     28     29     P MASONIC LAB DRAW    1:30 PM   (15 min.)   [facility]   [facility]     [facility] RETURN    1:45 PM   (50 min.)   Maribel Harper [facility]C [facility] 120    3:30 PM   (120 min.)   UC ONCOLOGY INFUSION   [facility] 30     [facility] NEW WITH ROOM   10:45 AM   (60 min.)   Mary Anne Pool LICSW   M [facility] NEW   12:30 PM   (75 min.)   Quynh Mcguire DO   M [facility] 31 April 2017 Sunday Monday Tuesday Wednesday Thursday Friday Saturday                                 1       2     3     4     [facility] MASONIC LAB DRAW    1:30 PM   (15 min.)   [facility] MASONIC LAB DRAW   [facility] Masonic Lab Draw     LONG    1:45 PM   (60 min.)   Jose  Janine Seth [facility] Medication Therapy Management     [facility] ONC INFUSION 120    2:00 PM   (120 min.)    ONCOLOGY INFUSION   [facility] 5     6     7     8       9     10     11     CT SOFT TISSUE NECK W    9:25 AM   (20 min.)   [address]     CT CHEST/ABDOMEN/PELVIS W    9:45 AM   (20 min.)   [address]     [facility] MASONIC LAB DRAW   12:45 PM   (15 min.)    [facility]   [facility]     [facility] RETURN    1:00 PM   (30 min.)   Keira Hernandez MD   [facility]     [facility] ONC INFUSION 180    2:30 PM   (180 min.)    ONCOLOGY INFUSION   [facility] 12     13     14     15       16     17     18     [facility] MASONIC LAB DRAW   12:00 PM   (15 min.)    [facility] LAB DRAW   [facility]     [facility] ONC INFUSION 180   12:30 PM   (180 min.)    ONCOLOGY INFUSION   [facility] 19     20     21     22       23     24     25     [facility] MASONIC LAB DRAW   12:00 PM   (15 min.)    [facility] LAB DRAW   [facility]     [facility] ONC INFUSION 180   12:30 PM   (180 min.)   UC ONCOLOGY INFUSION   [facility] 26     27     28     29       30                                               Recent Results (from the past 24 hour(s))   CBC with platelets differential    Collection Time: 03/29/17  1:35 PM   Result Value Ref Range    WBC 1.4 (L) 4.0 - 11.0 10e9/L    RBC Count 2.91 (L) 4.4 - 5.9 10e12/L    Hemoglobin 8.4 (L) 13.3 - 17.7 g/dL    Hematocrit 25.6 (L) 40.0 - 53.0 %    MCV 88 78 - 100 fl    MCH 28.9 26.5 - 33.0 pg    MCHC 32.8 31.5 - 36.5 g/dL    RDW 24.1 (H) 10.0 - 15.0 %    Platelet Count 156 150 - 450 10e9/L    Diff Method Automated Method     % Neutrophils 70.0 %    % Lymphocytes 16.1 %    % Monocytes 10.2 %    % Eosinophils 1.5 %    % Basophils 0.7 %    % Immature Granulocytes 1.5 %    Nucleated RBCs 0 0 /100    Absolute Neutrophil 1.0 (L) 1.6 - 8.3 10e9/L     Absolute Lymphocytes 0.2 (L) 0.8 - 5.3 10e9/L    Absolute Monocytes 0.1 0.0 - 1.3 10e9/L    Absolute Eosinophils 0.0 0.0 - 0.7 10e9/L    Absolute Basophils 0.0 0.0 - 0.2 10e9/L    Abs Immature Granulocytes 0.0 0 - 0.4 10e9/L    Absolute Nucleated RBC 0.0    Comprehensive metabolic panel    Collection Time: 03/29/17  1:35 PM   Result Value Ref Range    Sodium 135 133 - 144 mmol/L    Potassium 3.8 3.4 - 5.3 mmol/L    Chloride 99 94 - 109 mmol/L    Carbon Dioxide 27 20 - 32 mmol/L    Anion Gap 8 3 - 14 mmol/L    Glucose 113 (H) 70 - 99 mg/dL    Urea Nitrogen 14 7 - 30 mg/dL    Creatinine 0.53 (L) 0.66 - 1.25 mg/dL    GFR Estimate >90  Non  GFR Calc   >60 mL/min/1.7m2    GFR Estimate If Black >90   GFR Calc   >60 mL/min/1.7m2    Calcium 8.5 8.5 - 10.1 mg/dL    Bilirubin Total 0.3 0.2 - 1.3 mg/dL    Albumin 3.0 (L) 3.4 - 5.0 g/dL    Protein Total 7.6 6.8 - 8.8 g/dL    Alkaline Phosphatase 66 40 - 150 U/L    ALT 64 0 - 70 U/L    AST 41 0 - 45 U/L   Magnesium    Collection Time: 03/29/17  1:35 PM   Result Value Ref Range    Magnesium 2.7 (H) 1.6 - 2.3 mg/dL               Follow-ups after your visit        Your next 10 appointments already scheduled     Mar 29, 2017  3:30 PM CDT   Infusion 120 with  ONCOLOGY INFUSION, UC 25 [facility] ([facility])    [address] [place] [address]  2nd [facility] [address] [phone]            Mar 30, 2017 11:00 AM CDT   (Arrive by 10:45 AM)   NEW WITH ROOM with DANIELA Garrison, UC 2 118 CONSULT [facility] ([facility])    [address] [place] [address]  2nd Floor  [facility] [address] [phone]            Mar 30, 2017 12:45 PM CDT   (Arrive by 12:30 PM)   New Patient Visit with DO AMEE Flores [facility] ([facility])    [address] [place] [address] [phone]             Apr 04, 2017  1:30 PM CDT   Masonic Lab Draw with  MASONIC LAB DRAW   [facility]    [address] [facility]  2nd [facility] [id number] [phone]            Apr 04, 2017  2:00 PM CDT   Infusion 120 with  ONCOLOGY INFUSION, UC 11 ATC   [facility] ([facility])    [address] [place] [id number] [phone]            Apr 04, 2017  2:00 PM CDT   (Arrive by 1:45 PM)   Office Visit with Janine Garcia [facility] Medication Therapy Management ([facility])    [address] [place] [id number] [phone]           Bring a current list of meds and any records pertaining to this visit.  For Physicals, please bring immunization records and any forms needing to be filled out.  Please arrive 10 minutes early to complete paperwork.            Apr 11, 2017  9:40 AM CDT   (Arrive by 9:25 AM)   CT SOFT TISSUE NECK W CONTRAST with UCCT2   [facility] ([facility])    [address] [place] [id number] [phone]           Please bring any scans or X-rays taken at other hospitals, if similar tests were done. Also bring a list of your medicines, including vitamins, minerals and over-the-counter drugs. It is safest to leave personal items at home.  Be sure to tell your doctor:   If you have any allergies.   If there s any chance you are pregnant.   If you are breastfeeding.   If you have any special needs.  You will have contrast for this exam. To prepare:   Do not eat or drink for 2 hours before your exam. If you need to take medicine, you may take it with small sips of water. (We may ask you to take liquid medicine as well.)   The day before your exam, drink extra fluids at least six 8-ounce glasses (unless your doctor tells you to restrict your fluids).   Patients over 70 or patients with diabetes or kidney problems:   If you haven t had a blood test (creatinine test) within the last 30 days, go to your clinic or Diagnostic Imaging Department for this test.  If you have diabetes:   If your kidney function is normal, continue taking your metformin (Avandamet, Glucophage, Glucovance, Metaglip) on the day of your exam.   If your kidney function is abnormal, wait 48 hours before restarting this medicine.  Please wear loose clothing, such as a sweat suit or jogging clothes. Avoid snaps, zippers and other metal. We may ask you to undress and put on a hospital gown.  If you have any questions, please call the Imaging Department where you will have your exam.              Who to contact     If you have questions or need follow up information about today's clinic visit or your schedule please contact [facility] directly at [phone].  Normal or non-critical lab and imaging results will be communicated to you by [employer], letter or phone within 4 business days after the clinic has received the results. If you do not hear from us within 7 days, please contact the clinic through [employer] or phone. If you have a critical or abnormal lab result, we will notify you by phone as soon as possible.  Submit refill requests through [employer] or call your pharmacy and they will forward the refill request to us. Please allow 3 business days for your refill to be completed.          Additional Information About Your Visit        [employer] Information     [employer] gives you secure access to your electronic health record. If you see a primary care provider, you can also send messages to your care team and make appointments. If you have questions, please call your primary care clinic.  If you do not have a primary care provider, please call [phone] and they will assist you.        Care EveryWhere ID     This is your Care EveryWhere ID. This could be used by other  organizations to access your [place] medical records  [id number]         Blood Pressure from Last 3 Encounters:   03/29/17 118/84   03/23/17 122/81   03/21/17 120/80    Weight from Last 3 Encounters:   03/29/17 58.4 kg (128 lb 12.8 oz)   03/23/17 58.9 kg (129 lb 14.4 oz)   03/21/17 56.2 kg (123 lb 12.8 oz)              We Performed the Following     CBC with platelets differential     Comprehensive metabolic panel     Magnesium          Today's Medication Changes          These changes are accurate as of: 3/29/17  3:12 PM.  If you have any questions, ask your nurse or doctor.               These medicines have changed or have updated prescriptions.        Dose/Directions    mineral oil-hydrophilic petrolatum   This may have changed:    - when to take this  - reasons to take this   Used for:  Malignant neoplasm of tongue (H)        Apply topically every 8 hours   Quantity:  50 g   Refills:  0       pantoprazole 20 MG EC tablet   Commonly known as:  PROTONIX   This may have changed:    - when to take this  - reasons to take this   Used for:  Gastroesophageal reflux disease without esophagitis        Dose:  20 mg   Take 1 tablet (20 mg) by mouth daily   Quantity:  30 tablet   Refills:  3            Where to get your medicines      Some of these will need a paper prescription and others can be bought over the counter.  Ask your nurse if you have questions.     Bring a paper prescription for each of these medications     oxyCODONE 5 MG/5ML solution                Primary Care Provider Office Phone # Fax #    Mary Jane Nicollet [facility] [phone] [address] [address]        Thank you!     Thank you for choosing [facility]  for your care. Our goal is always to provide you with excellent care. Hearing back from our patients is one way we can continue to improve our services. Please take a few minutes to complete the written survey that  you may receive in the mail after your visit with us. Thank you!             Your Updated Medication List - Protect others around you: Learn how to safely use, store and throw away your medicines at www.disposemymeds.org.          This list is accurate as of: 3/29/17  3:12 PM.  Always use your most recent med list.                   Brand Name Dispense Instructions for use    citalopram 10 MG/5ML solution    celeXA    300 mL    Take 10 mLs (20 mg) by mouth daily       clindamycin 1 % lotion    CLEOCIN T    60 mL    Apply topically 2 times daily       ibuprofen 200 MG tablet    ADVIL/MOTRIN     Take 200 mg by mouth daily as needed       levothyroxine 25 MCG tablet    SYNTHROID/LEVOTHROID    90 tablet    Take 2 tablets (50 mcg) by mouth daily       lidocaine 2 % solution    XYLOCAINE    1000 mL    15 mL swish and spit q3h PRN, max 8 doses in 24 hours       LORazepam 0.5 MG tablet    ATIVAN    30 tablet    Take 1 tablet (0.5 mg) by mouth every 4 hours as needed (Anxiety, Nausea/Vomiting or Sleep)       methylphenidate 5 MG tablet    RITALIN    60 tablet    Take 1 tablet (5 mg) by mouth 2 times daily       mineral oil-hydrophilic petrolatum     50 g    Apply topically every 8 hours       multivitamins with minerals Liqd liquid     1 Bottle    15 mLs by Per Feeding Tube route daily       OLANZapine 5 MG tablet    zyPREXA    60 tablet    Take 1 tablet (5 mg) by mouth 2 times daily as needed For nausea       ondansetron 4 MG tablet    ZOFRAN     Take 4 mg by mouth daily as needed       * oxyCODONE 20 MG 12 hr tablet    OxyCONTIN    60 tablet    Take 1 tablet (20 mg) by mouth every 12 hours maximum 2 tablet(s) per day       * oxyCODONE 5 MG/5ML solution    ROXICODONE    500 mL    10 mLs (10 mg) by Oral or PEG tube route every 4 hours as needed for moderate to severe pain       pantoprazole 20 MG EC tablet    PROTONIX    30 tablet    Take 1 tablet (20 mg) by mouth daily       prochlorperazine 10 MG tablet    COMPAZINE    30  tablet    Take 1 tablet (10 mg) by mouth every 6 hours as needed (Nausea/Vomiting)       * Notice:  This list has 2 medication(s) that are the same as other medications prescribed for you. Read the directions carefully, and ask your doctor or other care provider to review them with you.

## 2017-03-29 NOTE — PATIENT INSTRUCTIONS
Contact Numbers  [facility]: [phone]    After Hours:  [phone]  Triage: [phone]    Please call the [facility] Triage line if you experience a temperature greater than or equal to 100.5, shaking chills, have uncontrolled nausea, vomiting and/or diarrhea, dizziness, shortness of breath, chest pain, bleeding, unexplained bruising, or if you have any other new/concerning symptoms, questions or concerns.     If it is after hours, weekends, or holidays, please call the main hospital  at  [phone] and ask to speak to the Oncology doctor on call.     If you are having any concerning symptoms or wish to speak to a provider before your next infusion visit, please call your care coordinator or triage to notify them so we can adequately serve you.     If you need a refill on a narcotic prescription or other medication, please call triage before your infusion appointment.         March 2017 Sunday Monday Tuesday Wednesday Thursday Friday Saturday                  1     2     3     4       5     6     7     [facility] MASONIC LAB DRAW    2:00 PM   (15 min.)    MASONIC LAB DRAW   [facility] Lab Draw     [facility] ONC INFUSION 120    2:30 PM   (120 min.)    ONCOLOGY INFUSION   [facility] RETURN WITH ROOM    3:15 PM   (60 min.)   Yanique Wiggins GC   [facility] MASONIC LAB DRAW    4:00 PM   (15 min.)    MASONIC LAB DRAW   [facility] Lab Draw 8     9     10     [facility] MASONIC LAB DRAW    7:45 AM   (15 min.)    MASONIC LAB DRAW   [facility] Lab Draw     [facility] RETURN    8:05 AM   (50 min.)   Opal Cannon PA-C   [facility] ONC INFUSION 120    9:00 AM   (120 min.)    ONCOLOGY INFUSION   [facility] 11       12     13     14     [facility] MASONIC LAB DRAW    1:00 PM   (15 min.)    MASONIC LAB DRAW   [facility] Lab Draw     [facility] ONC INFUSION 120    1:30 PM   (120 min.)    ONCOLOGY  INFUSION   [facility] 15     16     17     18       19     20     PROCEDURE - 1.5 HR   12:30 PM   (90 min.)   U2A ROOM 16   Unit 2A [facility]     Admission   12:35 PM   Suyapa Junior MD   Unit 6D Observation [facility]   (Discharge: 3/21/2017)     IR G TUBE PERCUTANEOUS PLCMNT    2:30 PM   (120 min.)   UUIR4   [facility] [place], Interventional Radiology 21     22     23     [facility] MASONIC LAB DRAW   12:30 PM   (15 min.)   UC MASONIC LAB DRAW   [facility] Masonic Lab Draw     [facility] RETURN   12:45 PM   (50 min.)   Maribel Harper PA-C   [facility] ONC INFUSION 180    1:30 PM   (180 min.)   UC ONCOLOGY INFUSION   [facility] 24     25       26     27     28     29     [facility] MASONIC LAB DRAW    1:30 PM   (15 min.)   [facility] MASONIC LAB DRAW   [facility] Masonic Lab Draw     [facility] RETURN    1:45 PM   (50 min.)   Maribel Harper PA-C   [facility] ONC INFUSION 120    3:30 PM   (120 min.)   UC ONCOLOGY INFUSION   [facility] 30     [facility] NEW WITH ROOM   10:45 AM   (60 min.)   Mary Anne Pool LICSW   [facility] NEW   12:30 PM   (75 min.)   Quynh Mcguire DO   [facility] 31 April 2017 Sunday Monday Tuesday Wednesday Thursday Friday Saturday                                 1       2     3     4     [facility] MASONIC LAB DRAW    1:30 PM   (15 min.)   UC MASONIC LAB DRAW   [employer] Lab Draw     LONG    1:45 PM   (60 min.)   Janine Garcia [facility] Medication Therapy Management     [facility] ONC INFUSION 120    2:00 PM   (120 min.)   UC ONCOLOGY INFUSION   [facility] 5     6     7     8       9     10     11     CT SOFT TISSUE NECK W    9:25 AM   (20 min.)   CT2   [facility] CT     CT CHEST/ABDOMEN/PELVIS W    9:45 AM   (20 min.)   CT2   [facility] CT     [facility] MASONIC LAB DRAW   12:45 PM    (15 min.)    MASONIC LAB DRAW   [facility] Lab Draw     UMP RETURN    1:00 PM   (30 min.)   Keira Hernandez MD   [facility]     UMP ONC INFUSION 180    2:30 PM   (180 min.)    ONCOLOGY INFUSION   [facility] 12     13     14     15       16     17     18     [facility] MASONIC LAB DRAW   12:00 PM   (15 min.)    MASONIC LAB DRAW   [facility] Lab Draw     UMP ONC INFUSION 180   12:30 PM   (180 min.)    ONCOLOGY INFUSION   [facility] 19     20     21     22       23     24     25     [facility] MASONIC LAB DRAW   12:00 PM   (15 min.)    MASONIC LAB DRAW   [facility] Lab Draw     UMP ONC INFUSION 180   12:30 PM   (180 min.)    ONCOLOGY INFUSION   [facility] 26     27     28     29       30                                               Recent Results (from the past 24 hour(s))   CBC with platelets differential    Collection Time: 03/29/17  1:35 PM   Result Value Ref Range    WBC 1.4 (L) 4.0 - 11.0 10e9/L    RBC Count 2.91 (L) 4.4 - 5.9 10e12/L    Hemoglobin 8.4 (L) 13.3 - 17.7 g/dL    Hematocrit 25.6 (L) 40.0 - 53.0 %    MCV 88 78 - 100 fl    MCH 28.9 26.5 - 33.0 pg    MCHC 32.8 31.5 - 36.5 g/dL    RDW 24.1 (H) 10.0 - 15.0 %    Platelet Count 156 150 - 450 10e9/L    Diff Method Automated Method     % Neutrophils 70.0 %    % Lymphocytes 16.1 %    % Monocytes 10.2 %    % Eosinophils 1.5 %    % Basophils 0.7 %    % Immature Granulocytes 1.5 %    Nucleated RBCs 0 0 /100    Absolute Neutrophil 1.0 (L) 1.6 - 8.3 10e9/L    Absolute Lymphocytes 0.2 (L) 0.8 - 5.3 10e9/L    Absolute Monocytes 0.1 0.0 - 1.3 10e9/L    Absolute Eosinophils 0.0 0.0 - 0.7 10e9/L    Absolute Basophils 0.0 0.0 - 0.2 10e9/L    Abs Immature Granulocytes 0.0 0 - 0.4 10e9/L    Absolute Nucleated RBC 0.0    Comprehensive metabolic panel    Collection Time: 03/29/17  1:35 PM   Result Value Ref Range    Sodium 135 133 - 144 mmol/L    Potassium 3.8 3.4 - 5.3 mmol/L     Chloride 99 94 - 109 mmol/L    Carbon Dioxide 27 20 - 32 mmol/L    Anion Gap 8 3 - 14 mmol/L    Glucose 113 (H) 70 - 99 mg/dL    Urea Nitrogen 14 7 - 30 mg/dL    Creatinine 0.53 (L) 0.66 - 1.25 mg/dL    GFR Estimate >90  Non  GFR Calc   >60 mL/min/1.7m2    GFR Estimate If Black >90   GFR Calc   >60 mL/min/1.7m2    Calcium 8.5 8.5 - 10.1 mg/dL    Bilirubin Total 0.3 0.2 - 1.3 mg/dL    Albumin 3.0 (L) 3.4 - 5.0 g/dL    Protein Total 7.6 6.8 - 8.8 g/dL    Alkaline Phosphatase 66 40 - 150 U/L    ALT 64 0 - 70 U/L    AST 41 0 - 45 U/L   Magnesium    Collection Time: 03/29/17  1:35 PM   Result Value Ref Range    Magnesium 2.7 (H) 1.6 - 2.3 mg/dL

## 2017-03-29 NOTE — LETTER
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 Pathologic staging: [id number]. Recurrence with distant metastasis after initial chemoRT.       TREATMENT:    1- April 28, 2016 transmandibular subtotal oral glossectomy, right base of tongue resection with partial pharyngectomy, bilateral neck dissections, and free flap reconstruction. Concurrent ChemoRt with high dose Cisplatin 6/6/16. day 22 cisplatin on 6/27/16. He completed day 43 on 7/19/16. Radiation completed, 6600cGy on 7/21/16.   2- Palliative intent treatment with Pembrolizumab 12/6/2016  3- Due to rapid progression of disease the treatment was switched to Carboplatin, 5-FU and Cetuximab on 1/18/17 and has received 2 cycles with response seen on CT CAP on 2/21/17. Cycle 3 started on 2/28/17.    He had some issues during cycle 3 regarding intake, nausea, weakness etc. He recently had a PEG tube placed to address the nutrition concerns and had cycle 4 last week. He is here for cycle 4 day 8 erbitux today.        SUBJECTIVE: Patient received cycle 4 treatment last week. He said the past week was not great, but better than previous first weeks. He had some nausea starting on day 4 and it continues. He has been taking ativan about 1x/day and zyprexa BID. He does not think zofran or compazine help him. This currently regimen has kept his nausea controlled and he has not had any vomiting. It is improving but he does think the control was suboptimal initially. He continues with PEG feeds, he does about 5 cans throughout the day. No clear relationship between his feeding and nausea. He is generally more tired, but has been up around home, walking his dog etc. He  denies any new pain and current pain is well controlled. He has no new mouth sores, hand/foot syndrome, diarrhea. His erbitux rash on his face is a bit better. No other new concerns today. Ongoing phlegm and some intermittent constipation.     No fevers/chills/sweats, HA, dizziness, cough, congestion,  chest pain, SOB, loose stools, swelling, bleeding, or hand/foot syndrome.     Meds  Current Outpatient Prescriptions   Medication Sig Dispense Refill     oxyCODONE (ROXICODONE) 5 MG/5ML solution 10 mLs (10 mg) by Oral or PEG tube route every 4 hours as needed for moderate to severe pain 500 mL 0     levothyroxine (SYNTHROID/LEVOTHROID) 25 MCG tablet Take 2 tablets (50 mcg) by mouth daily 90 tablet 3     oxyCODONE (OXYCONTIN) 20 MG 12 hr tablet Take 1 tablet (20 mg) by mouth every 12 hours maximum 2 tablet(s) per day 60 tablet 0     methylphenidate (RITALIN) 5 MG tablet Take 1 tablet (5 mg) by mouth 2 times daily 60 tablet 0     OLANZapine (ZYPREXA) 5 MG tablet Take 1 tablet (5 mg) by mouth 2 times daily as needed For nausea 60 tablet 3     clindamycin (CLEOCIN T) 1 % lotion Apply topically 2 times daily 60 mL 3     lidocaine (XYLOCAINE) 2 % solution 15 mL swish and spit q3h PRN, max 8 doses in 24 hours 1000 mL 3     LORazepam (ATIVAN) 0.5 MG tablet Take 1 tablet (0.5 mg) by mouth every 4 hours as needed (Anxiety, Nausea/Vomiting or Sleep) 30 tablet 5     prochlorperazine (COMPAZINE) 10 MG tablet Take 1 tablet (10 mg) by mouth every 6 hours as needed (Nausea/Vomiting) 30 tablet 5     pantoprazole (PROTONIX) 20 MG EC tablet Take 1 tablet (20 mg) by mouth daily (Patient taking differently: Take 20 mg by mouth daily as needed ) 30 tablet 3     ibuprofen (ADVIL/MOTRIN) 200 MG tablet Take 200 mg by mouth daily as needed        ondansetron (ZOFRAN) 4 MG tablet Take 4 mg by mouth daily as needed        citalopram (CELEXA) 10 MG/5ML solution Take 10 mLs (20 mg) by mouth daily 300 mL 3     multivitamins with minerals  (CERTAVITE) LIQD 15 mLs by Per Feeding Tube route daily 1 Bottle 1     mineral oil-hydrophilic petrolatum (AQUAPHOR) ointment Apply topically every 8 hours (Patient taking differently: Apply topically every 8 hours as needed ) 50 g 0      PHYSICAL EXAMINATION:   VITAL SIGNS: ./84  Pulse 104  Temp 98  F (36.7  C) (Oral)  Resp 16  Wt 58.4 kg (128 lb 12.8 oz)  SpO2 100%  BMI 16.54 kg/m2   Wt Readings from Last 10 Encounters:   03/29/17 58.4 kg (128 lb 12.8 oz)   03/23/17 58.9 kg (129 lb 14.4 oz)   03/21/17 56.2 kg (123 lb 12.8 oz)   03/14/17 57.6 kg (126 lb 14.4 oz)   03/10/17 59.4 kg (131 lb)   03/07/17 60 kg (132 lb 4.8 oz)   02/28/17 59.7 kg (131 lb 11.2 oz)   02/21/17 62.1 kg (136 lb 14.4 oz)   02/15/17 62 kg (136 lb 9.6 oz)   02/14/17 61.1 kg (134 lb 11.2 oz)   GENERAL: Sitting comfortably. Here today with uncle today.   HEENT: Pupils are equal. Oropharynx is with some mild mucositis. Moderate-severe trismus. Acne like rash on face-improved  NECK: No cervical or supraclavicular lymphadenopathy, but firm throughout and some tenderness in R anterior neck. There are 3 small lumps in the neck that are unchanged compared to last week-not sure if they could be dermal mets <1.0cm?   LUNGS: Clear bilaterally.   HEART: S1, S2, regular.   ABDOMEN: Soft, nontender, nondistended, no hepatosplenomegaly. Tender near new PEG tube c/d/i  EXTREMITIES: Warm, well perfused.   NEUROLOGIC: Alert, awake.   SKIN: No rash.   LYMPHATICS: No edema.     DATA:  Results for MICHEAL GARCIA (MRN [id number]) as of 3/29/2017 18:13   Ref. Range 3/23/2017 13:06 3/29/2017 13:35   Sodium Latest Ref Range: 133 - 144 mmol/L 137 135   Potassium Latest Ref Range: 3.4 - 5.3 mmol/L 4.0 3.8   Chloride Latest Ref Range: 94 - 109 mmol/L 102 99   Carbon Dioxide Latest Ref Range: 20 - 32 mmol/L 27 27   Urea Nitrogen Latest Ref Range: 7 - 30 mg/dL 12 14   Creatinine Latest Ref Range: 0.66 - 1.25 mg/dL 0.56 (L) 0.53 (L)   GFR Estimate Latest Ref  Range: >60 mL/min/1.7m2 >90... >90...   GFR Estimate If Black Latest Ref Range: >60 mL/min/1.7m2 >90... >90...   Calcium Latest Ref Range: 8.5 - 10.1 mg/dL 8.5 8.5   Anion Gap Latest Ref Range: 3 - 14 mmol/L 8 8   Magnesium Latest Ref Range: 1.6 - 2.3 mg/dL  2.7 (H)   Albumin Latest Ref Range: 3.4 - 5.0 g/dL 3.0 (L) 3.0 (L)   Protein Total Latest Ref Range: 6.8 - 8.8 g/dL 7.4 7.6   Bilirubin Total Latest Ref Range: 0.2 - 1.3 mg/dL 0.4 0.3   Alkaline Phosphatase Latest Ref Range: 40 - 150 U/L 69 66   ALT Latest Ref Range: 0 - 70 U/L 37 64   AST Latest Ref Range: 0 - 45 U/L 25 41   Glucose Latest Ref Range: 70 - 99 mg/dL 114 (H) 113 (H)   WBC Latest Ref Range: 4.0 - 11.0 10e9/L 2.0 (L) 1.4 (L)   Hemoglobin Latest Ref Range: 13.3 - 17.7 g/dL 8.5 (L) 8.4 (L)   Hematocrit Latest Ref Range: 40.0 - 53.0 % 26.1 (L) 25.6 (L)   Platelet Count Latest Ref Range: 150 - 450 10e9/L 105 (L) 156   RBC Count Latest Ref Range: 4.4 - 5.9 10e12/L 2.97 (L) 2.91 (L)   MCV Latest Ref Range: 78 - 100 fl 88 88   MCH Latest Ref Range: 26.5 - 33.0 pg 28.6 28.9   MCHC Latest Ref Range: 31.5 - 36.5 g/dL 32.6 32.8   RDW Latest Ref Range: 10.0 - 15.0 % 25.0 (H) 24.1 (H)   Diff Method Unknown Automated Method Automated Method   % Neutrophils Latest Units: % 63.1 70.0   % Lymphocytes Latest Units: % 15.7 16.1   % Monocytes Latest Units: % 18.2 10.2   % Eosinophils Latest Units: % 2.0 1.5   % Basophils Latest Units: % 0.5 0.7   % Immature Granulocytes Latest Units: % 0.5 1.5   Nucleated RBCs Latest Ref Range: 0 /100 0 0   Absolute Neutrophil Latest Ref Range: 1.6 - 8.3 10e9/L 1.3 (L) 1.0 (L)   Absolute Lymphocytes Latest Ref Range: 0.8 - 5.3 10e9/L 0.3 (L) 0.2 (L)   Absolute Monocytes Latest Ref Range: 0.0 - 1.3 10e9/L 0.4 0.1   Absolute Eosinophils Latest Ref Range: 0.0 - 0.7 10e9/L 0.0 0.0   Absolute Basophils Latest Ref Range: 0.0 - 0.2 10e9/L 0.0 0.0   Abs Immature Granulocytes Latest Ref Range: 0 - 0.4 10e9/L 0.0 0.0   Absolute Nucleated RBC  Unknown 0.0 0.0          ASSESSMENT:  A 27 year old gentleman  [id number] squamous cell carcinoma of the right oral tongue.Status post April 28, 2016 transmandibular subtotal oral glossectomy, right base of tongue resection with partial pharyngectomy, bilateral neck dissections, and free flap. Initiated treatment by 6/6/16.   He completed his concurrent chemoRT 7/21/16.  Follow up PET CT scan 10/28/16 was reviewed at multidisciplinary head and neck tumor board. It was consistent with local and distant recurrence. L- His case was subsequently reviewed at Thoracic oncology conference and we will proceed with biopsy by IR.glendy biopsy results were reviewed with the patient and family members which confirmed metastatic disease.   He was seen at [facility] and no first line trials were available. He was switched to Pembrolizumab with a plan to do short term scans to assess the pace of disease progression.   CT scan 1/16/17 showed rapid disease progression with thoracic vertebral fracture. Multiple lytic/destructive spine lesions were reviewed by spine surgery and were not felt to be unstable. He completed palliative radiation to bones.   - He is on chemotherapy with carboplatin/5-FU and Cetuximab. He was given cycle 4 last week and his antiemetics were altered to help with n/v. This seems to have helped, but still had nausea starting day 4 that he felt was not optimally controlled with ativan and zyprexa (zofran and compazine have not worked for him). No vomiting, but he felt it could be better controlled in the future. Improving now. Could try adding dex on days 5-7 with his next cycle?? He will return for cycle 4 day 15 next week with scans the following week. He has had some lumps on the neck that I am concerned could be dermal mets-not clear whether these have been present prior. No change since last week and due for scans in two weeks without any other signs/symptoms or progression.     Bone mets: On zometa monthly due  on 4/11.     Dysphagia. Patient's swallowing was improving following chemorads, but worsened again with starting chemotherapy with intermittent mouth sores than likely extend into his throat. He had a PEG tube placed which has been helpful with intake and overall well being. Continue swallowing exercises.     Coping.Palliative care appt for tomorrow-but he was not aware of this or what it was for. He asked to reschedule from tomorrow to another day with other appts already scheduled to ease transportation. He was open to meeting with palliative care provider, but did not want to meet with SW at this time. He is on Celexa 20 mg daily.    Throat pain and mucositis. Adequately controlled with OxyContin 20 mg bid and oxycodone 10 mg qid. He also continues to use salt/soda swishes and lidocaine. No new pain today.     Hypothyroidism. Increased to 50mcg last week given levels. Recheck in early May.     Phlegm: better with use of mucinex-continue.     Elevated mag: unclear cause. 2.7 today. Should drop with erbitux infusions. Monitor.     Heme: ANC 1.0 today. Okay to give erbitux as planned. Reviewed neutropenic precautions.     Constipation: use senna prn-secondary to narcotics.     It is my privilege to be involved in the care of the above patient.     Maribel Harper PA-C  [facility]  [address] [place] [id number] [phone]

## 2017-03-29 NOTE — NURSING NOTE
"Darek Navarro is a 27 year old male who presents for:  Chief Complaint   Patient presents with     Port Draw     Pt with hx of head and neck ca labs collected via portacath.      Oncology Clinic Visit     Throat Cancer        Initial Vitals:  /84  Pulse 104  Temp 98  F (36.7  C) (Oral)  Resp 16  Wt 58.4 kg (128 lb 12.8 oz)  SpO2 100%  BMI 16.54 kg/m2 Estimated body mass index is 16.54 kg/(m^2) as calculated from the following:    Height as of 3/20/17: 1.88 m (6' 2\").    Weight as of this encounter: 58.4 kg (128 lb 12.8 oz).. Body surface area is 1.75 meters squared. BP completed using cuff size: NA (Not Taken)  Data Unavailable No LMP for male patient. Allergies and medications reviewed.     Medications: MEDICATION REFILLS NEEDED TODAY.  Pharmacy name entered into [facility]:    [id number] [place], [place] - [address]  [facility] [id number] [place], [place] - [address]    Comments: Patient is not having any pain today.     6 minutes for nursing intake (face to face time)   Sheri London CMA       "

## 2017-03-29 NOTE — PROGRESS NOTES
Infusion Nursing Note:  Darek Navarro presents today for C4D8 Erbitux.    Patient seen by provider today: Yes: CLARY Silverman    Treatment Conditions:  Lab Results   Component Value Date    HGB 8.4 03/29/2017     Lab Results   Component Value Date    WBC 1.4 03/29/2017      Lab Results   Component Value Date    ANEU 1.0 03/29/2017     Lab Results   Component Value Date     03/29/2017      Lab Results   Component Value Date     03/29/2017                   Lab Results   Component Value Date    POTASSIUM 3.8 03/29/2017           Lab Results   Component Value Date    MAG 2.7 03/29/2017            Lab Results   Component Value Date    CR 0.53 03/29/2017                   Lab Results   Component Value Date    COLE 8.5 03/29/2017                Lab Results   Component Value Date    BILITOTAL 0.3 03/29/2017           Lab Results   Component Value Date    ALBUMIN 3.0 03/29/2017                    Lab Results   Component Value Date    ALT 64 03/29/2017           Lab Results   Component Value Date    AST 41 03/29/2017       Intravenous Access:  Implanted Port.  Access dc'd at time of discharge.      Note:   TORB 3/29/17@1450 CLARY Silverman-Jazlyn Peres RN  --OK to treat with ANC:1.0  --No intervention for elevated Magnesium  Results reviewed, copy given to patient.  Proceed with treatment.    Copy of AVS given to patient. + Blood return from PORT pre and post infusion.  Tolerated infusion without incident. Oxycodone Prescription filled today.   D/C in care of dad.  Pt will return 4/4 for provider/infusion appt.   30 minute observation performed post Erbitux.  Pt cx/d Palliative Care appt for tomorrow, will follow up with MD on 4/11 about future Palliative Care appts.    Jazlyn Peres RN    Administrations This Visit     heparin 100 UNIT/ML injection 5 mL     Admin Date Action Dose Route Administered By             03/29/2017 Given 5 mL Intracatheter Kerri Ordoñez, ISAAC                     sodium chloride (PF) 0.9% PF flush 10 mL     Admin Date Action Dose Route Administered By             03/29/2017 Given 10 mL Intracatheter Kerri Ordoñez RN

## 2017-03-29 NOTE — MR AVS SNAPSHOT
After Visit Summary   3/29/2017    Darek Navarro    MRN: [id number]           Patient Information     Date Of Birth          1990        Visit Information        Provider Department      3/29/2017 2:00 PM Maribel Harper PA-C [facility]        Today's Diagnoses     Malignant neoplasm of tongue (H)    -  1    Head and neck cancer (H)        Oral cancer (H)        Drug-induced constipation        Increased oropharyngeal secretions        Chemotherapy-induced neutropenia (H)        Bone metastasis (H)           Follow-ups after your visit        Your next 10 appointments already scheduled     Apr 04, 2017  1:30 PM CDT   Masonic Lab Draw with  MASONIC LAB DRAW   [facility] ([facility])    [address] [id number] [phone]            Apr 04, 2017  2:00 PM CDT   Infusion 120 with  ONCOLOGY INFUSION,  11 ATC   [facility] ([facility])    [address] [place] [id number] [phone]            Apr 04, 2017  2:00 PM CDT   (Arrive by 1:45 PM)   Office Visit with Janine Garcia [facility] ([facility])    [address] [place] [id number] [phone]           Bring a current list of meds and any records pertaining to this visit.  For Physicals, please bring immunization records and any forms needing to be filled out.  Please arrive 10 minutes early to complete paperwork.            Apr 11, 2017  9:40 AM CDT   (Arrive by 9:25 AM)   CT SOFT TISSUE NECK W CONTRAST with UCCT2   [facility] CT ([facility])    [address] [place] [address]  1st [facility] [id number] [phone]           Please bring any scans or X-rays taken at other hospitals, if similar tests were done. Also bring a list of your  medicines, including vitamins, minerals and over-the-counter drugs. It is safest to leave personal items at home.  Be sure to tell your doctor:   If you have any allergies.   If there s any chance you are pregnant.   If you are breastfeeding.   If you have any special needs.  You will have contrast for this exam. To prepare:   Do not eat or drink for 2 hours before your exam. If you need to take medicine, you may take it with small sips of water. (We may ask you to take liquid medicine as well.)   The day before your exam, drink extra fluids at least six 8-ounce glasses (unless your doctor tells you to restrict your fluids).  Patients over 70 or patients with diabetes or kidney problems:   If you haven t had a blood test (creatinine test) within the last 30 days, go to your clinic or Diagnostic Imaging Department for this test.  If you have diabetes:   If your kidney function is normal, continue taking your metformin (Avandamet, Glucophage, Glucovance, Metaglip) on the day of your exam.   If your kidney function is abnormal, wait 48 hours before restarting this medicine.  Please wear loose clothing, such as a sweat suit or jogging clothes. Avoid snaps, zippers and other metal. We may ask you to undress and put on a hospital gown.  If you have any questions, please call the Imaging Department where you will have your exam.            Apr 11, 2017 10:00 AM CDT   (Arrive by 9:45 AM)   CT CHEST/ABDOMEN/PELVIS W CONTRAST with UCCT2   [facility] CT ([facility])    [address] [address] [phone]           Please bring any scans or X-rays taken at other hospitals, if similar tests were done. Also bring a list of your medicines, including vitamins, minerals and over-the-counter drugs. It is safest to leave personal items at home.  Be sure to tell your doctor:   If you have any allergies.   If there s any chance you are pregnant.   If you are  breastfeeding.   If you have any special needs.  You may have contrast for this exam. To prepare:   Do not eat or drink for 2 hours before your exam. If you need to take medicine, you may take it with small sips of water. (We may ask you to take liquid medicine as well.)   The day before your exam, drink extra fluids at least six 8-ounce glasses (unless your doctor tells you to restrict your fluids).  Patients over 70 or patients with diabetes or kidney problems:   If you haven t had a blood test (creatinine test) within the last 30 days, go to your clinic or Diagnostic Imaging Department for this test.  If you have diabetes:   If your kidney function is normal, continue taking your metformin (Avandamet, Glucophage, Glucovance, Metaglip) on the day of your exam.   If your kidney function is abnormal, wait 48 hours before restarting this medicine.  You will have oral contrast for this exam:   You will drink the contrast at home. Get this from your clinic or Diagnostic Imaging Department. Please follow the directions given.  Please wear loose clothing, such as a sweat suit or jogging clothes. Avoid snaps, zippers and other metal. We may ask you to undress and put on a hospital gown.  If you have any questions, please call the Imaging Department where you will have your exam.            Apr 11, 2017 12:45 PM CDT   Masonic Lab Draw with  JERAD LAB DRAW   [facility] Lab Draw ([facility])    [address] [place] [id number]   [phone]            Apr 11, 2017  1:15 PM CDT   (Arrive by 1:00 PM)   Return Visit with Keira Hernandez MD   [facility] ([facility])    [address] [place] [id number]   [phone]            Apr 11, 2017  2:30 PM CDT   Infusion 180 with TROY ONCOLOGY INFUSION, UC 19 ATC   [facility] ([facility])    [address] [place] [address]  [facility] [address] [phone]              Who to contact     If you have questions or need follow up information about today's clinic visit or your schedule please contact [facility] directly at [phone].  Normal or non-critical lab and imaging results will be communicated to you by MyChart, letter or phone within 4 business days after the clinic has received the results. If you do not hear from us within 7 days, please contact the clinic through [employer] or phone. If you have a critical or abnormal lab result, we will notify you by phone as soon as possible.  Submit refill requests through [employer] or call your pharmacy and they will forward the refill request to us. Please allow 3 business days for your refill to be completed.          Additional Information About Your Visit        [employer] Information     [employer] gives you secure access to your electronic health record. If you see a primary care provider, you can also send messages to your care team and make appointments. If you have questions, please call your primary care clinic.  If you do not have a primary care provider, please call [phone] and they will assist you.        Care EveryWhere ID     This is your Care EveryWhere ID. This could be used by other organizations to access your [facility] medical records  [id number]        Your Vitals Were     Pulse Temperature Respirations Pulse Oximetry BMI (Body Mass Index)       104 98  F (36.7  C) (Oral) 16 100% 16.54 kg/m2        Blood Pressure from Last 3 Encounters:   03/29/17 118/84   03/23/17 122/81   03/21/17 120/80    Weight from Last 3 Encounters:   03/29/17 58.4 kg (128 lb 12.8 oz)   03/23/17 58.9 kg (129 lb 14.4 oz)   03/21/17 56.2 kg (123 lb 12.8 oz)              Today, you had the following     No orders found for display         Today's Medication Changes          These changes are accurate as of: 3/29/17  6:25 PM.  If you have any questions, ask your nurse  or doctor.               These medicines have changed or have updated prescriptions.        Dose/Directions    mineral oil-hydrophilic petrolatum   This may have changed:    - when to take this  - reasons to take this   Used for:  Malignant neoplasm of tongue (H)        Apply topically every 8 hours   Quantity:  50 g   Refills:  0       pantoprazole 20 MG EC tablet   Commonly known as:  PROTONIX   This may have changed:    - when to take this  - reasons to take this   Used for:  Gastroesophageal reflux disease without esophagitis        Dose:  20 mg   Take 1 tablet (20 mg) by mouth daily   Quantity:  30 tablet   Refills:  3            Where to get your medicines      Some of these will need a paper prescription and others can be bought over the counter.  Ask your nurse if you have questions.     Bring a paper prescription for each of these medications     oxyCODONE 5 MG/5ML solution                Primary Care Provider Office Phone # Fax #    [facility] [phone] [address] [facility] [address]  [facility] [address]        Thank you!     Thank you for choosing [facility]  for your care. Our goal is always to provide you with excellent care. Hearing back from our patients is one way we can continue to improve our services. Please take a few minutes to complete the written survey that you may receive in the mail after your visit with us. Thank you!             Your Updated Medication List - Protect others around you: Learn how to safely use, store and throw away your medicines at www.disposemymeds.org.          This list is accurate as of: 3/29/17  6:25 PM.  Always use your most recent med list.                   Brand Name Dispense Instructions for use    citalopram 10 MG/5ML solution    celeXA    300 mL    Take 10 mLs (20 mg) by mouth daily       clindamycin 1 % lotion    CLEOCIN T    60 mL    Apply topically 2 times daily       ibuprofen 200 MG  tablet    ADVIL/MOTRIN     Take 200 mg by mouth daily as needed       levothyroxine 25 MCG tablet    SYNTHROID/LEVOTHROID    90 tablet    Take 2 tablets (50 mcg) by mouth daily       lidocaine 2 % solution    XYLOCAINE    1000 mL    15 mL swish and spit q3h PRN, max 8 doses in 24 hours       LORazepam 0.5 MG tablet    ATIVAN    30 tablet    Take 1 tablet (0.5 mg) by mouth every 4 hours as needed (Anxiety, Nausea/Vomiting or Sleep)       methylphenidate 5 MG tablet    RITALIN    60 tablet    Take 1 tablet (5 mg) by mouth 2 times daily       mineral oil-hydrophilic petrolatum     50 g    Apply topically every 8 hours       multivitamins with minerals Liqd liquid     1 Bottle    15 mLs by Per Feeding Tube route daily       OLANZapine 5 MG tablet    zyPREXA    60 tablet    Take 1 tablet (5 mg) by mouth 2 times daily as needed For nausea       ondansetron 4 MG tablet    ZOFRAN     Take 4 mg by mouth daily as needed       * oxyCODONE 20 MG 12 hr tablet    OxyCONTIN    60 tablet    Take 1 tablet (20 mg) by mouth every 12 hours maximum 2 tablet(s) per day       * oxyCODONE 5 MG/5ML solution    ROXICODONE    500 mL    10 mLs (10 mg) by Oral or PEG tube route every 4 hours as needed for moderate to severe pain       pantoprazole 20 MG EC tablet    PROTONIX    30 tablet    Take 1 tablet (20 mg) by mouth daily       prochlorperazine 10 MG tablet    COMPAZINE    30 tablet    Take 1 tablet (10 mg) by mouth every 6 hours as needed (Nausea/Vomiting)       * Notice:  This list has 2 medication(s) that are the same as other medications prescribed for you. Read the directions carefully, and ask your doctor or other care provider to review them with you.

## 2017-03-29 NOTE — PROGRESS NOTES
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 Pathologic staging: [id number]. Recurrence with distant metastasis after initial chemoRT.       TREATMENT:    1- April 28, 2016 transmandibular subtotal oral glossectomy, right base of tongue resection with partial pharyngectomy, bilateral neck dissections, and free flap reconstruction. Concurrent ChemoRt with high dose Cisplatin 6/6/16. day 22 cisplatin on 6/27/16. He completed day 43 on 7/19/16. Radiation completed, 6600cGy on 7/21/16.   2- Palliative intent treatment with Pembrolizumab 12/6/2016  3- Due to rapid progression of disease the treatment was switched to Carboplatin, 5-FU and Cetuximab on 1/18/17 and has received 2 cycles with response seen on CT CAP on 2/21/17. Cycle 3 started on 2/28/17.    He had some issues during cycle 3 regarding intake, nausea, weakness etc. He recently had a PEG tube placed to address the nutrition concerns and had cycle 4 last week. He is here for cycle 4 day 8 erbitux today.        SUBJECTIVE: Patient received cycle 4 treatment last week. He said the past week was not great, but better than previous first weeks. He had some nausea starting on day 4 and it continues. He has been taking ativan about 1x/day and zyprexa BID. He does not think zofran or compazine help him. This currently regimen has kept his nausea controlled and he has not had any vomiting. It is improving but he does think the control was suboptimal initially. He continues with PEG feeds, he does about 5 cans throughout the day. No clear relationship between his feeding and nausea. He is generally more tired, but has been up around home, walking his dog etc. He denies any new pain and current pain is well controlled. He has no new mouth  sores, hand/foot syndrome, diarrhea. His erbitux rash on his face is a bit better. No other new concerns today. Ongoing phlegm and some intermittent constipation.     No fevers/chills/sweats, HA, dizziness, cough, congestion,  chest pain, SOB, loose stools, swelling, bleeding, or hand/foot syndrome.     Meds  Current Outpatient Prescriptions   Medication Sig Dispense Refill     oxyCODONE (ROXICODONE) 5 MG/5ML solution 10 mLs (10 mg) by Oral or PEG tube route every 4 hours as needed for moderate to severe pain 500 mL 0     levothyroxine (SYNTHROID/LEVOTHROID) 25 MCG tablet Take 2 tablets (50 mcg) by mouth daily 90 tablet 3     oxyCODONE (OXYCONTIN) 20 MG 12 hr tablet Take 1 tablet (20 mg) by mouth every 12 hours maximum 2 tablet(s) per day 60 tablet 0     methylphenidate (RITALIN) 5 MG tablet Take 1 tablet (5 mg) by mouth 2 times daily 60 tablet 0     OLANZapine (ZYPREXA) 5 MG tablet Take 1 tablet (5 mg) by mouth 2 times daily as needed For nausea 60 tablet 3     clindamycin (CLEOCIN T) 1 % lotion Apply topically 2 times daily 60 mL 3     lidocaine (XYLOCAINE) 2 % solution 15 mL swish and spit q3h PRN, max 8 doses in 24 hours 1000 mL 3     LORazepam (ATIVAN) 0.5 MG tablet Take 1 tablet (0.5 mg) by mouth every 4 hours as needed (Anxiety, Nausea/Vomiting or Sleep) 30 tablet 5     prochlorperazine (COMPAZINE) 10 MG tablet Take 1 tablet (10 mg) by mouth every 6 hours as needed (Nausea/Vomiting) 30 tablet 5     pantoprazole (PROTONIX) 20 MG EC tablet Take 1 tablet (20 mg) by mouth daily (Patient taking differently: Take 20 mg by mouth daily as needed ) 30 tablet 3     ibuprofen (ADVIL/MOTRIN) 200 MG tablet Take 200 mg by mouth daily as needed        ondansetron (ZOFRAN) 4 MG tablet Take 4 mg by mouth daily as needed        citalopram (CELEXA) 10 MG/5ML solution Take 10 mLs (20 mg) by mouth daily 300 mL 3     multivitamins with minerals (CERTAVITE) LIQD 15 mLs by Per Feeding Tube route daily 1 Bottle 1     mineral  oil-hydrophilic petrolatum (AQUAPHOR) ointment Apply topically every 8 hours (Patient taking differently: Apply topically every 8 hours as needed ) 50 g 0      PHYSICAL EXAMINATION:   VITAL SIGNS: ./84  Pulse 104  Temp 98  F (36.7  C) (Oral)  Resp 16  Wt 58.4 kg (128 lb 12.8 oz)  SpO2 100%  BMI 16.54 kg/m2   Wt Readings from Last 10 Encounters:   03/29/17 58.4 kg (128 lb 12.8 oz)   03/23/17 58.9 kg (129 lb 14.4 oz)   03/21/17 56.2 kg (123 lb 12.8 oz)   03/14/17 57.6 kg (126 lb 14.4 oz)   03/10/17 59.4 kg (131 lb)   03/07/17 60 kg (132 lb 4.8 oz)   02/28/17 59.7 kg (131 lb 11.2 oz)   02/21/17 62.1 kg (136 lb 14.4 oz)   02/15/17 62 kg (136 lb 9.6 oz)   02/14/17 61.1 kg (134 lb 11.2 oz)   GENERAL: Sitting comfortably. Here today with uncle today.   HEENT: Pupils are equal. Oropharynx is with some mild mucositis. Moderate-severe trismus. Acne like rash on face-improved  NECK: No cervical or supraclavicular lymphadenopathy, but firm throughout and some tenderness in R anterior neck. There are 3 small lumps in the neck that are unchanged compared to last week-not sure if they could be dermal mets <1.0cm?   LUNGS: Clear bilaterally.   HEART: S1, S2, regular.   ABDOMEN: Soft, nontender, nondistended, no hepatosplenomegaly. Tender near new PEG tube c/d/i  EXTREMITIES: Warm, well perfused.   NEUROLOGIC: Alert, awake.   SKIN: No rash.   LYMPHATICS: No edema.     DATA:  Results for MICHEAL GARCIA (MRN [id number]) as of 3/29/2017 18:13   Ref. Range 3/23/2017 13:06 3/29/2017 13:35   Sodium Latest Ref Range: 133 - 144 mmol/L 137 135   Potassium Latest Ref Range: 3.4 - 5.3 mmol/L 4.0 3.8   Chloride Latest Ref Range: 94 - 109 mmol/L 102 99   Carbon Dioxide Latest Ref Range: 20 - 32 mmol/L 27 27   Urea Nitrogen Latest Ref Range: 7 - 30 mg/dL 12 14   Creatinine Latest Ref Range: 0.66 - 1.25 mg/dL 0.56 (L) 0.53 (L)   GFR Estimate Latest Ref Range: >60 mL/min/1.7m2 >90... >90...   GFR Estimate If Black Latest Ref Range:  >60 mL/min/1.7m2 >90... >90...   Calcium Latest Ref Range: 8.5 - 10.1 mg/dL 8.5 8.5   Anion Gap Latest Ref Range: 3 - 14 mmol/L 8 8   Magnesium Latest Ref Range: 1.6 - 2.3 mg/dL  2.7 (H)   Albumin Latest Ref Range: 3.4 - 5.0 g/dL 3.0 (L) 3.0 (L)   Protein Total Latest Ref Range: 6.8 - 8.8 g/dL 7.4 7.6   Bilirubin Total Latest Ref Range: 0.2 - 1.3 mg/dL 0.4 0.3   Alkaline Phosphatase Latest Ref Range: 40 - 150 U/L 69 66   ALT Latest Ref Range: 0 - 70 U/L 37 64   AST Latest Ref Range: 0 - 45 U/L 25 41   Glucose Latest Ref Range: 70 - 99 mg/dL 114 (H) 113 (H)   WBC Latest Ref Range: 4.0 - 11.0 10e9/L 2.0 (L) 1.4 (L)   Hemoglobin Latest Ref Range: 13.3 - 17.7 g/dL 8.5 (L) 8.4 (L)   Hematocrit Latest Ref Range: 40.0 - 53.0 % 26.1 (L) 25.6 (L)   Platelet Count Latest Ref Range: 150 - 450 10e9/L 105 (L) 156   RBC Count Latest Ref Range: 4.4 - 5.9 10e12/L 2.97 (L) 2.91 (L)   MCV Latest Ref Range: 78 - 100 fl 88 88   MCH Latest Ref Range: 26.5 - 33.0 pg 28.6 28.9   MCHC Latest Ref Range: 31.5 - 36.5 g/dL 32.6 32.8   RDW Latest Ref Range: 10.0 - 15.0 % 25.0 (H) 24.1 (H)   Diff Method Unknown Automated Method Automated Method   % Neutrophils Latest Units: % 63.1 70.0   % Lymphocytes Latest Units: % 15.7 16.1   % Monocytes Latest Units: % 18.2 10.2   % Eosinophils Latest Units: % 2.0 1.5   % Basophils Latest Units: % 0.5 0.7   % Immature Granulocytes Latest Units: % 0.5 1.5   Nucleated RBCs Latest Ref Range: 0 /100 0 0   Absolute Neutrophil Latest Ref Range: 1.6 - 8.3 10e9/L 1.3 (L) 1.0 (L)   Absolute Lymphocytes Latest Ref Range: 0.8 - 5.3 10e9/L 0.3 (L) 0.2 (L)   Absolute Monocytes Latest Ref Range: 0.0 - 1.3 10e9/L 0.4 0.1   Absolute Eosinophils Latest Ref Range: 0.0 - 0.7 10e9/L 0.0 0.0   Absolute Basophils Latest Ref Range: 0.0 - 0.2 10e9/L 0.0 0.0   Abs Immature Granulocytes Latest Ref Range: 0 - 0.4 10e9/L 0.0 0.0   Absolute Nucleated RBC Unknown 0.0 0.0          ASSESSMENT:  A 27 year old gentleman  [id number] squamous  cell carcinoma of the right oral tongue.Status post April 28, 2016 transmandibular subtotal oral glossectomy, right base of tongue resection with partial pharyngectomy, bilateral neck dissections, and free flap. Initiated treatment by 6/6/16.   He completed his concurrent chemoRT 7/21/16.  Follow up PET CT scan 10/28/16 was reviewed at multidisciplinary head and neck tumor board. It was consistent with local and distant recurrence. L- His case was subsequently reviewed at Thoracic oncology conference and we will proceed with biopsy by IR.glendy biopsy results were reviewed with the patient and family members which confirmed metastatic disease.   He was seen at [facility] and no first line trials were available. He was switched to Pembrolizumab with a plan to do short term scans to assess the pace of disease progression.   CT scan 1/16/17 showed rapid disease progression with thoracic vertebral fracture. Multiple lytic/destructive spine lesions were reviewed by spine surgery and were not felt to be unstable. He completed palliative radiation to bones.   - He is on chemotherapy with carboplatin/5-FU and Cetuximab. He was given cycle 4 last week and his antiemetics were altered to help with n/v. This seems to have helped, but still had nausea starting day 4 that he felt was not optimally controlled with ativan and zyprexa (zofran and compazine have not worked for him). No vomiting, but he felt it could be better controlled in the future. Improving now. Could try adding dex on days 5-7 with his next cycle?? He will return for cycle 4 day 15 next week with scans the following week. He has had some lumps on the neck that I am concerned could be dermal mets-not clear whether these have been present prior. No change since last week and due for scans in two weeks without any other signs/symptoms or progression.     Bone mets: On zometa monthly due on 4/11.     Dysphagia. Patient's swallowing was improving following chemorads,  but worsened again with starting chemotherapy with intermittent mouth sores than likely extend into his throat. He had a PEG tube placed which has been helpful with intake and overall well being. Continue swallowing exercises.     Coping.Palliative care appt for tomorrow-but he was not aware of this or what it was for. He asked to reschedule from tomorrow to another day with other appts already scheduled to ease transportation. He was open to meeting with palliative care provider, but did not want to meet with SW at this time. He is on Celexa 20 mg daily.    Throat pain and mucositis. Adequately controlled with OxyContin 20 mg bid and oxycodone 10 mg qid. He also continues to use salt/soda swishes and lidocaine. No new pain today.     Hypothyroidism. Increased to 50mcg last week given levels. Recheck in early May.     Phlegm: better with use of mucinex-continue.     Elevated mag: unclear cause. 2.7 today. Should drop with erbitux infusions. Monitor.     Heme: ANC 1.0 today. Okay to give erbitux as planned. Reviewed neutropenic precautions.     Constipation: use senna prn-secondary to narcotics.     It is my privilege to be involved in the care of the above patient.     Maribel Harper PA-C  [facility]  [address] [place] [address] [id number] [phone]

## 2017-04-04 NOTE — PATIENT INSTRUCTIONS
Contact Numbers    [facility] Main Line: [phone]  [facility] Triage:  [phone]    Call triage with chills and/or temperature greater than or equal to 100.5, uncontrolled nausea/vomiting, diarrhea, constipation, dizziness, shortness of breath, chest pain, bleeding, unexplained bruising, or any new/concerning symptoms, questions/concerns.     If you are having any concerning symptoms or wish to speak to a provider before your next infusion visit, please call your care coordinator or triage to notify them so we can adequately serve you.       After Hours: [phone]    If after hours, weekends, or holidays, call main hospital  and ask for Oncology doctor on call.         April 2017 Sunday Monday Tuesday Wednesday Thursday Friday Saturday                                 1       2     3     4     [facility] DRAW    1:30 PM   (15 min.)   [facility] DRAW   [facility] Lab Draw     [facility] ONC INFUSION 120    2:00 PM   (120 min.)    ONCOLOGY INFUSION   [facility] 5     6     7     8       9     10     11     CT SOFT TISSUE NECK W    9:25 AM   (20 min.)   [address] [place] [address] CT     CT CHEST/ABDOMEN/PELVIS W    9:45 AM   (20 min.)   [address]     [facility] DRAW   12:45 PM   (15 min.)   [facility] DRAW   [facility] Lab Draw     [facility] RETURN    1:00 PM   (30 min.)   Keira Hernandez MD   [facility]     [facility] ONC INFUSION 180    2:30 PM   (180 min.)   UC ONCOLOGY INFUSION   [facility] 12     13     14     15       16     17     18     [facility] NEW WITH ROOM    9:45 AM   (60 min.)   [facility]     [facility] DRAW   12:00 PM   (15 min.)   [facility] DRAW   [facility] Lab Draw     [facility] ONC INFUSION 180   12:30 PM   (180 min.)    ONCOLOGY INFUSION   [facility] 19     20     21     22       23     24     25     [facility]  DRAW   12:00 PM   (15 min.)   [facility] LAB DRAW   [facility] Lab Draw     [facility] ONC INFUSION 180   12:30 PM   (180 min.)    ONCOLOGY INFUSION   [facility] 26 27     28     29       30                                              May 2017   [place] Monday Tuesday Wednesday Thursday Friday Saturday        1     2     3     4     5     6       7     8     9     10     11     12     13       14     15     16     17     18     19     20       21     22     23     24     25     26     27       28     29     30     31                                Recent Results (from the past 24 hour(s))   Comprehensive metabolic panel    Collection Time: 04/04/17  1:55 PM   Result Value Ref Range    Sodium 138 133 - 144 mmol/L    Potassium 4.0 3.4 - 5.3 mmol/L    Chloride 100 94 - 109 mmol/L    Carbon Dioxide 29 20 - 32 mmol/L    Anion Gap 8 3 - 14 mmol/L    Glucose 132 (H) 70 - 99 mg/dL    Urea Nitrogen 16 7 - 30 mg/dL    Creatinine 0.65 (L) 0.66 - 1.25 mg/dL    GFR Estimate >90  Non  GFR Calc   >60 mL/min/1.7m2    GFR Estimate If Black >90   GFR Calc   >60 mL/min/1.7m2    Calcium 9.2 8.5 - 10.1 mg/dL    Bilirubin Total 0.5 0.2 - 1.3 mg/dL    Albumin 2.9 (L) 3.4 - 5.0 g/dL    Protein Total 7.6 6.8 - 8.8 g/dL    Alkaline Phosphatase 71 40 - 150 U/L    ALT 51 0 - 70 U/L    AST 30 0 - 45 U/L   CBC with platelets differential    Collection Time: 04/04/17  1:55 PM   Result Value Ref Range    WBC 1.1 (L) 4.0 - 11.0 10e9/L    RBC Count 2.81 (L) 4.4 - 5.9 10e12/L    Hemoglobin 8.2 (L) 13.3 - 17.7 g/dL    Hematocrit 26.0 (L) 40.0 - 53.0 %    MCV 93 78 - 100 fl    MCH 29.2 26.5 - 33.0 pg    MCHC 31.5 31.5 - 36.5 g/dL    RDW 24.5 (H) 10.0 - 15.0 %    Platelet Count 99 (L) 150 - 450 10e9/L    Diff Method Manual Differential     % Neutrophils 44.6 %    % Lymphocytes 28.6 %    % Monocytes 26.8 %    % Eosinophils 0.0 %    % Basophils 0.0 %    Absolute Neutrophil 0.5 (L) 1.6 - 8.3 10e9/L     Absolute Lymphocytes 0.3 (L) 0.8 - 5.3 10e9/L    Absolute Monocytes 0.3 0.0 - 1.3 10e9/L    Absolute Eosinophils 0.0 0.0 - 0.7 10e9/L    Absolute Basophils 0.0 0.0 - 0.2 10e9/L    Anisocytosis Marked     Poikilocytosis Slight

## 2017-04-04 NOTE — PROGRESS NOTES
Infusion Nursing Note:  Darek Navarro presents today for Cycle 4 Day 15 Erbitux.    Patient seen by provider today: No   present during visit today: Not Applicable.    Note: Patients weight decreased since last visit and now dose is outside of 10%. Patient states he has had 1 episode of vomiting per day for the past 4 days. He is using zyprexa and ativan as needed for nausea and vomiting. He states he is still eating and drinking.    04/04/17 1500 TORB CLARY Cardenas/ Jennie Williamson, RN: Erbitux dose changed to match patients current weight. Patient may increase zyprexa to 10 mg two times daily as needed for nausea and vomiting.     04/04/17 1615 TORB CLARY Silverman/ Jennie Williamson, RN: Ok to treat with ANC 0.5. Review neutropenic precautions with patient.     Intravenous Access:  Implanted Port.    Treatment Conditions:  Lab Results   Component Value Date    HGB 8.2 04/04/2017     Lab Results   Component Value Date    WBC 1.1 04/04/2017      Lab Results   Component Value Date    ANEU 0.5 04/04/2017     Lab Results   Component Value Date    PLT 99 04/04/2017      Lab Results   Component Value Date     04/04/2017                   Lab Results   Component Value Date    POTASSIUM 4.0 04/04/2017           Lab Results   Component Value Date    MAG 2.7 03/29/2017            Lab Results   Component Value Date    CR 0.65 04/04/2017                   Lab Results   Component Value Date    COLE 9.2 04/04/2017                Lab Results   Component Value Date    BILITOTAL 0.5 04/04/2017           Lab Results   Component Value Date    ALBUMIN 2.9 04/04/2017                    Lab Results   Component Value Date    ALT 51 04/04/2017           Lab Results   Component Value Date    AST 30 04/04/2017     Not Applicable.      Post Infusion Assessment:  Patient tolerated infusion without incident.  Patient observed for 30 minutes post Erbitux per protocol.  Blood return noted pre and post  infusion.  Site patent and intact, free from redness, edema or discomfort.  No evidence of extravasations.  Access discontinued per protocol.    Discharge Plan:   Prescription refills given for lorazepam, levothyroxine.  Discharge instructions reviewed with: Patient.  Patient and/or family verbalized understanding of discharge instructions and all questions answered.  Copy of AVS reviewed with patient and/or family.  Patient will return 4/11/17 for next appointment.  Patient discharged in stable condition accompanied by: self and friend.  Departure Mode: Ambulatory.  Face to Face time: 5 minutes.    Jennie Williamson RN

## 2017-04-04 NOTE — NURSING NOTE
Chief Complaint   Patient presents with     Port Draw     Labs collected via port by RN.      Labs collected, port flushed with NS and Heparin. Checked in for next visit.   Marj Castellanos RN

## 2017-04-04 NOTE — MR AVS SNAPSHOT
After Visit Summary   4/4/2017    Darek Navarro    MRN: [id number]           Patient Information     Date Of Birth          1990        Visit Information        Provider Department      4/4/2017 2:00 PM UC 11 ATC; UC ONCOLOGY INFUSION [facility]        Today's Diagnoses     Head and neck cancer (H)    -  1    Drug-induced neutropenia (H)          Care Instructions    Contact Numbers    [facility] Main Line: [phone]  [facility] Triage:  [phone]    Call triage with chills and/or temperature greater than or equal to 100.5, uncontrolled nausea/vomiting, diarrhea, constipation, dizziness, shortness of breath, chest pain, bleeding, unexplained bruising, or any new/concerning symptoms, questions/concerns.     If you are having any concerning symptoms or wish to speak to a provider before your next infusion visit, please call your care coordinator or triage to notify them so we can adequately serve you.       After Hours: [phone]    If after hours, weekends, or holidays, call main hospital  and ask for Oncology doctor on call.         April 2017 Sunday Monday Tuesday Wednesday Thursday Friday Saturday                                 1       2     3     4     P [facility] LAB DRAW    1:30 PM   (15 min.)    [facility] LAB DRAW   [facility] [facility] Lab Draw     P ONC INFUSION 120    2:00 PM   (120 min.)    ONCOLOGY INFUSION   [facility] 5     6     7     8       9     10     11     CT SOFT TISSUE NECK W    9:25 AM   (20 min.)   CT2   [facility] CT     CT CHEST/ABDOMEN/PELVIS W    9:45 AM   (20 min.)   CT2   [facility] CT     [facility] DRAW   12:45 PM   (15 min.)    [facility] LAB DRAW   [facility] Lab Draw     [facility] RETURN    1:00 PM   (30 min.)   Keira Hernandez MD   [facility]     [facility] ONC INFUSION 180    2:30 PM   (180 min.)   UC ONCOLOGY INFUSION   [facility] 12     13      14     15       16     17     18     P NEW WITH ROOM    9:45 AM   (60 min.)   Kesha Maloney, [facility] LAB DRAW   12:00 PM   (15 min.)    M[facility] LAB DRAW   [facility] Lab Draw     [facility] ONC INFUSION 180   12:30 PM   (180 min.)    ONCOLOGY INFUSION   [facility] 19     20     21     22       23     24     25     [facility] LAB DRAW   12:00 PM   (15 min.)    [facility] LAB DRAW   [facility] Lab Draw     [facility] ONC INFUSION 180   12:30 PM   (180 min.)    ONCOLOGY INFUSION   [facility] 26     27     28     29       30                                              May 2017   [place] Monday Tuesday Wednesday Thursday Friday Saturday        1     2     3     4     5     6       7     8     9     10     11     12     13       14     15     16     17     18     19     20       21     22     23     24     25     26     27       28     29     30     31                                Recent Results (from the past 24 hour(s))   Comprehensive metabolic panel    Collection Time: 04/04/17  1:55 PM   Result Value Ref Range    Sodium 138 133 - 144 mmol/L    Potassium 4.0 3.4 - 5.3 mmol/L    Chloride 100 94 - 109 mmol/L    Carbon Dioxide 29 20 - 32 mmol/L    Anion Gap 8 3 - 14 mmol/L    Glucose 132 (H) 70 - 99 mg/dL    Urea Nitrogen 16 7 - 30 mg/dL    Creatinine 0.65 (L) 0.66 - 1.25 mg/dL    GFR Estimate >90  Non  GFR Calc   >60 mL/min/1.7m2    GFR Estimate If Black >90   GFR Calc   >60 mL/min/1.7m2    Calcium 9.2 8.5 - 10.1 mg/dL    Bilirubin Total 0.5 0.2 - 1.3 mg/dL    Albumin 2.9 (L) 3.4 - 5.0 g/dL    Protein Total 7.6 6.8 - 8.8 g/dL    Alkaline Phosphatase 71 40 - 150 U/L    ALT 51 0 - 70 U/L    AST 30 0 - 45 U/L   CBC with platelets differential    Collection Time: 04/04/17  1:55 PM   Result Value Ref Range    WBC 1.1 (L) 4.0 - 11.0 10e9/L    RBC Count 2.81 (L) 4.4 - 5.9 10e12/L     Hemoglobin 8.2 (L) 13.3 - 17.7 g/dL    Hematocrit 26.0 (L) 40.0 - 53.0 %    MCV 93 78 - 100 fl    MCH 29.2 26.5 - 33.0 pg    MCHC 31.5 31.5 - 36.5 g/dL    RDW 24.5 (H) 10.0 - 15.0 %    Platelet Count 99 (L) 150 - 450 10e9/L    Diff Method Manual Differential     % Neutrophils 44.6 %    % Lymphocytes 28.6 %    % Monocytes 26.8 %    % Eosinophils 0.0 %    % Basophils 0.0 %    Absolute Neutrophil 0.5 (L) 1.6 - 8.3 10e9/L    Absolute Lymphocytes 0.3 (L) 0.8 - 5.3 10e9/L    Absolute Monocytes 0.3 0.0 - 1.3 10e9/L    Absolute Eosinophils 0.0 0.0 - 0.7 10e9/L    Absolute Basophils 0.0 0.0 - 0.2 10e9/L    Anisocytosis Marked     Poikilocytosis Slight                Follow-ups after your visit        Your next 10 appointments already scheduled     Apr 11, 2017  9:40 AM CDT   (Arrive by 9:25 AM)   CT SOFT TISSUE NECK W CONTRAST with UCCT2   [facility] Imaging [place] ([facility])    [address] [address] [phone]           Please bring any scans or X-rays taken at other hospitals, if similar tests were done. Also bring a list of your medicines, including vitamins, minerals and over-the-counter drugs. It is safest to leave personal items at home.  Be sure to tell your doctor:   If you have any allergies.   If there s any chance you are pregnant.   If you are breastfeeding.   If you have any special needs.  You will have contrast for this exam. To prepare:   Do not eat or drink for 2 hours before your exam. If you need to take medicine, you may take it with small sips of water. (We may ask you to take liquid medicine as well.)   The day before your exam, drink extra fluids at least six 8-ounce glasses (unless your doctor tells you to restrict your fluids).  Patients over 70 or patients with diabetes or kidney problems:   If you haven t had a blood test (creatinine test) within the last 30 days, go to your clinic or Diagnostic Imaging Department for this test.   If you have diabetes:   If your kidney function is normal, continue taking your metformin (Avandamet, Glucophage, Glucovance, Metaglip) on the day of your exam.   If your kidney function is abnormal, wait 48 hours before restarting this medicine.  Please wear loose clothing, such as a sweat suit or jogging clothes. Avoid snaps, zippers and other metal. We may ask you to undress and put on a hospital gown.  If you have any questions, please call the Imaging Department where you will have your exam.            Apr 11, 2017 10:00 AM CDT   (Arrive by 9:45 AM)   CT CHEST/ABDOMEN/PELVIS W CONTRAST with UCCT2   [facility] [place] ([facility] [place])    [address] [facility]  1st [address]  [facility] [address] [phone]           Please bring any scans or X-rays taken at other hospitals, if similar tests were done. Also bring a list of your medicines, including vitamins, minerals and over-the-counter drugs. It is safest to leave personal items at home.  Be sure to tell your doctor:   If you have any allergies.   If there s any chance you are pregnant.   If you are breastfeeding.   If you have any special needs.  You may have contrast for this exam. To prepare:   Do not eat or drink for 2 hours before your exam. If you need to take medicine, you may take it with small sips of water. (We may ask you to take liquid medicine as well.)   The day before your exam, drink extra fluids at least six 8-ounce glasses (unless your doctor tells you to restrict your fluids).  Patients over 70 or patients with diabetes or kidney problems:   If you haven t had a blood test (creatinine test) within the last 30 days, go to your clinic or Diagnostic Imaging Department for this test.  If you have diabetes:   If your kidney function is normal, continue taking your metformin (Avandamet, Glucophage, Glucovance, Metaglip) on the day of your exam.   If your kidney function is abnormal, wait 48 hours before restarting  this medicine.  You will have oral contrast for this exam:   You will drink the contrast at home. Get this from your clinic or Diagnostic Imaging Department. Please follow the directions given.  Please wear loose clothing, such as a sweat suit or jogging clothes. Avoid snaps, zippers and other metal. We may ask you to undress and put on a hospital gown.  If you have any questions, please call the Imaging Department where you will have your exam.            Apr 11, 2017 12:45 PM CDT   [facility] Lab Draw with  [facility] LAB DRAW   [facility] Lab Draw ([facility])    [address] [place] [id number]   [phone]            Apr 11, 2017  1:15 PM CDT   (Arrive by 1:00 PM)   Return Visit with Keira Hernandez MD   [facility] ([facility])    [address] [place] [address]   [phone]            Apr 11, 2017  2:30 PM CDT   Infusion 180 with UC ONCOLOGY INFUSION, UC 19 ATC   [facility] ([facility])    [address] [place] [address]   [phone]            Apr 18, 2017 10:00 AM CDT   (Arrive by 9:45 AM)   NEW WITH ROOM with Kesha Maloney [facility],  2 119 CONSULT RM   [facility] ([facility])    [address] [place] [address]   [phone]            Apr 18, 2017 12:00 PM CDT   [facility] Lab Draw with UC [facility] LAB DRAW   [facility])    [address] [place] [address]   [phone]            Apr 18, 2017 12:30 PM CDT   Infusion 180 with UC ONCOLOGY INFUSION   [facility] ([facility])    [address] [place] [address]   [phone]              Who to contact     If you have questions or need  follow up information about today's clinic visit or your schedule please contact [facility] directly at [phone].  Normal or non-critical lab and imaging results will be communicated to you by [facility], letter or phone within 4 business days after the clinic has received the results. If you do not hear from us within 7 days, please contact the clinic through [employer] or phone. If you have a critical or abnormal lab result, we will notify you by phone as soon as possible.  Submit refill requests through [employer] or call your pharmacy and they will forward the refill request to us. Please allow 3 business days for your refill to be completed.          Additional Information About Your Visit        [facility] Information     [employer] gives you secure access to your electronic health record. If you see a primary care provider, you can also send messages to your care team and make appointments. If you have questions, please call your primary care clinic.  If you do not have a primary care provider, please call [phone] and they will assist you.        Care EveryWhere ID     This is your Care EveryWhere ID. This could be used by other organizations to access your [place] medical records  [id number]        Your Vitals Were     Pulse Temperature Respirations Pulse Oximetry BMI (Body Mass Index)       113 98.2  F (36.8  C) (Oral) 16 99% 16.19 kg/m2        Blood Pressure from Last 3 Encounters:   04/04/17 109/73   03/29/17 118/84   03/23/17 122/81    Weight from Last 3 Encounters:   04/04/17 57.2 kg (126 lb 1.8 oz)   03/29/17 58.4 kg (128 lb 12.8 oz)   03/23/17 58.9 kg (129 lb 14.4 oz)              We Performed the Following     CBC with platelets differential     Comprehensive metabolic panel     Nursing observation          Today's Medication Changes          These changes are accurate as of: 4/4/17  4:28 PM.  If you have any questions, ask your nurse or doctor.               These medicines have  changed or have updated prescriptions.        Dose/Directions    levothyroxine 50 MCG tablet   Commonly known as:  SYNTHROID/LEVOTHROID   This may have changed:  medication strength   Used for:  Head and neck cancer (H)        Dose:  50 mcg   Take 1 tablet (50 mcg) by mouth daily   Quantity:  30 tablet   Refills:  1       mineral oil-hydrophilic petrolatum   This may have changed:    - when to take this  - reasons to take this   Used for:  Malignant neoplasm of tongue (H)        Apply topically every 8 hours   Quantity:  50 g   Refills:  0       pantoprazole 20 MG EC tablet   Commonly known as:  PROTONIX   This may have changed:    - when to take this  - reasons to take this   Used for:  Gastroesophageal reflux disease without esophagitis        Dose:  20 mg   Take 1 tablet (20 mg) by mouth daily   Quantity:  30 tablet   Refills:  3            Where to get your medicines      These medications were sent to [address] [place] [address] [address] [address]    Hours:  TRANSPLANT PHONE NUMBER [phone] Phone:  [phone]     levothyroxine 50 MCG tablet                Primary Care Provider Office Phone # Fax #    Mary Jane Nicollet [facility] [phone] [address] [facility] [address]  [facility] [address]        Thank you!     Thank you for choosing [facility]IN[facility]  for your care. Our goal is always to provide you with excellent care. Hearing back from our patients is one way we can continue to improve our services. Please take a few minutes to complete the written survey that you may receive in the mail after your visit with us. Thank you!             Your Updated Medication List - Protect others around you: Learn how to safely use, store and throw away your medicines at www.disposemymeds.org.          This list is accurate as of: 4/4/17  4:28 PM.  Always use your most recent med list.                    Brand Name Dispense Instructions for use    citalopram 10 MG/5ML solution    celeXA    300 mL    Take 10 mLs (20 mg) by mouth daily       clindamycin 1 % lotion    CLEOCIN T    60 mL    Apply topically 2 times daily       ibuprofen 200 MG tablet    ADVIL/MOTRIN     Take 200 mg by mouth daily as needed       levothyroxine 50 MCG tablet    SYNTHROID/LEVOTHROID    30 tablet    Take 1 tablet (50 mcg) by mouth daily       lidocaine 2 % solution    XYLOCAINE    1000 mL    15 mL swish and spit q3h PRN, max 8 doses in 24 hours       LORazepam 0.5 MG tablet    ATIVAN    30 tablet    Take 1 tablet (0.5 mg) by mouth every 4 hours as needed (Anxiety, Nausea/Vomiting or Sleep)       methylphenidate 5 MG tablet    RITALIN    60 tablet    Take 1 tablet (5 mg) by mouth 2 times daily       mineral oil-hydrophilic petrolatum     50 g    Apply topically every 8 hours       multivitamins with minerals Liqd liquid     1 Bottle    15 mLs by Per Feeding Tube route daily       OLANZapine 5 MG tablet    zyPREXA    60 tablet    Take 1 tablet (5 mg) by mouth 2 times daily as needed For nausea       ondansetron 4 MG tablet    ZOFRAN     Take 4 mg by mouth daily as needed       * oxyCODONE 20 MG 12 hr tablet    OxyCONTIN    60 tablet    Take 1 tablet (20 mg) by mouth every 12 hours maximum 2 tablet(s) per day       * oxyCODONE 5 MG/5ML solution    ROXICODONE    500 mL    10 mLs (10 mg) by Oral or PEG tube route every 4 hours as needed for moderate to severe pain       pantoprazole 20 MG EC tablet    PROTONIX    30 tablet    Take 1 tablet (20 mg) by mouth daily       prochlorperazine 10 MG tablet    COMPAZINE    30 tablet    Take 1 tablet (10 mg) by mouth every 6 hours as needed (Nausea/Vomiting)       * Notice:  This list has 2 medication(s) that are the same as other medications prescribed for you. Read the directions carefully, and ask your doctor or other care provider to review them with you.

## 2017-04-11 NOTE — PROGRESS NOTES
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 Pathologic staging: [id number]. Recurrence with distant metastasis after initial chemoRT.       TREATMENT:    1- April 28, 2016 transmandibular subtotal oral glossectomy, right base of tongue resection with partial pharyngectomy, bilateral neck dissections, and free flap reconstruction. Concurrent ChemoRt with high dose Cisplatin 6/6/16. day 22 cisplatin on 6/27/16. He completed day 43 on 7/19/16. Radiation completed, 6600cGy on 7/21/16.   2- Palliative intent treatment with Pembrolizumab 12/6/2016  3- Due to rapid progression of disease the treatment was switched to Carboplatin, 5-FU and Cetuximab on 1/18/17     SUBJECTIVE:  The patient was seen as a followup today. He has developed severe mucositis. Oral intake is reduced. He is also loosing weight.     REVIEW OF SYSTEMS:  A complete review of systems was performed and found to be negative other than pertinent positives mentioned in history of present illness.     Past medical, social histories reviewed.    Meds- Reviewed.     PHYSICAL EXAMINATION:   VITAL SIGNS: ./77  Pulse 109  Temp 98.5  F (36.9  C) (Oral)  Resp 16  Ht 1.88 m (6' 2.02\")  Wt 58 kg (127 lb 12.8 oz)  SpO2 98%  BMI 16.4 kg/m2  GENERAL: Sitting comfortably.   HEENT: Pupils are equal. Mucositis.   NECK: No cervical or supraclavicular lymphadenopathy.   LUNGS: Clear bilaterally.   HEART: S1, S2, regular.   ABDOMEN: Soft, nontender, nondistended, no hepatosplenomegaly.   EXTREMITIES: Warm, well perfused.   NEUROLOGIC: Alert, awake.   SKIN: No rash.   LYMPHATICS: No edema.     DATA:  Recent Labs   Lab Test  04/04/17   1355  03/29/17   1335   03/21/17   0247   05/05/16   0750   NA  138  135   < >  139   < >  140   POTASSIUM  4.0  3.8 "   < >  4.2   < >  4.9   CHLORIDE  100  99   < >  105   < >  105   CO2  29  27   < >  25   < >  28   ANIONGAP  8  8   < >  8   < >  6   BUN  16  14   < >  8   < >  16   CR  0.65*  0.53*   < >  0.65*   < >  0.64*   GLC  132*  113*   < >  89   < >  106*   COLE  9.2  8.5   < >  8.6   < >  9.6   MAG   --   2.7*   --   2.4*   < >  2.3   PHOS   --    --    --   3.1   --   5.3*    < > = values in this interval not displayed.     Recent Labs   Lab Test  04/04/17   1355  03/29/17   1335  03/23/17   1306   WBC  1.1*  1.4*  2.0*   HGB  8.2*  8.4*  8.5*   PLT  99*  156  105*   MCV  93  88  88   NEUTROPHIL  44.6  70.0  63.1     Recent Labs   Lab Test  04/04/17   1355  03/29/17   1335  03/23/17   1306   BILITOTAL  0.5  0.3  0.4   ALKPHOS  71  66  69   ALT  51  64  37   AST  30  41  25   ALBUMIN  2.9*  3.0*  3.0*       ECOG PS: 1     ASSESSMENT:  A 26-year-old gentleman  [id number] squamous cell carcinoma of the right oral tongue.Status post April 28, 2016 transmandibular subtotal oral glossectomy, right base of tongue resection with partial pharyngectomy, bilateral neck dissections, and free flap. Initiated treatment by 6/6/16.   He completed his concurrent chemoRT 7/21/16.  Follow up PET CT scan 10/28/16 was reviewed at multidisciplinary head and neck tumor board. It was consistent with local and distant recurrence. L- His case was subsequently reviewed at Thoracic oncology conference and we will proceed with biopsy by IR.glendy biopsy results were reviewed with the patient and family members which confirmed metastatic disease.   He was seen at [facility] and no first line trials were available. He was switched to Pembrolizumab with a plan to do short term scans to assess the pace of disease progression.   CT scan 1/16/17 showed rapid disease progression with thoracic vertebral fracture. Multiple lytic/destructive spine lesions were reviewed by spine surgery and were not felt to be unstable. He completed palliative radiation to bones.    He is on chemotherapy with carboplatin/5-FU and Cetuximab and is starting 5th cycle today.   He is on Zometa  CT scan was performed today and results are pending. I reviewed the images myself and the findings appeared generally stable, we will follow the formal read.  I discussed that we should hold off chemotherapy due to severe nature of mucositis resulting from chemotherapy.   I will have him get 2 weeks break and will discuss further treatment options. We may consider to switch him to immunotherapy if he had a good response.     PLAN:  1- Hold chemotherapy today  2- Salt and soda rinses 4-6 times a day  3- Magic mouth wash 4-6 times a day  4- Normal saline 1 liter IV today  5- RTC MD 2 weeks  6- Nutritionist follow up for weight loss    CLAUS CAT MD    4/11/2017    cc: Andrea Pagan MD

## 2017-04-11 NOTE — NURSING NOTE
Chief Complaint   Patient presents with     Oncology Clinic Visit     Throat Ca     Port Draw     Labs collected via port by RN.     Port accessed by CT, labs collected, flushed with NS and Heparin. Checked in for provider visit.  Marj Castellanos RN

## 2017-04-11 NOTE — LETTER
"4/11/2017       RE: Darek Navarro  [address]  [place] [address]     Dear Colleague,    Thank you for referring your patient, Darek Navarro, to the [facility]. Please see a copy of my visit note below.    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 Pathologic staging: [id number]. Recurrence with distant metastasis after initial chemoRT.       TREATMENT:    1- April 28, 2016 transmandibular subtotal oral glossectomy, right base of tongue resection with partial pharyngectomy, bilateral neck dissections, and free flap reconstruction. Concurrent ChemoRt with high dose Cisplatin 6/6/16. day 22 cisplatin on 6/27/16. He completed day 43 on 7/19/16. Radiation completed, 6600cGy on 7/21/16.   2- Palliative intent treatment with Pembrolizumab 12/6/2016  3- Due to rapid progression of disease the treatment was switched to Carboplatin, 5-FU and Cetuximab on 1/18/17     SUBJECTIVE:  The patient was seen as a followup today. He has developed severe mucositis. Oral intake is reduced. He is also loosing weight.     REVIEW OF SYSTEMS:  A complete review of systems was performed and found to be negative other than pertinent positives mentioned in history of present illness.     Past medical, social histories reviewed.    Meds- Reviewed.     PHYSICAL EXAMINATION:   VITAL SIGNS: ./77  Pulse 109  Temp 98.5  F (36.9  C) (Oral)  Resp 16  Ht 1.88 m (6' 2.02\")  Wt 58 kg (127 lb 12.8 oz)  SpO2 98%  BMI 16.4 kg/m2  GENERAL: Sitting comfortably.   HEENT: Pupils are equal. Mucositis.   NECK: No cervical or supraclavicular lymphadenopathy.   LUNGS: Clear bilaterally.   HEART: S1, S2, regular.   ABDOMEN: Soft, nontender, nondistended, no hepatosplenomegaly.   EXTREMITIES: Warm, well " perfused.   NEUROLOGIC: Alert, awake.   SKIN: No rash.   LYMPHATICS: No edema.     DATA:  Recent Labs   Lab Test  04/04/17   1355  03/29/17   1335   03/21/17   0247   05/05/16   0750   NA  138  135   < >  139   < >  140   POTASSIUM  4.0  3.8   < >  4.2   < >  4.9   CHLORIDE  100  99   < >  105   < >  105   CO2  29  27   < >  25   < >  28   ANIONGAP  8  8   < >  8   < >  6   BUN  16  14   < >  8   < >  16   CR  0.65*  0.53*   < >  0.65*   < >  0.64*   GLC  132*  113*   < >  89   < >  106*   COLE  9.2  8.5   < >  8.6   < >  9.6   MAG   --   2.7*   --   2.4*   < >  2.3   PHOS   --    --    --   3.1   --   5.3*    < > = values in this interval not displayed.     Recent Labs   Lab Test  04/04/17   1355  03/29/17   1335  03/23/17   1306   WBC  1.1*  1.4*  2.0*   HGB  8.2*  8.4*  8.5*   PLT  99*  156  105*   MCV  93  88  88   NEUTROPHIL  44.6  70.0  63.1     Recent Labs   Lab Test  04/04/17   1355  03/29/17   1335  03/23/17   1306   BILITOTAL  0.5  0.3  0.4   ALKPHOS  71  66  69   ALT  51  64  37   AST  30  41  25   ALBUMIN  2.9*  3.0*  3.0*       ECOG PS: 1     ASSESSMENT:  A 26-year-old gentleman  [id number] squamous cell carcinoma of the right oral tongue.Status post April 28, 2016 transmandibular subtotal oral glossectomy, right base of tongue resection with partial pharyngectomy, bilateral neck dissections, and free flap. Initiated treatment by 6/6/16.   He completed his concurrent chemoRT 7/21/16.  Follow up PET CT scan 10/28/16 was reviewed at multidisciplinary head and neck tumor board. It was consistent with local and distant recurrence. L- His case was subsequently reviewed at Thoracic oncology conference and we will proceed with biopsy by IR.glendy biopsy results were reviewed with the patient and family members which confirmed metastatic disease.   He was seen at [facility] and no first line trials were available. He was switched to Pembrolizumab with a plan to do short term scans to assess the pace of disease  progression.   CT scan 1/16/17 showed rapid disease progression with thoracic vertebral fracture. Multiple lytic/destructive spine lesions were reviewed by spine surgery and were not felt to be unstable. He completed palliative radiation to bones.   He is on chemotherapy with carboplatin/5-FU and Cetuximab and is starting 5th cycle today.   He is on Zometa  CT scan was performed today and results are pending. I reviewed the images myself and the findings appeared generally stable, we will follow the formal read.  I discussed that we should hold off chemotherapy due to severe nature of mucositis resulting from chemotherapy.   I will have him get 2 weeks break and will discuss further treatment options. We may consider to switch him to immunotherapy if he had a good response.     PLAN:  1- Hold chemotherapy today  2- Salt and soda rinses 4-6 times a day  3- Magic mouth wash 4-6 times a day  4- Normal saline 1 liter IV today  5- RTC MD 2 weeks  6- Nutritionist follow up for weight loss    CLAUS CAT MD    4/11/2017    cc: Andrea Pagan MD

## 2017-04-11 NOTE — PROGRESS NOTES
Infusion Nursing Note:  Darek Navarro presents today for IVF, Zometa and anti-emetics.    Patient seen by provider today: Yes: Dr. Hernandez    Note: Patient presented for IVF following appt with Dr. Hernandez.  Discussed current symptoms; patient endorses nausea most days, however none present at time in infusion.  Administered Emend for long acting nausea control for next several days.  Hgb=7.6; patient endorses fatigue and some intermittent lightheadedness.  TORB 4/11/2017 15:22 Dr. Hernandez/YAZMIN Huerta RN: Patient does not need transfusion unless very symptomatic or requests to be transfused; he is starting chemo break for two weeks.  Does not need to recheck Hgb for two weeks.  Discussed MD advisement with patient and patient's wife; would like to forgo any infusion at this time.  Instructed patient to have low-threshold for calling triage for any s/s of anemia if not improving; lightheadedness, fatigue, SOB/DUARTE or palpitations.  Patient verbalized understanding.    Intravenous Access:  Implanted Port.    Treatment Conditions:  Lab Results   Component Value Date    HGB 7.6 04/11/2017     Lab Results   Component Value Date    WBC 2.4 04/11/2017      Lab Results   Component Value Date    ANEU 1.6 04/11/2017     Lab Results   Component Value Date    PLT 71 04/11/2017      Lab Results   Component Value Date     04/11/2017                   Lab Results   Component Value Date    POTASSIUM 4.0 04/11/2017           Lab Results   Component Value Date    MAG 2.7 03/29/2017            Lab Results   Component Value Date    CR 0.56 04/11/2017                   Lab Results   Component Value Date    COLE 9.2 04/11/2017                Lab Results   Component Value Date    BILITOTAL 0.2 04/11/2017           Lab Results   Component Value Date    ALBUMIN 2.7 04/11/2017                    Lab Results   Component Value Date    ALT 52 04/11/2017           Lab Results   Component Value Date    AST 30 04/11/2017     Results reviewed,  labs MET treatment parameters, ok to proceed with treatment.  Blood transfusion consent signed 3/20/2017.    Post Infusion Assessment:  Patient tolerated infusion without incident.  Blood return noted pre and post infusion.  Site patent and intact, free from redness, edema or discomfort.  No evidence of extravasations.  Access discontinued per protocol.  Zometa stop time: 15:35    Discharge Plan:   Prescription refills for Oxycodone, Ativan and magic mouthwash will be picked up by patient at pharmacy.  Discharge instructions reviewed with: Patient.  Patient and/or family verbalized understanding of discharge instructions and all questions answered.  Copy of AVS reviewed with patient and/or family.  Patient will return 4/25/2017 for next appointment.  Departure Mode: Ambulatory.  Face to Face time: 5 minutes.    Clarice Huerta RN

## 2017-04-11 NOTE — PATIENT INSTRUCTIONS
Contact Numbers    [facility] Main Line: [phone]  [facility] Triage:  [phone]    Call triage with chills and/or temperature greater than or equal to 100.5, uncontrolled nausea/vomiting, diarrhea, constipation, dizziness, shortness of breath, chest pain, bleeding, unexplained bruising, or any new/concerning symptoms, questions/concerns.     If you are having any concerning symptoms or wish to speak to a provider before your next infusion visit, please call your care coordinator or triage to notify them so we can adequately serve you.       After Hours: [phone]    If after hours, weekends, or holidays, call main hospital  and ask for Oncology doctor on call.         April 2017 Sunday Monday Tuesday Wednesday Thursday Friday Saturday                                 1       2     3     4     [facility] D[facility]    1:30 PM   (15 min.)   [facility]   [facility]     [facility] ONC INFUSION 120    2:00 PM   (120 min.)    ONCOLOGY INFUSION   [facility] 5     6     7     8       9     10     11     CT SOFT TISSUE NECK W    9:25 AM   (20 min.)   [address] [place] [address] CT     CT CHEST/ABDOMEN/PELVIS W    9:45 AM   (20 min.)   [address]     [facility]   12:45 PM   (15 min.)   [facility]   [facility] Draw     [facility] RETURN    1:00 PM   (30 min.)   Keira Hernandez MD   [facility]     [facility] ONC INFUSION 180    2:30 PM   (180 min.)   [facility] ONCOLOGY INFUSION   [facility] 12     13     14     15       16     17     18     [facility] NEW WITH ROOM    9:45 AM   (60 min.)   [facility]   [facility]     [facility] DRAW   12:00 PM   (15 min.)   [facility]   [facility] Draw     [facility] ONC INFUSION 180   12:30 PM   (180 min.)    ONCOLOGY INFUSION   [facility] 19     20     21     22       23     24     CONSULT HOD    1:00  PM   (60 min.)   Renea Avila RD   [facility] [place] Services 25     [facility] RETURN    9:45 AM   (30 min.)   Keira Hernandez MD [facility] DRAW   12:00 PM   (15 min.)    [facility] D[facility]   [facility] Lab Draw     [facility] ONC INFUSION 180   12:30 PM   (180 min.)    ONCOLOGY INFUSION   [facility] 26     27     28     29       30                                              May 2017   [place] Monday Tuesday Wednesday Thursday Friday Saturday        1     2     3     4     5     6       7     8     9     10     11     12     13       14     15     16     17     18     19     20       21     22     23     24     25     26     27       28     29     30     31                                 Lab Results:  Recent Results (from the past 12 hour(s))   CBC with platelets differential    Collection Time: 04/11/17  1:07 PM   Result Value Ref Range    WBC 2.4 (L) 4.0 - 11.0 10e9/L    RBC Count 2.50 (L) 4.4 - 5.9 10e12/L    Hemoglobin 7.6 (L) 13.3 - 17.7 g/dL    Hematocrit 23.7 (L) 40.0 - 53.0 %    MCV 95 78 - 100 fl    MCH 30.4 26.5 - 33.0 pg    MCHC 32.1 31.5 - 36.5 g/dL    RDW 24.0 (H) 10.0 - 15.0 %    Platelet Count 71 (L) 150 - 450 10e9/L    Diff Method Automated Method     % Neutrophils 64.0 %    % Lymphocytes 14.3 %    % Monocytes 20.1 %    % Eosinophils 0.8 %    % Basophils 0.4 %    % Immature Granulocytes 0.4 %    Nucleated RBCs 0 0 /100    Absolute Neutrophil 1.6 1.6 - 8.3 10e9/L    Absolute Lymphocytes 0.4 (L) 0.8 - 5.3 10e9/L    Absolute Monocytes 0.5 0.0 - 1.3 10e9/L    Absolute Eosinophils 0.0 0.0 - 0.7 10e9/L    Absolute Basophils 0.0 0.0 - 0.2 10e9/L    Abs Immature Granulocytes 0.0 0 - 0.4 10e9/L    Absolute Nucleated RBC 0.0    Comprehensive metabolic panel    Collection Time: 04/11/17  1:07 PM   Result Value Ref Range    Sodium 136 133 - 144 mmol/L    Potassium 4.0 3.4 - 5.3 mmol/L    Chloride 101 94 - 109 mmol/L    Carbon Dioxide  28 20 - 32 mmol/L    Anion Gap 7 3 - 14 mmol/L    Glucose 109 (H) 70 - 99 mg/dL    Urea Nitrogen 17 7 - 30 mg/dL    Creatinine 0.56 (L) 0.66 - 1.25 mg/dL    GFR Estimate >90  Non  GFR Calc   >60 mL/min/1.7m2    GFR Estimate If Black >90   GFR Calc   >60 mL/min/1.7m2    Calcium 9.2 8.5 - 10.1 mg/dL    Bilirubin Total 0.2 0.2 - 1.3 mg/dL    Albumin 2.7 (L) 3.4 - 5.0 g/dL    Protein Total 7.7 6.8 - 8.8 g/dL    Alkaline Phosphatase 69 40 - 150 U/L    ALT 52 0 - 70 U/L    AST 30 0 - 45 U/L   ABO/Rh type and screen    Collection Time: 04/11/17  1:07 PM   Result Value Ref Range    ABO Pending     RH(D) Pending     Antibody Screen Pending     Test Valid Only At       [facility]    Specimen Expires 04/14/2017

## 2017-04-11 NOTE — MR AVS SNAPSHOT
After Visit Summary   4/11/2017    Darek Navarro    MRN: [id number]           Patient Information     Date Of Birth          1990        Visit Information        Provider Department      4/11/2017 2:30 PM  19 ATC; UC ONCOLOGY INFUSION [facility]        Today's Diagnoses     Head and neck cancer (H)    -  1    Drug-induced neutropenia (H)        Malignant neoplasm of tongue (H)        Bone metastasis (H)          Care Instructions    Contact Numbers    [facility] Main Line: [phone]  [facility] Triage:  [phone]    Call triage with chills and/or temperature greater than or equal to 100.5, uncontrolled nausea/vomiting, diarrhea, constipation, dizziness, shortness of breath, chest pain, bleeding, unexplained bruising, or any new/concerning symptoms, questions/concerns.     If you are having any concerning symptoms or wish to speak to a provider before your next infusion visit, please call your care coordinator or triage to notify them so we can adequately serve you.       After Hours: [phone]    If after hours, weekends, or holidays, call main hospital  and ask for Oncology doctor on call.         April 2017 Sunday Monday Tuesday Wednesday Thursday Friday Saturday                                 1       2     3     4     P [facility] DRAW    1:30 PM   (15 min.)    [facility] DRAW   [facility] Lab Draw     [facility] ONC INFUSION 120    2:00 PM   (120 min.)    ONCOLOGY INFUSION   [facility] 5     6     7     8       9     10     11     CT SOFT TISSUE NECK W    9:25 AM   (20 min.)   [address] [place] [address] CT     CT CHEST/ABDOMEN/PELVIS W    9:45 AM   (20 min.)   [address]     [facility] DRAW   12:45 PM   (15 min.)    [facility]     [facility] RETURN    1:00 PM   (30 min.)   Keira Hernandez MD   [facility]     [facility] ONC INFUSION 180    2:30 PM   (180 min.)     ONCOLOGY INFUSION   [facility] 12     13     14     15       16     17     18     [facility] NEW WITH ROOM    9:45 AM   (60 min.)   Kesha Maloney LICSW   [facility] LAB DRAW   12:00 PM   (15 min.)    [facility]   [facility]     [facility] ONC INFUSION 180   12:30 PM   (180 min.)    ONCOLOGY INFUSION   [facility] 19     20     21     22       23     24     CONSULT HOD    1:00 PM   (60 min.)   Renea Avila, BIJAL   [facility], [place], Nutrition Services 25     [facility] RETURN    9:45 AM   (30 min.)   Keira Hernandez MD   [facility] LAB DRAW   12:00 PM   (15 min.)    [facility] LAB DRAW   [facility] Lab Draw     [facility] ONC INFUSION 180   12:30 PM   (180 min.)    ONCOLOGY INFUSION   [facility] 26     27     28     29       30                                              May 2017   [place] Monday Tuesday Wednesday Thursday Friday Saturday        1     2     3     4     5     6       7     8     9     10     11     12     13       14     15     16     17     18     19     20       21     22     23     24     25     26     27       28     29     30     31                                 Lab Results:  Recent Results (from the past 12 hour(s))   CBC with platelets differential    Collection Time: 04/11/17  1:07 PM   Result Value Ref Range    WBC 2.4 (L) 4.0 - 11.0 10e9/L    RBC Count 2.50 (L) 4.4 - 5.9 10e12/L    Hemoglobin 7.6 (L) 13.3 - 17.7 g/dL    Hematocrit 23.7 (L) 40.0 - 53.0 %    MCV 95 78 - 100 fl    MCH 30.4 26.5 - 33.0 pg    MCHC 32.1 31.5 - 36.5 g/dL    RDW 24.0 (H) 10.0 - 15.0 %    Platelet Count 71 (L) 150 - 450 10e9/L    Diff Method Automated Method     % Neutrophils 64.0 %    % Lymphocytes 14.3 %    % Monocytes 20.1 %    % Eosinophils 0.8 %    % Basophils 0.4 %    % Immature Granulocytes 0.4 %    Nucleated RBCs 0 0 /100    Absolute Neutrophil 1.6 1.6  - 8.3 10e9/L    Absolute Lymphocytes 0.4 (L) 0.8 - 5.3 10e9/L    Absolute Monocytes 0.5 0.0 - 1.3 10e9/L    Absolute Eosinophils 0.0 0.0 - 0.7 10e9/L    Absolute Basophils 0.0 0.0 - 0.2 10e9/L    Abs Immature Granulocytes 0.0 0 - 0.4 10e9/L    Absolute Nucleated RBC 0.0    Comprehensive metabolic panel    Collection Time: 04/11/17  1:07 PM   Result Value Ref Range    Sodium 136 133 - 144 mmol/L    Potassium 4.0 3.4 - 5.3 mmol/L    Chloride 101 94 - 109 mmol/L    Carbon Dioxide 28 20 - 32 mmol/L    Anion Gap 7 3 - 14 mmol/L    Glucose 109 (H) 70 - 99 mg/dL    Urea Nitrogen 17 7 - 30 mg/dL    Creatinine 0.56 (L) 0.66 - 1.25 mg/dL    GFR Estimate >90  Non  GFR Calc   >60 mL/min/1.7m2    GFR Estimate If Black >90   GFR Calc   >60 mL/min/1.7m2    Calcium 9.2 8.5 - 10.1 mg/dL    Bilirubin Total 0.2 0.2 - 1.3 mg/dL    Albumin 2.7 (L) 3.4 - 5.0 g/dL    Protein Total 7.7 6.8 - 8.8 g/dL    Alkaline Phosphatase 69 40 - 150 U/L    ALT 52 0 - 70 U/L    AST 30 0 - 45 U/L   ABO/Rh type and screen    Collection Time: 04/11/17  1:07 PM   Result Value Ref Range    ABO Pending     RH(D) Pending     Antibody Screen Pending     Test Valid Only At       [facility]    Specimen Expires 04/14/2017              Follow-ups after your visit        Your next 10 appointments already scheduled     Apr 18, 2017 10:00 AM CDT   (Arrive by 9:45 AM)   NEW WITH ROOM with YONIS Hernandez,  2 119 CONSULT [facility] ([facility])    [address] [place] [address] [phone]            Apr 24, 2017  1:00 PM CDT   Consult HOD with Renea Borges RD   [facility], [place], Nutrition Services ([facility] [place])    [address] [facility] [address]  [facility] [address]               Apr 25, 2017 10:00 AM CDT   (Arrive by 9:45 AM)   Return Visit with  Keria Hernandez MD   [facility] ([facility])    [address] [facility]  2nd [facility] [address] [phone]            Apr 25, 2017 12:00 PM CDT   [facility] Lab Draw with UC [facility] LAB DRAW   [facility] Lab Draw ([facility])    [address] [id number] [phone]            Apr 25, 2017 12:30 PM CDT   Infusion 180 with UC ONCOLOGY INFUSION, UC 30 ATC   [facility] ([facility])    [address] [id number] [phone]              Who to contact     If you have questions or need follow up information about today's clinic visit or your schedule please contact [facility] directly at [phone].  Normal or non-critical lab and imaging results will be communicated to you by MyChart, letter or phone within 4 business days after the clinic has received the results. If you do not hear from us within 7 days, please contact the clinic through Casacandahart or phone. If you have a critical or abnormal lab result, we will notify you by phone as soon as possible.  Submit refill requests through Lima or call your pharmacy and they will forward the refill request to us. Please allow 3 business days for your refill to be completed.          Additional Information About Your Visit        CasacandaharIronPearl Information     Lima gives you secure access to your electronic health record. If you see a primary care provider, you can also send messages to your care team and make appointments. If you have questions, please call your primary care clinic.  If you do not have a primary care provider, please call [phone] and they will assist you.        Care EveryWhere ID     This is your Care EveryWhere ID. This could be used by other organizations to access your [place] medical records  [id number]         Blood Pressure from  Last 3 Encounters:   04/11/17 117/77   04/04/17 109/73   03/29/17 118/84    Weight from Last 3 Encounters:   04/11/17 58 kg (127 lb 12.8 oz)   04/04/17 57.2 kg (126 lb 1.8 oz)   03/29/17 58.4 kg (128 lb 12.8 oz)              We Performed the Following     ABO/Rh type and screen     CBC with platelets differential     Comprehensive metabolic panel     Treatment Conditions          Today's Medication Changes          These changes are accurate as of: 4/11/17  4:25 PM.  If you have any questions, ask your nurse or doctor.               These medicines have changed or have updated prescriptions.        Dose/Directions    mineral oil-hydrophilic petrolatum   This may have changed:    - when to take this  - reasons to take this   Used for:  Malignant neoplasm of tongue (H)        Apply topically every 8 hours   Quantity:  50 g   Refills:  0       pantoprazole 20 MG EC tablet   Commonly known as:  PROTONIX   This may have changed:    - when to take this  - reasons to take this   Used for:  Gastroesophageal reflux disease without esophagitis        Dose:  20 mg   Take 1 tablet (20 mg) by mouth daily   Quantity:  30 tablet   Refills:  3            Where to get your medicines      Some of these will need a paper prescription and others can be bought over the counter.  Ask your nurse if you have questions.     Bring a paper prescription for each of these medications     LORazepam 0.5 MG tablet    oxyCODONE 5 MG/5ML solution                Primary Care Provider Office Phone # Fax #    Mary Jane Nicollet [facility] [phone] [address] [facility] [address] [place] [address]        Thank you!     Thank you for choosing [facility]  for your care. Our goal is always to provide you with excellent care. Hearing back from our patients is one way we can continue to improve our services. Please take a few minutes to complete the written survey that you may receive in the mail after  your visit with us. Thank you!             Your Updated Medication List - Protect others around you: Learn how to safely use, store and throw away your medicines at www.disposemymeds.org.          This list is accurate as of: 4/11/17  4:25 PM.  Always use your most recent med list.                   Brand Name Dispense Instructions for use    citalopram 10 MG/5ML solution    celeXA    300 mL    Take 10 mLs (20 mg) by mouth daily       clindamycin 1 % lotion    CLEOCIN T    60 mL    Apply topically 2 times daily       ibuprofen 200 MG tablet    ADVIL/MOTRIN     Take 200 mg by mouth daily as needed       levothyroxine 50 MCG tablet    SYNTHROID/LEVOTHROID    30 tablet    Take 1 tablet (50 mcg) by mouth daily       lidocaine 2 % solution    XYLOCAINE    1000 mL    15 mL swish and spit q3h PRN, max 8 doses in 24 hours       LORazepam 0.5 MG tablet    ATIVAN    30 tablet    Take 1 tablet (0.5 mg) by mouth every 4 hours as needed (Anxiety, Nausea/Vomiting or Sleep)       methylphenidate 5 MG tablet    RITALIN    60 tablet    Take 1 tablet (5 mg) by mouth 2 times daily       mineral oil-hydrophilic petrolatum     50 g    Apply topically every 8 hours       multivitamins with minerals Liqd liquid     1 Bottle    15 mLs by Per Feeding Tube route daily       OLANZapine 5 MG tablet    zyPREXA    60 tablet    Take 1 tablet (5 mg) by mouth 2 times daily as needed For nausea       ondansetron 4 MG tablet    ZOFRAN     Take 4 mg by mouth daily as needed       * oxyCODONE 20 MG 12 hr tablet    OxyCONTIN    60 tablet    Take 1 tablet (20 mg) by mouth every 12 hours maximum 2 tablet(s) per day       * oxyCODONE 5 MG/5ML solution    ROXICODONE    1000 mL    10 mLs (10 mg) by Oral or PEG tube route every 4 hours as needed for moderate to severe pain       pantoprazole 20 MG EC tablet    PROTONIX    30 tablet    Take 1 tablet (20 mg) by mouth daily       prochlorperazine 10 MG tablet    COMPAZINE    30 tablet    Take 1 tablet (10 mg) by  mouth every 6 hours as needed (Nausea/Vomiting)       * Notice:  This list has 2 medication(s) that are the same as other medications prescribed for you. Read the directions carefully, and ask your doctor or other care provider to review them with you.

## 2017-04-11 NOTE — NURSING NOTE
"Darek Navarro is a 27 year old male who presents for:  Chief Complaint   Patient presents with     Oncology Clinic Visit     Throat Ca        Initial Vitals:  /77  Pulse 109  Temp 98.5  F (36.9  C) (Oral)  Resp 16  Ht 1.88 m (6' 2.02\")  Wt 58 kg (127 lb 12.8 oz)  SpO2 98%  BMI 16.4 kg/m2 Estimated body mass index is 16.4 kg/(m^2) as calculated from the following:    Height as of this encounter: 1.88 m (6' 2.02\").    Weight as of this encounter: 58 kg (127 lb 12.8 oz).. Body surface area is 1.74 meters squared. BP completed using cuff size: NA (Not Taken)  No Pain (0) No LMP for male patient. Allergies and medications reviewed.     Medications: MEDICATION REFILLS NEEDED TODAY.  Pharmacy name entered into [facility]:    [id number] [place], [place] - [address] [facility] [address]  [facility] [address] [place] [facility] [address]    Comments: Oxycodone and Lorazepam. Vitals completed in lab. Pt declined to review medications stating they are all UTD.    7 minutes for nursing intake (face to face time)   Gertrude Mcgill LPN        "

## 2017-04-11 NOTE — MR AVS SNAPSHOT
After Visit Summary   4/11/2017    Darek Navarro    MRN: [id number]           Patient Information     Date Of Birth          1990        Visit Information        Provider Department      4/11/2017 1:15 PM Keira Hernandez MD [facility]        Today's Diagnoses     Malignant neoplasm of tongue (H)        Head and neck cancer (H)        Oral cancer (H)           Follow-ups after your visit        Your next 10 appointments already scheduled     Apr 11, 2017  2:30 PM CDT   Infusion 180 with UC ONCOLOGY INFUSION, UC 19 ATC   [facility] ([facility])    [address] [place] [address]   [phone]            Apr 18, 2017 10:00 AM CDT   (Arrive by 9:45 AM)   NEW WITH ROOM with Kesha Maloney [facility],  2 119 CONSULT [facility] ([facility])    [address] [place] [id number] [phone]            Apr 18, 2017 12:00 PM CDT   [facility] Lab Draw with  [facility] LAB DRAW   [facility] Lab Draw ([facility])    [address] [place] [id number] [phone]            Apr 18, 2017 12:30 PM CDT   Infusion 180 with UC ONCOLOGY INFUSION, UC 30 ATC   [facility] ([facility])    [address] [place] [address]   [phone]            Apr 24, 2017  1:00 PM CDT   Consult HOD with Renea Borges RD   [facility] [place], Nutrition Services ([facility])    [address] [place] [address] 84  [facility] [address]               Apr 25, 2017 10:00 AM CDT   (Arrive by 9:45 AM)   Return Visit with Keira Hernandez MD   [facility] ([facility])    [address] [place] [address]   [phone]            Apr 25,  "2017 12:00 PM [facility]   [facility] Lab Draw with  [facility] LAB DRAW   [facility] Lab Draw ([facility] [place])    [address] [facility]  2nd Floor  [facility] [address] [phone]              Who to contact     If you have questions or need follow up information about today's clinic visit or your schedule please contact [facility] directly at [phone].  Normal or non-critical lab and imaging results will be communicated to you by ROME Corporationhart, letter or phone within 4 business days after the clinic has received the results. If you do not hear from us within 7 days, please contact the clinic through [employer] or phone. If you have a critical or abnormal lab result, we will notify you by phone as soon as possible.  Submit refill requests through [email or web address] or call your pharmacy and they will forward the refill request to us. Please allow 3 business days for your refill to be completed.          Additional Information About Your Visit        [email or web address] Information     [email or web address] gives you secure access to your electronic health record. If you see a primary care provider, you can also send messages to your care team and make appointments. If you have questions, please call your primary care clinic.  If you do not have a primary care provider, please call [phone] and they will assist you.        Care EveryWhere ID     This is your Care EveryWhere ID. This could be used by other organizations to access your [place] medical records  [id number]        Your Vitals Were     Pulse Temperature Respirations Height Pulse Oximetry BMI (Body Mass Index)    109 98.5  F (36.9  C) (Oral) 16 1.88 m (6' 2.02\") 98% 16.4 kg/m2       Blood Pressure from Last 3 Encounters:   04/11/17 117/77   04/04/17 109/73   03/29/17 118/84    Weight from Last 3 Encounters:   04/11/17 58 kg (127 lb 12.8 oz)   04/04/17 57.2 kg (126 lb 1.8 oz)   03/29/17 58.4 kg (128 lb 12.8 oz)              Today, you had the " following     No orders found for display         Today's Medication Changes          These changes are accurate as of: 4/11/17  1:52 PM.  If you have any questions, ask your nurse or doctor.               These medicines have changed or have updated prescriptions.        Dose/Directions    mineral oil-hydrophilic petrolatum   This may have changed:    - when to take this  - reasons to take this   Used for:  Malignant neoplasm of tongue (H)        Apply topically every 8 hours   Quantity:  50 g   Refills:  0       pantoprazole 20 MG EC tablet   Commonly known as:  PROTONIX   This may have changed:    - when to take this  - reasons to take this   Used for:  Gastroesophageal reflux disease without esophagitis        Dose:  20 mg   Take 1 tablet (20 mg) by mouth daily   Quantity:  30 tablet   Refills:  3            Where to get your medicines      Some of these will need a paper prescription and others can be bought over the counter.  Ask your nurse if you have questions.     Bring a paper prescription for each of these medications     LORazepam 0.5 MG tablet    oxyCODONE 5 MG/5ML solution                Primary Care Provider Office Phone # Fax #    Mary Jane Nicollet [facility] [phone] [address] [facility] [address]  [facility] [address]        Thank you!     Thank you for choosing [facility]  for your care. Our goal is always to provide you with excellent care. Hearing back from our patients is one way we can continue to improve our services. Please take a few minutes to complete the written survey that you may receive in the mail after your visit with us. Thank you!             Your Updated Medication List - Protect others around you: Learn how to safely use, store and throw away your medicines at www.disposemymeds.org.          This list is accurate as of: 4/11/17  1:52 PM.  Always use your most recent med list.                   Brand Name Dispense Instructions  for use    citalopram 10 MG/5ML solution    celeXA    300 mL    Take 10 mLs (20 mg) by mouth daily       clindamycin 1 % lotion    CLEOCIN T    60 mL    Apply topically 2 times daily       ibuprofen 200 MG tablet    ADVIL/MOTRIN     Take 200 mg by mouth daily as needed       levothyroxine 50 MCG tablet    SYNTHROID/LEVOTHROID    30 tablet    Take 1 tablet (50 mcg) by mouth daily       lidocaine 2 % solution    XYLOCAINE    1000 mL    15 mL swish and spit q3h PRN, max 8 doses in 24 hours       LORazepam 0.5 MG tablet    ATIVAN    30 tablet    Take 1 tablet (0.5 mg) by mouth every 4 hours as needed (Anxiety, Nausea/Vomiting or Sleep)       methylphenidate 5 MG tablet    RITALIN    60 tablet    Take 1 tablet (5 mg) by mouth 2 times daily       mineral oil-hydrophilic petrolatum     50 g    Apply topically every 8 hours       multivitamins with minerals Liqd liquid     1 Bottle    15 mLs by Per Feeding Tube route daily       OLANZapine 5 MG tablet    zyPREXA    60 tablet    Take 1 tablet (5 mg) by mouth 2 times daily as needed For nausea       ondansetron 4 MG tablet    ZOFRAN     Take 4 mg by mouth daily as needed       * oxyCODONE 20 MG 12 hr tablet    OxyCONTIN    60 tablet    Take 1 tablet (20 mg) by mouth every 12 hours maximum 2 tablet(s) per day       * oxyCODONE 5 MG/5ML solution    ROXICODONE    1000 mL    10 mLs (10 mg) by Oral or PEG tube route every 4 hours as needed for moderate to severe pain       pantoprazole 20 MG EC tablet    PROTONIX    30 tablet    Take 1 tablet (20 mg) by mouth daily       prochlorperazine 10 MG tablet    COMPAZINE    30 tablet    Take 1 tablet (10 mg) by mouth every 6 hours as needed (Nausea/Vomiting)       * Notice:  This list has 2 medication(s) that are the same as other medications prescribed for you. Read the directions carefully, and ask your doctor or other care provider to review them with you.

## 2017-04-18 NOTE — NURSING NOTE
Chief Complaint   Patient presents with     RECHECK     Pt with no lab orders at time of rooming.  Clinic/Provider notified.       Kerri Rivera RN

## 2017-04-18 NOTE — PROGRESS NOTES
Palliative Care Clinical Social Work Contact    Data: Patient was scheduled for initial evaluation today at 10am.    Intervention: I met with him briefly since he was still occupied with research/ clinical trials discussion at 10:15, briefly introducing palliative care counseling services, and offering to see later today or reschedule.     Response/Assessment: He voiced that he is tired today and prefers not to consider scheduling at this point. His significant other voiced that he is fatigued with appointments today    Plan: No further outreach planned. Please feel free to re-refer the patient, or let me know if you would like me to reach out again to provide support.    THAO Garay, [facility]  Clinical , Palliative Care Program  [facility]  Office Phone: [phone]  [facility]: [phone]  [facility]: [phone]

## 2017-04-18 NOTE — MR AVS SNAPSHOT
After Visit Summary   4/18/2017    Darek Navarro    MRN: [id number]           Patient Information     Date Of Birth          1990        Visit Information        Provider Department      4/18/2017 10:00 AM [facility]  2 119 CON[facility]ULT [facility]        Today's Diagnoses     Encounter for palliative care    -  1       Follow-ups after your visit        Your next 10 appointments already scheduled     Apr 24, 2017  1:00 PM CDT   Consult HOD with Renea Borges RD   [facility] [place], Nutrition Services ([facility])    [address] [place] [address] 84  [facility] [address]               Apr 25, 2017 10:00 AM CDT   (Arrive by 9:45 AM)   Return Visit with Keira Hernandez MD   [facility] ([facility])    [address] [place] [address] [id number] [phone]            Apr 25, 2017 12:00 PM CDT   [facility] Draw with [facility] DRAW   [facility] Lab Draw ([facility])    [address] [place] [id number] [phone]            Apr 25, 2017 12:30 PM CDT   Infusion 180 with  ONCOLOGY INFUSION, UC 30 ATC   [facility] ([facility])    [address] [place] [id number] [phone]            May 09, 2017  1:15 PM CDT   (Arrive by 1:00 PM)   RETURN WITH ROOM with Yanique Wiggins GC,  2 114 CONSULT [facility] ([facility])    [address] [place] [id number] [phone]              Who to contact     If you have questions or need follow up information about today's clinic visit or your schedule please contact [facility] directly at [phone].  Normal or non-critical lab and imaging results will be communicated to you by  MyChart, letter or phone within 4 business days after the clinic has received the results. If you do not hear from us within 7 days, please contact the clinic through [employer] or phone. If you have a critical or abnormal lab result, we will notify you by phone as soon as possible.  Submit refill requests through [employer] or call your pharmacy and they will forward the refill request to us. Please allow 3 business days for your refill to be completed.          Additional Information About Your Visit        [employer] Information     [employer] gives you secure access to your electronic health record. If you see a primary care provider, you can also send messages to your care team and make appointments. If you have questions, please call your primary care clinic.  If you do not have a primary care provider, please call [phone] and they will assist you.        Care EveryWhere ID     This is your Care EveryWhere ID. This could be used by other organizations to access your [facility] medical records  [id number]         Blood Pressure from Last 3 Encounters:   04/18/17 111/76   04/11/17 117/77   04/04/17 109/73    Weight from Last 3 Encounters:   04/18/17 56.9 kg (125 lb 7.1 oz)   04/11/17 58 kg (127 lb 12.8 oz)   04/04/17 57.2 kg (126 lb 1.8 oz)              Today, you had the following     No orders found for display         Today's Medication Changes          These changes are accurate as of: 4/18/17 10:25 AM.  If you have any questions, ask your nurse or doctor.               These medicines have changed or have updated prescriptions.        Dose/Directions    mineral oil-hydrophilic petrolatum   This may have changed:    - when to take this  - reasons to take this   Used for:  Malignant neoplasm of tongue (H)        Apply topically every 8 hours   Quantity:  50 g   Refills:  0       pantoprazole 20 MG EC tablet   Commonly known as:  PROTONIX   This may have changed:    - when to take this  - reasons to take this   Used  for:  Gastroesophageal reflux disease without esophagitis        Dose:  20 mg   Take 1 tablet (20 mg) by mouth daily   Quantity:  30 tablet   Refills:  3                Primary Care Provider Office Phone # Fax #    [facility] [phone] [address]  [facility] [address]        Thank you!     Thank you for choosing [facility]  for your care. Our goal is always to provide you with excellent care. Hearing back from our patients is one way we can continue to improve our services. Please take a few minutes to complete the written survey that you may receive in the mail after your visit with us. Thank you!             Your Updated Medication List - Protect others around you: Learn how to safely use, store and throw away your medicines at www.disposemymeds.org.          This list is accurate as of: 4/18/17 10:25 AM.  Always use your most recent med list.                   Brand Name Dispense Instructions for use    citalopram 10 MG/5ML solution    celeXA    300 mL    Take 10 mLs (20 mg) by mouth daily       clindamycin 1 % lotion    CLEOCIN T    60 mL    Apply topically 2 times daily       ibuprofen 200 MG tablet    ADVIL/MOTRIN     Take 200 mg by mouth daily as needed       levothyroxine 50 MCG tablet    SYNTHROID/LEVOTHROID    30 tablet    Take 1 tablet (50 mcg) by mouth daily       lidocaine 2 % solution    XYLOCAINE    1000 mL    15 mL swish and spit q3h PRN, max 8 doses in 24 hours       LORazepam 0.5 MG tablet    ATIVAN    30 tablet    Take 1 tablet (0.5 mg) by mouth every 4 hours as needed (Anxiety, Nausea/Vomiting or Sleep)       methylphenidate 5 MG tablet    RITALIN    60 tablet    Take 1 tablet (5 mg) by mouth 2 times daily       mineral oil-hydrophilic petrolatum     50 g    Apply topically every 8 hours       multivitamins with minerals Liqd liquid     1 Bottle    15 mLs by Per Feeding Tube route daily       OLANZapine 5 MG  tablet    zyPREXA    60 tablet    Take 1 tablet (5 mg) by mouth 2 times daily as needed For nausea       ondansetron 4 MG tablet    ZOFRAN     Take 4 mg by mouth daily as needed       * oxyCODONE 20 MG 12 hr tablet    OxyCONTIN    60 tablet    Take 1 tablet (20 mg) by mouth every 12 hours maximum 2 tablet(s) per day       * oxyCODONE 5 MG/5ML solution    ROXICODONE    1000 mL    10 mLs (10 mg) by Oral or PEG tube route every 4 hours as needed for moderate to severe pain       pantoprazole 20 MG EC tablet    PROTONIX    30 tablet    Take 1 tablet (20 mg) by mouth daily       prochlorperazine 10 MG tablet    COMPAZINE    30 tablet    Take 1 tablet (10 mg) by mouth every 6 hours as needed (Nausea/Vomiting)       * Notice:  This list has 2 medication(s) that are the same as other medications prescribed for you. Read the directions carefully, and ask your doctor or other care provider to review them with you.

## 2017-04-18 NOTE — LETTER
4/18/2017       RE: Darek Navarro  [address]  [place] [address]     Dear Colleague,    Thank you for referring your patient, Darek Navarro, to the [facility]. Please see a copy of my visit note below.    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 Pathologic staging: [id number]. Recurrence with distant metastasis after initial chemoRT.       TREATMENT:    1- April 28, 2016 transmandibular subtotal oral glossectomy, right base of tongue resection with partial pharyngectomy, bilateral neck dissections, and free flap reconstruction. Concurrent ChemoRt with high dose Cisplatin 6/6/16. day 22 cisplatin on 6/27/16. He completed day 43 on 7/19/16. Radiation completed, 6600cGy on 7/21/16.   2- Palliative intent treatment with Pembrolizumab 12/6/2016  3- Due to rapid progression of disease the treatment was switched to Carboplatin, 5-FU and Cetuximab on 1/18/17     SUBJECTIVE:  The patient was seen as a followup today. He continues to have worsening of dysphagia. He appears to be not able to take full calories from his feeding tube and is continuing to loose weight.     REVIEW OF SYSTEMS:  A complete review of systems was performed and found to be negative other than pertinent positives mentioned in history of present illness.     Past medical, social histories reviewed.    Meds- Reviewed.     PHYSICAL EXAMINATION:   VITAL SIGNS: ./76  Pulse 116  Temp 98.1  F (36.7  C) (Oral)  Resp 16  Wt 56.9 kg (125 lb 7.1 oz)  SpO2 100%  BMI 16.1 kg/m2  GENERAL: Sitting comfortably.   NEUROLOGIC: Alert, awake.     DATA:   Recent Labs   Lab Test  04/11/17   1307  04/04/17   1355  03/29/17   1335   03/21/17   0247   05/05/16   0750   NA  136  138  135   < >  139   < >  140    POTASSIUM  4.0  4.0  3.8   < >  4.2   < >  4.9   CHLORIDE  101  100  99   < >  105   < >  105   CO2  28  29  27   < >  25   < >  28   ANIONGAP  7  8  8   < >  8   < >  6   BUN  17  16  14   < >  8   < >  16   CR  0.56*  0.65*  0.53*   < >  0.65*   < >  0.64*   GLC  109*  132*  113*   < >  89   < >  106*   COLE  9.2  9.2  8.5   < >  8.6   < >  9.6   MAG   --    --   2.7*   --   2.4*   < >  2.3   PHOS   --    --    --    --   3.1   --   5.3*    < > = values in this interval not displayed.     Recent Labs   Lab Test  04/11/17   1307  04/04/17   1355  03/29/17   1335   WBC  2.4*  1.1*  1.4*   HGB  7.6*  8.2*  8.4*   PLT  71*  99*  156   MCV  95  93  88   NEUTROPHIL  64.0  44.6  70.0     Recent Labs   Lab Test  04/11/17   1307  04/04/17   1355  03/29/17   1335   BILITOTAL  0.2  0.5  0.3   ALKPHOS  69  71  66   ALT  52  51  64   AST  30  30  41   ALBUMIN  2.7*  2.9*  3.0*         Results for orders placed or performed in visit on 04/11/17   CT Chest/Abdomen/Pelvis w Contrast     Value    Radiologist flags Slightly increased anterior compression deformity    Narrative    EXAMINATION: CT CHEST/ABDOMEN/PELVIS W CONTRAST, 4/11/2017 10:09 AM    TECHNIQUE:  Helical CT images from the thoracic inlet through the  symphysis pubis were obtained  with contrast.    CONTRAST DOSE: Isovue 370  77 mls    COMPARISON: CT 2/21/2017, 1/16/2017    HISTORY: restaging metastatic head and neck cancer, Malignant neoplasm  of tongue, unspecified, Malignant neoplasm of head, face and neck,  status post contrast mandibular subtotal glossectomy, right tongue  base resection with partial pharyngectomy, bilateral neck dissection  and flap reconstruction in 2016, known bone metastases, radiation  therapy to the spine, on chemotherapy, neck lumps per recent note    FINDINGS:    Chest:     Left chest wall sangita catheter tip projects in the right atrium.    Postsurgical changes in the neck. Increased lower cervical  lymphadenopathy which appear necrotic,  for example a 1.6 cm  right-sided lower cervical necrotic lymph node compared to 1.1 cm in  2/21/2017. No axillary lymphadenopathy.    Increased mediastinal and perihilar lymphadenopathy, for example the  1.6 cm left paratracheal necrotic lymph node previously measured 1 cm  (series 4 image 107). Additionally larger 1.9 cm left perihilar  necrotic lymph node compared to 1.6 cm on the prior ( Series 4 image  101).    The heart size within normal limits. No pericardial effusion.    The central tracheobronchial tree is patent. Larger 6 x 5.1 cm left  upper lobe cavitary mass compared to 5.2 x 1.7 cm. Smaller 1.8 cm left  lower lobe cavitary lesion compared to 2.3 cm. Additionally smaller  4.1 x 3.1 cm left upper lobe mass compared to 4.2 x 3.4 cm on the  prior. Slightly increased tree-in-bud opacities in the left lower  lobe. Additional bilateral nodules, some of them are cavitatory.    Redemonstration of the bilateral pleural nodules, with the right  smaller than the prior while the left appearing larger than 2  2/21/2017.     No significant pleural effusion or pneumothorax.    Abdomen and pelvis:   Gastrostomy tube.    Further smaller 5 mm low-attenuation lesion in the hepatic segment 5,  compared to 7 mm  in 2/21/ 2017 and 14 mm in 1/16/2017. No additional  suspicious hepatic lesion. The gallbladder is decompressed. The  spleen, adrenals and pancreas are unremarkable.    Slightly larger and increased cystic appearance of the multiple  low-attenuation renal lesions for example the 3 cm right interpolar  region lesion previously measured 2.6 cm (series 5 image 54). No  hydronephrosis or renal calculus.    The urinary bladder is unremarkable. Large prostate measures up to 5  cm. Again seen prostatic calcification/right-sided pelvic phlebolith.    No bowel obstruction or abnormal bowel wall thickening.    Bones and soft tissues:     Increased size of the 5.2 cm soft tissue mass centered on the anterior  right iliac bone  (series 4 image 473) compared to 4.2 cm on the prior.  Associated lytic foci in the iliac bone.    No significant change in the severe anterior compression deformity of  the T10 vertebral body. Increased anterior compression deformity of  the T1 vertebral body. Additional lytic lesions involving the T11  vertebral body. Stable sclerotic focus of the left lateral eighth rib.  Again seen lucent lesions of the bilateral iliac bones. Unchanged  sclerotic focus involving the bilateral hip bones.      Impression    IMPRESSION:   In this patient with history of metastatic squamous cell carcinoma of  the tongue, mixed response:  1a) Increased lower cervical lymphadenopathy with necrotic nodes.  1b) Increased mediastinal and perihilar lymphadenopathy with necrotic  nodes.  1c) Larger 6 cm left upper lobe cavitary mass. Additional bilateral  pulmonary nodules/masses, some of them are cavitary. Some of these  nodules are smaller. Bilateral pleural nodules.  1d) Smaller 5 mm low-attenuation lesion of the hepatic segment.  1e) Larger more cystic appearing of the multiple bilateral renal  lesions, likely suggestive of post therapeutic necrosis.  1f) Increased size of the 5.2 cm soft tissue mass centered on the  anterior right iliac bone (series 4 image 473) compared to 4.2 cm on  the prior. Associated lytic foci in the iliac bone. Increased anterior  compression deformity at the T1 vertebral body. Stable severe anterior  compression deformity of the T10 vertebral body. Multiple lytic  lesions and sclerotic foci as described above.  2) Slightly increased left lower lobe tree-in-bud opacities,  consistent with persistent infection.    [Consider Follow Up: Slightly increased anterior compression deformity  of the T1 vertebral body. Stable severe anterior compression deformity  of T10 vertebral body.]    This report will be copied to the [facility] to ensure a  provider acknowledges the finding.             I have  personally reviewed the examination and initial interpretation  and I agree with the findings.    MALENA PANCHAL MD       ECOG PS: 2     ASSESSMENT:  A 26-year-old gentleman  [id number] squamous cell carcinoma of the right oral tongue.Status post April 28, 2016 transmandibular subtotal oral glossectomy, right base of tongue resection with partial pharyngectomy, bilateral neck dissections, and free flap. Initiated treatment by 6/6/16.   He completed his concurrent chemoRT 7/21/16.  Follow up PET CT scan 10/28/16 was reviewed at multidisciplinary head and neck tumor board. It was consistent with local and distant recurrence. L- His case was subsequently reviewed at Thoracic oncology conference and we will proceed with biopsy by IR which confirmed metastatic disease.   He was seen at [facility] and no first line trials were available. He was switched to Pembrolizumab with a plan to do short term scans to assess the pace of disease progression.   CT scan 1/16/17 showed rapid disease progression with thoracic vertebral fracture. Multiple lytic/destructive spine lesions were reviewed by spine surgery and were not felt to be unstable. He completed palliative radiation to bones.   He was switched to carboplatin/5-FU and Cetuximab and completed 4 cycles.  He is on Zometa  - His performance status has declined.  - CT scan 4/11/17 results were reviewed with the patient, I reviewed the images myself. He has progression at multiple sites.   - I discussed the option of screening for NCI-match clinical trial. Unfortunately, match rate is quite low. He will need another biopsy for this. If he is not a candidate for NCI-match then we will consider the next line of chemotherapy with Taxotere.  - I also discussed that given his continued decline in performance status and continued disease progression the expected survival is quite short. I introduced them to the concept of hospice. He agrees to consider hospice after he has tried  another line of treatment either on or off trial.     PLAN:  1- Nutritionist evaluation ASAP, please see if it can be done today. Patient needs help with tube feeds, consistently loosing weight. Please also see if a pump for feeding tube can be arranged.   2- Research nurse to talk to the patient today for NCI-match screening.  3- Chemo education- Taxotere every three weeks (plan to start chemotherapy if he fails screening for the trial).  4- Please schedule a follow up with Dr. Guzman for worsening of dysphagia  5- RTC MD 2 weeks     CLAUS CAT MD    4/18/2017    cc: Andrea Pagan MD     Addendum: I will also send [employer]ant 360.

## 2017-04-18 NOTE — PROGRESS NOTES
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 Pathologic staging: [id number]. Recurrence with distant metastasis after initial chemoRT.       TREATMENT:    1- April 28, 2016 transmandibular subtotal oral glossectomy, right base of tongue resection with partial pharyngectomy, bilateral neck dissections, and free flap reconstruction. Concurrent ChemoRt with high dose Cisplatin 6/6/16. day 22 cisplatin on 6/27/16. He completed day 43 on 7/19/16. Radiation completed, 6600cGy on 7/21/16.   2- Palliative intent treatment with Pembrolizumab 12/6/2016  3- Due to rapid progression of disease the treatment was switched to Carboplatin, 5-FU and Cetuximab on 1/18/17     SUBJECTIVE:  The patient was seen as a followup today. He continues to have worsening of dysphagia. He appears to be not able to take full calories from his feeding tube and is continuing to loose weight.     REVIEW OF SYSTEMS:  A complete review of systems was performed and found to be negative other than pertinent positives mentioned in history of present illness.     Past medical, social histories reviewed.    Meds- Reviewed.     PHYSICAL EXAMINATION:   VITAL SIGNS: ./76  Pulse 116  Temp 98.1  F (36.7  C) (Oral)  Resp 16  Wt 56.9 kg (125 lb 7.1 oz)  SpO2 100%  BMI 16.1 kg/m2  GENERAL: Sitting comfortably.   NEUROLOGIC: Alert, awake.     DATA:   Recent Labs   Lab Test  04/11/17   1307  04/04/17   1355  03/29/17   1335   03/21/17   0247   05/05/16   0750   NA  136  138  135   < >  139   < >  140   POTASSIUM  4.0  4.0  3.8   < >  4.2   < >  4.9   CHLORIDE  101  100  99   < >  105   < >  105   CO2  28  29  27   < >  25   < >  28   ANIONGAP  7  8  8   < >  8   < >  6   BUN  17  16  14   < >  8   < >  16   CR  0.56*  0.65*  0.53*   < >   0.65*   < >  0.64*   GLC  109*  132*  113*   < >  89   < >  106*   COLE  9.2  9.2  8.5   < >  8.6   < >  9.6   MAG   --    --   2.7*   --   2.4*   < >  2.3   PHOS   --    --    --    --   3.1   --   5.3*    < > = values in this interval not displayed.     Recent Labs   Lab Test  04/11/17   1307  04/04/17   1355  03/29/17   1335   WBC  2.4*  1.1*  1.4*   HGB  7.6*  8.2*  8.4*   PLT  71*  99*  156   MCV  95  93  88   NEUTROPHIL  64.0  44.6  70.0     Recent Labs   Lab Test  04/11/17   1307  04/04/17   1355  03/29/17   1335   BILITOTAL  0.2  0.5  0.3   ALKPHOS  69  71  66   ALT  52  51  64   AST  30  30  41   ALBUMIN  2.7*  2.9*  3.0*         Results for orders placed or performed in visit on 04/11/17   CT Chest/Abdomen/Pelvis w Contrast     Value    Radiologist flags Slightly increased anterior compression deformity    Narrative    EXAMINATION: CT CHEST/ABDOMEN/PELVIS W CONTRAST, 4/11/2017 10:09 AM    TECHNIQUE:  Helical CT images from the thoracic inlet through the  symphysis pubis were obtained  with contrast.    CONTRAST DOSE: Isovue 370  77 mls    COMPARISON: CT 2/21/2017, 1/16/2017    HISTORY: restaging metastatic head and neck cancer, Malignant neoplasm  of tongue, unspecified, Malignant neoplasm of head, face and neck,  status post contrast mandibular subtotal glossectomy, right tongue  base resection with partial pharyngectomy, bilateral neck dissection  and flap reconstruction in 2016, known bone metastases, radiation  therapy to the spine, on chemotherapy, neck lumps per recent note    FINDINGS:    Chest:     Left chest wall sangita catheter tip projects in the right atrium.    Postsurgical changes in the neck. Increased lower cervical  lymphadenopathy which appear necrotic, for example a 1.6 cm  right-sided lower cervical necrotic lymph node compared to 1.1 cm in  2/21/2017. No axillary lymphadenopathy.    Increased mediastinal and perihilar lymphadenopathy, for example the  1.6 cm left paratracheal necrotic  lymph node previously measured 1 cm  (series 4 image 107). Additionally larger 1.9 cm left perihilar  necrotic lymph node compared to 1.6 cm on the prior ( Series 4 image  101).    The heart size within normal limits. No pericardial effusion.    The central tracheobronchial tree is patent. Larger 6 x 5.1 cm left  upper lobe cavitary mass compared to 5.2 x 1.7 cm. Smaller 1.8 cm left  lower lobe cavitary lesion compared to 2.3 cm. Additionally smaller  4.1 x 3.1 cm left upper lobe mass compared to 4.2 x 3.4 cm on the  prior. Slightly increased tree-in-bud opacities in the left lower  lobe. Additional bilateral nodules, some of them are cavitatory.    Redemonstration of the bilateral pleural nodules, with the right  smaller than the prior while the left appearing larger than 2  2/21/2017.     No significant pleural effusion or pneumothorax.    Abdomen and pelvis:   Gastrostomy tube.    Further smaller 5 mm low-attenuation lesion in the hepatic segment 5,  compared to 7 mm  in 2/21/ 2017 and 14 mm in 1/16/2017. No additional  suspicious hepatic lesion. The gallbladder is decompressed. The  spleen, adrenals and pancreas are unremarkable.    Slightly larger and increased cystic appearance of the multiple  low-attenuation renal lesions for example the 3 cm right interpolar  region lesion previously measured 2.6 cm (series 5 image 54). No  hydronephrosis or renal calculus.    The urinary bladder is unremarkable. Large prostate measures up to 5  cm. Again seen prostatic calcification/right-sided pelvic phlebolith.    No bowel obstruction or abnormal bowel wall thickening.    Bones and soft tissues:     Increased size of the 5.2 cm soft tissue mass centered on the anterior  right iliac bone (series 4 image 473) compared to 4.2 cm on the prior.  Associated lytic foci in the iliac bone.    No significant change in the severe anterior compression deformity of  the T10 vertebral body. Increased anterior compression deformity  of  the T1 vertebral body. Additional lytic lesions involving the T11  vertebral body. Stable sclerotic focus of the left lateral eighth rib.  Again seen lucent lesions of the bilateral iliac bones. Unchanged  sclerotic focus involving the bilateral hip bones.      Impression    IMPRESSION:   In this patient with history of metastatic squamous cell carcinoma of  the tongue, mixed response:  1a) Increased lower cervical lymphadenopathy with necrotic nodes.  1b) Increased mediastinal and perihilar lymphadenopathy with necrotic  nodes.  1c) Larger 6 cm left upper lobe cavitary mass. Additional bilateral  pulmonary nodules/masses, some of them are cavitary. Some of these  nodules are smaller. Bilateral pleural nodules.  1d) Smaller 5 mm low-attenuation lesion of the hepatic segment.  1e) Larger more cystic appearing of the multiple bilateral renal  lesions, likely suggestive of post therapeutic necrosis.  1f) Increased size of the 5.2 cm soft tissue mass centered on the  anterior right iliac bone (series 4 image 473) compared to 4.2 cm on  the prior. Associated lytic foci in the iliac bone. Increased anterior  compression deformity at the T1 vertebral body. Stable severe anterior  compression deformity of the T10 vertebral body. Multiple lytic  lesions and sclerotic foci as described above.  2) Slightly increased left lower lobe tree-in-bud opacities,  consistent with persistent infection.    [Consider Follow Up: Slightly increased anterior compression deformity  of the T1 vertebral body. Stable severe anterior compression deformity  of T10 vertebral body.]    This report will be copied to the [facility] to ensure a  provider acknowledges the finding.             I have personally reviewed the examination and initial interpretation  and I agree with the findings.    MALENA PANCHAL MD       ECOG PS: 2     ASSESSMENT:  A 26-year-old gentleman  [id number] squamous cell carcinoma of the right oral tongue.Status  post April 28, 2016 transmandibular subtotal oral glossectomy, right base of tongue resection with partial pharyngectomy, bilateral neck dissections, and free flap. Initiated treatment by 6/6/16.   He completed his concurrent chemoRT 7/21/16.  Follow up PET CT scan 10/28/16 was reviewed at multidisciplinary head and neck tumor board. It was consistent with local and distant recurrence. L- His case was subsequently reviewed at Thoracic oncology conference and we will proceed with biopsy by IR which confirmed metastatic disease.   He was seen at [facility] and no first line trials were available. He was switched to Pembrolizumab with a plan to do short term scans to assess the pace of disease progression.   CT scan 1/16/17 showed rapid disease progression with thoracic vertebral fracture. Multiple lytic/destructive spine lesions were reviewed by spine surgery and were not felt to be unstable. He completed palliative radiation to bones.   He was switched to carboplatin/5-FU and Cetuximab and completed 4 cycles.  He is on Zometa  - His performance status has declined.  - CT scan 4/11/17 results were reviewed with the patient, I reviewed the images myself. He has progression at multiple sites.   - I discussed the option of screening for NCI-match clinical trial. Unfortunately, match rate is quite low. He will need another biopsy for this. If he is not a candidate for NCI-match then we will consider the next line of chemotherapy with Taxotere.  - I also discussed that given his continued decline in performance status and continued disease progression the expected survival is quite short. I introduced them to the concept of hospice. He agrees to consider hospice after he has tried another line of treatment either on or off trial.     PLAN:  1- Nutritionist evaluation ASAP, please see if it can be done today. Patient needs help with tube feeds, consistently loosing weight. Please also see if a pump for feeding tube can be  arranged.   2- Research nurse to talk to the patient today for NCI-match screening.  3- Chemo education- Taxotere every three weeks (plan to start chemotherapy if he fails screening for the trial).  4- Please schedule a follow up with Dr. Guzman for worsening of dysphagia  5- RTC MD 2 weeks     CLAUS CAT MD    4/18/2017    cc: Andrea Pagan MD     Addendum: I will also send Guardant 360.

## 2017-04-18 NOTE — LETTER
4/18/2017       RE: Darek Navarro  [address] [address]  [place] [address]     Dear Colleague,    Thank you for referring your patient, Darek Navarro, to the [facility] at [facility]. Please see a copy of my visit note below.    Palliative Care Clinical Social Work Contact    Data: Patient was scheduled for initial evaluation today at 10am.    Intervention: I met with him briefly since he was still occupied with research/ clinical trials discussion at 10:15, briefly introducing palliative care counseling services, and offering to see later today or reschedule.     Response/Assessment: He voiced that he is tired today and prefers not to consider scheduling at this point. His significant other voiced that he is fatigued with appointments today    Plan: No further outreach planned. Please feel free to re-refer the patient, or let me know if you would like me to reach out again to provide support.    THAO Garay, [facility]  Clinical , Palliative Care Program  [facility]  Office Phone: [phone]  [facility]: [phone]  [facility]: [phone]

## 2017-04-18 NOTE — MR AVS SNAPSHOT
After Visit Summary   4/18/2017    Darek Navarro    MRN: [id number]           Patient Information     Date Of Birth          1990        Visit Information        Provider Department      4/18/2017 9:30 AM Keira Hernandez MD [facility]        Today's Diagnoses     Malignant neoplasm of tongue (H)    -  1       Follow-ups after your visit        Your next 10 appointments already scheduled     May 09, 2017  1:15 PM CDT   (Arrive by 1:00 PM)   RETURN WITH ROOM with Yanique Wiggins GC,  2 [address] CONSULT [facility]    [address] [place] [id number] [phone]            May 16, 2017 10:00 AM CDT   (Arrive by 9:45 AM)   Return Visit with Keira Hernandez MD   [facility] ([facility]    [address] [place] [id number] [phone]              Future tests that were ordered for you today     Open Future Orders        Priority Expected Expires Ordered    IR Consultation for IR Exam Routine  4/25/2018 4/25/2017            Who to contact     If you have questions or need follow up information about today's clinic visit or your schedule please contact [facility] directly at [phone].  Normal or non-critical lab and imaging results will be communicated to you by MyChart, letter or phone within 4 business days after the clinic has received the results. If you do not hear from us within 7 days, please contact the clinic through MyChart or phone. If you have a critical or abnormal lab result, we will notify you by phone as soon as possible.  Submit refill requests through [employer] or call your pharmacy and they will forward the refill request to us. Please allow 3 business days for your refill to be completed.          Additional Information About Your Visit        Interviewhart Information     [employer] gives you  secure access to your electronic health record. If you see a primary care provider, you can also send messages to your care team and make appointments. If you have questions, please call your primary care clinic.  If you do not have a primary care provider, please call [phone] and they will assist you.        Care EveryWhere ID     This is your Care EveryWhere ID. This could be used by other organizations to access your [place] medical records  [id number]        Your Vitals Were     Pulse Temperature Respirations Pulse Oximetry BMI (Body Mass Index)       116 98.1  F (36.7  C) (Oral) 16 100% 16.1 kg/m2        Blood Pressure from Last 3 Encounters:   04/25/17 113/82   04/18/17 111/76   04/11/17 117/77    Weight from Last 3 Encounters:   04/25/17 56.4 kg (124 lb 6.4 oz)   04/18/17 56.9 kg (125 lb 7.1 oz)   04/11/17 58 kg (127 lb 12.8 oz)                 Today's Medication Changes          These changes are accurate as of: 4/18/17 11:59 PM.  If you have any questions, ask your nurse or doctor.               These medicines have changed or have updated prescriptions.        Dose/Directions    mineral oil-hydrophilic petrolatum   This may have changed:    - when to take this  - reasons to take this   Used for:  Malignant neoplasm of tongue (H)        Apply topically every 8 hours   Quantity:  50 g   Refills:  0       pantoprazole 20 MG EC tablet   Commonly known as:  PROTONIX   This may have changed:    - when to take this  - reasons to take this   Used for:  Gastroesophageal reflux disease without esophagitis        Dose:  20 mg   Take 1 tablet (20 mg) by mouth daily   Quantity:  30 tablet   Refills:  3                Primary Care Provider Office Phone # Fax #    [facility] [phone] [address]  [facility] [address]        Thank you!     Thank you for choosing [facility]  for your care. Our goal is always to provide you with  excellent care. Hearing back from our patients is one way we can continue to improve our services. Please take a few minutes to complete the written survey that you may receive in the mail after your visit with us. Thank you!             Your Updated Medication List - Protect others around you: Learn how to safely use, store and throw away your medicines at www.disposemymeds.org.          This list is accurate as of: 4/18/17 11:59 PM.  Always use your most recent med list.                   Brand Name Dispense Instructions for use    citalopram 10 MG/5ML solution    celeXA    300 mL    Take 10 mLs (20 mg) by mouth daily       clindamycin 1 % lotion    CLEOCIN T    60 mL    Apply topically 2 times daily       ibuprofen 200 MG tablet    ADVIL/MOTRIN     Take 200 mg by mouth daily as needed       levothyroxine 50 MCG tablet    SYNTHROID/LEVOTHROID    30 tablet    Take 1 tablet (50 mcg) by mouth daily       lidocaine 2 % solution    XYLOCAINE    1000 mL    15 mL swish and spit q3h PRN, max 8 doses in 24 hours       LORazepam 0.5 MG tablet    ATIVAN    30 tablet    Take 1 tablet (0.5 mg) by mouth every 4 hours as needed (Anxiety, Nausea/Vomiting or Sleep)       methylphenidate 5 MG tablet    RITALIN    60 tablet    Take 1 tablet (5 mg) by mouth 2 times daily       mineral oil-hydrophilic petrolatum     50 g    Apply topically every 8 hours       multivitamins with minerals Liqd liquid     1 Bottle    15 mLs by Per Feeding Tube route daily       OLANZapine 5 MG tablet    zyPREXA    60 tablet    Take 1 tablet (5 mg) by mouth 2 times daily as needed For nausea       ondansetron 4 MG tablet    ZOFRAN     Take 4 mg by mouth daily as needed       oxyCODONE 20 MG 12 hr tablet    OxyCONTIN    60 tablet    Take 1 tablet (20 mg) by mouth every 12 hours maximum 2 tablet(s) per day       pantoprazole 20 MG EC tablet    PROTONIX    30 tablet    Take 1 tablet (20 mg) by mouth daily       prochlorperazine 10 MG tablet    COMPAZINE     30 tablet    Take 1 tablet (10 mg) by mouth every 6 hours as needed (Nausea/Vomiting)

## 2017-04-20 NOTE — NURSING NOTE
Met with pt and his sister to discuss the MATCH trial. Pt signed consent and had no further questions. Pt vocalized understanding of study. Pt was advised to contact the research department should any further questions arise. Yadi Disla, CMA

## 2017-04-20 NOTE — MR AVS SNAPSHOT
After Visit Summary   4/20/2017    Darek Navarro    MRN: [id number]           Patient Information     Date Of Birth          1990        Visit Information        Provider Department      4/20/2017 12:00 PM Nurse, [facility]        Today's Diagnoses     Head and neck cancer (H)    -  1       Follow-ups after your visit        Your next 10 appointments already scheduled     Apr 25, 2017 10:00 AM CDT   (Arrive by 9:45 AM)   Return Visit with Keira Hernandez MD   [facility] ([facility])    [address] [place] [id number] [phone]            Apr 25, 2017 12:00 PM CDT   [facility] Draw with [facility] LAB DRAW   [facility] ([facility])    [address] [place] [id number] [phone]            Apr 25, 2017 12:30 PM CDT   Infusion 180 with [facility], UC 30 ATC   [facility] ([facility])    [address] [place] [id number] [phone]            Apr 25, 2017 12:30 PM CDT   Consult HOD with Jodee Valladares RD   [facility] [place] Services ([facility])    [address] [place] [address] 84  [facility] [id number]               May 09, 2017  1:15 PM CDT   (Arrive by 1:00 PM)   RETURN WITH ROOM with Yanique Wiggins GC,  2 114 CONSULT [facility] ([facility])    [address] [place] [address] [id number] [phone]              Who to contact     If you have questions or need follow up information about today's clinic visit or your schedule please contact [facility] directly at [phone].  Normal or non-critical lab and imaging results will be communicated to you by MyChart, letter or phone within 4 business days  after the clinic has received the results. If you do not hear from us within 7 days, please contact the clinic through [employer] or phone. If you have a critical or abnormal lab result, we will notify you by phone as soon as possible.  Submit refill requests through [employer] or call your pharmacy and they will forward the refill request to us. Please allow 3 business days for your refill to be completed.          Additional Information About Your Visit        [employer] Information     [employer] gives you secure access to your electronic health record. If you see a primary care provider, you can also send messages to your care team and make appointments. If you have questions, please call your primary care clinic.  If you do not have a primary care provider, please call [phone] and they will assist you.        Care EveryWhere ID     This is your Care EveryWhere ID. This could be used by other organizations to access your [place] medical records  [id number]         Blood Pressure from Last 3 Encounters:   04/18/17 111/76   04/11/17 117/77   04/04/17 109/73    Weight from Last 3 Encounters:   04/18/17 56.9 kg (125 lb 7.1 oz)   04/11/17 58 kg (127 lb 12.8 oz)   04/04/17 57.2 kg (126 lb 1.8 oz)              Today, you had the following     No orders found for display         Today's Medication Changes          These changes are accurate as of: 4/20/17  3:59 PM.  If you have any questions, ask your nurse or doctor.               These medicines have changed or have updated prescriptions.        Dose/Directions    mineral oil-hydrophilic petrolatum   This may have changed:    - when to take this  - reasons to take this   Used for:  Malignant neoplasm of tongue (H)        Apply topically every 8 hours   Quantity:  50 g   Refills:  0       pantoprazole 20 MG EC tablet   Commonly known as:  PROTONIX   This may have changed:    - when to take this  - reasons to take this   Used for:  Gastroesophageal reflux disease without  esophagitis        Dose:  20 mg   Take 1 tablet (20 mg) by mouth daily   Quantity:  30 tablet   Refills:  3                Primary Care Provider Office Phone # Fax #    [facility] [phone] [address]  [facility] [address]        Thank you!     Thank you for choosing [facility]  for your care. Our goal is always to provide you with excellent care. Hearing back from our patients is one way we can continue to improve our services. Please take a few minutes to complete the written survey that you may receive in the mail after your visit with us. Thank you!             Your Updated Medication List - Protect others around you: Learn how to safely use, store and throw away your medicines at www.disposemymeds.org.          This list is accurate as of: 4/20/17  3:59 PM.  Always use your most recent med list.                   Brand Name Dispense Instructions for use    citalopram 10 MG/5ML solution    celeXA    300 mL    Take 10 mLs (20 mg) by mouth daily       clindamycin 1 % lotion    CLEOCIN T    60 mL    Apply topically 2 times daily       ibuprofen 200 MG tablet    ADVIL/MOTRIN     Take 200 mg by mouth daily as needed       levothyroxine 50 MCG tablet    SYNTHROID/LEVOTHROID    30 tablet    Take 1 tablet (50 mcg) by mouth daily       lidocaine 2 % solution    XYLOCAINE    1000 mL    15 mL swish and spit q3h PRN, max 8 doses in 24 hours       LORazepam 0.5 MG tablet    ATIVAN    30 tablet    Take 1 tablet (0.5 mg) by mouth every 4 hours as needed (Anxiety, Nausea/Vomiting or Sleep)       methylphenidate 5 MG tablet    RITALIN    60 tablet    Take 1 tablet (5 mg) by mouth 2 times daily       mineral oil-hydrophilic petrolatum     50 g    Apply topically every 8 hours       multivitamins with minerals Liqd liquid     1 Bottle    15 mLs by Per Feeding Tube route daily       OLANZapine 5 MG tablet    zyPREXA    60 tablet    Take 1  tablet (5 mg) by mouth 2 times daily as needed For nausea       ondansetron 4 MG tablet    ZOFRAN     Take 4 mg by mouth daily as needed       * oxyCODONE 20 MG 12 hr tablet    OxyCONTIN    60 tablet    Take 1 tablet (20 mg) by mouth every 12 hours maximum 2 tablet(s) per day       * oxyCODONE 5 MG/5ML solution    ROXICODONE    1000 mL    10 mLs (10 mg) by Oral or PEG tube route every 4 hours as needed for moderate to severe pain       pantoprazole 20 MG EC tablet    PROTONIX    30 tablet    Take 1 tablet (20 mg) by mouth daily       prochlorperazine 10 MG tablet    COMPAZINE    30 tablet    Take 1 tablet (10 mg) by mouth every 6 hours as needed (Nausea/Vomiting)       * Notice:  This list has 2 medication(s) that are the same as other medications prescribed for you. Read the directions carefully, and ask your doctor or other care provider to review them with you.

## 2017-04-25 NOTE — NURSING NOTE
Chief Complaint   Patient presents with     Blood Draw     Labs collected peripherally by RN.     Labs collected via venipuncture.   Marj Castellanos RN

## 2017-04-25 NOTE — PROGRESS NOTES
"CLINICAL NUTRITION SERVICES - ASSESSMENT NOTE    REASON FOR ASSESSMENT  Darek Navarro is a 27 year old male seen by the dietitian for Provider Order - weight loss and increase TF referred by Dr. Keira Hernandez  Pt accompanied by wife   NUTRITION HISTORY  - Patient is on a regular diet at home. However he does not eat anything d/t severe mucositis. He is able to drink some water and flat soda throughout the day slowly.    - Patient had G-tube placed and started on enteral nutrition on 3/21/17. He is currently doing gravity feeds at home, doing 4-6 cans of Isosource 1.5. Per pt and wife he does not tolerate gravity feeds well d/t nausea as he believes the cans are infused to quickly. Generally 2 cans are infused in ~40 minutes.   -Current TF regimen provides: 1500-2250kcals and 68-102g protein per day.    -Per patient and family pt getting a pump from home infusion company tomorrow.    -Per patient and wife patient continues with weight loss.     ANTHROPOMETRICS  Height: 74\"  Weight: 56.4kg  BMI: 15.9kg/m^2  IBW: 86.4 kg  % IBW: 65%  Weight History:   Wt Readings from Last 15 Encounters:   04/25/17 56.4 kg (124 lb 6.4 oz)   04/18/17 56.9 kg (125 lb 7.1 oz)   04/11/17 58 kg (127 lb 12.8 oz)   04/04/17 57.2 kg (126 lb 1.8 oz)   03/29/17 58.4 kg (128 lb 12.8 oz)   03/23/17 58.9 kg (129 lb 14.4 oz)   03/21/17 56.2 kg (123 lb 12.8 oz)   03/14/17 57.6 kg (126 lb 14.4 oz)   03/10/17 59.4 kg (131 lb)   03/07/17 60 kg (132 lb 4.8 oz)   02/28/17 59.7 kg (131 lb 11.2 oz)   02/21/17 62.1 kg (136 lb 14.4 oz)   02/15/17 62 kg (136 lb 9.6 oz)   02/14/17 61.1 kg (134 lb 11.2 oz)   02/08/17 64.4 kg (142 lb)   ~4% in 1 month, ~9% weight loss in 2 months    LABS  Labs reviewed    MEDICATIONS  Certavite  Zofran    ASSESSED NUTRITION NEEDS:  Estimated Energy Needs: 7920-5964 kcals (35-45 Kcal/Kg)  Justification: repletion and underweight  Estimated Protein Needs:  protein (1.5-2 g pro/Kg)  Justification: Repletion and " hypercatabolism with critical illness  Estimated Fluid Needs: 1 mL/Kcal  Justification: maintenance    NUTRITION STATUS VALIDATION  % Intake:Decreased intake does not meet criteria for malnutrition   % Weight Loss:~4% in 1 month, ~9% weight loss in 2 months  Subcutaneous Fat Loss: severe  Muscle Loss: severe  Fluid/Edema:None noted  Weight Status:Underweight BMI <18.5  Severe malnutrition In the context of chronic illness    NUTRITION DIAGNOSIS:  Unintended weight loss related to poor PO and likely hypercatabolism with CA AEB ~4% in 1 month, ~9% weight loss in 2 months and need to increase TF to promote weight stability and weight gain.    INTERVENTIONS  Recommendations / Nutrition Prescription  1. Recommend transitioning to cycled TF's given intolerance to bolus feeds and need to increase total volume to provide adequate nutrition for weight maintenance/gain.   -Once patient receives pump recommend initiating TFs as follows:  Isosource 1.5 @ 25mL/hr and advance by 20mL every 6 hours to goal of 140mL/hr.   Once at goal start cycle feeds: 140mL/hr x 12 hours per day. This provides (1680mL/day): 2520kcals (45kcals/kg), 114g protein (2g/kg), 1277mL free water.  -If patient unable to tolerate above infusion rate recommend as follows:  -105mL x 16 hours per day  -90mL x 18 hours per day  -85mL x 20 hours per day  -70mL x 24 hours per day  Water flushes: 90mL before and after cycle (~180mL/day). Patient will need an additional ~750mL/day fluids from PO or additional water flushes.   Diet education: Discussed plan with patient and wife. Will send e-mail with above recommendations. If cycling overnight please take aspiration precautions including HOB>30 degrees during and 1 hour after TFs administered.  -Sent message with recommendations to patients MD and care coordinator.     Goals  Weight maintenance 56kg    Follow up/Monitoring  Patient Understanding: good  Expected Compliance: good  Follow-Up Plans: PRN  Time spent  with patient: 15 minutes.  Myra ECHEVARRIA, RD

## 2017-04-25 NOTE — MR AVS SNAPSHOT
After Visit Summary   4/25/2017    Darek Navarro    MRN: [id number]           Patient Information     Date Of Birth          1990        Visit Information        Provider Department      4/25/2017 10:00 AM Keira Hernandez MD [facility]        Today's Diagnoses     Malignant neoplasm of tongue (H)    -  1    Head and neck cancer (H)        Oral cancer (H)        Other specified hypothyroidism          Care Instructions    1- Refer to IR for biopsy for NCI-match within next week  2- RTC MD 3 weeks        Follow-ups after your visit        Your next 10 appointments already scheduled     May 09, 2017  1:15 PM CDT   (Arrive by 1:00 PM)   RETURN WITH ROOM with Yanique Wiggins GC,  2 114 CONSULT [facility]    [address] [place] [id number] [phone]            May 16, 2017 10:00 AM CDT   (Arrive by 9:45 AM)   Return Visit with Keira Hernandez MD   [facility])    [address] [place] [id number] [phone]              Future tests that were ordered for you today     Open Future Orders        Priority Expected Expires Ordered    IR Consultation for IR Exam Routine  4/25/2018 4/25/2017            Who to contact     If you have questions or need follow up information about today's clinic visit or your schedule please contact [facility] directly at [phone].  Normal or non-critical lab and imaging results will be communicated to you by MyChart, letter or phone within 4 business days after the clinic has received the results. If you do not hear from us within 7 days, please contact the clinic through MyChart or phone. If you have a critical or abnormal lab result, we will notify you by phone as soon as possible.  Submit refill requests through [employer] or call your pharmacy and they will  "forward the refill request to us. Please allow 3 business days for your refill to be completed.          Additional Information About Your Visit        [employer] Information     [employer] gives you secure access to your electronic health record. If you see a primary care provider, you can also send messages to your care team and make appointments. If you have questions, please call your primary care clinic.  If you do not have a primary care provider, please call [phone] and they will assist you.        Care EveryWhere ID     This is your Care EveryWhere ID. This could be used by other organizations to access your [place] medical records  [id number]        Your Vitals Were     Pulse Temperature Respirations Height Pulse Oximetry BMI (Body Mass Index)    119 98.9  F (37.2  C) (Oral) 18 1.88 m (6' 2.02\") 100% 15.97 kg/m2       Blood Pressure from Last 3 Encounters:   04/25/17 113/82   04/18/17 111/76   04/11/17 117/77    Weight from Last 3 Encounters:   04/25/17 56.4 kg (124 lb 6.4 oz)   04/18/17 56.9 kg (125 lb 7.1 oz)   04/11/17 58 kg (127 lb 12.8 oz)              We Performed the Following     T4 free     TSH with free T4 reflex          Today's Medication Changes          These changes are accurate as of: 4/25/17  4:09 PM.  If you have any questions, ask your nurse or doctor.               These medicines have changed or have updated prescriptions.        Dose/Directions    mineral oil-hydrophilic petrolatum   This may have changed:    - when to take this  - reasons to take this   Used for:  Malignant neoplasm of tongue (H)        Apply topically every 8 hours   Quantity:  50 g   Refills:  0       pantoprazole 20 MG EC tablet   Commonly known as:  PROTONIX   This may have changed:    - when to take this  - reasons to take this   Used for:  Gastroesophageal reflux disease without esophagitis        Dose:  20 mg   Take 1 tablet (20 mg) by mouth daily   Quantity:  30 tablet   Refills:  3            Where to get " your medicines      Some of these will need a paper prescription and others can be bought over the counter.  Ask your nurse if you have questions.     Bring a paper prescription for each of these medications     oxyCODONE 5 MG/5ML solution                Primary Care Provider Office Phone # Fax #    [facility] [phone] [address]  [facility] [address]        Thank you!     Thank you for choosing [facility]  for your care. Our goal is always to provide you with excellent care. Hearing back from our patients is one way we can continue to improve our services. Please take a few minutes to complete the written survey that you may receive in the mail after your visit with us. Thank you!             Your Updated Medication List - Protect others around you: Learn how to safely use, store and throw away your medicines at www.disposemymeds.org.          This list is accurate as of: 4/25/17  4:09 PM.  Always use your most recent med list.                   Brand Name Dispense Instructions for use    citalopram 10 MG/5ML solution    celeXA    300 mL    Take 10 mLs (20 mg) by mouth daily       clindamycin 1 % lotion    CLEOCIN T    60 mL    Apply topically 2 times daily       ibuprofen 200 MG tablet    ADVIL/MOTRIN     Take 200 mg by mouth daily as needed       levothyroxine 50 MCG tablet    SYNTHROID/LEVOTHROID    30 tablet    Take 1 tablet (50 mcg) by mouth daily       lidocaine 2 % solution    XYLOCAINE    1000 mL    15 mL swish and spit q3h PRN, max 8 doses in 24 hours       LORazepam 0.5 MG tablet    ATIVAN    30 tablet    Take 1 tablet (0.5 mg) by mouth every 4 hours as needed (Anxiety, Nausea/Vomiting or Sleep)       methylphenidate 5 MG tablet    RITALIN    60 tablet    Take 1 tablet (5 mg) by mouth 2 times daily       mineral oil-hydrophilic petrolatum     50 g    Apply topically every 8 hours       multivitamins with minerals  Liqd liquid     1 Bottle    15 mLs by Per Feeding Tube route daily       OLANZapine 5 MG tablet    zyPREXA    60 tablet    Take 1 tablet (5 mg) by mouth 2 times daily as needed For nausea       ondansetron 4 MG tablet    ZOFRAN     Take 4 mg by mouth daily as needed       order for DME     1 Units    Pls. Send tube feeding pump for patient.  Provide instructions in use.  Patient should have 6 cans of feeds per day.       * oxyCODONE 20 MG 12 hr tablet    OxyCONTIN    60 tablet    Take 1 tablet (20 mg) by mouth every 12 hours maximum 2 tablet(s) per day       * oxyCODONE 5 MG/5ML solution    ROXICODONE    1000 mL    10 mLs (10 mg) by Oral or PEG tube route every 4 hours as needed for moderate to severe pain       pantoprazole 20 MG EC tablet    PROTONIX    30 tablet    Take 1 tablet (20 mg) by mouth daily       prochlorperazine 10 MG tablet    COMPAZINE    30 tablet    Take 1 tablet (10 mg) by mouth every 6 hours as needed (Nausea/Vomiting)       * Notice:  This list has 2 medication(s) that are the same as other medications prescribed for you. Read the directions carefully, and ask your doctor or other care provider to review them with you.

## 2017-04-25 NOTE — LETTER
"4/25/2017       RE: Darek Navarro  [address]  [place] [address]     Dear Colleague,    Thank you for referring your patient, Darek Navarro, to the [facility]. Please see a copy of my visit note below.    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 Pathologic staging: [id number]. Recurrence with distant metastasis after initial chemoRT.       TREATMENT:    1- April 28, 2016 transmandibular subtotal oral glossectomy, right base of tongue resection with partial pharyngectomy, bilateral neck dissections, and free flap reconstruction. Concurrent ChemoRt with high dose Cisplatin 6/6/16. day 22 cisplatin on 6/27/16. He completed day 43 on 7/19/16. Radiation completed, 6600cGy on 7/21/16.   2- Palliative intent treatment with Pembrolizumab 12/6/2016  3- Due to rapid progression of disease the treatment was switched to Carboplatin, 5-FU and Cetuximab on 1/18/17     SUBJECTIVE:  The patient was seen as a followup today. He is feeling better then before. Swallowing has some improvement. He is seeing the nutritionist today.     REVIEW OF SYSTEMS:  A complete review of systems was performed and found to be negative other than pertinent positives mentioned in history of present illness.     Past medical, social histories reviewed.    Meds- Reviewed.     PHYSICAL EXAMINATION:   VITAL SIGNS: /82 (BP Location: Left arm, Patient Position: Chair, Cuff Size: Adult Regular)  Pulse 119  Temp 98.9  F (37.2  C) (Oral)  Resp 18  Ht 1.88 m (6' 2.02\")  Wt 56.4 kg (124 lb 6.4 oz)  SpO2 100%  BMI 15.97 kg/m2  GENERAL: Sitting comfortably.   HEENT: Pupils are equal. Oropharynx is clear.   NECK: Bilateral cervical lymphadenopathy.   LUNGS: Clear bilaterally.   HEART: S1, S2, regular. "   ABDOMEN: Soft, nontender, nondistended, no hepatosplenomegaly.   EXTREMITIES: Warm, well perfused.   NEUROLOGIC: Alert, awake.   SKIN: No rash.   LYMPHATICS: No edema.     DATA:   Recent Labs   Lab Test  04/20/17   1323  04/11/17   1307   03/29/17   1335   03/21/17   0247   05/05/16   0750   NA  137  136   < >  135   < >  139   < >  140   POTASSIUM  5.1  4.0   < >  3.8   < >  4.2   < >  4.9   CHLORIDE  100  101   < >  99   < >  105   < >  105   CO2  32  28   < >  27   < >  25   < >  28   ANIONGAP  5  7   < >  8   < >  8   < >  6   BUN  20  17   < >  14   < >  8   < >  16   CR  0.63*  0.56*   < >  0.53*   < >  0.65*   < >  0.64*   GLC  118*  109*   < >  113*   < >  89   < >  106*   COLE  10.6*  9.2   < >  8.5   < >  8.6   < >  9.6   MAG   --    --    --   2.7*   --   2.4*   < >  2.3   PHOS   --    --    --    --    --   3.1   --   5.3*    < > = values in this interval not displayed.     Recent Labs   Lab Test  04/20/17   1323  04/11/17   1307  04/04/17   1355   WBC  3.0*  2.4*  1.1*   HGB  8.3*  7.6*  8.2*   PLT  232  71*  99*   MCV  97  95  93   NEUTROPHIL  60.5  64.0  44.6     Recent Labs   Lab Test  04/20/17   1323  04/11/17   1307  04/04/17   1355   BILITOTAL  0.3  0.2  0.5   ALKPHOS  68  69  71   ALT  65  52  51   AST  39  30  30   ALBUMIN  2.8*  2.7*  2.9*         Results for orders placed or performed in visit on 04/11/17   CT Chest/Abdomen/Pelvis w Contrast     Value    Radiologist flags Slightly increased anterior compression deformity    Narrative    EXAMINATION: CT CHEST/ABDOMEN/PELVIS W CONTRAST, 4/11/2017 10:09 AM    TECHNIQUE:  Helical CT images from the thoracic inlet through the  symphysis pubis were obtained  with contrast.    CONTRAST DOSE: Isovue 370  77 mls    COMPARISON: CT 2/21/2017, 1/16/2017    HISTORY: restaging metastatic head and neck cancer, Malignant neoplasm  of tongue, unspecified, Malignant neoplasm of head, face and neck,  status post contrast mandibular subtotal glossectomy, right  tongue  base resection with partial pharyngectomy, bilateral neck dissection  and flap reconstruction in 2016, known bone metastases, radiation  therapy to the spine, on chemotherapy, neck lumps per recent note    FINDINGS:    Chest:     Left chest wall sangita catheter tip projects in the right atrium.    Postsurgical changes in the neck. Increased lower cervical  lymphadenopathy which appear necrotic, for example a 1.6 cm  right-sided lower cervical necrotic lymph node compared to 1.1 cm in  2/21/2017. No axillary lymphadenopathy.    Increased mediastinal and perihilar lymphadenopathy, for example the  1.6 cm left paratracheal necrotic lymph node previously measured 1 cm  (series 4 image 107). Additionally larger 1.9 cm left perihilar  necrotic lymph node compared to 1.6 cm on the prior ( Series 4 image  101).    The heart size within normal limits. No pericardial effusion.    The central tracheobronchial tree is patent. Larger 6 x 5.1 cm left  upper lobe cavitary mass compared to 5.2 x 1.7 cm. Smaller 1.8 cm left  lower lobe cavitary lesion compared to 2.3 cm. Additionally smaller  4.1 x 3.1 cm left upper lobe mass compared to 4.2 x 3.4 cm on the  prior. Slightly increased tree-in-bud opacities in the left lower  lobe. Additional bilateral nodules, some of them are cavitatory.    Redemonstration of the bilateral pleural nodules, with the right  smaller than the prior while the left appearing larger than 2  2/21/2017.     No significant pleural effusion or pneumothorax.    Abdomen and pelvis:   Gastrostomy tube.    Further smaller 5 mm low-attenuation lesion in the hepatic segment 5,  compared to 7 mm  in 2/21/ 2017 and 14 mm in 1/16/2017. No additional  suspicious hepatic lesion. The gallbladder is decompressed. The  spleen, adrenals and pancreas are unremarkable.    Slightly larger and increased cystic appearance of the multiple  low-attenuation renal lesions for example the 3 cm right interpolar  region lesion  previously measured 2.6 cm (series 5 image 54). No  hydronephrosis or renal calculus.    The urinary bladder is unremarkable. Large prostate measures up to 5  cm. Again seen prostatic calcification/right-sided pelvic phlebolith.    No bowel obstruction or abnormal bowel wall thickening.    Bones and soft tissues:     Increased size of the 5.2 cm soft tissue mass centered on the anterior  right iliac bone (series 4 image 473) compared to 4.2 cm on the prior.  Associated lytic foci in the iliac bone.    No significant change in the severe anterior compression deformity of  the T10 vertebral body. Increased anterior compression deformity of  the T1 vertebral body. Additional lytic lesions involving the T11  vertebral body. Stable sclerotic focus of the left lateral eighth rib.  Again seen lucent lesions of the bilateral iliac bones. Unchanged  sclerotic focus involving the bilateral hip bones.      Impression    IMPRESSION:   In this patient with history of metastatic squamous cell carcinoma of  the tongue, mixed response:  1a) Increased lower cervical lymphadenopathy with necrotic nodes.  1b) Increased mediastinal and perihilar lymphadenopathy with necrotic  nodes.  1c) Larger 6 cm left upper lobe cavitary mass. Additional bilateral  pulmonary nodules/masses, some of them are cavitary. Some of these  nodules are smaller. Bilateral pleural nodules.  1d) Smaller 5 mm low-attenuation lesion of the hepatic segment.  1e) Larger more cystic appearing of the multiple bilateral renal  lesions, likely suggestive of post therapeutic necrosis.  1f) Increased size of the 5.2 cm soft tissue mass centered on the  anterior right iliac bone (series 4 image 473) compared to 4.2 cm on  the prior. Associated lytic foci in the iliac bone. Increased anterior  compression deformity at the T1 vertebral body. Stable severe anterior  compression deformity of the T10 vertebral body. Multiple lytic  lesions and sclerotic foci as described  above.  2) Slightly increased left lower lobe tree-in-bud opacities,  consistent with persistent infection.    [Consider Follow Up: Slightly increased anterior compression deformity  of the T1 vertebral body. Stable severe anterior compression deformity  of T10 vertebral body.]    This report will be copied to the [facility] to ensure a  provider acknowledges the finding.             I have personally reviewed the examination and initial interpretation  and I agree with the findings.    MALENA PANCHAL MD       ECOG PS: 1     ASSESSMENT:  A 26-year-old gentleman  [id number] squamous cell carcinoma of the right oral tongue.Status post April 28, 2016 transmandibular subtotal oral glossectomy, right base of tongue resection with partial pharyngectomy, bilateral neck dissections, and free flap. Initiated treatment by 6/6/16.   He completed his concurrent chemoRT 7/21/16.  Follow up PET CT scan 10/28/16 was reviewed at multidisciplinary head and neck tumor board. It was consistent with local and distant recurrence. L- His case was subsequently reviewed at Thoracic oncology conference and we will proceed with biopsy by IR which confirmed metastatic disease.   He was seen at [facility] and no first line trials were available. He was switched to Pembrolizumab with a plan to do short term scans to assess the pace of disease progression.   CT scan 1/16/17 showed rapid disease progression with thoracic vertebral fracture. Multiple lytic/destructive spine lesions were reviewed by spine surgery and were not felt to be unstable. He completed palliative radiation to bones.   He was switched to carboplatin/5-FU and Cetuximab and completed 4 cycles.  He is on Zometa  CT scan 4/11/17 showed progression at multiple sites.   He is being screened for NCI-match clinical trial. Unfortunately, match rate is quite low. He will need another biopsy for this. If he is not a candidate for NCI-match then we will consider the next line  of chemotherapy with Taxotere.    PLAN:  1- Refer to IR for biopsy for NCI-match within next week  2- RTC MD 3 weeks  3- Recheck TSH today    KEIRA HERNANDEZ MD    4/25/2017    Total time spent 35 minutes, more than 50% of which were spent in counseling and coordination of care.       cc: Andrea Pagan MD        His TSH is very high. He should increase Levothyroxine to 100 mcg daily. Please have him see endocrinology as well. Thanks    Again, thank you for allowing me to participate in the care of your patient.      Sincerely,    Keira Hernandez MD

## 2017-04-25 NOTE — PROGRESS NOTES
Per the request of Dr. Hernandez, this RN called patient and gave him TSH result and advised him to take 100 mcg. Of synthroid.  Will also arrange for endocrine appt.  Patient indicated understanding.  New Rx sent to pharmacy.    Yadira Ferraro MSN, RN  RN Care Coordinator  [facility]  [phone]

## 2017-04-25 NOTE — NURSING NOTE
"Darek Navarro is a 27 year old male who presents for:  Chief Complaint   Patient presents with     Oncology Clinic Visit     Throat Ca F/U        Initial Vitals:  /82 (BP Location: Left arm, Patient Position: Chair, Cuff Size: Adult Regular)  Pulse 119  Temp 98.9  F (37.2  C) (Oral)  Resp 18  Ht 1.88 m (6' 2.02\")  Wt 56.4 kg (124 lb 6.4 oz)  SpO2 100%  BMI 15.97 kg/m2 Estimated body mass index is 15.97 kg/(m^2) as calculated from the following:    Height as of this encounter: 1.88 m (6' 2.02\").    Weight as of this encounter: 56.4 kg (124 lb 6.4 oz).. Body surface area is 1.72 meters squared. BP completed using cuff size: regular  No Pain (0) No LMP for male patient. Allergies and medications reviewed.     Medications: MEDICATION REFILLS NEEDED TODAY.  Pharmacy name entered into [facility]:    [id number] [place] [facility] [address] [address] [place] [facility] [address]    Comments: Pt. Would like a refill for his Oxycodone.     7 minutes for nursing intake (face to face time)   Brenda Bedoya CMA        "

## 2017-04-25 NOTE — PROGRESS NOTES
His TSH is very high. He should increase Levothyroxine to 100 mcg daily. Please have him see endocrinology as well. Thanks

## 2017-04-26 NOTE — TELEPHONE ENCOUNTER
Spoke with sis, scheduled 5/22 at 1030 with Dr. Menendez      ----- Message -----     From: Virginia Blue     Sent: 4/25/2017   5:17 PM       To: Med Specialties Endo Triage-  Subject: New pt- referred by Oncology                     Pt is being referred by Dr. Hernandez in oncology. Pt has head and neck cancer and is being referred for elevated TSH. I was unable to schedule pt in a reasonable time frame so pt would like a call back if a sooner appt can be arranged. Please call pt back at the number above. Thanks.    KI    Please DO NOT send this message and/or reply back to sender.  Call Center Representatives DO NOT respond to messages.

## 2017-05-18 ENCOUNTER — TELEPHONE (OUTPATIENT)
Dept: ONCOLOGY | Facility: CLINIC | Age: 27
End: 2017-05-18

## 2017-05-18 NOTE — Clinical Note
"Please print and send a copy of this letter and the patient's genetic testing reports (3 reports) to the patient.    Please enclose test reports (3): \"Send outs misc test [[id number]] (Order [id number])\", \"Send outs misc test [[id number]] (Order [id number])\", and \"Fanconi Mutation Sensitivity Study [[id number]] (Order [id number]) \"  "

## 2017-05-18 NOTE — LETTER
Cancer Risk Management  Program Locations    [facility]  [facility]  Mailing Address  Cancer Risk Management Program  [facility]  [address]  [place] [address]    New patient appointments  [phone]  May 31, 2017    Family of Darek Navarro  C/O: Karla Alvarado  [address]  [place] [address]      Dear Karla,    I hope this letter finds you and your family well. I have included below a copy of the progress note summarizing our conversation, as well as copies of Darek's genetic testing reports. If you have any additional questions, please do not hesitate to call.    5/18/2017    Referring Provider: Keira Hernandez MD    Presenting Information:  I spoke to Darek's sister Karla by phone today to discuss Darek's genetic testing results, as Darek previously signed a release form allowing this information to be shared with Karla. I was informed that Darek recently passed away prior to receiving his genetic testing/screening results. As such, I called Karla in order to review the implications of his test results on Karla and Darek's other relatives.     His blood was drawn on 3/7/2017. Three genetic tests/screens were ordered at that time: 1) BRCA1/2 Analyses with the OvaNext gene panel from [employer], 2) Telomere Length Measurement Test from [employer], and 3) Fanconi Anemia Mutagen Sensitivity Study from the [facility]. 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    Genetic Testing Result: NEGATIVE/NORMAL  1) Darek is negative for mutations in the LUISA, BARD1, BRCA1, BRCA2, BRIP1, CDH1, CHEK2, DICER1, EPCAM, MLH1, MRE11A,  "MSH2, MSH6, MUTYH, NBN, NF1, PALB2, PMS2, PTEN, RAD50, RAD51C, RAD51D, SMARCA4, STK11, and TP53  genes. No mutations were found in any of the 25 genes analyzed. This test involved sequencing and deletion/duplication analysis of all genes with the exception of EPCAM (deletions/duplications only).    Testing did not detect an identifiable mutation associated with Hereditary Breast and Ovarian Cancer syndrome (BRCA1, BRCA2), Brasher syndrome (MLH1, MSH2, MSH6, PMS2, EPCAM), Hereditary Diffuse Gastric Cancer (CDH1), Cowden syndrome (PTEN), Li Fraumeni syndrome (TP53), Peutz-Jeghers syndrome (STK11), MUTYH Associated Polyposis (MAP), or Neurofibromatosis type 1 (NF1).     A copy of the test report can be found in the Laboratory tab, dated 3/7/2017, and named \"SEND OUTS MISC TEST\". The report is scanned in as a linked document.    2) Darek's Telomere Length Measurement Test was normal. All five cell lines were found to have telomere length measurements in the normal range. As such, this is not consistent with a diagnosis of dyskeratosis congenita. No additional genetic testing for dyskeratosis congenita is indicated at this time.    A copy of the test report can be found in the Laboratory tab, dated 3/7/2017, and named \"SEND OUTS MISC TEST\". The report is scanned in as a linked document.    3) Darek's Fanconi Anemia Mutagen Sensitivity Study identified \"elevated breakage in the spontaneous (baseline) condition\". Per the results interpretation, \"based on the high level of spontaneous breakage, and the constellation of abnormalities see in the spontaneous, [facility] and CAROLINE cultures, these results are not considered diagnostic of Fanconi Anemia\". These results are reportedly more consistent with an individual receiving radiation and chemotherapy. As such, this is not consistent with a diagnosis of Fanconi Anemia. No additional genetic testing for Fanconi Anemia is indicated at this time.     A copy of the test report can be found " "in the Laboratory tab, dated 3/7/2017, and named \"FANCONI MUTAGEN SENSITIVITY STUDY\".     Interpretation:  We discussed several different interpretations of these negative test result.    1. One explanation may be that there is a different gene/syndrome or combination of genes and environment that are associated with the cancers in Darek and/or his relatives.  2. It is possible that another relative, such as his mother and/or father, did have a mutation in one of the 25 genes on the OvaNext gene panel (or much less likely, had Fanconi Anemia or dyskeratosis congenita) and Darek did not inherit the same gene mutation(s). If that is the case, Darek's tongue cancer may have been a sporadic cancer caused by random cellular changes.  3. There is also a small possibility that there was a mutation in one of the 25 genes on the OvaNext gene panel and our current testing methods could not identify the mutation(s). Also, although the two tests for Fanconi Anemia and dyskeratosis congenita are the best diagnostics tests currently available for these conditions, not all individuals with these conditions will have a positive test result. As such, these normal/negative Fanconi Anemia and dyskeratosis congenita tests significantly reduce, but do not eliminate, the chance Darek had one of these conditions.       Screening:  Based on this negative test result, it is important for Darek's relatives to refer back to the family history for appropriate cancer screening.      It is possible that Janneth's other close relatives remain at some increased risk for tongue cancer, though it is difficult to estimate the exact lifetime risk. Also, no screening recommendations for tongue cancer based on family history alone are available at this time. Janneth's other close relatives are strongly encouraged to share the family history with their primary care providers and dentists, who may be able to make more " individualized screening recommendations.     Due to the family history of ovarian cancer, Karla and Darek's other close female relatives remain at slightly increased risk for ovarian cancer. We previously discussed available ovarian cancer screening (pelvic exams, CA-125 blood tests, and transvaginal ultrasounds) as well as the significant limitations of this screening. As such, this screening is not typically recommended. That being said, women in this family should discuss this screening and the signs and symptoms of ovarian cancer with their primary OB/GYN provider, as they may have individualized recommendations.      Other population cancer screening options, such as recommendations by the American Cancer Society and the National Comprehensive Cancer Network (NCCN), are appropriate for Darek's family. These screening recommendations may change if there are changes to the family history. Final screening recommendations should be made by each individual's managing physician.    Fanconi Anemia and Dyskeratosis Congenita Inheritance:  We reviewed the autosomal dominant, autosomal recessive, and X-linked recessive inheritance of mutations that cause Fanconi Anemia and dyskeratosis congenita. Given that Darek's test results are not consistent with a diagnosis of Fanconi Anemia or dyskeratosis congenita, it is very unlikely that Darek's close relatives could have either of these two conditions. No testing or screening for these conditions is currently recommended for other close relatives, unless other relatives present with symptoms of their own concerning for Fanconi Anemia or dyskeratosis congenita.    Additional Genetic Testing for Relatives:  Although Darek's OvaNext genetic testing result was negative, other relatives may still carry a gene mutation associated with hereditary ovarian, brain, and/or kidney cancer that is unrelated to Darek's history of tongue cancer. Genetic counseling is recommended for  Karla, her younger brother, and her maternal aunts and uncles to discuss their genetic testing options. Karla explained that she would be very interested in meeting with a genetic counselor to review her own genetic testing options. As such, she was provided the scheduling contact number. She is also encouraged to share my contact information with her younger brother and maternal relatives if they have any questions regarding genetic counseling and genetic testing.    Summary:  We do not have an explanation for Darek's tongue cancer. Because of that, it is important that Karla and Darek's other relatives continue with cancer screening based on their personal and family history as discussed above.    Genetic testing is rapidly advancing, and new cancer susceptibility genes will most likely be identified in the future. Therefore, I encouraged Karla to contact me annually or if there are changes in her personal or family history. This may change how we assess her and her relatives' cancer risks, screening, and the genetic testing we would offer.    Plan:  1. Karla will be mailed copies of Darek's test reports and a handout summarizing negative genetic test results (see after visit summary).  2. Karla was provided the scheduling contact number in the event either she or her relatives wish to meet with a genetic counselor.  3. Karla should contact me annually, or sooner if her family history changes.    If Karla or Darek's other relatives have any further questions, they are encouraged to contact me at [phone].    Yanique Wiggins MS, [facility]  Certified Genetic Counselor  Office: [phone]  Pager: [phone]

## 2017-05-18 NOTE — TELEPHONE ENCOUNTER
"5/18/2017    Referring Provider: Keira Hernandez MD    Presenting Information:  I spoke to Darek's sister Karla by phone today to discuss Darek's genetic testing results, as Darek previously signed a release form allowing this information to be shared with Karla. I was informed that Darek recently passed away prior to receiving his genetic testing/screening results. As such, I called Karla in order to review the implications of his test results on Karla and Darek's other relatives.     His blood was drawn on 3/7/2017. Three genetic tests/screens were ordered at that time: 1) BRCA1/2 Analyses with the OvaNext gene panel from [facility], 2) Telomere Length Measurement Test from [facility], and 3) Fanconi Anemia Mutagen Sensitivity Study from the [facility]. 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    Genetic Testing Result: NEGATIVE/NORMAL  1) Darek is negative for mutations in the LUISA, BARD1, BRCA1, BRCA2, BRIP1, CDH1, CHEK2, DICER1, EPCAM, MLH1, MRE11A, MSH2, MSH6, MUTYH, NBN, NF1, PALB2, PMS2, PTEN, RAD50, RAD51C, RAD51D, SMARCA4, STK11, and TP53  genes. No mutations were found in any of the 25 genes analyzed. This test involved sequencing and deletion/duplication analysis of all genes with the exception of EPCAM (deletions/duplications only).    Testing did not detect an identifiable mutation associated with Hereditary Breast and Ovarian Cancer syndrome (BRCA1, BRCA2), Brasher syndrome (MLH1, MSH2, MSH6, PMS2, EPCAM), Hereditary Diffuse Gastric Cancer (CDH1), Cowden syndrome (PTEN), Li Fraumeni syndrome (TP53), Peutz-Jeghers syndrome (STK11), MUTYH Associated Polyposis (MAP), or Neurofibromatosis type 1 (NF1).     A copy of the test report can be found in the Laboratory tab, dated 3/7/2017, and named \"SEND OUTS MISC TEST\". The " "report is scanned in as a linked document.    2) Darek's Telomere Length Measurement Test was normal. All five cell lines were found to have telomere length measurements in the normal range. As such, this is not consistent with a diagnosis of dyskeratosis congenita. No additional genetic testing for dyskeratosis congenita is indicated at this time.    A copy of the test report can be found in the Laboratory tab, dated 3/7/2017, and named \"SEND OUTS MISC TEST\". The report is scanned in as a linked document.    3) Darek's Fanconi Anemia Mutagen Sensitivity Study identified \"elevated breakage in the spontaneous (baseline) condition\". Per the results interpretation, \"based on the high level of spontaneous breakage, and the constellation of abnormalities see in the spontaneous, intermediate and CAROLINE cultures, these results are not considered diagnostic of Fanconi Anemia\". These results are reportedly more consistent with an individual receiving radiation and chemotherapy. As such, this is not consistent with a diagnosis of Fanconi Anemia. No additional genetic testing for Fanconi Anemia is indicated at this time.     A copy of the test report can be found in the Laboratory tab, dated 3/7/2017, and named \"FANCONI MUTAGEN SENSITIVITY STUDY\".     Interpretation:  We discussed several different interpretations of these negative test result.    1. One explanation may be that there is a different gene/syndrome or combination of genes and environment that are associated with the cancers in Darek and/or his relatives.  2. It is possible that another relative, such as his mother and/or father, did have a mutation in one of the 25 genes on the [facility] gene panel (or much less likely, had Fanconi Anemia or dyskeratosis congenita) and Darek did not inherit the same gene mutation(s). If that is the case, Darek's tongue cancer may have been a sporadic cancer caused by random cellular changes.  3. There is also a small possibility that " there was a mutation in one of the 25 genes on the OvaNext gene panel and our current testing methods could not identify the mutation(s). Also, although the two tests for Fanconi Anemia and dyskeratosis congenita are the best diagnostics tests currently available for these conditions, not all individuals with these conditions will have a positive test result. As such, these normal/negative Fanconi Anemia and dyskeratosis congenita tests significantly reduce, but do not eliminate, the chance Darek had one of these conditions.       Screening:  Based on this negative test result, it is important for Darek's relatives to refer back to the family history for appropriate cancer screening.      It is possible that Afshans other close relatives remain at some increased risk for tongue cancer, though it is difficult to estimate the exact lifetime risk. Also, no screening recommendations for tongue cancer based on family history alone are available at this time. Afshans other close relatives are strongly encouraged to share the family history with their primary care providers and dentists, who may be able to make more individualized screening recommendations.     Due to the family history of ovarian cancer, Afshans other close female relatives remain at slightly increased risk for ovarian cancer. We previously discussed available ovarian cancer screening (pelvic exams, CA-125 blood tests, and transvaginal ultrasounds) as well as the significant limitations of this screening. As such, this screening is not typically recommended. That being said, women in this family should discuss this screening and the signs and symptoms of ovarian cancer with their primary OB/GYN provider, as they may have individualized recommendations.      Other population cancer screening options, such as recommendations by the American Cancer Society and the National Comprehensive Cancer Network (NCCN), are  appropriate for Darek's family. These screening recommendations may change if there are changes to the family history. Final screening recommendations should be made by each individual's managing physician.    Fanconi Anemia and Dyskeratosis Congenita Inheritance:  We reviewed the autosomal dominant, autosomal recessive, and X-linked recessive inheritance of mutations that cause Fanconi Anemia and dyskeratosis congenita. Given that Darek's test results are not consistent with a diagnosis of Fanconi Anemia or dyskeratosis congenita, it is very unlikely that Darek's close relatives could have either of these two conditions. No testing or screening for these conditions is currently recommended for other close relatives, unless other relatives present with symptoms of their own concerning for Fanconi Anemia or dyskeratosis congenita.    Additional Genetic Testing for Relatives:  Although Darek's OvaNext genetic testing result was negative, other relatives may still carry a gene mutation associated with hereditary ovarian, brain, and/or kidney cancer that is unrelated to Darek's history of tongue cancer. Genetic counseling is recommended for Karla, her younger brother, and her maternal aunts and uncles to discuss their genetic testing options. Karla explained that she would be very interested in meeting with a genetic counselor to review her own genetic testing options. As such, she was provided the scheduling contact number. She is also encouraged to share my contact information with her younger brother and maternal relatives if they have any questions regarding genetic counseling and genetic testing.    Summary:  We do not have an explanation for Darek's tongue cancer. Because of that, it is important that Karla and Darek's other relatives continue with cancer screening based on their personal and family history as discussed above.    Genetic testing is rapidly advancing, and new cancer susceptibility genes  will most likely be identified in the future. Therefore, I encouraged Karla to contact me annually or if there are changes in her personal or family history. This may change how we assess her and her relatives' cancer risks, screening, and the genetic testing we would offer.    Plan:  1. Karla will be mailed copies of Darek's test reports and a handout summarizing negative genetic test results (see after visit summary).  2. Karla was provided the scheduling contact number in the event either she or her relatives wish to meet with a genetic counselor.  3. Karla should contact me annually, or sooner if her family history changes.    If Karla or Darek's other relatives have any further questions, they are encouraged to contact me at [phone].    Yanique Wiggins MS, [facility]  Certified Genetic Counselor  Office: [phone]  Pager: [phone]

## 2017-05-30 ENCOUNTER — CARE COORDINATION (OUTPATIENT)
Dept: ONCOLOGY | Facility: CLINIC | Age: 27
End: 2017-05-30

## 2024-11-18 NOTE — PROGRESS NOTES
Infusion Nursing Note:  Darek Navarro presents today for Cycle 2 Day 1 Erbitux, Carboplatin, Fluorouracil pump connect  Patient seen by provider today: Yes: Dr. Hernandez   present during visit today: Not Applicable.    Note: lab reported that pt's port did not have a consistent blood flow during lab draw today. Lab placed Atleplase to port and sent pt to appointment with Dr. Hernandez. This RN obtained brisk, positive blood return when pt arrived to infusion room. Alteplase 2cc withdrawn from port. Port continued to have positive blood return throughout infusion.     Note that Zometa was NOT given today. It was released in error. Pt will be due for Zometa next week (2/14) with Erbitux.     Intravenous Access:  Implanted Port.    Treatment Conditions:  HGB     10.0   2/7/2017  WBC      3.0   2/7/2017   ANEU      2.0   2/7/2017  PLT      225   2/7/2017     NA      137   2/7/2017                POTASSIUM      4.8   2/7/2017        MAG      2.1   2/7/2017         CR     0.71   2/7/2017                COLE      9.3   2/7/2017             BILITOTAL      0.4   2/7/2017        ALBUMIN      3.4   2/7/2017                 ALT       28   2/7/2017        AST       19   2/7/2017  Results reviewed, labs MET treatment parameters, ok to proceed with treatment.      Post Infusion Assessment:  Patient tolerated infusion without incident.  Patient observed for 30 minutes post Erbitux per protocol.  Blood return noted pre and post infusion.  Site patent and intact, free from redness, edema or discomfort.  No evidence of extravasations.  Port left intact with Fluorouracil pump infusing. All connections taped and double checked with Ariadna Emanuel RN. [facility] RN (Disha) is aware to disconnect pt's pump at home on 2/11 at 1345.     Discharge Plan:   Several prescriptions filled today per Dr. Hernandez.  Copy of AVS reviewed with patient and/or family.  Patient will return 2/14 for next appointment.  Patient discharged in stable condition  accompanied by: family.  Departure Mode: Ambulatory.    ARTI GUZMAN RN                         Bisexual

## (undated) RX ORDER — SODIUM CHLORIDE 9 MG/ML
INJECTION, SOLUTION INTRAVENOUS
Status: DISPENSED
Start: 2017-01-01

## (undated) RX ORDER — CEFAZOLIN SODIUM 2 G/100ML
INJECTION, SOLUTION INTRAVENOUS
Status: DISPENSED
Start: 2017-01-01

## (undated) RX ORDER — FENTANYL CITRATE 50 UG/ML
INJECTION, SOLUTION INTRAMUSCULAR; INTRAVENOUS
Status: DISPENSED
Start: 2017-01-01